# Patient Record
Sex: FEMALE | Race: WHITE | NOT HISPANIC OR LATINO | Employment: OTHER | URBAN - METROPOLITAN AREA
[De-identification: names, ages, dates, MRNs, and addresses within clinical notes are randomized per-mention and may not be internally consistent; named-entity substitution may affect disease eponyms.]

---

## 2017-01-04 ENCOUNTER — OFFICE VISIT (OUTPATIENT)
Dept: OPTOMETRY | Facility: CLINIC | Age: 72
End: 2017-01-04
Payer: MEDICARE

## 2017-01-04 ENCOUNTER — CLINICAL SUPPORT (OUTPATIENT)
Dept: OPHTHALMOLOGY | Facility: CLINIC | Age: 72
End: 2017-01-04
Payer: MEDICARE

## 2017-01-04 ENCOUNTER — HOSPITAL ENCOUNTER (OUTPATIENT)
Dept: RADIOLOGY | Facility: HOSPITAL | Age: 72
Discharge: HOME | End: 2017-01-04
Attending: INTERNAL MEDICINE
Payer: MEDICARE

## 2017-01-04 DIAGNOSIS — H53.462 LEFT HOMONYMOUS HEMIANOPSIA: ICD-10-CM

## 2017-01-04 DIAGNOSIS — H52.4 PRESBYOPIA OU: ICD-10-CM

## 2017-01-04 DIAGNOSIS — E11.9 TYPE 2 DIABETES MELLITUS WITHOUT RETINOPATHY: Primary | ICD-10-CM

## 2017-01-04 DIAGNOSIS — Z12.31 ENCOUNTER FOR SCREENING MAMMOGRAM FOR MALIGNANT NEOPLASM OF BREAST: ICD-10-CM

## 2017-01-04 DIAGNOSIS — Z13.5 SCREENING FOR GLAUCOMA: ICD-10-CM

## 2017-01-04 PROCEDURE — 77067 SCR MAMMO BI INCL CAD: CPT | Mod: 26,,, | Performed by: RADIOLOGY

## 2017-01-04 PROCEDURE — 92015 DETERMINE REFRACTIVE STATE: CPT | Mod: S$GLB,,, | Performed by: OPTOMETRIST

## 2017-01-04 PROCEDURE — 92014 COMPRE OPH EXAM EST PT 1/>: CPT | Mod: S$GLB,,, | Performed by: OPTOMETRIST

## 2017-01-04 PROCEDURE — 92083 EXTENDED VISUAL FIELD XM: CPT | Mod: S$GLB,,, | Performed by: OPTOMETRIST

## 2017-01-04 PROCEDURE — 99999 PR PBB SHADOW E&M-EST. PATIENT-LVL II: CPT | Mod: PBBFAC,,, | Performed by: OPTOMETRIST

## 2017-01-04 NOTE — PROGRESS NOTES
HPI     DLS: 12/29/2015  Pt states no noticeable va changes   Denies f/f    Refresh ou PRN     LBS= 125 this morning   Hemoglobin A1C       Date                     Value               Ref Range             Status                11/28/2016               7.7 (H)             4.5 - 6.2 %           Final                  11/16/2016               7.8 (H)             4.5 - 6.2 %           Final                  07/21/2016               7.3 (H)             4.5 - 6.2 %           Final              ----------         Last edited by Brooklyn Benton on 1/4/2017  1:40 PM.     ROS     Positive for: Endocrine, Cardiovascular, Eyes    Negative for: Constitutional, Gastrointestinal, Neurological, Skin,   Genitourinary, Musculoskeletal, HENT, Respiratory, Psychiatric,   Allergic/Imm, Heme/Lymph    Last edited by Mayco Correa, OD on 1/4/2017  1:54 PM. (History)        Assessment /Plan     For exam results, see Encounter Report.    Type 2 diabetes mellitus without retinopathy    Left homonymous hemianopsia - Both Eyes    Screening for glaucoma    Presbyopia OU      1. Sp pciol/YAG OU--pt wishes new Rx  2. Incomplete left homom hemianopsia sp stroke/pit hx.  VF stable  3. DM--without retinopathy.  Advised yearly exam       Plan:    rtc 1 yr--repeat HVF 24-2 sf in 2 years

## 2017-01-04 NOTE — PROGRESS NOTES
f done-[facility]    Assessment /Plan     For exam results, see Encounter Report.    Left homonymous hemianopsia - Both Eyes  -     [place]man Visual Field - OU - Extended - Both Eyes

## 2017-01-24 DIAGNOSIS — E11.69 DYSLIPIDEMIA ASSOCIATED WITH TYPE 2 DIABETES MELLITUS: ICD-10-CM

## 2017-01-24 DIAGNOSIS — E78.5 DYSLIPIDEMIA ASSOCIATED WITH TYPE 2 DIABETES MELLITUS: ICD-10-CM

## 2017-01-24 DIAGNOSIS — E11.40 TYPE 2 DIABETES MELLITUS WITH DIABETIC NEUROPATHY: ICD-10-CM

## 2017-01-24 DIAGNOSIS — I10 ESSENTIAL HYPERTENSION: ICD-10-CM

## 2017-01-24 RX ORDER — HYDROCHLOROTHIAZIDE 12.5 MG/1
TABLET ORAL
Qty: 30 TABLET | Refills: 0 | Status: SHIPPED | OUTPATIENT
Start: 2017-01-24 | End: 2017-04-12 | Stop reason: SDUPTHER

## 2017-01-27 DIAGNOSIS — E11.40 TYPE 2 DIABETES MELLITUS WITH DIABETIC NEUROPATHY: ICD-10-CM

## 2017-01-27 DIAGNOSIS — I15.2 HYPERTENSION ASSOCIATED WITH DIABETES: Chronic | ICD-10-CM

## 2017-01-27 DIAGNOSIS — E78.5 DYSLIPIDEMIA ASSOCIATED WITH TYPE 2 DIABETES MELLITUS: ICD-10-CM

## 2017-01-27 DIAGNOSIS — E11.69 DYSLIPIDEMIA ASSOCIATED WITH TYPE 2 DIABETES MELLITUS: ICD-10-CM

## 2017-01-27 DIAGNOSIS — I10 ESSENTIAL HYPERTENSION: ICD-10-CM

## 2017-01-27 DIAGNOSIS — E11.59 HYPERTENSION ASSOCIATED WITH DIABETES: Chronic | ICD-10-CM

## 2017-01-27 RX ORDER — HYDROCHLOROTHIAZIDE 12.5 MG/1
TABLET ORAL
Qty: 30 TABLET | Refills: 0 | Status: SHIPPED | OUTPATIENT
Start: 2017-01-27 | End: 2017-03-06 | Stop reason: SDUPTHER

## 2017-01-29 RX ORDER — LISINOPRIL 40 MG/1
TABLET ORAL
Qty: 90 TABLET | Refills: 3 | Status: SHIPPED | OUTPATIENT
Start: 2017-01-29 | End: 2018-02-10 | Stop reason: SDUPTHER

## 2017-01-30 DIAGNOSIS — E78.5 DYSLIPIDEMIA ASSOCIATED WITH TYPE 2 DIABETES MELLITUS: Primary | ICD-10-CM

## 2017-01-30 DIAGNOSIS — E11.69 DYSLIPIDEMIA ASSOCIATED WITH TYPE 2 DIABETES MELLITUS: Primary | ICD-10-CM

## 2017-01-30 RX ORDER — ROSUVASTATIN CALCIUM 10 MG/1
10 TABLET, COATED ORAL DAILY
Qty: 90 TABLET | Refills: 3 | Status: SHIPPED | OUTPATIENT
Start: 2017-01-30 | End: 2018-04-09 | Stop reason: SDUPTHER

## 2017-01-30 RX ORDER — ROSUVASTATIN CALCIUM 10 MG/1
10 TABLET, COATED ORAL DAILY
COMMUNITY
End: 2017-01-30 | Stop reason: SDUPTHER

## 2017-02-06 RX ORDER — FENOFIBRATE 48 MG/1
TABLET, FILM COATED ORAL
Qty: 90 TABLET | Refills: 3 | Status: SHIPPED | OUTPATIENT
Start: 2017-02-06 | End: 2018-03-11 | Stop reason: SDUPTHER

## 2017-02-06 NOTE — TELEPHONE ENCOUNTER
Received incoming fax from [employer] stating that the relion test strips are not covered under the pt's insurance can it be changed to Accu check luisito test strips. Order pending pls advise.

## 2017-03-01 ENCOUNTER — LAB VISIT (OUTPATIENT)
Dept: LAB | Facility: HOSPITAL | Age: 72
End: 2017-03-01
Attending: INTERNAL MEDICINE
Payer: MEDICARE

## 2017-03-01 DIAGNOSIS — E11.51 DIABETES MELLITUS WITH PERIPHERAL VASCULAR DISEASE: ICD-10-CM

## 2017-03-01 PROCEDURE — 83036 HEMOGLOBIN GLYCOSYLATED A1C: CPT

## 2017-03-01 PROCEDURE — 36415 COLL VENOUS BLD VENIPUNCTURE: CPT | Mod: PO

## 2017-03-02 ENCOUNTER — TELEPHONE (OUTPATIENT)
Dept: INTERNAL MEDICINE | Facility: CLINIC | Age: 72
End: 2017-03-02

## 2017-03-02 LAB
ESTIMATED AVG GLUCOSE: 186 MG/DL
HBA1C MFR BLD HPLC: 8.1 %

## 2017-03-03 NOTE — TELEPHONE ENCOUNTER
Spoke with pt, provided her with the number to call and schedule appt b/c her daughter schedules the appt.

## 2017-03-06 DIAGNOSIS — E78.5 DYSLIPIDEMIA ASSOCIATED WITH TYPE 2 DIABETES MELLITUS: ICD-10-CM

## 2017-03-06 DIAGNOSIS — E11.69 DYSLIPIDEMIA ASSOCIATED WITH TYPE 2 DIABETES MELLITUS: ICD-10-CM

## 2017-03-06 DIAGNOSIS — E11.40 TYPE 2 DIABETES MELLITUS WITH DIABETIC NEUROPATHY: ICD-10-CM

## 2017-03-06 DIAGNOSIS — I10 ESSENTIAL HYPERTENSION: ICD-10-CM

## 2017-03-07 RX ORDER — HYDROCHLOROTHIAZIDE 12.5 MG/1
TABLET ORAL
Qty: 30 TABLET | Refills: 0 | Status: SHIPPED | OUTPATIENT
Start: 2017-03-07 | End: 2017-04-12 | Stop reason: SDUPTHER

## 2017-03-08 ENCOUNTER — OFFICE VISIT (OUTPATIENT)
Dept: PODIATRY | Facility: CLINIC | Age: 72
End: 2017-03-08
Payer: MEDICARE

## 2017-03-08 VITALS
SYSTOLIC BLOOD PRESSURE: 163 MMHG | HEART RATE: 62 BPM | DIASTOLIC BLOOD PRESSURE: 65 MMHG | HEIGHT: 59 IN | BODY MASS INDEX: 27.01 KG/M2 | WEIGHT: 134 LBS

## 2017-03-08 DIAGNOSIS — L84 CORN OR CALLUS: ICD-10-CM

## 2017-03-08 DIAGNOSIS — E11.49 TYPE II DIABETES MELLITUS WITH NEUROLOGICAL MANIFESTATIONS: Primary | ICD-10-CM

## 2017-03-08 DIAGNOSIS — B35.1 ONYCHOMYCOSIS DUE TO DERMATOPHYTE: ICD-10-CM

## 2017-03-08 PROCEDURE — 99999 PR PBB SHADOW E&M-EST. PATIENT-LVL II: CPT | Mod: PBBFAC,,, | Performed by: PODIATRIST

## 2017-03-08 PROCEDURE — 99499 UNLISTED E&M SERVICE: CPT | Mod: S$GLB,,, | Performed by: PODIATRIST

## 2017-03-08 PROCEDURE — 11721 DEBRIDE NAIL 6 OR MORE: CPT | Mod: Q9,59,S$GLB, | Performed by: PODIATRIST

## 2017-03-08 PROCEDURE — 11056 PARNG/CUTG B9 HYPRKR LES 2-4: CPT | Mod: Q9,S$GLB,, | Performed by: PODIATRIST

## 2017-03-08 NOTE — PROGRESS NOTES
Subjective:      Patient ID: Marlen Arndt is a 71 y.o. female.    Chief Complaint: No chief complaint on file.    Marlen is a 71 y.o. female who presents to the clinic for evaluation and treatment of high risk feet. 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 The patient's chief complaint is long, thick toenails .   This patient has documented high risk feet requiring routine maintenance secondary to diabetes mellitis and those secondary complications of diabetes, as mentioned..    PCP: Dominique Rendon MD  11/30/16  Date Last Seen by PCP:  No chief complaint on file.      Current shoe gear:  sandals    Hemoglobin A1C   Date Value Ref Range Status   03/01/2017 8.1 (H) 4.5 - 6.2 % Final     Comment:     According to ADA guidelines, hemoglobin A1C <7.0% represents  optimal control in non-pregnant diabetic patients.  Different  metrics may apply to specific populations.   Standards of Medical Care in Diabetes - 2016.  For the purpose of screening for the presence of diabetes:  <5.7%     Consistent with the absence of diabetes  5.7-6.4%  Consistent with increasing risk for diabetes   (prediabetes)  >or=6.5%  Consistent with diabetes  Currently no consensus exists for use of hemoglobin A1C  for diagnosis of diabetes for  children.     11/28/2016 7.7 (H) 4.5 - 6.2 % Final     Comment:     According to ADA guidelines, hemoglobin A1C <7.0% represents  optimal control in non-pregnant diabetic patients.  Different  metrics may apply to specific populations.   Standards of Medical Care in Diabetes - 2016.  For the purpose of screening for the presence of diabetes:  <5.7%     Consistent with the absence of diabetes  5.7-6.4%  Consistent with increasing risk for diabetes   (prediabetes)  >or=6.5%  Consistent with diabetes  Currently no consensus exists for use of hemoglobin A1C  for diagnosis of diabetes for children.     11/16/2016 7.8 (H) 4.5 - 6.2 % Final     Comment:     According to ADA guidelines, hemoglobin A1C <7.0% represents  optimal control in non-pregnant diabetic patients.  Different  metrics may apply to specific populations.   Standards of Medical Care in Diabetes - 2016.  For the purpose of screening for the presence of diabetes:  <5.7%     Consistent with the absence of diabetes  5.7-6.4%  Consistent with increasing risk for diabetes   (prediabetes)  >or=6.5%  Consistent with diabetes  Currently no consensus exists for use of hemoglobin A1C  for diagnosis of diabetes for children.         Review of Systems   Constitution: Negative for chills, decreased appetite and fever.   Cardiovascular: Negative for chest pain, claudication and leg swelling.   Respiratory: Negative for cough.    Skin: Positive for dry skin and nail changes. Negative for flushing, itching and poor wound healing.   Musculoskeletal: Negative for arthritis, back pain, falls, gout, joint swelling and myalgias.   Gastrointestinal: Negative for nausea and vomiting.   Neurological: Positive for numbness. Negative for loss of balance and paresthesias.           Objective:      Physical Exam   Constitutional: She is oriented to person, place, and time. She appears well-developed and well-nourished. No distress.   Cardiovascular:   Pulses:       Dorsalis pedis  pulses are 1+ on the right side, and 1+ on the left side.        Posterior tibial pulses are 1+ on the right side, and 1+ on the left side.   Toes are cool to touch. Feet are warm proximally.There is decreased digital hair. Skin is atrophic, slightly hyperpigmented, and mildly edematous       Musculoskeletal: Normal range of motion. She exhibits no edema or tenderness.   Adequate joint range of motion without pain, limitation, nor crepitation Bilateral feet and ankle joints. Muscle strength is 5/5 in all groups bilaterally.         Neurological: She is alert and oriented to person, place, and time.   [place] 5.07 monofilamant testing is diminished Carlos feet. Sharp/dull sensation diminished Bilaterally. Light touch absent Bilaterally.       Skin: Skin is warm, dry and intact. No abrasion, no bruising, no burn, no ecchymosis and no lesion noted. No erythema.   Nails x 10  are elongated by  2-7mm's, thickened by 2-4 mm's, dystrophic, and are darkened in  coloration . Xerosis Bilaterally. No open lesions noted.    Hyperkeratotic tissue noted to sub 1st mpj left and plantar medial 1st hallux IPJ.    Psychiatric: She has a normal mood and affect. Her behavior is normal.   Nursing note and vitals reviewed.            Assessment:       Encounter Diagnoses   Name Primary?    Type II diabetes mellitus with neurological manifestations Yes    Corn or callus     Onychomycosis due to dermatophyte          Plan:       Diagnoses and all orders for this visit:    Type II diabetes mellitus with neurological manifestations    Corn or callus    Onychomycosis due to dermatophyte      I counseled the patient on her conditions, their implications and medical management.        - Shoe inspection. Diabetic Foot Education. Patient reminded of the importance of good nutrition and blood sugar control to help prevent podiatric complications of diabetes. Patient instructed on proper foot hygeine. We discussed wearing proper shoe gear,  daily foot inspections, never walking without protective shoe gear, never putting sharp instruments to feet, routine podiatric nail visits every 2-3 months.      - With patient's permission, nails were aggressively reduced and debrided x 10 to their soft tissue attachment mechanically and with electric , removing all offending nail and debris. Patient relates relief following the procedure. She will continue to monitor the areas daily, inspect her feet, wear protective shoe gear when ambulatory, moisturizer to maintain skin integrity and follow in this office in approximately 2-3 months, sooner p.r.n.    - After cleansing the  area w/ alcohol prep pad the above mentioned hyperkeratosis was trimmed utilizing No 15 scapel, to a smooth base with out incident. Patient tolerated this  well and reported comfort to the area of sub 1st MPJ left and left hallux plantar medial IPJ.

## 2017-03-27 ENCOUNTER — TELEPHONE (OUTPATIENT)
Dept: CARDIOLOGY | Facility: CLINIC | Age: 72
End: 2017-03-27

## 2017-03-27 ENCOUNTER — TELEPHONE (OUTPATIENT)
Dept: INTERNAL MEDICINE | Facility: CLINIC | Age: 72
End: 2017-03-27

## 2017-03-27 NOTE — TELEPHONE ENCOUNTER
----- Message from Hayden King MA sent at 3/27/2017  2:22 PM CDT -----  Contact: self - [phone]   Patient is requesting to speak with Dr Rendon regarding a Jury Duty letter. Please call. Thanks!

## 2017-03-27 NOTE — TELEPHONE ENCOUNTER
Pt would like to know if you can write her a letter stating that she should not be on jury duty 4/26/17. Pt stated that she has had several strokes and does not think she would be able to be on jury duty. Pt stated that you wrote a letter for her years ago so that she wouldn't have to be on jury duty. Please advise.

## 2017-03-27 NOTE — TELEPHONE ENCOUNTER
----- Message from Mona Long sent at 3/27/2017 11:04 AM CDT -----  Contact: self   Pt has requested a call to get information NOT to attend jury duty. Pt call back number is [phone].        Thanks

## 2017-03-27 NOTE — LETTER
[facility]  [address]  [place] [address]  Phone: [phone]  Fax: [phone] March 29, 2017    Marlen Arndt  [address] [place] [address]      To Whom It May Concern:    Marlen Arndt is unable to participate in jury duty due to chronic medical illness.  She will never be able to serve as a juror.    If you have any questions or concerns, please feel free to call my office.    Sincerely,        Dominique Rendon MD

## 2017-03-28 NOTE — TELEPHONE ENCOUNTER
Spoke with pt, she stated she has memory problems and forgets a lot the provider is aware of that.

## 2017-03-29 ENCOUNTER — TELEPHONE (OUTPATIENT)
Dept: INTERNAL MEDICINE | Facility: CLINIC | Age: 72
End: 2017-03-29

## 2017-03-29 NOTE — TELEPHONE ENCOUNTER
----- Message from Jade Manuel sent at 3/29/2017  1:33 PM CDT -----  Contact: Son/Alexi/[phone] home/[phone] cell  Pt's son said that he is calling in regards to needing to get a letter excusing pt from jury duty he stated that pt has had several strokes and has memory problems also pt does not drive so he is asking if the doctor could write a letter for the pt. Please call and advise            Thank you

## 2017-04-10 NOTE — TELEPHONE ENCOUNTER
----- Message from Daniel London sent at 4/10/2017 12:20 PM CDT -----  Contact: [facility] at [phone]   Pharmacy said pt's script blood sugar diagnostic (ACCU-CHEK TYLER) Strp is not covered by insurance.    Pharmacy is requesting New Scripts for pt's meter, test strips and lancets:  [employer] brands are covered by pt's insurance.

## 2017-04-11 RX ORDER — BLOOD-GLUCOSE CONTROL, NORMAL
2 EACH MISCELLANEOUS 2 TIMES DAILY
Qty: 100 EACH | Refills: 3 | Status: SHIPPED | OUTPATIENT
Start: 2017-04-11 | End: 2017-05-26

## 2017-04-11 RX ORDER — DEXTROSE 4 G
2 TABLET,CHEWABLE ORAL 2 TIMES DAILY
Qty: 1 EACH | Refills: 0 | Status: SHIPPED | OUTPATIENT
Start: 2017-04-11 | End: 2017-06-27 | Stop reason: SDUPTHER

## 2017-04-11 NOTE — TELEPHONE ENCOUNTER
----- Message from Omid Humphries sent at 4/11/2017  9:32 AM CDT -----  Contact: Lisa [phone]  Pharmacy is requesting New Scripts for pt's meter, test strips and lancets:  [employer] brands are covered by pt's insurance. Please advise, Thanks !

## 2017-04-12 ENCOUNTER — TELEPHONE (OUTPATIENT)
Dept: INTERNAL MEDICINE | Facility: CLINIC | Age: 72
End: 2017-04-12

## 2017-04-12 ENCOUNTER — OFFICE VISIT (OUTPATIENT)
Dept: INTERNAL MEDICINE | Facility: CLINIC | Age: 72
End: 2017-04-12
Payer: MEDICARE

## 2017-04-12 VITALS
HEIGHT: 59 IN | OXYGEN SATURATION: 96 % | HEART RATE: 61 BPM | SYSTOLIC BLOOD PRESSURE: 130 MMHG | BODY MASS INDEX: 27.29 KG/M2 | WEIGHT: 135.38 LBS | DIASTOLIC BLOOD PRESSURE: 51 MMHG

## 2017-04-12 DIAGNOSIS — E11.69 TYPE 2 DIABETES MELLITUS WITH HYPERLIPIDEMIA: ICD-10-CM

## 2017-04-12 DIAGNOSIS — J32.9 BACTERIAL SINUSITIS: ICD-10-CM

## 2017-04-12 DIAGNOSIS — E11.51 DIABETES MELLITUS WITH PERIPHERAL VASCULAR DISEASE: Chronic | ICD-10-CM

## 2017-04-12 DIAGNOSIS — E78.5 DYSLIPIDEMIA ASSOCIATED WITH TYPE 2 DIABETES MELLITUS: Primary | ICD-10-CM

## 2017-04-12 DIAGNOSIS — B96.89 BACTERIAL SINUSITIS: ICD-10-CM

## 2017-04-12 DIAGNOSIS — E11.40 TYPE 2 DIABETES MELLITUS WITH DIABETIC NEUROPATHY, WITH LONG-TERM CURRENT USE OF INSULIN: ICD-10-CM

## 2017-04-12 DIAGNOSIS — I10 ESSENTIAL HYPERTENSION: ICD-10-CM

## 2017-04-12 DIAGNOSIS — E11.69 DYSLIPIDEMIA ASSOCIATED WITH TYPE 2 DIABETES MELLITUS: Primary | ICD-10-CM

## 2017-04-12 DIAGNOSIS — E78.5 TYPE 2 DIABETES MELLITUS WITH HYPERLIPIDEMIA: ICD-10-CM

## 2017-04-12 DIAGNOSIS — Z79.4 TYPE 2 DIABETES MELLITUS WITH DIABETIC NEUROPATHY, WITH LONG-TERM CURRENT USE OF INSULIN: ICD-10-CM

## 2017-04-12 PROCEDURE — 99999 PR PBB SHADOW E&M-EST. PATIENT-LVL V: CPT | Mod: PBBFAC,,, | Performed by: INTERNAL MEDICINE

## 2017-04-12 PROCEDURE — 3045F PR MOST RECENT HEMOGLOBIN A1C LEVEL 7.0-9.0%: CPT | Mod: S$GLB,,, | Performed by: INTERNAL MEDICINE

## 2017-04-12 PROCEDURE — 4010F ACE/ARB THERAPY RXD/TAKEN: CPT | Mod: S$GLB,,, | Performed by: INTERNAL MEDICINE

## 2017-04-12 PROCEDURE — 99214 OFFICE O/P EST MOD 30 MIN: CPT | Mod: S$GLB,,, | Performed by: INTERNAL MEDICINE

## 2017-04-12 PROCEDURE — 1159F MED LIST DOCD IN RCRD: CPT | Mod: S$GLB,,, | Performed by: INTERNAL MEDICINE

## 2017-04-12 PROCEDURE — 3078F DIAST BP <80 MM HG: CPT | Mod: S$GLB,,, | Performed by: INTERNAL MEDICINE

## 2017-04-12 PROCEDURE — 1160F RVW MEDS BY RX/DR IN RCRD: CPT | Mod: S$GLB,,, | Performed by: INTERNAL MEDICINE

## 2017-04-12 PROCEDURE — 1157F ADVNC CARE PLAN IN RCRD: CPT | Mod: S$GLB,,, | Performed by: INTERNAL MEDICINE

## 2017-04-12 PROCEDURE — 3075F SYST BP GE 130 - 139MM HG: CPT | Mod: S$GLB,,, | Performed by: INTERNAL MEDICINE

## 2017-04-12 PROCEDURE — 99499 UNLISTED E&M SERVICE: CPT | Mod: S$GLB,,, | Performed by: INTERNAL MEDICINE

## 2017-04-12 PROCEDURE — 1126F AMNT PAIN NOTED NONE PRSNT: CPT | Mod: S$GLB,,, | Performed by: INTERNAL MEDICINE

## 2017-04-12 RX ORDER — FLUTICASONE PROPIONATE 50 MCG
2 SPRAY, SUSPENSION (ML) NASAL DAILY
Qty: 16 G | Refills: 12 | Status: SHIPPED | OUTPATIENT
Start: 2017-04-12 | End: 2017-05-12

## 2017-04-12 RX ORDER — HYDROCHLOROTHIAZIDE 12.5 MG/1
12.5 TABLET ORAL DAILY
Qty: 90 TABLET | Refills: 3 | Status: SHIPPED | OUTPATIENT
Start: 2017-04-12 | End: 2018-01-29

## 2017-04-12 RX ORDER — DOXYCYCLINE 100 MG/1
100 CAPSULE ORAL 2 TIMES DAILY
Qty: 20 CAPSULE | Refills: 0 | Status: SHIPPED | OUTPATIENT
Start: 2017-04-12 | End: 2017-05-15 | Stop reason: SDUPTHER

## 2017-04-12 NOTE — TELEPHONE ENCOUNTER
----- Message from Gladys Rodriguez sent at 4/12/2017 10:04 AM CDT -----  Contact: [facility]  [facility] pharmacy is calling to clarify the prescription for the lancets and the strips.  It was written as use two twice a day, which is unusual.      [address] [place] [place] [address] [phone] (Phone) [phone] (Fax)    Thanks!

## 2017-04-13 ENCOUNTER — TELEPHONE (OUTPATIENT)
Dept: INTERNAL MEDICINE | Facility: CLINIC | Age: 72
End: 2017-04-13

## 2017-05-09 ENCOUNTER — TELEPHONE (OUTPATIENT)
Dept: INTERNAL MEDICINE | Facility: CLINIC | Age: 72
End: 2017-05-09

## 2017-05-15 RX ORDER — DOXYCYCLINE 100 MG/1
CAPSULE ORAL
Qty: 20 CAPSULE | Refills: 0 | Status: SHIPPED | OUTPATIENT
Start: 2017-05-15 | End: 2017-07-03 | Stop reason: ALTCHOICE

## 2017-05-15 NOTE — TELEPHONE ENCOUNTER
Pt stated that she is still coughing without relief for nearly 1 month. Pt stated that this is just a continuation of her previous illness (bacterial sinusitis), and she felt as though she needed to extend the amount of time on the Doxy. She has used cough syrup (Tussin DM) without relief. Pt denies fever. Please advise.

## 2017-05-17 ENCOUNTER — CLINICAL SUPPORT (OUTPATIENT)
Dept: DIABETES | Facility: CLINIC | Age: 72
End: 2017-05-17
Payer: MEDICARE

## 2017-05-17 DIAGNOSIS — Z79.4 UNCONTROLLED TYPE 2 DIABETES MELLITUS WITH HYPERGLYCEMIA, WITH LONG-TERM CURRENT USE OF INSULIN: Primary | ICD-10-CM

## 2017-05-17 DIAGNOSIS — Z79.4 TYPE 2 DIABETES MELLITUS WITH DIABETIC NEUROPATHY, WITH LONG-TERM CURRENT USE OF INSULIN: ICD-10-CM

## 2017-05-17 DIAGNOSIS — E11.65 UNCONTROLLED TYPE 2 DIABETES MELLITUS WITH HYPERGLYCEMIA, WITH LONG-TERM CURRENT USE OF INSULIN: Primary | ICD-10-CM

## 2017-05-17 DIAGNOSIS — E11.40 TYPE 2 DIABETES MELLITUS WITH DIABETIC NEUROPATHY, WITH LONG-TERM CURRENT USE OF INSULIN: ICD-10-CM

## 2017-05-17 PROCEDURE — G0108 DIAB MANAGE TRN  PER INDIV: HCPCS | Mod: S$GLB,,, | Performed by: DIETITIAN, REGISTERED

## 2017-05-17 NOTE — PROGRESS NOTES
"Diabetes Education  Author: Nona Rabago RD  Date: 5/17/2017    Diabetes Education Visit  Diabetes Education Record Assessment/Progress: Initial    Diabetes Type  Diabetes Type : Type II    Diabetes History  Diabetes Diagnosis: >10 years    Nutrition  Meal Planning: skipping meals, water, diet drinks, artificial sweeteners    Monitoring   Self Monitoring : reports SMBG 4 times daily - brought log but only 2x/day written down - am fasting ranging 110s-200s and HS ranging 90s-160s  Blood Glucose Logs: Yes    Exercise   Exercise Type:  (not currently exercising)    Current Diabetes Treatment   Current Treatment: Insulin (Novolog 3-5 units before each meal TID, Levemir 12 units every evening)    Social History  Preferred Learning Method: Face to Face, Reading Materials  Primary Support: Self, Daughter, Spouse (daughter attended visit with her today)  Smoking Status: Ex Smoker                             Barriers to Change  Barriers to Change: None  Learning Challenges : None    Readiness to Learn   Readiness to Learn : Acceptance    Cultural Influences  Cultural Influences: No    Diabetes Education Assessment/Progress    Acute Complications (preventing, detecting, and treating acute complications): Discussion, Instructed, Competent (verbalizes/demonstrates), Written Materials Provided, Individual Session, Competent Family/SO (Had severe hypo after heart surgery ~ 1 year ago. Reports glucagon Rx was too expensive but keeps glucose gel at bedside for treatment. Has not had any recent hypos. Reviewed appropriate treatment with "rule of 15.")    Chronic Complications (preventing, detecting, and treating chronic complications): Discussion, Instructed, Competent (verbalizes/demonstrates), Written Materials Provided, Individual Session (Reports up to date on eye exam. Reviewed care schedule and home foot care guidelines. )    Diabetes Disease Process (diabetes disease process and treatment options): Discussion, Instructed, " Competent (verbalizes/demonstrates), Written Materials Provided, Individual Session    Nutrition (Incorporating nutritional management into one's lifestyle): Discussion, Instructed, Competent (verbalizes/demonstrates), Written Materials Provided, Individual Session (Shows good understanding of sources of CHO and label reading. Reviewed avoid skipping meals, timing/spacing of meals, serving sizes, and rec'd eating pattern with 30-45g CHO/meal, 3 meals daily and limiting most snacks to 0-5g CHO. Advised if BG <100 HS, have 15g CHO snack + protein.)    Physical Activity (incorporating physical activity into one's lifestyle): Discussion, Instructed, Competent (verbalizes/demonstrates), Written Materials Provided, Individual Session (Reviewed benefits and goals of physical activity. )    Medications (states correct name, dose, onset, peak, duration, side effects & timing of meds): Discussion, Instructed, Competent (verbalizes/demonstrates), Written Materials Provided, Individual Session (Reviewed timing, dosage, and MOA of Novolog and Levemir. Denies missed doses. Has alarm on watch set for Levemir dose. Reports appropriate self-admin. But has been self-adjusting insulin since regaining appetite after heart surgery and BG trending up. )    Monitoring (monitoring blood glucose/other parameters & using results): Discussion, Instructed, Competent (verbalizes/demonstrates), Written Materials Provided, Individual Session (Reviewed SMBG 4 times daily, AC/HS and advised to bring log to empowerment appt. Provided log sheet. )    Goal Setting and Problem Solving (verbalizes behavior change strategies & sets realistic goals): Discussion, Individual Session    Behavior Change (developing personal strategies to health & behavior change): Discussion, Individual Session    Goals  Physical Activity: Set (Will start with exercising at least 2 days per week.)  Start Date: 05/17/17  Target Date: 08/17/17         Diabetes Care  Plan/Intervention  Education Plan/Intervention: Diabetes Empowerment Program (Empowerment appt scheduled for June 15th (1 month out d/t pt's schedule - granddaughter's wedding is June 9th). )    Diabetes Meal Plan  Restrictions: Restricted Carbohydrate, Low Sodium, Low Fat  Carbohydrate Per Meal: 30-45g    Education Units of Time   Time Spent: 45 min      Health Maintenance Topics with due status: Not Due       Topic Last Completion Date    Colonoscopy 04/24/2009    TETANUS VACCINE 10/10/2013    DEXA SCAN 06/29/2015    Lipid Panel 11/16/2016    Influenza Vaccine 11/30/2016    Mammogram 01/04/2017    Eye Exam 01/04/2017    Hemoglobin A1c 03/01/2017    Foot Exam 03/08/2017     There are no preventive care reminders to display for this patient.

## 2017-05-26 ENCOUNTER — PATIENT OUTREACH (OUTPATIENT)
Dept: DIABETES | Facility: CLINIC | Age: 72
End: 2017-05-26

## 2017-05-26 DIAGNOSIS — E11.65 UNCONTROLLED TYPE 2 DIABETES MELLITUS WITH HYPERGLYCEMIA, WITH LONG-TERM CURRENT USE OF INSULIN: Primary | ICD-10-CM

## 2017-05-26 DIAGNOSIS — Z79.4 UNCONTROLLED TYPE 2 DIABETES MELLITUS WITH HYPERGLYCEMIA, WITH LONG-TERM CURRENT USE OF INSULIN: Primary | ICD-10-CM

## 2017-05-26 RX ORDER — CALCIUM CITRATE/VITAMIN D3 200MG-6.25
1 TABLET ORAL 4 TIMES DAILY
Qty: 120 STRIP | Refills: 11 | Status: SHIPPED | OUTPATIENT
Start: 2017-05-26 | End: 2017-06-28 | Stop reason: SDUPTHER

## 2017-05-26 RX ORDER — BLOOD-GLUCOSE CONTROL, NORMAL
1 EACH MISCELLANEOUS 4 TIMES DAILY
Qty: 120 EACH | Refills: 11 | Status: SHIPPED | OUTPATIENT
Start: 2017-05-26 | End: 2019-12-16

## 2017-05-26 NOTE — PROGRESS NOTES
Received voicemail from pt regarding needs new Rx for [employer] testing supplies. Attempted to call pt back x2 with no answer and no voice mailbox available. Sent Rx for testing supplies to Dr. Rendon, pt's PCP.

## 2017-05-30 RX ORDER — PHENOBARBITAL 64.8 MG/1
TABLET ORAL
Qty: 90 TABLET | Refills: 3 | Status: SHIPPED | OUTPATIENT
Start: 2017-05-30 | End: 2019-12-16

## 2017-06-05 ENCOUNTER — TELEPHONE (OUTPATIENT)
Dept: INTERNAL MEDICINE | Facility: CLINIC | Age: 72
End: 2017-06-05

## 2017-06-05 NOTE — TELEPHONE ENCOUNTER
----- Message from Isa Orourke sent at 6/5/2017  1:39 PM CDT -----  Contact: call [phone] cell [phone]  Pt is calling to check on status of paper work she dropped off last week for her  for the [facility]

## 2017-06-15 ENCOUNTER — CLINICAL SUPPORT (OUTPATIENT)
Dept: DIABETES | Facility: CLINIC | Age: 72
End: 2017-06-15
Payer: MEDICARE

## 2017-06-15 VITALS
SYSTOLIC BLOOD PRESSURE: 132 MMHG | WEIGHT: 128.75 LBS | BODY MASS INDEX: 25.96 KG/M2 | DIASTOLIC BLOOD PRESSURE: 74 MMHG | HEART RATE: 67 BPM | HEIGHT: 59 IN

## 2017-06-15 DIAGNOSIS — E11.51 TYPE 2 DIABETES MELLITUS WITH DIABETIC PERIPHERAL ANGIOPATHY WITHOUT GANGRENE, WITH LONG-TERM CURRENT USE OF INSULIN: Chronic | ICD-10-CM

## 2017-06-15 DIAGNOSIS — Z95.2 S/P AVR (AORTIC VALVE REPLACEMENT): ICD-10-CM

## 2017-06-15 DIAGNOSIS — E11.69 DYSLIPIDEMIA ASSOCIATED WITH TYPE 2 DIABETES MELLITUS: ICD-10-CM

## 2017-06-15 DIAGNOSIS — I73.9 PVD (PERIPHERAL VASCULAR DISEASE): Chronic | ICD-10-CM

## 2017-06-15 DIAGNOSIS — R41.3 POOR SHORT TERM MEMORY: ICD-10-CM

## 2017-06-15 DIAGNOSIS — E11.40 TYPE 2 DIABETES MELLITUS WITH DIABETIC NEUROPATHY, WITH LONG-TERM CURRENT USE OF INSULIN: Primary | ICD-10-CM

## 2017-06-15 DIAGNOSIS — I35.9 AORTIC VALVE DISEASE: ICD-10-CM

## 2017-06-15 DIAGNOSIS — E11.42 TYPE 2 DIABETES MELLITUS WITH POLYNEUROPATHY: Chronic | ICD-10-CM

## 2017-06-15 DIAGNOSIS — Z79.4 TYPE 2 DIABETES MELLITUS WITH DIABETIC PERIPHERAL ANGIOPATHY WITHOUT GANGRENE, WITH LONG-TERM CURRENT USE OF INSULIN: Chronic | ICD-10-CM

## 2017-06-15 DIAGNOSIS — E11.59 HYPERTENSION ASSOCIATED WITH DIABETES: Chronic | ICD-10-CM

## 2017-06-15 DIAGNOSIS — I25.10 CORONARY ARTERY DISEASE INVOLVING NATIVE CORONARY ARTERY OF NATIVE HEART WITHOUT ANGINA PECTORIS: Chronic | ICD-10-CM

## 2017-06-15 DIAGNOSIS — Z79.4 TYPE 2 DIABETES MELLITUS WITH DIABETIC NEUROPATHY, WITH LONG-TERM CURRENT USE OF INSULIN: Primary | ICD-10-CM

## 2017-06-15 DIAGNOSIS — I15.2 HYPERTENSION ASSOCIATED WITH DIABETES: Chronic | ICD-10-CM

## 2017-06-15 DIAGNOSIS — E78.5 DYSLIPIDEMIA ASSOCIATED WITH TYPE 2 DIABETES MELLITUS: ICD-10-CM

## 2017-06-15 PROCEDURE — 99999 PR PBB SHADOW E&M-EST. PATIENT-LVL V: CPT | Mod: PBBFAC,,,

## 2017-06-15 PROCEDURE — 99214 OFFICE O/P EST MOD 30 MIN: CPT | Mod: S$GLB,,, | Performed by: NURSE PRACTITIONER

## 2017-06-15 PROCEDURE — 99499 UNLISTED E&M SERVICE: CPT | Mod: S$GLB,,, | Performed by: NURSE PRACTITIONER

## 2017-06-15 RX ORDER — INSULIN ASPART 100 [IU]/ML
3 INJECTION, SOLUTION INTRAVENOUS; SUBCUTANEOUS
Qty: 1 BOX | Refills: 3
Start: 2017-06-15 | End: 2017-07-16

## 2017-06-15 NOTE — PROGRESS NOTES
CC:  Diabetes.     HPI: June B Yris is a 71 y.o. female presents for visit in [facility]. The patient has had diabetes along with associated comorbidities indicated in the Visit Diagnosis section of this encounter. The patient was diagnosed with Type 2 diabetes in >30 years ago. Has been on insulin since diabetes diagnosis.     VISIT #: 1    Of note: Reports diabetes well controlled until 2015, sciatica uncontrolled with significant weight loss and loss of muscle tone. Once sciatica improved, insulin doses decreased due to improved BG and hypoglycemia, multiple episodes of hypoglycemia. Now BG increasing once diet and appetite improved but insulin doses have not been increased    Also has history of carotid artery disease and CVA, followed by Dr. Isatu Ziegler    1st visit - Today, presents with daughter, forgot BG logs at home. Per pt and daughter report, patient is very conscious of what she eats, measures everything, mindful of carbs and meal sizes but does not carb count.       DIABETES COMPLICATIONS: peripheral neuropathy and cerebrovascular disease     STANDARDS of CARE:  Statin:  Yes - lipitor   ACEI or ARB:  Yes - lisinopril   ASA:  Yes - 81 mg   Last eye exam 1/2017 no retinopathy   Microalbumin/Creatinine ratio:  Lab Results   Component Value Date    MICALBCREAT 20.0 02/11/2015       CURRENT A1C:    Hemoglobin A1C   Date Value Ref Range Status   03/01/2017 8.1 (H) 4.5 - 6.2 % Final     Comment:     According to ADA guidelines, hemoglobin A1C <7.0% represents  optimal control in non-pregnant diabetic patients.  Different  metrics may apply to specific populations.   Standards of Medical Care in Diabetes - 2016.  For the purpose of screening for the presence of diabetes:  <5.7%     Consistent with the absence of diabetes  5.7-6.4%  Consistent with increasing risk for diabetes   (prediabetes)  >or=6.5%  Consistent with diabetes  Currently no consensus exists for use of hemoglobin  "A1C  for diagnosis of diabetes for children.     11/28/2016 7.7 (H) 4.5 - 6.2 % Final     Comment:     According to ADA guidelines, hemoglobin A1C <7.0% represents  optimal control in non-pregnant diabetic patients.  Different  metrics may apply to specific populations.   Standards of Medical Care in Diabetes - 2016.  For the purpose of screening for the presence of diabetes:  <5.7%     Consistent with the absence of diabetes  5.7-6.4%  Consistent with increasing risk for diabetes   (prediabetes)  >or=6.5%  Consistent with diabetes  Currently no consensus exists for use of hemoglobin A1C  for diagnosis of diabetes for children.     11/16/2016 7.8 (H) 4.5 - 6.2 % Final     Comment:     According to ADA guidelines, hemoglobin A1C <7.0% represents  optimal control in non-pregnant diabetic patients.  Different  metrics may apply to specific populations.   Standards of Medical Care in Diabetes - 2016.  For the purpose of screening for the presence of diabetes:  <5.7%     Consistent with the absence of diabetes  5.7-6.4%  Consistent with increasing risk for diabetes   (prediabetes)  >or=6.5%  Consistent with diabetes  Currently no consensus exists for use of hemoglobin A1C  for diagnosis of diabetes for children.         GOAL A1C: <7%    DM MEDICATIONS USED IN THE PAST: Metformin, Novolog, Levemir     MEDICATIONS UPON START OF PROGRAM:  Metformin 1000 mg BID, Levemir 12 units nightly, Novolog 3-5 units AC  Denies missing any doses of medication     CURRENT DIABETES MEDICATIONS: Metformin 1000 mg BID, Levemir 12 units nightly, Novolog 3-5 units AC  Denies missing any doses of medication   Does hold Novolog if she does not eat any carbs  Estimating/adjusting Novolog dose "based on what she eats" - although vague about what determines different dosing     DO YOU USE THE [facility] EMAIL SYSTEM? Yes    BLOOD GLUCOSE MONITORING:  No logs, only checking twice daily so unsure of prandial excursions after meals     HYPOGLYCEMIA:  " "No    MEALS:   Eating 3 meals     Snacking in between meals intermittently on trail mix or goldfish, detour bars    Drinking water and diet drinks     CURRENT EXERCISE:  No    OCCUPATION: retired     MEDICATIONS, ALLERGIES, LABS, VS's, MEDICAL, SURGICAL, SOCIAL, AND FAMILY HISTORY reviewed and updated in the appropriate sections during this encounter    ROS:     Constitutional: Negative for weight change  Endocrine:  Denies polyuria, polydipsia, nocturia.  HENT: Denies neck swelling, lumps, horseness or trouble swallowing. Denies any personal or family history of thyroid cancer.    Eyes: + intermittent blurry vision   Respiratory: Negative for cough or shortness or breath.  Cardiovascular: Negative for chest pain or claudication.   Gastrointestinal: Negative for nausea, diarrhea, vomiting, bloating.  No history of pancreatitis or gastroparesis.  Genitourinary: Negative for urgency, frequency, frequent urinary tract infections.  Skin: Denies prolonged wound healing.  Neurological: Negative for syncope, + numbness/burning of extremities.  Psychiatric/Behavioral: Negative for depression or anxiety      All other systems reviewed and are negative.    PE:  Constitutional: /74 (BP Location: Right arm, Patient Position: Sitting, BP Method: Manual)   Pulse 67   Ht 4' 11" (1.499 m)   Wt 58.4 kg (128 lb 12 oz)   BMI 26.00 kg/m²    Well developed, well nourished, NAD.    HENT:   External ears, nose: no masses. No significant findings.   Hearing: normal     Neck:  No trachial deviation present, No neck masses noticed   Thyroid:  No thyromegaly present.  No thyroid tenderness.    + right and left carotid bruit (chronic per last cardiology note)     Cardiovascular:    Auscultation:  Harsh systolic murmur +2/6, radiates to right neck (chronic aortic valve disease, followed by cardiology Dr. Smith)    Lower extremities:  No edema or cyanosis.     Respiratory:    Effort:  Normal, no use of accessory " muscles.   Auscultation: breath sounds normal, no adventitious sounds.  Abdomen:     Exam:  Soft, non-tender, no masses.      No hernia noted.  Skin:    Inspection: no rashes, lesion or ulcers, no acanthosis nigracans.   Palpation: no induration or nodules.    Insulin injection sites: no lipoatrophy or lipohypertrophy.  Psychiatric:  Judgement and insight good with normal mood and affect.  Musculoskeletal:  Gait steady. No gross abnormalities.        FOOT EXAMINATION:   Protective Sensation (w/ 10 gram monofilament):  Right: Intact  Left: Intact    Visual Inspection:  Callus -  Bilateral heels    Pedal Pulses:   Right: Present  Left: Present    Posterior tibialis:   Right:Present  Left: Present      Decreased vibratory response to 128 Hz tuning fork bilaterally.     ______________________________________________________________________     ASSESSMENT and PLAN:    1- Type 2 diabetes with neuropathy, PAD, cerebrovascular disease and hyperglycemia - A1c elevated, to be repeated by PCP in 2 weeks. No BG logs in visit today, unable to make any changes. Reviewed A1c and BG goals.  Discussed diagnosis of DM, progression of disease, long term complications and tx options, including adjustment of MDI versus other treatment modalities such as DPP-4.   Until CGMS, continue Metformin 1000 mg BID, Levemir 12 units nightly, Novolog 3 units AC.     ADDENDUM 6/21/17 Cpeptide WNL 1.77.      Instructed to monitor BG ac/hs, document on BG logs, and bring complete BG logs to all visits.     CGMS in 2-3 weeks  Empowerment f/u 1 week after CGMS off   Cpeptide and random glucose labs to assess intrinsic insulin production   MAC needed at next visit     2- Peripheral neuropathy - Educated patient to check feet daily for any foreign objects and/or wounds. Discussed with patient the importance of wearing appropriate footwear at all times, not to walk barefoot ever, and to check shoes before putting them on feet. Instructed patient to keep  feet dry by regularly changing shoes and socks and drying feet after baths and exercises. Also, instructed patient to report any new lesions, discolorations, or swelling to a healthcare professional.    3 - CAD, aortic valve disease, s/p AVR - followed by cardiology Dr. Ziegler    4- Hypertension - goal < 140/90 mmHg -  At goal, on lisinopril, continue current medications   BP Readings from Last 3 Encounters:   06/15/17 132/74   04/12/17 (!) 130/51   03/08/17 (!) 163/65     5- Hyperlipidemia -  LFT's WNL. On lipitor, if LDL remains >70 at next visit, consider increase in Lipitor dose    Lab Results   Component Value Date    LDLCALC 77.2 11/16/2016     6- Body mass index is 26 kg/m². = Overweight. Modest weight loss (5-10%) may greatly improve BG control     7 - Poor short term memory - family helping patient arrange medications, not affecting DM care

## 2017-06-15 NOTE — Clinical Note
Marlen Arndt attended [facility] today and was continued on her current medication regimen until a continuous glucose monitor can be completed.   Marlen Arndt will be seen back in 3 weeks for a f/u with labs (to assess intrinsic insulin production). Please let me know if I can be of any assistance. Thank you for allowing me to participate in Marlen Arndt 's care.   Thanks KINGA Canseco Endocrinology Nurse Practitioner

## 2017-06-15 NOTE — PATIENT INSTRUCTIONS
Continuous Glucose Monitoring (CGM)  Your healthcare provider has put you on the [employer] Pro Sensor system. At your next appointment, your CGM will be downloaded and reviewed so your healthcare provider can see your blood sugar trends and patterns. Your medication and diet may be adjusted based on this downloaded information.    What You Need To Do:   Wear the sensor on the back of your upper arm for the amount of time designated by the CGM clinic    You have no restrictions on your diet or activity.    Maintain a daily log of your blood glucose readings, diet, exercise, and dosing/timing of your diabetes medications.     Continue regular blood glucose self-testing per your providers recommendation using your own glucometer.    Important Things About Your CGM Test   If you have an MRI, a CT scan, or a diathermy treatment, you must remove your sensor prior to the procedure. If this occurs, notify your healthcare provider.      A Little Extra Care:   Showering, Bathing, and Swimming: The sensor is water-resistant and can be worn while bathing, showering, or swimming as long as you do not take it deeper than 3 feet or keep it underwater for more than 30 minutes at a time.   Getting dressed: Use care to avoid catching the sensor on clothing while getting dressed.   Exercising: Intensive exercise may cause the sensor to loosen due to sweat or movement of the sensor.     Day of Test (Inserting the CGM Sensor device)    The CGM Sensor device is placed on the top of your skin on the back of your upper arm     Throughout the Test    Continue regular blood glucose self-testing per your providers recommendation using your own glucometer.   Maintain a daily log of your blood glucose readings, diet, exercise, and dosing/timing of your diabetes medications.      Last Day of the Test (Removing the CGM Sensor device)    Loosen the adhesive attached to the CGM Sensor device.    Pull the CGM Sensor device away  from your skin    Place the CGM Sensor device in the biohazard plastic bag   Return all items you were given on the first day of the test including log sheets and items you placed in the biohazard bag.    Bring items between 7:30 am and 9:00 am to the [facility] located at [address] [place] [address], [address] [place] [address].     When to Contact the Clinic   Call [phone] (Monday-Friday) or [phone] after hours if:    Your CGM Sensor falls off    You have pain, severe itching, or bleeding at the site     Revised: 01/2017      © 2015 [facility] ([email or web address]) is a non-profit, academic, multi-specialty, healthcare delivery system dedicated to patient care, research and education.     UNDERSTANDING CONTINUOUS GLUCOSE MONITORING     What is continuous glucose monitoring?   Continuous glucose monitoring system (CGMS) is a method used to record your blood sugar levels over a period of time (usually 5 days). Your home blood glucose monitoring is very helpful, but only measures blood glucose levels at various points in time and can miss dangerous high and low patterns, especially during the night while you sleep. Continuous glucose monitoring provides a continuous 24-hour measurement of your blood glucose levels.     Who benefits from continuous glucose monitoring?    People who experience dangerous high and low blood glucose readings.    People who suffer from hypoglycemia unawareness and cannot feel when their blood glucose is dropping.    People who desire better blood glucose control.    People with elevated A1C levels.     How does continuous glucose monitoring work?   A glucose sensor is inserted on the top of your skin on the back of your arm. We do not leave a needle under your skin. The blood glucose sensor measures your blood sugar level every 15 minutes, 24 hours a day. At the end of the 5 days, the CGM Sensor is then downloaded and reviewed so  your healthcare provider can see your blood sugar trends and patterns.     What are your responsibilities to ensure successful testing?   It is recommended that you monitor your blood readings as directed by your healthcare provider.  It is vital that you document the correct times of your medications, meals, snacks, blood sugar readings, and any exercise.       How do you prepare for the test?   There is nothing you need to do to prepare for the test. You do not need to fast (restrict food intake) the night before the test.     Revised: 01/2017    © 2015 [facility] ([email or web address]) is a non-profit, academic, multi-specialty, healthcare delivery system dedicated to patient care, research and education.

## 2017-06-16 ENCOUNTER — TELEPHONE (OUTPATIENT)
Dept: ENDOCRINOLOGY | Facility: CLINIC | Age: 72
End: 2017-06-16

## 2017-06-16 ENCOUNTER — TELEPHONE (OUTPATIENT)
Dept: DIABETES | Facility: CLINIC | Age: 72
End: 2017-06-16

## 2017-06-16 NOTE — TELEPHONE ENCOUNTER
----- Message from Windy Olivera NP sent at 6/15/2017  4:47 PM CDT -----  Regarding: add urine MAC to lab already scheduled in June   Please add a urine MAC to labs already scheduled for pt in June    Thanks

## 2017-06-16 NOTE — TELEPHONE ENCOUNTER
Please contact patient to reschedule diabetse empowerment f/u appt to occur AFTER CGMS complete. Schedule f/u for 1 week after CGMS off. Ok to use 1130 override slot if no other slots available    Thanks

## 2017-06-20 ENCOUNTER — TELEPHONE (OUTPATIENT)
Dept: DIABETES | Facility: CLINIC | Age: 72
End: 2017-06-20

## 2017-06-20 DIAGNOSIS — E11.42 TYPE 2 DIABETES MELLITUS WITH POLYNEUROPATHY: Primary | Chronic | ICD-10-CM

## 2017-06-20 NOTE — TELEPHONE ENCOUNTER
Called and spoke with patient.  Instructions given as per Windy Scroggs.  Patient verbalizes understanding.  Random glucose ordered.

## 2017-06-20 NOTE — TELEPHONE ENCOUNTER
Cpeptide scheduled at lab for tomorrow    Please contact patient and remind her that her BG need to be >180 before her labs are drawn for her Cpeptide    Also, please add a random glucose to the lab encounter with the Augustinaitde. She needs both a random  glucose and Cpeptide done together, with BG >180 before lab    Thanks

## 2017-06-21 ENCOUNTER — LAB VISIT (OUTPATIENT)
Dept: LAB | Facility: HOSPITAL | Age: 72
End: 2017-06-21
Attending: NURSE PRACTITIONER
Payer: MEDICARE

## 2017-06-21 DIAGNOSIS — E11.40 TYPE 2 DIABETES MELLITUS WITH DIABETIC NEUROPATHY, WITH LONG-TERM CURRENT USE OF INSULIN: ICD-10-CM

## 2017-06-21 DIAGNOSIS — Z79.4 TYPE 2 DIABETES MELLITUS WITH DIABETIC NEUROPATHY, WITH LONG-TERM CURRENT USE OF INSULIN: ICD-10-CM

## 2017-06-21 DIAGNOSIS — E11.42 TYPE 2 DIABETES MELLITUS WITH POLYNEUROPATHY: Chronic | ICD-10-CM

## 2017-06-21 LAB
C PEPTIDE SERPL-MCNC: 1.77 NG/ML
GLUCOSE SERPL-MCNC: 240 MG/DL

## 2017-06-21 PROCEDURE — 84681 ASSAY OF C-PEPTIDE: CPT

## 2017-06-21 PROCEDURE — 82947 ASSAY GLUCOSE BLOOD QUANT: CPT

## 2017-06-21 PROCEDURE — 36415 COLL VENOUS BLD VENIPUNCTURE: CPT | Mod: PO

## 2017-06-22 ENCOUNTER — PATIENT MESSAGE (OUTPATIENT)
Dept: DIABETES | Facility: CLINIC | Age: 72
End: 2017-06-22

## 2017-06-23 ENCOUNTER — LAB VISIT (OUTPATIENT)
Dept: LAB | Facility: HOSPITAL | Age: 72
End: 2017-06-23
Attending: INTERNAL MEDICINE
Payer: MEDICARE

## 2017-06-23 DIAGNOSIS — E11.59 HYPERTENSION ASSOCIATED WITH DIABETES: Chronic | ICD-10-CM

## 2017-06-23 DIAGNOSIS — I25.10 CORONARY ARTERY DISEASE INVOLVING NATIVE CORONARY ARTERY OF NATIVE HEART WITHOUT ANGINA PECTORIS: Chronic | ICD-10-CM

## 2017-06-23 DIAGNOSIS — E11.51 DIABETES MELLITUS WITH PERIPHERAL VASCULAR DISEASE: Chronic | ICD-10-CM

## 2017-06-23 DIAGNOSIS — I77.9 BILATERAL CAROTID ARTERY DISEASE: ICD-10-CM

## 2017-06-23 DIAGNOSIS — I15.2 HYPERTENSION ASSOCIATED WITH DIABETES: Chronic | ICD-10-CM

## 2017-06-23 LAB
ALBUMIN SERPL BCP-MCNC: 3.6 G/DL
ALP SERPL-CCNC: 54 U/L
ALT SERPL W/O P-5'-P-CCNC: 13 U/L
ANION GAP SERPL CALC-SCNC: 11 MMOL/L
AST SERPL-CCNC: 19 U/L
BILIRUB SERPL-MCNC: 0.4 MG/DL
BUN SERPL-MCNC: 13 MG/DL
CALCIUM SERPL-MCNC: 9.7 MG/DL
CHLORIDE SERPL-SCNC: 101 MMOL/L
CHOLEST/HDLC SERPL: 2.2 {RATIO}
CO2 SERPL-SCNC: 24 MMOL/L
CREAT SERPL-MCNC: 0.8 MG/DL
EST. GFR  (AFRICAN AMERICAN): >60 ML/MIN/1.73 M^2
EST. GFR  (NON AFRICAN AMERICAN): >60 ML/MIN/1.73 M^2
GLUCOSE SERPL-MCNC: 136 MG/DL
HDL/CHOLESTEROL RATIO: 46.1 %
HDLC SERPL-MCNC: 154 MG/DL
HDLC SERPL-MCNC: 71 MG/DL
LDLC SERPL CALC-MCNC: 64 MG/DL
NONHDLC SERPL-MCNC: 83 MG/DL
POTASSIUM SERPL-SCNC: 4.7 MMOL/L
PROT SERPL-MCNC: 6.4 G/DL
SODIUM SERPL-SCNC: 136 MMOL/L
TRIGL SERPL-MCNC: 95 MG/DL
TSH SERPL DL<=0.005 MIU/L-ACNC: 2.1 UIU/ML

## 2017-06-23 PROCEDURE — 36415 COLL VENOUS BLD VENIPUNCTURE: CPT | Mod: PO

## 2017-06-23 PROCEDURE — 80061 LIPID PANEL: CPT

## 2017-06-23 PROCEDURE — 84443 ASSAY THYROID STIM HORMONE: CPT

## 2017-06-23 PROCEDURE — 83036 HEMOGLOBIN GLYCOSYLATED A1C: CPT

## 2017-06-23 PROCEDURE — 80053 COMPREHEN METABOLIC PANEL: CPT

## 2017-06-24 LAB
ESTIMATED AVG GLUCOSE: 171 MG/DL
HBA1C MFR BLD HPLC: 7.6 %

## 2017-06-26 ENCOUNTER — TELEPHONE (OUTPATIENT)
Dept: CARDIOLOGY | Facility: CLINIC | Age: 72
End: 2017-06-26

## 2017-06-26 NOTE — TELEPHONE ENCOUNTER
----- Message from Isatu Ziegler MD sent at 6/26/2017  9:20 AM CDT -----  Please review.  We will discuss the results during your upcoming visit with me. It looks good and diabetes is better controlled.

## 2017-06-27 RX ORDER — DEXTROSE 4 G
2 TABLET,CHEWABLE ORAL 2 TIMES DAILY
Qty: 1 EACH | Refills: 0 | Status: SHIPPED | OUTPATIENT
Start: 2017-06-27 | End: 2018-05-08 | Stop reason: SDUPTHER

## 2017-06-28 DIAGNOSIS — Z79.4 UNCONTROLLED TYPE 2 DIABETES MELLITUS WITH HYPERGLYCEMIA, WITH LONG-TERM CURRENT USE OF INSULIN: ICD-10-CM

## 2017-06-28 DIAGNOSIS — E11.65 UNCONTROLLED TYPE 2 DIABETES MELLITUS WITH HYPERGLYCEMIA, WITH LONG-TERM CURRENT USE OF INSULIN: ICD-10-CM

## 2017-06-28 RX ORDER — CALCIUM CITRATE/VITAMIN D3 200MG-6.25
1 TABLET ORAL 4 TIMES DAILY
Qty: 120 STRIP | Refills: 11 | Status: SHIPPED | OUTPATIENT
Start: 2017-06-28

## 2017-06-30 ENCOUNTER — OFFICE VISIT (OUTPATIENT)
Dept: CARDIOLOGY | Facility: CLINIC | Age: 72
End: 2017-06-30
Payer: MEDICARE

## 2017-06-30 VITALS
WEIGHT: 129.88 LBS | BODY MASS INDEX: 26.18 KG/M2 | HEIGHT: 59 IN | HEART RATE: 76 BPM | SYSTOLIC BLOOD PRESSURE: 134 MMHG | DIASTOLIC BLOOD PRESSURE: 56 MMHG

## 2017-06-30 DIAGNOSIS — Z86.73 HISTORY OF CVA (CEREBROVASCULAR ACCIDENT): ICD-10-CM

## 2017-06-30 DIAGNOSIS — Z79.4 TYPE 2 DIABETES MELLITUS WITH DIABETIC PERIPHERAL ANGIOPATHY WITHOUT GANGRENE, WITH LONG-TERM CURRENT USE OF INSULIN: Chronic | ICD-10-CM

## 2017-06-30 DIAGNOSIS — H53.462 LEFT HOMONYMOUS HEMIANOPSIA: ICD-10-CM

## 2017-06-30 DIAGNOSIS — I67.9 CEREBROVASCULAR DISEASE: ICD-10-CM

## 2017-06-30 DIAGNOSIS — I73.9 PVD (PERIPHERAL VASCULAR DISEASE): Chronic | ICD-10-CM

## 2017-06-30 DIAGNOSIS — I67.2 CEREBRAL ATHEROSCLEROSIS: ICD-10-CM

## 2017-06-30 DIAGNOSIS — I35.9 AORTIC VALVE DISEASE: ICD-10-CM

## 2017-06-30 DIAGNOSIS — Z95.2 S/P AVR (AORTIC VALVE REPLACEMENT): ICD-10-CM

## 2017-06-30 DIAGNOSIS — D35.2 PITUITARY MICROADENOMA: ICD-10-CM

## 2017-06-30 DIAGNOSIS — E11.59 HYPERTENSION ASSOCIATED WITH DIABETES: Chronic | ICD-10-CM

## 2017-06-30 DIAGNOSIS — E11.51 TYPE 2 DIABETES MELLITUS WITH DIABETIC PERIPHERAL ANGIOPATHY WITHOUT GANGRENE, WITH LONG-TERM CURRENT USE OF INSULIN: Chronic | ICD-10-CM

## 2017-06-30 DIAGNOSIS — R41.3 POOR SHORT TERM MEMORY: ICD-10-CM

## 2017-06-30 DIAGNOSIS — I25.10 CORONARY ARTERY DISEASE INVOLVING NATIVE CORONARY ARTERY OF NATIVE HEART WITHOUT ANGINA PECTORIS: Primary | Chronic | ICD-10-CM

## 2017-06-30 DIAGNOSIS — I70.0 STENOSIS OF INFRARENAL ABDOMINAL AORTA DUE TO ATHEROSCLEROSIS: ICD-10-CM

## 2017-06-30 DIAGNOSIS — I15.2 HYPERTENSION ASSOCIATED WITH DIABETES: Chronic | ICD-10-CM

## 2017-06-30 DIAGNOSIS — I77.9 BILATERAL CAROTID ARTERY DISEASE: ICD-10-CM

## 2017-06-30 PROCEDURE — 99999 PR PBB SHADOW E&M-EST. PATIENT-LVL III: CPT | Mod: PBBFAC,,, | Performed by: INTERNAL MEDICINE

## 2017-06-30 PROCEDURE — 1125F AMNT PAIN NOTED PAIN PRSNT: CPT | Mod: S$GLB,,, | Performed by: INTERNAL MEDICINE

## 2017-06-30 PROCEDURE — 4010F ACE/ARB THERAPY RXD/TAKEN: CPT | Mod: S$GLB,,, | Performed by: INTERNAL MEDICINE

## 2017-06-30 PROCEDURE — 1157F ADVNC CARE PLAN IN RCRD: CPT | Mod: S$GLB,,, | Performed by: INTERNAL MEDICINE

## 2017-06-30 PROCEDURE — 3045F PR MOST RECENT HEMOGLOBIN A1C LEVEL 7.0-9.0%: CPT | Mod: S$GLB,,, | Performed by: INTERNAL MEDICINE

## 2017-06-30 PROCEDURE — 99499 UNLISTED E&M SERVICE: CPT | Mod: S$GLB,,, | Performed by: INTERNAL MEDICINE

## 2017-06-30 PROCEDURE — 99214 OFFICE O/P EST MOD 30 MIN: CPT | Mod: S$GLB,,, | Performed by: INTERNAL MEDICINE

## 2017-06-30 PROCEDURE — 1159F MED LIST DOCD IN RCRD: CPT | Mod: S$GLB,,, | Performed by: INTERNAL MEDICINE

## 2017-06-30 NOTE — PROGRESS NOTES
"Subjective:   Patient ID:  June B Yris is a 72 y.o. female who presents for follow-up of Coronary Artery Disease and Hypertension      HPI: Doing well . NO new symptoms.  Tolerates medications well.  Diabetes better, but not at goal    Lab Results   Component Value Date     06/23/2017    K 4.7 06/23/2017     06/23/2017    CO2 24 06/23/2017    BUN 13 06/23/2017    CREATININE 0.8 06/23/2017     (H) 06/23/2017    HGBA1C 7.6 (H) 06/23/2017    MG 1.6 08/14/2013    AST 19 06/23/2017    ALT 13 06/23/2017    ALBUMIN 3.6 06/23/2017    PROT 6.4 06/23/2017    BILITOT 0.4 06/23/2017    WBC 7.05 11/30/2016    HGB 11.4 (L) 11/30/2016    HCT 33.6 (L) 11/30/2016    HCT 24 (L) 08/08/2013    MCV 86 11/30/2016     11/30/2016    INR 1.0 08/14/2013    TSH 2.100 06/23/2017    CHOL 154 06/23/2017    HDL 71 06/23/2017    LDLCALC 64.0 06/23/2017    TRIG 95 06/23/2017       Review of Systems   Constitution: Negative.   HENT: Negative.    Eyes: Negative.    Cardiovascular: Negative.  Negative for chest pain, claudication, dyspnea on exertion, irregular heartbeat, leg swelling, near-syncope, palpitations and syncope.   Respiratory: Negative.  Negative for cough, shortness of breath, snoring and wheezing.    Endocrine: Negative.  Negative for cold intolerance, heat intolerance, polydipsia, polyphagia and polyuria.   Skin: Negative.    Musculoskeletal: Positive for arthritis and back pain.   Gastrointestinal: Negative.    Genitourinary: Negative.    Neurological: Negative.    Psychiatric/Behavioral: Negative.        Objective:   Physical Exam   Constitutional: She is oriented to person, place, and time. She appears well-developed and well-nourished.   BP (!) 134/56   Pulse 76   Ht 4' 11" (1.499 m)   Wt 58.9 kg (129 lb 13.6 oz)   BMI 26.23 kg/m²      HENT:   Head: Normocephalic.   Eyes: Pupils are equal, round, and reactive to light.   Neck: Normal range of motion. Neck supple. No thyromegaly present. "   Cardiovascular: Normal rate, regular rhythm and intact distal pulses.  Exam reveals no gallop and no friction rub.    Murmur heard.   Midsystolic murmur is present  at the upper right sternal border radiating to the neck  Pulses:       Carotid pulses are 2+ on the right side with bruit, and 2+ on the left side with bruit.       Radial pulses are 2+ on the right side, and 2+ on the left side.        Femoral pulses are 2+ on the right side, and 2+ on the left side.       Popliteal pulses are 2+ on the right side, and 2+ on the left side.        Dorsalis pedis pulses are 1+ on the right side, and 1+ on the left side.        Posterior tibial pulses are 1+ on the right side, and 1+ on the left side.   Pulmonary/Chest: Effort normal and breath sounds normal. No respiratory distress. She has no wheezes. She has no rales. She exhibits no tenderness.   Abdominal: Soft. Bowel sounds are normal.   Musculoskeletal: Normal range of motion. She exhibits no edema.   Neurological: She is alert and oriented to person, place, and time.   Skin: Skin is warm and dry.   Psychiatric: She has a normal mood and affect.   Nursing note and vitals reviewed.        Assessment and Plan:     Problem List Items Addressed This Visit        Cardiology Problems    Hypertension associated with diabetes (Chronic)    Relevant Orders    Hemoglobin A1c    TSH    Comprehensive metabolic panel    Lipid panel    PVD (peripheral vascular disease) (Chronic)    Relevant Orders    Hemoglobin A1c    TSH    Comprehensive metabolic panel    Lipid panel    Coronary artery disease involving native coronary artery of native heart without angina pectoris - Primary (Chronic)    Relevant Orders    Hemoglobin A1c    TSH    Comprehensive metabolic panel    Lipid panel    Stenosis of infrarenal abdominal aorta due to atherosclerosis    Relevant Orders    Hemoglobin A1c    TSH    Comprehensive metabolic panel    Lipid panel    Bilateral carotid artery disease    Relevant  Orders    CAR Ultrasound doppler carotid bliateral    Hemoglobin A1c    TSH    Comprehensive metabolic panel    Lipid panel    Cerebrovascular disease    Aortic valve disease    Relevant Orders    Hemoglobin A1c    TSH    Comprehensive metabolic panel    Lipid panel       Other    Type 2 diabetes mellitus with diabetic peripheral angiopathy without gangrene, with long-term current use of insulin (Chronic)    Relevant Orders    Hemoglobin A1c    TSH    Comprehensive metabolic panel    Lipid panel    Left homonymous hemianopsia - Both Eyes    Pituitary microadenoma    Poor short term memory    S/P AVR (aortic valve replacement)    History of CVA (cerebrovascular accident)      Other Visit Diagnoses     Cerebral atherosclerosis         Relevant Orders    CAR Ultrasound doppler carotid bliateral          Patient's Medications   New Prescriptions    No medications on file   Previous Medications    ACETAMINOPHEN (TYLENOL) 325 MG TABLET    Take 325 mg by mouth daily as needed. Half Tablet Oral Every day as needed    ASCORBIC ACID (VITAMIN C) 1000 MG TABLET        ASPIRIN 81 MG CHEW    Take 81 mg by mouth once daily.    BLOOD-GLUCOSE METER MISC    2 each by Misc.(Non-Drug; Combo Route) route 2 (two) times daily.    COENZYME Q10 (COQ-10) 100 MG CAPSULE    Take 100 mg by mouth Daily. 1 Capsule Oral Every day    DAILY MULTIVITAMIN TABLET    Take 1 tablet by mouth Daily. 1 Tablet Oral Every day    DOXYCYCLINE (MONODOX) 100 MG CAPSULE    TAKE ONE CAPSULE BY MOUTH TWICE DAILY    FENOFIBRATE (TRICOR) 48 MG TABLET    TAKE ONE TABLET BY MOUTH ONCE DAILY    GABAPENTIN (NEURONTIN) 300 MG CAPSULE    TAKE THREE CAPSULES BY MOUTH THREE TIMES DAILY    HYDROCHLOROTHIAZIDE (HYDRODIURIL) 12.5 MG TAB    Take 1 tablet (12.5 mg total) by mouth once daily.    INSULIN ASPART (NOVOLOG FLEXPEN) 100 UNIT/ML INPN PEN    Inject 3 Units into the skin 3 (three) times daily with meals.    INSULIN DETEMIR (LEVEMIR FLEXTOUCH) 100 UNIT/ML (3 ML) SUBQ INPN  "PEN    Inject 12 Units into the skin every evening.    INSULIN SYRINGE-NEEDLE U-100 1/2 ML 30 X 1/2" SYRG    Inject 1 each into the skin 2 (two) times daily.    LABETALOL (NORMODYNE) 200 MG TABLET    Pt to take 2 pill twice a day.    LANCETS 30 GAUGE MISC    1 lancet by Misc.(Non-Drug; Combo Route) route 4 (four) times daily.    LISINOPRIL (PRINIVIL,ZESTRIL) 40 MG TABLET    TAKE ONE TABLET BY MOUTH ONCE DAILY    MAGNESIUM 200 MG TAB    Take 200 mg by mouth once daily.    METFORMIN (GLUCOPHAGE) 1000 MG TABLET    TAKE ONE TABLET BY MOUTH TWICE DAILY FOR DIABETES    NITROGLYCERIN (NITROSTAT) 0.4 MG SL TABLET    1-3 Tablet, Sublingual   As directed    NOVOFINE 30 30 GAUGE X 1/3" NDLE    USE  4 TIMES DAILY    OMEGA-3 FATTY ACIDS-VITAMIN E (FISH OIL) 1,000 MG CAP        PHENOBARBITAL (LUMINAL) 64.8 MG TABLET    TAKE ONE TABLET BY MOUTH AT BEDTIME    ROSUVASTATIN (CRESTOR) 10 MG TABLET    Take 1 tablet (10 mg total) by mouth once daily.    TRUE METRIX GLUCOSE TEST STRIP STRP    1 strip by Misc.(Non-Drug; Combo Route) route 4 (four) times daily.   Modified Medications    No medications on file   Discontinued Medications    GLUCAGON (HUMAN RECOMBINANT) INJ 1MG/ML KIT    Inject 1 mg into the muscle as needed.       Return in about 6 months (around 12/30/2017).          "

## 2017-07-03 ENCOUNTER — OFFICE VISIT (OUTPATIENT)
Dept: PODIATRY | Facility: CLINIC | Age: 72
End: 2017-07-03
Payer: MEDICARE

## 2017-07-03 ENCOUNTER — CLINICAL SUPPORT (OUTPATIENT)
Dept: CARDIOLOGY | Facility: CLINIC | Age: 72
End: 2017-07-03
Payer: MEDICARE

## 2017-07-03 VITALS
HEART RATE: 58 BPM | SYSTOLIC BLOOD PRESSURE: 133 MMHG | RESPIRATION RATE: 18 BRPM | DIASTOLIC BLOOD PRESSURE: 59 MMHG | HEIGHT: 59 IN | WEIGHT: 129 LBS | BODY MASS INDEX: 26 KG/M2

## 2017-07-03 DIAGNOSIS — I77.9 BILATERAL CAROTID ARTERY DISEASE: ICD-10-CM

## 2017-07-03 DIAGNOSIS — L84 CORN OR CALLUS: ICD-10-CM

## 2017-07-03 DIAGNOSIS — B35.1 ONYCHOMYCOSIS DUE TO DERMATOPHYTE: ICD-10-CM

## 2017-07-03 DIAGNOSIS — E11.49 TYPE II DIABETES MELLITUS WITH NEUROLOGICAL MANIFESTATIONS: Primary | ICD-10-CM

## 2017-07-03 DIAGNOSIS — I67.2 CEREBRAL ATHEROSCLEROSIS: ICD-10-CM

## 2017-07-03 LAB — INTERNAL CAROTID STENOSIS: ABNORMAL

## 2017-07-03 PROCEDURE — 99999 PR PBB SHADOW E&M-EST. PATIENT-LVL III: CPT | Mod: PBBFAC,,, | Performed by: PODIATRIST

## 2017-07-03 PROCEDURE — 11721 DEBRIDE NAIL 6 OR MORE: CPT | Mod: 59,Q9,S$GLB, | Performed by: PODIATRIST

## 2017-07-03 PROCEDURE — 99499 UNLISTED E&M SERVICE: CPT | Mod: S$GLB,,, | Performed by: PODIATRIST

## 2017-07-03 PROCEDURE — 11056 PARNG/CUTG B9 HYPRKR LES 2-4: CPT | Mod: Q9,S$GLB,, | Performed by: PODIATRIST

## 2017-07-03 PROCEDURE — 93880 EXTRACRANIAL BILAT STUDY: CPT | Mod: S$GLB,,, | Performed by: INTERNAL MEDICINE

## 2017-07-03 NOTE — PROGRESS NOTES
Subjective:      Patient ID: Marlen Arndt is a 72 y.o. female.    Chief Complaint: PCP (Dominique Rendon MD 4/12/17); Diabetic Foot Exam; and Nail Care    Marlen is a 72 y.o. female who presents to the clinic for evaluation and treatment of high risk feet. Marlen has a past medical history of Aortic stenosis; Arthritis; Back pain; Carotid artery occlusion; Cataract; Coronary artery disease; Diabetes mellitus type II; Heart murmur; Hyperlipidemia; Hypertension associated with diabetes (7/24/2012); Non-functioning pituitary adenoma (7/24/2012); Pituitary microadenoma (11/17/2015); Polyneuropathy; PVD (peripheral vascular disease); PVD (peripheral vascular disease); S/P AVR (aortic valve replacement) (8/14/2013); Stenosis; Stented coronary artery (4/10/2013); Stroke; Type II or unspecified type diabetes mellitus with neurological manifestations, not stated as uncontrolled; and Type II or unspecified type diabetes mellitus with peripheral circulatory disorders, not stated as uncontrolled. The patient's chief complaint is long, thick toenails .   This patient has documented high risk feet requiring routine maintenance secondary to diabetes mellitis and those secondary complications of diabetes, as mentioned..    PCP: Dominique Rendon MD  Date Last Seen by PCP:  Chief Complaint   Patient presents with    PCP     Dominique Rendon MD 4/12/17    Diabetic Foot Exam    Nail Care       Current shoe gear:  sandals    Hemoglobin A1C   Date Value Ref Range Status   06/23/2017 7.6 (H) 4.0 - 5.6 % Final     Comment:     According to ADA guidelines, hemoglobin A1c <7.0% represents  optimal control in non-pregnant diabetic patients. Different  metrics may apply to specific patient populations.   Standards of Medical Care in Diabetes-2016.  For the purpose of screening for the presence of diabetes:  <5.7%     Consistent with the absence of diabetes  5.7-6.4%  Consistent with increasing risk for diabetes   (prediabetes)  >or=6.5%   Consistent with diabetes  Currently, no consensus exists for use of hemoglobin A1c  for diagnosis of diabetes for children.  This Hemoglobin A1c assay has significant interference with fetal   hemoglobin   (HbF). The results are invalid for patients with abnormal amounts of   HbF,   including those with known Hereditary Persistence   of Fetal Hemoglobin. Heterozygous hemoglobin variants (HbAS, HbAC,   HbAD, HbAE, HbA2) do not significantly interfere with this assay;   however, presence of multiple variants in a sample may impact the %   interference.     03/01/2017 8.1 (H) 4.5 - 6.2 % Final     Comment:     According to ADA guidelines, hemoglobin A1C <7.0% represents  optimal control in non-pregnant diabetic patients.  Different  metrics may apply to specific populations.   Standards of Medical Care in Diabetes - 2016.  For the purpose of screening for the presence of diabetes:  <5.7%     Consistent with the absence of diabetes  5.7-6.4%  Consistent with increasing risk for diabetes   (prediabetes)  >or=6.5%  Consistent with diabetes  Currently no consensus exists for use of hemoglobin A1C  for diagnosis of diabetes for children.     11/28/2016 7.7 (H) 4.5 - 6.2 % Final     Comment:     According to ADA guidelines, hemoglobin A1C <7.0% represents  optimal control in non-pregnant diabetic patients.  Different  metrics may apply to specific populations.   Standards of Medical Care in Diabetes - 2016.  For the purpose of screening for the presence of diabetes:  <5.7%     Consistent with the absence of diabetes  5.7-6.4%  Consistent with increasing risk for diabetes   (prediabetes)  >or=6.5%  Consistent with diabetes  Currently no consensus exists for use of hemoglobin A1C  for diagnosis of diabetes for children.         Review of Systems   Constitution: Negative for chills, decreased appetite and fever.   Cardiovascular: Negative for chest pain, claudication and leg swelling.   Respiratory: Negative for cough.    Skin:  Positive for dry skin and nail changes. Negative for flushing, itching and poor wound healing.   Musculoskeletal: Negative for arthritis, back pain, falls, gout, joint swelling and myalgias.   Gastrointestinal: Negative for nausea and vomiting.   Neurological: Positive for numbness. Negative for loss of balance and paresthesias.           Objective:      Physical Exam   Constitutional: She is oriented to person, place, and time. She appears well-developed and well-nourished. No distress.   Cardiovascular:   Pulses:       Dorsalis pedis pulses are 1+ on the right side, and 1+ on the left side.        Posterior tibial pulses are 1+ on the right side, and 1+ on the left side.   Toes are cool to touch. Feet are warm proximally.There is decreased digital hair. Skin is atrophic, slightly hyperpigmented, and mildly edematous       Musculoskeletal: Normal range of motion. She exhibits no edema or tenderness.   Adequate joint range of motion without pain, limitation, nor crepitation Bilateral feet and ankle joints. Muscle strength is 5/5 in all groups bilaterally.         Neurological: She is alert and oriented to person, place, and time.   [place] 5.07 monofilamant testing is diminished Carlos feet. Sharp/dull sensation diminished Bilaterally. Light touch absent Bilaterally.       Skin: Skin is warm, dry and intact. No abrasion, no bruising, no burn, no ecchymosis and no lesion noted. No erythema.   Nails x 10  are elongated by  2-9mm's, thickened by 2-4 mm's, dystrophic, and are darkened in  coloration . Xerosis Bilaterally. No open lesions noted.    Hyperkeratotic tissue noted to sub 1st mpj left and plantar medial 1st hallux IPJ.    Psychiatric: She has a normal mood and affect. Her behavior is normal.   Nursing note and vitals reviewed.            Assessment:       Encounter Diagnoses   Name Primary?    Type II diabetes mellitus with neurological manifestations Yes    Corn or callus     Onychomycosis due to  dermatophyte          Plan:       Marlen was seen today for pcp, diabetic foot exam and nail care.    Diagnoses and all orders for this visit:    Type II diabetes mellitus with neurological manifestations    Corn or callus    Onychomycosis due to dermatophyte      I counseled the patient on her conditions, their implications and medical management.        - Shoe inspection. Diabetic Foot Education. Patient reminded of the importance of good nutrition and blood sugar control to help prevent podiatric complications of diabetes. Patient instructed on proper foot hygeine. We discussed wearing proper shoe gear, daily foot inspections, never walking without protective shoe gear, never putting sharp instruments to feet, routine podiatric nail visits every 2-3 months.      - With patient's permission, nails were aggressively reduced and debrided x 10 to their soft tissue attachment mechanically and with electric , removing all offending nail and debris. Patient relates relief following the procedure. She will continue to monitor the areas daily, inspect her feet, wear protective shoe gear when ambulatory, moisturizer to maintain skin integrity and follow in this office in approximately 2-3 months, sooner p.r.n.    - After cleansing the  area w/ alcohol prep pad the above mentioned hyperkeratosis was trimmed utilizing No 15 scapel, to a smooth base with out incident. Patient tolerated this  well and reported comfort to the area of sub 1st MPJ left and left hallux plantar medial IPJ.

## 2017-07-05 ENCOUNTER — TELEPHONE (OUTPATIENT)
Dept: CARDIOLOGY | Facility: CLINIC | Age: 72
End: 2017-07-05

## 2017-07-05 NOTE — TELEPHONE ENCOUNTER
----- Message from Isatu Ziegler MD sent at 7/5/2017  9:27 AM CDT -----  Mrs. Arndt, please review results of your carotid duplex. It shows moderate bilateral disease.  We will repeat it in 6 months.

## 2017-07-06 RX ORDER — GABAPENTIN 300 MG/1
CAPSULE ORAL
Qty: 270 CAPSULE | Refills: 3 | Status: SHIPPED | OUTPATIENT
Start: 2017-07-06 | End: 2017-08-03

## 2017-07-12 ENCOUNTER — CLINICAL SUPPORT (OUTPATIENT)
Dept: ENDOCRINOLOGY | Facility: CLINIC | Age: 72
End: 2017-07-12
Payer: MEDICARE

## 2017-07-12 DIAGNOSIS — E11.40 TYPE 2 DIABETES MELLITUS WITH DIABETIC NEUROPATHY, WITH LONG-TERM CURRENT USE OF INSULIN: ICD-10-CM

## 2017-07-12 DIAGNOSIS — Z79.4 TYPE 2 DIABETES MELLITUS WITH DIABETIC NEUROPATHY, WITH LONG-TERM CURRENT USE OF INSULIN: ICD-10-CM

## 2017-07-12 NOTE — PROGRESS NOTES
"DIABETES EDUCATOR NOTE   PLACEMENT OF FREESTYLE ALBERTO PRO SENSOR  CONTINOUS GLUCOSE MONITORING SYSTEM (CGMS)    Patient is here in clinic today for placement of continuous glucose monitoring sensor.      Each patient verified that they were here for CGMS procedure ordered by their provider and that they have a working glucose meter and supplies at home.   Patient will be provided with a Freestyle Alberto Sensor and a copy of the Continuous Glucose Monitoring Patient Log to fill out during the study.   A detailed  explanation of Continuous Glucose Monitoring was  provided. Patient informed that this is a blind procedure and that they will not actually see the blood sugar tracing in real time.  Reviewed with patient the [employer] patient education handout called "Your Freestyle Alberto Pro sensor: What you need to know" to review self-care during the study to avoid sensor loosening or removal ie... Bathing, Swimming, dressing, and exercising.   Instructed patient to check blood sugar using home glucometer and to record the following on provided patient log sheets:Blood sugar taken at home, Meals and snacks, Activity, and Diabetes medications taken and dosage    Patient was brought to a private location.  Arm for insertion was selected and prepared and allowed to dry. Glucose Sensor Serial Number [id number]  was inserted to back of patient's right upper arm.    The following forms  were given and reviewed in detail with patient and all questions answered.   · Continuous Glucose Monitoring Patient Log #[id number]  · [employer] Patient Handout "Your Freestyle Alberto Pro Sensor: What you need to know"     Instructions held in a group: Time: 15 min   Insertion of sensor done individually in private:  Time: 5 minutes       "

## 2017-07-16 RX ORDER — INSULIN ASPART 100 [IU]/ML
INJECTION, SOLUTION INTRAVENOUS; SUBCUTANEOUS
Qty: 15 ML | Refills: 11 | Status: SHIPPED | OUTPATIENT
Start: 2017-07-16 | End: 2017-11-03

## 2017-07-18 ENCOUNTER — CLINICAL SUPPORT (OUTPATIENT)
Dept: ENDOCRINOLOGY | Facility: CLINIC | Age: 72
End: 2017-07-18
Payer: MEDICARE

## 2017-07-18 DIAGNOSIS — E11.40 TYPE 2 DIABETES MELLITUS WITH DIABETIC NEUROPATHY, WITH LONG-TERM CURRENT USE OF INSULIN: ICD-10-CM

## 2017-07-18 DIAGNOSIS — Z79.4 TYPE 2 DIABETES MELLITUS WITH DIABETIC NEUROPATHY, WITH LONG-TERM CURRENT USE OF INSULIN: ICD-10-CM

## 2017-07-18 PROCEDURE — 95250 CONT GLUC MNTR PHYS/QHP EQP: CPT | Mod: S$GLB,,, | Performed by: DIETITIAN, REGISTERED

## 2017-07-18 PROCEDURE — 95251 CONT GLUC MNTR ANALYSIS I&R: CPT | Mod: S$GLB,,, | Performed by: NURSE PRACTITIONER

## 2017-07-19 NOTE — PROGRESS NOTES
DIABETES EDUCATOR NOTE   Return of the Freestyle Layla Pro Sensor and Patient Log.    Patient returned to clinic today to return Glucose Sensor and signed patient log form used in [facility] procedure.    The CGMS Sensor will be scanned and downloaded. All reports will be imported into the patient's electronic medical record.    Endocrine Nurse Practitioner will complete data interpretation and make recommendations; will forward recommendations to the ordering provider for follow up with patient.

## 2017-07-26 ENCOUNTER — PATIENT MESSAGE (OUTPATIENT)
Dept: ENDOCRINOLOGY | Facility: CLINIC | Age: 72
End: 2017-07-26

## 2017-07-26 ENCOUNTER — TELEPHONE (OUTPATIENT)
Dept: DIABETES | Facility: CLINIC | Age: 72
End: 2017-07-26

## 2017-07-26 NOTE — TELEPHONE ENCOUNTER
I attempted to call both son and daughter to coordinate next Diabetes Empowerment appointment, but there was no answer, left message for a call back and gave specific instructions to make sure the Levemir is decreased to 8 units nightly as per Windy Olivera NP.  I also attempted to call patient at her number listed, but there was no answer and no option to leave voicemail.

## 2017-07-26 NOTE — TELEPHONE ENCOUNTER
Please call patient and notify her that her blood sugar was too low on her CGMS, so instruct her to decrease her Levemir to 8 units nightly.     Also, note received with CGMS from son and daughter about concerns over possible nonadherence to medications due to memory. Please call son and/or daughter (see numbers below) and encourage one or both of them to attend upcoming Diabetes Empowerment visit to discuss concerns    Tylor Arndt [phone]  lorie Cloud [phone]    Thanks

## 2017-07-26 NOTE — TELEPHONE ENCOUNTER
Son calls back and explains that, he got my message and him and his sister will definitely be at their mom's appointment with Windy Olivera NP next week.  Verbalizes understanding to decrease levemir to 8 units nightly.

## 2017-07-30 DIAGNOSIS — E11.51 DIABETES MELLITUS WITH PERIPHERAL VASCULAR DISEASE: Chronic | ICD-10-CM

## 2017-07-30 DIAGNOSIS — I25.10 CORONARY ARTERY DISEASE INVOLVING NATIVE CORONARY ARTERY OF NATIVE HEART WITHOUT ANGINA PECTORIS: Chronic | ICD-10-CM

## 2017-07-30 DIAGNOSIS — I77.9 BILATERAL CAROTID ARTERY DISEASE: ICD-10-CM

## 2017-07-30 DIAGNOSIS — I15.2 HYPERTENSION ASSOCIATED WITH DIABETES: Chronic | ICD-10-CM

## 2017-07-30 DIAGNOSIS — E11.59 HYPERTENSION ASSOCIATED WITH DIABETES: Chronic | ICD-10-CM

## 2017-07-31 RX ORDER — LABETALOL 200 MG/1
TABLET, FILM COATED ORAL
Qty: 120 TABLET | Refills: 0 | Status: SHIPPED | OUTPATIENT
Start: 2017-07-31 | End: 2017-12-06 | Stop reason: SDUPTHER

## 2017-08-03 ENCOUNTER — CLINICAL SUPPORT (OUTPATIENT)
Dept: DIABETES | Facility: CLINIC | Age: 72
End: 2017-08-03
Payer: MEDICARE

## 2017-08-03 ENCOUNTER — PATIENT MESSAGE (OUTPATIENT)
Dept: DIABETES | Facility: CLINIC | Age: 72
End: 2017-08-03

## 2017-08-03 VITALS
HEART RATE: 63 BPM | BODY MASS INDEX: 26.45 KG/M2 | SYSTOLIC BLOOD PRESSURE: 122 MMHG | WEIGHT: 131.19 LBS | HEIGHT: 59 IN | DIASTOLIC BLOOD PRESSURE: 68 MMHG

## 2017-08-03 DIAGNOSIS — E11.69 DYSLIPIDEMIA ASSOCIATED WITH TYPE 2 DIABETES MELLITUS: ICD-10-CM

## 2017-08-03 DIAGNOSIS — E11.59 HYPERTENSION ASSOCIATED WITH DIABETES: Chronic | ICD-10-CM

## 2017-08-03 DIAGNOSIS — Z79.4 TYPE 2 DIABETES MELLITUS WITH DIABETIC PERIPHERAL ANGIOPATHY WITHOUT GANGRENE, WITH LONG-TERM CURRENT USE OF INSULIN: Chronic | ICD-10-CM

## 2017-08-03 DIAGNOSIS — Z79.4 TYPE 2 DIABETES MELLITUS WITH DIABETIC NEUROPATHY, WITH LONG-TERM CURRENT USE OF INSULIN: Primary | ICD-10-CM

## 2017-08-03 DIAGNOSIS — I70.0 ATHEROSCLEROSIS OF AORTA: ICD-10-CM

## 2017-08-03 DIAGNOSIS — E78.5 DYSLIPIDEMIA ASSOCIATED WITH TYPE 2 DIABETES MELLITUS: ICD-10-CM

## 2017-08-03 DIAGNOSIS — I15.2 HYPERTENSION ASSOCIATED WITH DIABETES: Chronic | ICD-10-CM

## 2017-08-03 DIAGNOSIS — R41.3 MEMORY LOSS: ICD-10-CM

## 2017-08-03 DIAGNOSIS — E11.40 TYPE 2 DIABETES MELLITUS WITH DIABETIC NEUROPATHY, WITH LONG-TERM CURRENT USE OF INSULIN: Primary | ICD-10-CM

## 2017-08-03 DIAGNOSIS — E11.42 TYPE 2 DIABETES MELLITUS WITH POLYNEUROPATHY: Chronic | ICD-10-CM

## 2017-08-03 DIAGNOSIS — I77.9 BILATERAL CAROTID ARTERY DISEASE: ICD-10-CM

## 2017-08-03 DIAGNOSIS — E11.51 TYPE 2 DIABETES MELLITUS WITH DIABETIC PERIPHERAL ANGIOPATHY WITHOUT GANGRENE, WITH LONG-TERM CURRENT USE OF INSULIN: Chronic | ICD-10-CM

## 2017-08-03 DIAGNOSIS — R41.3 POOR SHORT TERM MEMORY: ICD-10-CM

## 2017-08-03 DIAGNOSIS — I73.9 PVD (PERIPHERAL VASCULAR DISEASE): Chronic | ICD-10-CM

## 2017-08-03 DIAGNOSIS — I25.10 CORONARY ARTERY DISEASE INVOLVING NATIVE CORONARY ARTERY OF NATIVE HEART WITHOUT ANGINA PECTORIS: Chronic | ICD-10-CM

## 2017-08-03 PROCEDURE — 99999 PR PBB SHADOW E&M-EST. PATIENT-LVL V: CPT | Mod: PBBFAC,,, | Performed by: NURSE PRACTITIONER

## 2017-08-03 PROCEDURE — 99214 OFFICE O/P EST MOD 30 MIN: CPT | Mod: S$GLB,,, | Performed by: NURSE PRACTITIONER

## 2017-08-03 PROCEDURE — 99499 UNLISTED E&M SERVICE: CPT | Mod: S$GLB,,, | Performed by: NURSE PRACTITIONER

## 2017-08-03 RX ORDER — METFORMIN HYDROCHLORIDE 500 MG/1
2000 TABLET, EXTENDED RELEASE ORAL NIGHTLY
Qty: 360 TABLET | Refills: 3 | Status: SHIPPED | OUTPATIENT
Start: 2017-08-03 | End: 2017-08-07 | Stop reason: SDUPTHER

## 2017-08-03 RX ORDER — GABAPENTIN 300 MG/1
300 CAPSULE ORAL 2 TIMES DAILY
Qty: 270 CAPSULE | Refills: 3
Start: 2017-08-03 | End: 2018-01-25 | Stop reason: SDUPTHER

## 2017-08-03 NOTE — PATIENT INSTRUCTIONS
Ask [employer]:  1/ Do you have a donut hole or coverage gap?  How close to the coverage gap are you?  If you were in the gap, how much would either Januvia or Trulicity be?  What would the copays for Januvia and trulicity be currently?    Let me know when you decide which treatment routine you want to change to

## 2017-08-03 NOTE — PROGRESS NOTES
"CC:  Diabetes.     HPI: June B Yris is a 72 y.o. female presents for visit in [facility]. The patient has had diabetes along with associated comorbidities indicated in the Visit Diagnosis section of this encounter. The patient was diagnosed with Type 2 diabetes in >30 years ago. Has been on insulin since diabetes diagnosis.     Of note: Reports diabetes well controlled until 2015, sciatica uncontrolled with significant weight loss and loss of muscle tone. Once sciatica improved, insulin doses decreased due to improved BG and hypoglycemia, multiple episodes of hypoglycemia. Now BG increasing once diet and appetite improved but insulin doses have not been increased    Also has history of carotid artery disease and CVA, followed by Dr. Isatu Ziegler    Today, presents with daughter and son. Family and patient both have concerns about memory loss (pt forgets why she walked into a room, unsure if she took her medications when asked, often forgets her AM medications unless prompted multiple times, etc). On CGMS, BG uncontrolled after meals. Pt documented Novolog AC and meal intake, but still varying dose of Novolog "based on what she eats", but has no significant knowledge of carb intake, does not count carbs. Cpeptide WNL on June labs. Family with concerns that patient is missing medications frequently. On CGMS, frequent hypoglycemia and basal insulin dose was decreased from 10 units to 8 units nightly.       DIABETES COMPLICATIONS: peripheral neuropathy and cerebrovascular disease     STANDARDS of CARE:  Statin:  Yes - lipitor   ACEI or ARB:  Yes - lisinopril   ASA:  Yes - 81 mg   Last eye exam 1/2017 no retinopathy   Microalbumin/Creatinine ratio:  Lab Results   Component Value Date    MICALBCREAT 25.0 06/21/2017       CURRENT A1C:    Hemoglobin A1C   Date Value Ref Range Status   06/23/2017 7.6 (H) 4.0 - 5.6 % Final     Comment:     According to ADA guidelines, hemoglobin A1c <7.0% " represents  optimal control in non-pregnant diabetic patients. Different  metrics may apply to specific patient populations.   Standards of Medical Care in Diabetes-2016.  For the purpose of screening for the presence of diabetes:  <5.7%     Consistent with the absence of diabetes  5.7-6.4%  Consistent with increasing risk for diabetes   (prediabetes)  >or=6.5%  Consistent with diabetes  Currently, no consensus exists for use of hemoglobin A1c  for diagnosis of diabetes for children.  This Hemoglobin A1c assay has significant interference with fetal   hemoglobin   (HbF). The results are invalid for patients with abnormal amounts of   HbF,   including those with known Hereditary Persistence   of Fetal Hemoglobin. Heterozygous hemoglobin variants (HbAS, HbAC,   HbAD, HbAE, HbA2) do not significantly interfere with this assay;   however, presence of multiple variants in a sample may impact the %   interference.     03/01/2017 8.1 (H) 4.5 - 6.2 % Final     Comment:     According to ADA guidelines, hemoglobin A1C <7.0% represents  optimal control in non-pregnant diabetic patients.  Different  metrics may apply to specific populations.   Standards of Medical Care in Diabetes - 2016.  For the purpose of screening for the presence of diabetes:  <5.7%     Consistent with the absence of diabetes  5.7-6.4%  Consistent with increasing risk for diabetes   (prediabetes)  >or=6.5%  Consistent with diabetes  Currently no consensus exists for use of hemoglobin A1C  for diagnosis of diabetes for children.     11/28/2016 7.7 (H) 4.5 - 6.2 % Final     Comment:     According to ADA guidelines, hemoglobin A1C <7.0% represents  optimal control in non-pregnant diabetic patients.  Different  metrics may apply to specific populations.   Standards of Medical Care in Diabetes - 2016.  For the purpose of screening for the presence of diabetes:  <5.7%     Consistent with the absence of diabetes  5.7-6.4%  Consistent with increasing risk for  "diabetes   (prediabetes)  >or=6.5%  Consistent with diabetes  Currently no consensus exists for use of hemoglobin A1C  for diagnosis of diabetes for children.         GOAL A1C: <7.5%    DM MEDICATIONS USED IN THE PAST: Metformin, Novolog, Levemir     MEDICATIONS UPON START OF PROGRAM:  Metformin 1000 mg BID, Levemir 8 units nightly (dose decreased after CGMS), Novolog 3-5 units AC  Denies missing any doses of medication     CURRENT DIABETES MEDICATIONS: Metformin 1000 mg BID, Levemir 12 units nightly, Novolog 3-4 units AC (varying Novolog doses often based on "meal size", although does not take carbs into consideration)  Missing doses of medication, trisha AM dose of Metformin, unable to quantify how often she is missing medication doses because she does not remember    DO YOU USE THE [employer] EMAIL SYSTEM? Yes    BLOOD GLUCOSE MONITORING:  See CGMS    HYPOGLYCEMIA:  Yes, overnight when taking higher basal insulin dose     MEALS:   Eating 3 meals                 Drinking water and diet drinks     CURRENT EXERCISE:  No    OCCUPATION: retired     MEDICATIONS, ALLERGIES, LABS, VS's, MEDICAL, SURGICAL, SOCIAL, AND FAMILY HISTORY reviewed and updated in the appropriate sections during this encounter    ROS:     Constitutional: Negative for weight change  Endocrine:  Denies polyuria, polydipsia, nocturia.  HENT: Denies neck swelling, lumps, horseness or trouble swallowing. Denies any personal or family history of thyroid cancer.    Eyes: denies blurry vision   Respiratory: Negative for cough or shortness or breath.  Cardiovascular: Negative for chest pain or claudication.   Gastrointestinal: Negative for nausea, diarrhea, vomiting, bloating.  No history of pancreatitis or gastroparesis.  Genitourinary: Negative for urgency, frequency, frequent urinary tract infections.  Skin: Denies prolonged wound healing.  Neurological: Negative for syncope, + numbness/burning of extremities.  Psychiatric/Behavioral: Negative for " "depression or anxiety      All other systems reviewed and are negative.    PE:  Constitutional: /68 (BP Location: Right arm, Patient Position: Sitting)   Pulse 63   Ht 4' 11" (1.499 m)   Wt 59.5 kg (131 lb 2.8 oz)   BMI 26.49 kg/m²    Well developed, well nourished, NAD.    HENT:   External ears, nose: no masses. No significant findings.   Hearing: normal     Neck:  No trachial deviation present, No neck masses noticed   Thyroid:  No thyromegaly present.  No thyroid tenderness.    + right and left carotid bruit (chronic per last cardiology note)     Cardiovascular:    Auscultation:   systolic murmur +2/6, radiates to right neck (chronic aortic valve disease, followed by cardiology Dr. Smith)    Lower extremities:  No edema or cyanosis.     Respiratory:    Effort:  Normal, no use of accessory muscles.   Auscultation: breath sounds normal, no adventitious sounds.  Abdomen:     Exam:  Soft, non-tender, no masses.      No hernia noted.  Skin:    Inspection: no rashes, lesion or ulcers, no acanthosis nigracans.   Palpation: no induration or nodules.    Insulin injection sites: no lipoatrophy or lipohypertrophy.  Psychiatric:  Judgement and insight good with normal mood and affect.  Musculoskeletal:  Gait steady. No gross abnormalities.        FOOT EXAMINATION:   Protective Sensation (w/ 10 gram monofilament):  Right: Intact  Left: Intact    Visual Inspection:  Callus -  Bilateral heels    Pedal Pulses:   Right: Present  Left: Present    Posterior tibialis:   Right:Present  Left: Present      Decreased vibratory response to 128 Hz tuning fork bilaterally.     ______________________________________________________________________     ASSESSMENT and PLAN:      1- Type 2 diabetes with neuropathy, PAD, cerebrovascular disease and hyperglycemia - A1c improved, slightly above goal of <7.5% given age.    Long discussion had with son, daugther, and pt about options: Cpeptide WNL so can use other non-insulin regimens, " given various options, all Metformin + followin. Januvia + basal insulin, d/c Novolog  (may not be strong enough to control prandial excursions but can try)  2. Trulicity +/- basal insulin, d/c Novolog   3. HOFFMAN + basal insulin, d/c Novolog (higher risk of hypo, not best choice if others possible)    Until decision made by family and patient, change Metformin to XR and change to 2000 mg nightly (for compliance/memory loss reasons), continue Levemir 8 units nightly, Novolog 3-4 units AC - pt/family to email me when decision made    17 Cpeptide WNL 1.77.      Instructed to monitor BG ac/hs, document on BG logs, and bring complete BG logs to all visits.     Empowerment in 3 months with BMP and A1c before    2- Peripheral neuropathy - Educated patient to check feet daily for any foreign objects and/or wounds. Discussed with patient the importance of wearing appropriate footwear at all times, not to walk barefoot ever, and to check shoes before putting them on feet. Instructed patient to keep feet dry by regularly changing shoes and socks and drying feet after baths and exercises. Also, instructed patient to report any new lesions, discolorations, or swelling to a healthcare professional.    3 - CAD, aortic valve disease, s/p AVR - followed by cardiology Dr. Ziegler    4- Hypertension - goal < 140/90 mmHg -  At goal, on lisinopril, continue current medications   BP Readings from Last 3 Encounters:   17 122/68   17 (!) 133/59   17 (!) 134/56     5- Hyperlipidemia -  LFT's WNL. On Crestor 10 mg daily,  at goal of LDL <70    Lab Results   Component Value Date    LDLCALC 64.0 2017     6 - Poor short term memory - family helping patient arrange medications but starting to affect DM care, referral to neurology for evaluation

## 2017-08-07 ENCOUNTER — PATIENT MESSAGE (OUTPATIENT)
Dept: ENDOCRINOLOGY | Facility: CLINIC | Age: 72
End: 2017-08-07

## 2017-08-07 RX ORDER — METFORMIN HYDROCHLORIDE 500 MG/1
2000 TABLET, EXTENDED RELEASE ORAL NIGHTLY
Qty: 360 TABLET | Refills: 3 | Status: SHIPPED | OUTPATIENT
Start: 2017-08-07 | End: 2017-11-03

## 2017-08-07 NOTE — TELEPHONE ENCOUNTER
----- Message from Faith Morales sent at 8/7/2017 10:17 AM CDT -----  Contact: josé miguel jauregui  [phone]  [facility]   -    Pharmacy  Calling to get clarification  On the pt medication  metformin    Thanks,

## 2017-08-07 NOTE — TELEPHONE ENCOUNTER
Contacted Pharmacy explains that name brand would cost $540.40.  It does come in generic but they had to order it and it is now available.  Will fill Rx. For generic.

## 2017-08-07 NOTE — TELEPHONE ENCOUNTER
Patient called and explains that [facility] will not do generic for the Metformin XR, but it was like that already.  I went ahead and resubmitted.

## 2017-08-09 ENCOUNTER — TELEPHONE (OUTPATIENT)
Dept: ENDOCRINOLOGY | Facility: CLINIC | Age: 72
End: 2017-08-09

## 2017-08-09 NOTE — TELEPHONE ENCOUNTER
Called [facility] and spoke with Laura Explains that, this matter has already been resolved.

## 2017-08-09 NOTE — TELEPHONE ENCOUNTER
----- Message from aRdha Evans sent at 8/3/2017  1:20 PM CDT -----  Contact: Laura with [facility]  Pharmacy called regarding metformin (GLUCOPHAGE-XR) 500 MG 24 hr tablet for the above patient.        Pharmacy can be reached at [phone].        Thanks!

## 2017-08-22 ENCOUNTER — OFFICE VISIT (OUTPATIENT)
Dept: NEUROLOGY | Facility: CLINIC | Age: 72
End: 2017-08-22
Payer: MEDICARE

## 2017-08-22 VITALS
HEART RATE: 58 BPM | HEIGHT: 59 IN | BODY MASS INDEX: 26.75 KG/M2 | DIASTOLIC BLOOD PRESSURE: 63 MMHG | WEIGHT: 132.69 LBS | SYSTOLIC BLOOD PRESSURE: 152 MMHG

## 2017-08-22 DIAGNOSIS — E55.9 VITAMIN D DEFICIENCY: ICD-10-CM

## 2017-08-22 DIAGNOSIS — F01.50 VASCULAR DEMENTIA WITHOUT BEHAVIORAL DISTURBANCE: Primary | ICD-10-CM

## 2017-08-22 DIAGNOSIS — G61.82 MULTIFOCAL MOTOR NEUROPATHY: ICD-10-CM

## 2017-08-22 PROCEDURE — 1157F ADVNC CARE PLAN IN RCRD: CPT | Mod: S$GLB,,, | Performed by: PSYCHIATRY & NEUROLOGY

## 2017-08-22 PROCEDURE — 1159F MED LIST DOCD IN RCRD: CPT | Mod: S$GLB,,, | Performed by: PSYCHIATRY & NEUROLOGY

## 2017-08-22 PROCEDURE — 3078F DIAST BP <80 MM HG: CPT | Mod: S$GLB,,, | Performed by: PSYCHIATRY & NEUROLOGY

## 2017-08-22 PROCEDURE — 3077F SYST BP >= 140 MM HG: CPT | Mod: S$GLB,,, | Performed by: PSYCHIATRY & NEUROLOGY

## 2017-08-22 PROCEDURE — 99213 OFFICE O/P EST LOW 20 MIN: CPT | Mod: S$GLB,,, | Performed by: PSYCHIATRY & NEUROLOGY

## 2017-08-22 PROCEDURE — 1126F AMNT PAIN NOTED NONE PRSNT: CPT | Mod: S$GLB,,, | Performed by: PSYCHIATRY & NEUROLOGY

## 2017-08-22 PROCEDURE — 3008F BODY MASS INDEX DOCD: CPT | Mod: S$GLB,,, | Performed by: PSYCHIATRY & NEUROLOGY

## 2017-08-22 PROCEDURE — 99999 PR PBB SHADOW E&M-EST. PATIENT-LVL II: CPT | Mod: PBBFAC,,, | Performed by: PSYCHIATRY & NEUROLOGY

## 2017-08-22 NOTE — PROGRESS NOTES
Subjective:       Patient ID: June B Yris is a 72 y.o. female.    Chief Complaint:  Memory Loss      History of Present Illness  HPI  Additional Complaints:  Dementia  She is here for evaluation and treatment of cognitive problems. She is accompanied by patient, son and daughter. Primary caregiver is patient and spouse. The family and the patient identify problems with changes in short and long term memory. Family and patient report problems with forgetting to take medications and frequently misplaces object.. Family and patient are concerned about  medication errors, however, they are not concerned about driving and cooking. Medication administration: patient self medicates    Functional Assessment:   Activities of Daily Living (ADLs):    She is independent in the following: ambulation, bathing and hygiene, feeding, continence, grooming, toileting and dressing  Requires assistance with the following: None  Instrumental Activities of Daily Living (IADLs):    She is independent in the following: all activities   Requires assistance with the following: None        Review of SystemsReview of Systems   Constitutional: Negative.    HENT: Negative.    Eyes: Negative.    Respiratory: Negative.    Cardiovascular: Negative.    Musculoskeletal: Negative.    Skin: Negative.    Neurological: Negative.    Psychiatric/Behavioral: Positive for confusion and decreased concentration.       Objective:      Neurologic Exam     Mental Status   Oriented to person, place, and time.   Oriented to person.   Oriented to place.   Disoriented to year and date. Oriented to month, day and season.   Registration: recalls 3 of 3 objects. Follows 3 step commands.   Attention: normal. Concentration: normal.   Speech: speech is normal   Level of consciousness: alert  Able to name object. Able to read. Able to repeat. Able to write. Normal comprehension.     Cranial Nerves     CN II   Left visual field deficit: upper temporal and lower temporal  quadrant(s)    CN III, IV, VI   Pupils are equal, round, and reactive to light.  Extraocular motions are normal.   Nystagmus: none   Diplopia: none  Ophthalmoparesis: none    CN V   Facial sensation intact.     CN VII   Left facial weakness: central    CN VIII   CN VIII normal.     CN IX, X   CN IX normal.   CN X normal.     CN XI   CN XI normal.     CN XII   CN XII normal.     Motor Exam   Muscle bulk: normal  Overall muscle tone: normal  Right arm pronator drift: absent  Left arm pronator drift: absent    Strength   Strength 5/5 except as noted.   Left deltoid: 4/5  Left biceps: 4/5  Left triceps: 4/5  Left wrist flexion: 4/5  Left wrist extension: 4/5  Left interossei: 4/5  Left quadriceps: 4/5  Left hamstrin/5  Left glutei: 4/5  Left anterior tibial: 4/5  Left posterior tibial: 4/5  Left peroneal: 4/5  Left gastroc: 4/5    Sensory Exam   Right leg vibration: decreased from ankle  Left leg vibration: decreased from ankle  Right leg pinprick: decreased from ankle  Left leg pinprick: decreased from ankle  Graphesthesia: normal  Stereognosis: normal    Gait, Coordination, and Reflexes     Gait  Gait: normal    Coordination   Romberg: negative  Finger to nose coordination: normal  Heel to shin coordination: normal  Tandem walking coordination: normal    Tremor   Resting tremor: absent  Intention tremor: absent  Action tremor: absent    Reflexes   Right brachioradialis: 2+  Left brachioradialis: 2+  Right biceps: 2+  Left biceps: 2+  Right triceps: 2+  Left triceps: 2+  Right patellar: 2+  Left patellar: 2+  Right achilles: 2+  Left achilles: 2+  Right : 2+  Left : 2+  Right plantar: normal  Left plantar: normal  Right Krishna: absent  Left Krishna: absent  Right ankle clonus: absent  Left ankle clonus: absent  Right pendular knee jerk: absent  Left pendular knee jerk: absent      Physical Exam   Constitutional: She is oriented to person, place, and time. She appears well-developed and well-nourished.    HENT:   Head: Normocephalic and atraumatic.   Eyes: EOM are normal. Pupils are equal, round, and reactive to light.   Neck: Normal range of motion. Neck supple.   Cardiovascular: Normal rate, regular rhythm and normal heart sounds.    Pulmonary/Chest: Effort normal.   Musculoskeletal: Normal range of motion.   Neurological: She is alert and oriented to person, place, and time. She has normal reflexes. She has a normal Finger-Nose-Finger Test, a normal Heel to Shin Test, a normal Romberg Test and a normal Tandem Gait Test. Gait normal.   Reflex Scores:       Tricep reflexes are 2+ on the right side and 2+ on the left side.       Bicep reflexes are 2+ on the right side and 2+ on the left side.       Brachioradialis reflexes are 2+ on the right side and 2+ on the left side.       Patellar reflexes are 2+ on the right side and 2+ on the left side.       Achilles reflexes are 2+ on the right side and 2+ on the left side.  Skin: Skin is warm and dry.   Psychiatric: Her speech is normal.   Vitals reviewed.        Assessment:        Problem List Items Addressed This Visit        Neuro    Vascular dementia without behavioral disturbance - Primary     Must exclude other causes of dementia - Alzheimer's, nutritional, etc. Will do full work-up         Relevant Orders    VITAMIN B1    Vitam B7, H (Biotin)    VITAMIN B12    VITAMIN B6    VITAMIN D    T4, FREE    TSH    T3, FREE    RPR    FTA ANTIBODIES, IGG AND IGM    EEG,w/awake & drowsy record    Neuropsychological testing    HEPATITIS C ANTIBODY      Other Visit Diagnoses     Alzheimer's disease of other onset without behavioral disturbance         Relevant Orders    VITAMIN B12    Vitamin D deficiency         Relevant Orders    VITAMIN D    Alzheimer's disease         Relevant Orders    T4, FREE    VITAMIN B12    Multifocal motor neuropathy         Relevant Orders    TSH          Plan:     RTC 4-6 weeks

## 2017-09-18 ENCOUNTER — PATIENT MESSAGE (OUTPATIENT)
Dept: NEUROLOGY | Facility: CLINIC | Age: 72
End: 2017-09-18

## 2017-09-19 ENCOUNTER — PATIENT MESSAGE (OUTPATIENT)
Dept: INTERNAL MEDICINE | Facility: CLINIC | Age: 72
End: 2017-09-19

## 2017-09-19 RX ORDER — ERGOCALCIFEROL 1.25 MG/1
50000 CAPSULE ORAL
Qty: 12 CAPSULE | Refills: 3 | Status: SHIPPED | OUTPATIENT
Start: 2017-09-19 | End: 2018-08-16 | Stop reason: SDUPTHER

## 2017-09-28 RX ORDER — METFORMIN HYDROCHLORIDE 1000 MG/1
TABLET ORAL
Qty: 180 TABLET | Refills: 3 | OUTPATIENT
Start: 2017-09-28

## 2017-10-03 ENCOUNTER — PATIENT MESSAGE (OUTPATIENT)
Dept: NEUROLOGY | Facility: CLINIC | Age: 72
End: 2017-10-03

## 2017-10-17 ENCOUNTER — HOSPITAL ENCOUNTER (OUTPATIENT)
Dept: NEUROLOGY | Facility: CLINIC | Age: 72
Discharge: HOME | End: 2017-10-17
Payer: MEDICARE

## 2017-10-17 DIAGNOSIS — F01.50 VASCULAR DEMENTIA WITHOUT BEHAVIORAL DISTURBANCE: ICD-10-CM

## 2017-10-17 DIAGNOSIS — G45.4 TGA (TRANSIENT GLOBAL AMNESIA): ICD-10-CM

## 2017-10-17 PROCEDURE — 95816 PR EEG,W/AWAKE & DROWSY RECORD: ICD-10-PCS | Mod: S$GLB,,, | Performed by: PSYCHIATRY & NEUROLOGY

## 2017-10-17 PROCEDURE — 95816 EEG AWAKE AND DROWSY: CPT | Mod: S$GLB,,, | Performed by: PSYCHIATRY & NEUROLOGY

## 2017-10-18 NOTE — PROCEDURES
DATE OF PROCEDURE:  10/17/2017    EEG #:  [id number].    REQUESTING PHYSICIAN:  Archana Bello MD.    ELECTROENCEPHALOGRAM REPORT     METHODOLOGY:  Electroencephalographic (EEG) recording is recorded with   electrodes placed according to the International 10-20 placement system.  Thirty   two (32) channels of digital signal (sampling rate of 512/sec), including T1   and T2, were simultaneously recorded from the scalp and may include EKG, EMG,   and/or eye monitors.  Recording band pass was 0.1 to 512 Hz.  Digital video   recording of the patient is simultaneously recorded with the EEG.  The patient   is instructed to report clinical symptoms which may occur during the recording   session.  EEG and video recording are stored and archived in digital format.    Activation procedures, which include photic stimulation, hyperventilation and   instructing patients to perform simple tasks, are done in selected patients  The EEG is displayed on a monitor screen and can be reviewed using different   montages.  Computer assisted-analysis is employed to detect spike and   electrographic seizure activity.   The entire record is submitted for computer   analysis.  The entire recording is visually reviewed, and the times identified   by computer analysis as being spikes or seizures are reviewed again.    Compressed spectral analysis (CSA) is also performed on the activity recorded   from each individual channel.  This is displayed as a power display of   frequencies from 0 to 30 Hz over time.   The CSA is reviewed looking for   asymmetries in power between homologous areas of the scalp, then compared with   the original EEG recording.    FANCRU software was also utilized in the review of this study.  This software   suite analyzes the EEG recording in multiple domains.  Coherence and rhythmicity   are computed to identify EEG sections which may contain organized seizures.    Each channel undergoes analysis to detect the  presence of spike and sharp waves   which have special and morphological characteristics of epileptic activity.  The   routine EEG recording is converted from special into frequency domain.  This is   then displayed comparing homologous areas to identify areas of significant   asymmetry.  Algorithm to identify non-cortically generated artifact is used to   separate artifact from the EEG.      EEG FINDINGS:  The patient was awake throughout the recording.  There was a   fairly rhythmic posterior dominant rhythm of 8 Hz seen in the occipital,   parietal and posterior temporal regions bilaterally.  A midrange, fairly spindly   beta was seen diffusely.  Intermittently, theta was seen in the frontotemporal   regions, which at times in a synchronous fashion and other times it was   asynchronous.  There was also some delta components noted in the right   frontotemporal region.  No spike or sharp wave activity was seen.  Intermittent   photic stimulation was carried out, which did not significantly alter the   recording.  Hyperventilation for 3 minutes was performed without significant   impact on the recording.    IMPRESSION:  Abnormal EEG.  There is asynchronous slowing noted in the   frontotemporal regions bilaterally indicating a focal and probably some   structural process.  There is no evidence for an epileptic discharge.    CLINICAL CORRELATION:  The patient is a 72-year-old female with transient global   amnesia and possible early dementia.  This tracing does show a normal posterior   dominant rhythm, but asynchronous slowing in the frontotemporal areas   throughout the recording indicating a structural process.  This pattern,   however, is seen more commonly after the fourth decade of life and is thought to   often be associated with subcortical ischemic vascular changes.  Other   structural processes, however, could produce the same finding.  There is no   epileptic activity present.      MARLON/ADOLPH  dd: 10/18/2017  14:46:57 (CDT)  td: 10/18/2017 15:45:20 (ADI)  Doc ID   #[id number]  Job ID #[id number]    CC:

## 2017-10-19 PROBLEM — G45.4 TGA (TRANSIENT GLOBAL AMNESIA): Status: ACTIVE | Noted: 2017-08-03

## 2017-10-26 ENCOUNTER — IMMUNIZATION (OUTPATIENT)
Dept: FAMILY MEDICINE | Facility: CLINIC | Age: 72
End: 2017-10-26
Payer: MEDICARE

## 2017-10-26 DIAGNOSIS — Z23 NEED FOR PROPHYLACTIC VACCINATION AND INOCULATION AGAINST INFLUENZA: Primary | ICD-10-CM

## 2017-10-26 PROCEDURE — G0008 ADMIN INFLUENZA VIRUS VAC: HCPCS | Mod: S$GLB,,, | Performed by: INTERNAL MEDICINE

## 2017-10-26 PROCEDURE — 90662 IIV NO PRSV INCREASED AG IM: CPT | Mod: S$GLB,,, | Performed by: INTERNAL MEDICINE

## 2017-10-27 ENCOUNTER — LAB VISIT (OUTPATIENT)
Dept: LAB | Facility: HOSPITAL | Age: 72
End: 2017-10-27
Attending: INTERNAL MEDICINE
Payer: MEDICARE

## 2017-10-27 ENCOUNTER — OFFICE VISIT (OUTPATIENT)
Dept: FAMILY MEDICINE | Facility: CLINIC | Age: 72
End: 2017-10-27
Payer: MEDICARE

## 2017-10-27 VITALS
DIASTOLIC BLOOD PRESSURE: 74 MMHG | SYSTOLIC BLOOD PRESSURE: 130 MMHG | WEIGHT: 128.31 LBS | HEIGHT: 59 IN | BODY MASS INDEX: 25.87 KG/M2 | HEART RATE: 64 BPM

## 2017-10-27 DIAGNOSIS — I70.0 ATHEROSCLEROSIS OF AORTA: ICD-10-CM

## 2017-10-27 DIAGNOSIS — I15.2 HYPERTENSION ASSOCIATED WITH DIABETES: Chronic | ICD-10-CM

## 2017-10-27 DIAGNOSIS — E11.69 DYSLIPIDEMIA ASSOCIATED WITH TYPE 2 DIABETES MELLITUS: ICD-10-CM

## 2017-10-27 DIAGNOSIS — E11.59 HYPERTENSION ASSOCIATED WITH DIABETES: Chronic | ICD-10-CM

## 2017-10-27 DIAGNOSIS — I77.9 BILATERAL CAROTID ARTERY DISEASE: ICD-10-CM

## 2017-10-27 DIAGNOSIS — E11.65 UNCONTROLLED TYPE 2 DIABETES MELLITUS WITH HYPERGLYCEMIA, WITH LONG-TERM CURRENT USE OF INSULIN: ICD-10-CM

## 2017-10-27 DIAGNOSIS — E66.3 OVERWEIGHT (BMI 25.0-29.9): ICD-10-CM

## 2017-10-27 DIAGNOSIS — Z79.4 UNCONTROLLED TYPE 2 DIABETES MELLITUS WITH HYPERGLYCEMIA, WITH LONG-TERM CURRENT USE OF INSULIN: ICD-10-CM

## 2017-10-27 DIAGNOSIS — D35.2 NON-FUNCTIONING PITUITARY ADENOMA: ICD-10-CM

## 2017-10-27 DIAGNOSIS — Z79.4 TYPE 2 DIABETES MELLITUS WITH DIABETIC NEUROPATHY, WITH LONG-TERM CURRENT USE OF INSULIN: ICD-10-CM

## 2017-10-27 DIAGNOSIS — E11.40 TYPE 2 DIABETES MELLITUS WITH DIABETIC NEUROPATHY, WITH LONG-TERM CURRENT USE OF INSULIN: ICD-10-CM

## 2017-10-27 DIAGNOSIS — M47.816 FACET ARTHRITIS OF LUMBAR REGION: ICD-10-CM

## 2017-10-27 DIAGNOSIS — I73.9 PVD (PERIPHERAL VASCULAR DISEASE): Chronic | ICD-10-CM

## 2017-10-27 DIAGNOSIS — E11.42 DIABETIC POLYNEUROPATHY ASSOCIATED WITH TYPE 2 DIABETES MELLITUS: ICD-10-CM

## 2017-10-27 DIAGNOSIS — E78.5 DYSLIPIDEMIA ASSOCIATED WITH TYPE 2 DIABETES MELLITUS: ICD-10-CM

## 2017-10-27 DIAGNOSIS — Z00.00 ENCOUNTER FOR PREVENTIVE HEALTH EXAMINATION: Primary | ICD-10-CM

## 2017-10-27 LAB
ANION GAP SERPL CALC-SCNC: 9 MMOL/L
BUN SERPL-MCNC: 13 MG/DL
CALCIUM SERPL-MCNC: 9.4 MG/DL
CHLORIDE SERPL-SCNC: 101 MMOL/L
CO2 SERPL-SCNC: 28 MMOL/L
CREAT SERPL-MCNC: 0.9 MG/DL
EST. GFR  (AFRICAN AMERICAN): >60 ML/MIN/1.73 M^2
EST. GFR  (NON AFRICAN AMERICAN): >60 ML/MIN/1.73 M^2
ESTIMATED AVG GLUCOSE: 183 MG/DL
GLUCOSE SERPL-MCNC: 186 MG/DL
HBA1C MFR BLD HPLC: 8 %
POTASSIUM SERPL-SCNC: 4.2 MMOL/L
SODIUM SERPL-SCNC: 138 MMOL/L

## 2017-10-27 PROCEDURE — 83036 HEMOGLOBIN GLYCOSYLATED A1C: CPT

## 2017-10-27 PROCEDURE — 80048 BASIC METABOLIC PNL TOTAL CA: CPT

## 2017-10-27 PROCEDURE — G0439 PPPS, SUBSEQ VISIT: HCPCS | Mod: S$GLB,,, | Performed by: NURSE PRACTITIONER

## 2017-10-27 PROCEDURE — 99999 PR PBB SHADOW E&M-EST. PATIENT-LVL V: CPT | Mod: PBBFAC,,, | Performed by: NURSE PRACTITIONER

## 2017-10-27 PROCEDURE — 99499 UNLISTED E&M SERVICE: CPT | Mod: S$GLB,,, | Performed by: NURSE PRACTITIONER

## 2017-10-27 PROCEDURE — 36415 COLL VENOUS BLD VENIPUNCTURE: CPT | Mod: PO

## 2017-10-27 NOTE — PROGRESS NOTES
"Marlen Arndt presented for a  Medicare AWV and comprehensive Health Risk Assessment today. The following components were reviewed and updated:    · Medical history  · Family History  · Social history  · Allergies and Current Medications  · Health Risk Assessment  · Health Maintenance  · Care Team     ** See Completed Assessments for Annual Wellness Visit within the encounter summary.**       The following assessments were completed:  · Living Situation  · CAGE  · Depression Screening  · Timed Get Up and Go  · Whisper Test  · Cognitive Function Screening  · Nutrition Screening  · ADL Screening  · PAQ Screening    Vitals:    10/27/17 0959   BP: 130/74   Pulse: 64   Weight: 58.2 kg (128 lb 4.9 oz)   Height: 4' 11" (1.499 m)     Body mass index is 25.91 kg/m².     Physical Exam   Constitutional: She is oriented to person, place, and time. She appears well-developed and well-nourished. No distress.   HENT:   Head: Normocephalic and atraumatic.   Eyes: EOM are normal. Pupils are equal, round, and reactive to light.   Neck: Neck supple. No JVD present. No tracheal deviation present.   Cardiovascular: Normal rate, regular rhythm, normal heart sounds and intact distal pulses.    No murmur heard.  Pulmonary/Chest: Effort normal and breath sounds normal. No respiratory distress. She has no wheezes. She has no rales.   Abdominal: Soft. Bowel sounds are normal. She exhibits no distension and no mass. There is no tenderness.   Musculoskeletal: Normal range of motion. She exhibits no edema or tenderness.   Neurological: She is alert and oriented to person, place, and time. Coordination normal.   Skin: Skin is warm and dry. No erythema. No pallor.   Psychiatric: She has a normal mood and affect. Her behavior is normal. Judgment and thought content normal. Cognition and memory are impaired. She expresses no homicidal and no suicidal ideation.   Nursing note and vitals reviewed.        Diagnoses and health risks identified today and " associated recommendations/orders:    1. Encounter for preventive health examination    2. Uncontrolled type 2 diabetes mellitus with hyperglycemia, with long-term current use of insulin  Chronic; unstable on medication.  Followed by PCP, Endocrinology, and Diabetes Education.    3. Diabetic polyneuropathy associated with type 2 diabetes mellitus  Chronic; stable on medication.  Followed by Podiatry.    4. Hypertension associated with diabetes  Chronic; stable on medication.  Followed by PCP.    5. Dyslipidemia associated with type 2 diabetes mellitus  Chronic; stable on medication.  Followed by PCP.    6. Atherosclerosis of aorta  Chronic; stable.  Followed by Cardiology.    7. Bilateral carotid artery disease  Chronic; stable.  Followed by Cardiology.    8. PVD (peripheral vascular disease)  Chronic; stable.  Followed by PCP.    9. Non-functioning pituitary adenoma  Chronic; stable.  Followed by PCP.    10. Facet arthritis of lumbar region  Chronic; stable on medication.  As seen on imaging dated 12/07/10.  Followed by PCP.    11. Overweight (BMI 25.0-29.9)      Provided Marlen with a 5-10 year written screening schedule and personal prevention plan. Recommendations were developed using the USPSTF age appropriate recommendations. Education, counseling, and referrals were provided as needed. After Visit Summary printed and given to patient which includes a list of additional screenings\tests needed.    Return in about 4 weeks (around 11/24/2017) for follow-up with PCP, HRA visit in 1 year.    Tere Friedman NP

## 2017-10-27 NOTE — PATIENT INSTRUCTIONS
Counseling and Referral of Other Preventative  (Italic type indicates deductible and co-insurance are waived)    Patient Name: Marlen Arndt  Today's Date: 10/27/2017      SERVICE LIMITATIONS RECOMMENDATION    Vaccines    · Pneumococcal (once after 65)    · Influenza (annually)    · Hepatitis B (if medium/high risk)    · Prevnar 13      Hepatitis B medium/high risk factors:       - End-stage renal disease       - Hemophiliacs who received Factor VII or         IX concentrates       - Clients of institutions for the mentally             retarded       - Persons who live in the same house as          a HepB carrier       - Homosexual men       - Illicit injectable drug abusers     Pneumococcal: Done, no repeat necessary     Influenza: Done, repeat in one year     Hepatitis B: N/A     Prevnar 13: Done, no repeat necessary    Mammogram (biennial age 50-74)  Annually (age 40 or over)  Done this year, repeat every year    Pap (up to age 70 and after 70 if unknown history or abnormal study last 10 years)    N/A     The USPSTF recommends against screening for cervical cancer in women older than age 65 years who have had adequate prior screening and are not otherwise at high risk for cervical cancer.      Colorectal cancer screening (to age 75)    · Fecal occult blood test (annual)  · Flexible sigmoidoscopy (5y)  · Screening colonoscopy (10y)  · Barium enema   Last done 04/24/2009, recommend to repeat every 10  years    Diabetes self-management training (no USPSTF recommendations)  Requires referral by treating physician for patient with diabetes or renal disease. 10 hours of initial DSMT sessions of no less than 30 minutes each in a continuous 12-month period. 2 hours of follow-up DSMT in subsequent years.  Done this year, repeat every year    Bone mass measurements (age 65 & older, biennial)  Requires diagnosis related to osteoporosis or estrogen deficiency. Biennial benefit unless patient has history of long-term  glucocorticoid  Last done 06/29/2015, recommend to repeat every 3  years    Glaucoma screening (no USPSTF recommendation)  Diabetes mellitus, family history   , age 50 or over    American, age 65 or over  Done this year, repeat every year    Medical nutrition therapy for diabetes or renal disease (no recommended schedule)  Requires referral by treating physician for patient with diabetes or renal disease or kidney transplant within the past 3 years.  Can be provided in same year as diabetes self-management training (DSMT), and CMS recommends medical nutrition therapy take place after DSMT. Up to 3 hours for initial year and 2 hours in subsequent years.  N/A    Cardiovascular screening blood tests (every 5 years)  · Fasting lipid panel  Order as a panel if possible  Done this year, repeat every year    Diabetes screening tests (at least every 3 years, Medicare covers annually or at 6-month intervals for prediabetic patients)  · Fasting blood sugar (FBS) or glucose tolerance test (GTT)  Patient must be diagnosed with one of the following:       - Hypertension       - Dyslipidemia       - Obesity (BMI 30kg/m2)       - Previous elevated impaired FBS or GTT       ... or any two of the following:       - Overweight (BMI 25 but <30)       - Family history of diabetes       - Age 65 or older       - History of gestational diabetes or birth of baby weighing more than 9 pounds  Done this year, repeat every year    HIV screening (annually for increased risk patients)  · HIV-1 and HIV-2 by EIA, or MARCIE, rapid antibody test or oral mucosa transudate  Patients must be at increased risk for HIV infection per USPSTF guidelines or pregnant. Tests covered annually for patient at increased risk or as requested by the patient. Pregnant patients may receive up to 3 tests during pregnancy.  Risks discussed, screening is not recommended    Smoking cessation counseling (up to 8 sessions per year)  Patients must be  asymptomatic of tobacco-related conditions to receive as a preventative service.  Non-smoker    Subsequent annual wellness visit  At least 12 months since last AWV  Return in one year     The following information is provided to all patients.  This information is to help you find resources for any of the problems found today that may be affecting your health:                Living healthy guide: www.[facility].[place].[facility]      Understanding Diabetes: www.diabetes.org      Eating healthy: www.cdc.gov/healthyweight      CDC home safety checklist: www.cdc.gov/steadi/patient.html      Agency on Aging: www.goea.[place].[facility]      Alcoholics anonymous (AA): www.aa.org      Physical Activity: www.mariama.nih.gov/eu2stxc      Tobacco use: www.quitwithusla.org

## 2017-11-01 ENCOUNTER — PATIENT MESSAGE (OUTPATIENT)
Dept: NEUROLOGY | Facility: CLINIC | Age: 72
End: 2017-11-01

## 2017-11-01 ENCOUNTER — PATIENT MESSAGE (OUTPATIENT)
Dept: ENDOCRINOLOGY | Facility: CLINIC | Age: 72
End: 2017-11-01

## 2017-11-03 ENCOUNTER — OFFICE VISIT (OUTPATIENT)
Dept: URGENT CARE | Facility: CLINIC | Age: 72
End: 2017-11-03
Payer: MEDICARE

## 2017-11-03 ENCOUNTER — CLINICAL SUPPORT (OUTPATIENT)
Dept: DIABETES | Facility: CLINIC | Age: 72
End: 2017-11-03
Payer: MEDICARE

## 2017-11-03 VITALS
HEIGHT: 59 IN | SYSTOLIC BLOOD PRESSURE: 96 MMHG | BODY MASS INDEX: 25.69 KG/M2 | WEIGHT: 127.44 LBS | DIASTOLIC BLOOD PRESSURE: 60 MMHG | HEART RATE: 80 BPM

## 2017-11-03 VITALS
HEART RATE: 78 BPM | SYSTOLIC BLOOD PRESSURE: 104 MMHG | RESPIRATION RATE: 18 BRPM | WEIGHT: 128 LBS | TEMPERATURE: 99 F | OXYGEN SATURATION: 96 % | DIASTOLIC BLOOD PRESSURE: 60 MMHG | HEIGHT: 59 IN | BODY MASS INDEX: 25.8 KG/M2

## 2017-11-03 DIAGNOSIS — I15.2 HYPERTENSION ASSOCIATED WITH DIABETES: Chronic | ICD-10-CM

## 2017-11-03 DIAGNOSIS — I73.9 PVD (PERIPHERAL VASCULAR DISEASE): Chronic | ICD-10-CM

## 2017-11-03 DIAGNOSIS — E11.59 HYPERTENSION ASSOCIATED WITH DIABETES: Chronic | ICD-10-CM

## 2017-11-03 DIAGNOSIS — J32.9 SINUSITIS, UNSPECIFIED CHRONICITY, UNSPECIFIED LOCATION: Primary | ICD-10-CM

## 2017-11-03 DIAGNOSIS — E11.42 DIABETIC POLYNEUROPATHY ASSOCIATED WITH TYPE 2 DIABETES MELLITUS: ICD-10-CM

## 2017-11-03 DIAGNOSIS — F01.50 VASCULAR DEMENTIA WITHOUT BEHAVIORAL DISTURBANCE: ICD-10-CM

## 2017-11-03 DIAGNOSIS — J04.0 LARYNGITIS: ICD-10-CM

## 2017-11-03 DIAGNOSIS — I77.9 BILATERAL CAROTID ARTERY DISEASE: ICD-10-CM

## 2017-11-03 DIAGNOSIS — I25.10 CORONARY ARTERY DISEASE INVOLVING NATIVE CORONARY ARTERY OF NATIVE HEART WITHOUT ANGINA PECTORIS: Chronic | ICD-10-CM

## 2017-11-03 DIAGNOSIS — Z79.4 UNCONTROLLED TYPE 2 DIABETES MELLITUS WITH HYPERGLYCEMIA, WITH LONG-TERM CURRENT USE OF INSULIN: ICD-10-CM

## 2017-11-03 DIAGNOSIS — E11.42 DIABETIC POLYNEUROPATHY ASSOCIATED WITH TYPE 2 DIABETES MELLITUS: Primary | ICD-10-CM

## 2017-11-03 DIAGNOSIS — E78.5 DYSLIPIDEMIA ASSOCIATED WITH TYPE 2 DIABETES MELLITUS: ICD-10-CM

## 2017-11-03 DIAGNOSIS — E11.65 UNCONTROLLED TYPE 2 DIABETES MELLITUS WITH HYPERGLYCEMIA, WITH LONG-TERM CURRENT USE OF INSULIN: ICD-10-CM

## 2017-11-03 DIAGNOSIS — E11.69 DYSLIPIDEMIA ASSOCIATED WITH TYPE 2 DIABETES MELLITUS: ICD-10-CM

## 2017-11-03 DIAGNOSIS — Z86.73 HISTORY OF CVA (CEREBROVASCULAR ACCIDENT): ICD-10-CM

## 2017-11-03 DIAGNOSIS — R41.3 POOR SHORT TERM MEMORY: ICD-10-CM

## 2017-11-03 PROCEDURE — 99499 UNLISTED E&M SERVICE: CPT | Mod: S$GLB,,, | Performed by: NURSE PRACTITIONER

## 2017-11-03 PROCEDURE — 99203 OFFICE O/P NEW LOW 30 MIN: CPT | Mod: S$GLB,,, | Performed by: PHYSICIAN ASSISTANT

## 2017-11-03 PROCEDURE — 99214 OFFICE O/P EST MOD 30 MIN: CPT | Mod: S$GLB,,, | Performed by: NURSE PRACTITIONER

## 2017-11-03 PROCEDURE — G0108 DIAB MANAGE TRN  PER INDIV: HCPCS | Mod: S$GLB,,, | Performed by: DIETITIAN, REGISTERED

## 2017-11-03 PROCEDURE — 99999 PR PBB SHADOW E&M-EST. PATIENT-LVL V: CPT | Mod: PBBFAC,,, | Performed by: NURSE PRACTITIONER

## 2017-11-03 RX ORDER — BENZONATATE 100 MG/1
200 CAPSULE ORAL 3 TIMES DAILY PRN
Qty: 20 CAPSULE | Refills: 0 | Status: SHIPPED | OUTPATIENT
Start: 2017-11-03 | End: 2018-04-20

## 2017-11-03 RX ORDER — AMOXICILLIN AND CLAVULANATE POTASSIUM 875; 125 MG/1; MG/1
1 TABLET, FILM COATED ORAL 2 TIMES DAILY
Qty: 20 TABLET | Refills: 0 | Status: SHIPPED | OUTPATIENT
Start: 2017-11-03 | End: 2017-11-13

## 2017-11-03 RX ORDER — METFORMIN HYDROCHLORIDE 1000 MG/1
1000 TABLET ORAL 2 TIMES DAILY WITH MEALS
Qty: 180 TABLET | Refills: 3 | Status: SHIPPED | OUTPATIENT
Start: 2017-11-03 | End: 2018-12-18 | Stop reason: SDUPTHER

## 2017-11-03 NOTE — PROGRESS NOTES
"CC:  Diabetes.     HPI: June B Yirs is a 72 y.o. female presents for visit in [facility]. The patient has had diabetes along with associated comorbidities indicated in the Visit Diagnosis section of this encounter. The patient was diagnosed with Type 2 diabetes in >30 years ago. Has been on insulin since diabetes diagnosis.     Of note: Reports diabetes well controlled until 2015, sciatica uncontrolled with significant weight loss and loss of muscle tone.     Also has history of carotid artery disease and CVA, followed by Dr. Isatu Ziegler    Last visit, presented with son and daughter, concerns with memory loss. Frequent hypo on CGMS, so Levemir dose decreased, Novolog dose increased.     Today, presents with daughter. Interested in Trulicity. Diagnosed with dementia, followed by neurology. Doing better with medication compliance with Metformin due to talking pill box. Giving Novolog based on "what she eats" but not giving with snacks. Did not change to Metformin XR after last visit because issues getting XR metformin from pharmacy      DIABETES COMPLICATIONS: peripheral neuropathy, cerebrovascular disease and peripheral vascular disease     STANDARDS of CARE:  Statin:  Yes - crestor 10 mg daily   ACEI or ARB:  Yes - lisinopril   ASA:  Yes - 81 mg   Last eye exam 1/2017 no retinopathy   Microalbumin/Creatinine ratio:  Lab Results   Component Value Date    MICALBCREAT 25.0 06/21/2017       CURRENT A1C:    Hemoglobin A1C   Date Value Ref Range Status   10/27/2017 8.0 (H) 4.0 - 5.6 % Final     Comment:     According to ADA guidelines, hemoglobin A1c <7.0% represents  optimal control in non-pregnant diabetic patients. Different  metrics may apply to specific patient populations.   Standards of Medical Care in Diabetes-2016.  For the purpose of screening for the presence of diabetes:  <5.7%     Consistent with the absence of diabetes  5.7-6.4%  Consistent with increasing risk for diabetes "   (prediabetes)  >or=6.5%  Consistent with diabetes  Currently, no consensus exists for use of hemoglobin A1c  for diagnosis of diabetes for children.  This Hemoglobin A1c assay has significant interference with fetal   hemoglobin   (HbF). The results are invalid for patients with abnormal amounts of   HbF,   including those with known Hereditary Persistence   of Fetal Hemoglobin. Heterozygous hemoglobin variants (HbAS, HbAC,   HbAD, HbAE, HbA2) do not significantly interfere with this assay;   however, presence of multiple variants in a sample may impact the %   interference.     06/23/2017 7.6 (H) 4.0 - 5.6 % Final     Comment:     According to ADA guidelines, hemoglobin A1c <7.0% represents  optimal control in non-pregnant diabetic patients. Different  metrics may apply to specific patient populations.   Standards of Medical Care in Diabetes-2016.  For the purpose of screening for the presence of diabetes:  <5.7%     Consistent with the absence of diabetes  5.7-6.4%  Consistent with increasing risk for diabetes   (prediabetes)  >or=6.5%  Consistent with diabetes  Currently, no consensus exists for use of hemoglobin A1c  for diagnosis of diabetes for children.  This Hemoglobin A1c assay has significant interference with fetal   hemoglobin   (HbF). The results are invalid for patients with abnormal amounts of   HbF,   including those with known Hereditary Persistence   of Fetal Hemoglobin. Heterozygous hemoglobin variants (HbAS, HbAC,   HbAD, HbAE, HbA2) do not significantly interfere with this assay;   however, presence of multiple variants in a sample may impact the %   interference.     03/01/2017 8.1 (H) 4.5 - 6.2 % Final     Comment:     According to ADA guidelines, hemoglobin A1C <7.0% represents  optimal control in non-pregnant diabetic patients.  Different  metrics may apply to specific populations.   Standards of Medical Care in Diabetes - 2016.  For the purpose of screening for the presence of  "diabetes:  <5.7%     Consistent with the absence of diabetes  5.7-6.4%  Consistent with increasing risk for diabetes   (prediabetes)  >or=6.5%  Consistent with diabetes  Currently no consensus exists for use of hemoglobin A1C  for diagnosis of diabetes for children.         GOAL A1C: <7.5%    DM MEDICATIONS USED IN THE PAST: Metformin, Novolog, Levemir     MEDICATIONS UPON START OF PROGRAM:  Metformin 1000 mg BID, Levemir 8 units nightly (dose decreased after CGMS), Novolog 3-5 units AC  Denies missing any doses of medication     CURRENT DIABETES MEDICATIONS: Metformin 1000 mg BID, Levemir 8 units nightly, Novolog 4-5 AC (varying Novolog doses often based on "meal size" or carb amount)  Denies missing doses of medications now with talking pill box     DO YOU USE THE [facility] EMAIL SYSTEM? Yes    BLOOD GLUCOSE MONITORING:  See CGMS    HYPOGLYCEMIA:  Yes, overnight when taking higher basal insulin dose     MEALS:   Not eating meals consistently    Snacks throughout day     Drinking water and diet drinks     CURRENT EXERCISE:  No    OCCUPATION: retired     MEDICATIONS, ALLERGIES, LABS, VS's, MEDICAL, SURGICAL, SOCIAL, AND FAMILY HISTORY reviewed and updated in the appropriate sections during this encounter    ROS:     Constitutional: Negative for weight change  Endocrine:  Denies polyuria, polydipsia, nocturia.  HENT: Denies neck swelling, lumps, horseness or trouble swallowing. Denies any personal or family history of thyroid cancer.    Eyes: Negative for visual disturbance.   Gastrointestinal: Negative for nausea, diarrhea, vomiting, bloating.  No history of pancreatitis or gastroparesis.  Genitourinary: Negative for urgency, frequency, frequent urinary tract infections.  Neurological: Negative for syncope,  + numbness/burning of extremities.      All other systems reviewed and are negative.    PE:  Constitutional: BP 96/60 (BP Location: Left arm, Patient Position: Sitting, BP Method: Medium (Manual))   Pulse 80   " "Ht 4' 11" (1.499 m)   Wt 57.8 kg (127 lb 6.8 oz)   BMI 25.74 kg/m²    Well developed, well nourished, NAD.    Neck:  No trachial deviation present, No neck masses noticed   Thyroid:  No thyromegaly present.  No thyroid tenderness.    + right and left carotid bruit (chronic per last cardiology note)     Cardiovascular:    Auscultation:  systolic murmur +2/6, radiates to right neck (chronic aortic valve disease, followed by cardiology Dr. Smith)    Lower extremities:  No edema or cyanosis.      Respiratory:    Effort:  Normal, no use of accessory muscles.   Auscultation: breath sounds normal, no adventitious sounds.  Skin:    Inspection: no rashes, lesion or ulcers, no acanthosis nigracans.   Palpation: no induration or nodules.   Insulin injection sites: no lipoatrophy or lipohypertrophy.  Psychiatric:  Judgement and insight good with normal mood and affect.      FOOT EXAMINATION:   Protective Sensation (w/ 10 gram monofilament):  Right: Intact  Left: Intact    Visual Inspection:  Callus -  Bilateral heels    Pedal Pulses:   Right: Present  Left: Present    Posterior tibialis:   Right:Present  Left: Present      Decreased vibratory response to 128 Hz tuning fork bilaterally.     ______________________________________________________________________     ASSESSMENT and PLAN:      1- Type 2 diabetes with neuropathy, PAD, cerebrovascular disease and hyperglycemia - A1c increasing, pt wishes to try Trulicity. Start trulicity 0.75 mg weekly for one month. Then increase the dose to Trulicity 1.5 mg weekly indefinitely. Discussed MOA, side effects, including n/v/pancreatitis/medullary thyroid cancer. Instructed patient to notify me of any n/v/abdominal pain. Discussed proper dosing and administration. Continue Levemir 8 units nightly. D/c Novolog with start of Trulicity. Continue Metformin 1000 mg BID.     Discussed likelihood of falling into donut hole and high cost of Trulicity through donut hole, pt and family aware. " Discussed Trulicity program to possibly obtain medication for free while in donut hole.     6/17 Cpeptide WNL 1.77.      Instructed to monitor BG 2 times daily, document on BG logs, and bring complete BG logs to all visits - instructed to email logs in 1 month or sooner for any BG <70    [facility] in Return in about 3 months (around 2/3/2018). with below labs before    Orders Placed This Encounter   Procedures    Basic metabolic panel    Hemoglobin A1c    Ambulatory Referral to Diabetes Education     Diabetes education today for trulicity instruction     2- Peripheral neuropathy - Educated patient to check feet daily for any foreign objects and/or wounds. Discussed with patient the importance of wearing appropriate footwear at all times, not to walk barefoot ever, and to check shoes before putting them on feet. Instructed patient to keep feet dry by regularly changing shoes and socks and drying feet after baths and exercises. Also, instructed patient to report any new lesions, discolorations, or swelling to a healthcare professional.    3 - PVD, CAD, aortic valve disease, s/p AVR - followed by cardiology Dr. Ziegler    4- Hypertension - goal < 140/90 mmHg -  At goal, on lisinopril, continue current medications   BP Readings from Last 3 Encounters:   11/03/17 96/60   10/27/17 130/74   08/22/17 (!) 152/63     5- Hyperlipidemia -  LFT's WNL. On Crestor 10 mg daily,  at goal of LDL <70    Lab Results   Component Value Date    LDLCALC 64.0 06/23/2017     6 - Poor short term memory/ vascular dementia -neurology following

## 2017-11-03 NOTE — PROGRESS NOTES
"Subjective:       Patient ID: June B rYis is a 72 y.o. female.    Vitals:  height is 4' 11" (1.499 m) and weight is 58.1 kg (128 lb). Her temperature is 98.8 °F (37.1 °C). Her blood pressure is 104/60 and her pulse is 78. Her respiration is 18 and oxygen saturation is 96%.     Chief Complaint: Sinus Problem    Sinus Problem   This is a new problem. Episode onset: 3 days. The problem has been gradually worsening since onset. There has been no fever. Her pain is at a severity of 0/10. She is experiencing no pain. Associated symptoms include congestion, coughing, a hoarse voice and a sore throat. Pertinent negatives include no chills, ear pain, headaches or shortness of breath. Past treatments include nothing.     Review of Systems   Constitution: Positive for malaise/fatigue. Negative for chills and fever.   HENT: Positive for congestion, hoarse voice and sore throat. Negative for ear pain.    Eyes: Negative for discharge and redness.   Cardiovascular: Negative for chest pain, dyspnea on exertion and leg swelling.   Respiratory: Positive for cough. Negative for shortness of breath, sputum production and wheezing.    Musculoskeletal: Negative for myalgias.   Gastrointestinal: Negative for abdominal pain and nausea.   Neurological: Negative for headaches.       Objective:      Physical Exam   Constitutional: She is oriented to person, place, and time. She appears well-developed and well-nourished. She is cooperative.  Non-toxic appearance. She does not appear ill. No distress.   HENT:   Head: Normocephalic and atraumatic.   Right Ear: Hearing, tympanic membrane, external ear and ear canal normal.   Left Ear: Hearing, tympanic membrane, external ear and ear canal normal.   Nose: Mucosal edema and rhinorrhea present. No nasal deformity. No epistaxis. Right sinus exhibits maxillary sinus tenderness. Right sinus exhibits no frontal sinus tenderness. Left sinus exhibits maxillary sinus tenderness. Left sinus exhibits no " frontal sinus tenderness.   Mouth/Throat: Uvula is midline, oropharynx is clear and moist and mucous membranes are normal. No trismus in the jaw. Normal dentition. No uvula swelling. No posterior oropharyngeal erythema. No tonsillar exudate.   Eyes: Conjunctivae and lids are normal. No scleral icterus.   Sclera clear bilat   Neck: Trachea normal, full passive range of motion without pain and phonation normal. Neck supple.   Cardiovascular: Normal rate, regular rhythm, normal heart sounds, intact distal pulses and normal pulses.    Pulmonary/Chest: Effort normal and breath sounds normal. No respiratory distress. She has no decreased breath sounds. She has no wheezes. She has no rhonchi. She has no rales.   Abdominal: Soft. Normal appearance and bowel sounds are normal. She exhibits no distension. There is no tenderness.   Musculoskeletal: Normal range of motion. She exhibits no edema or deformity.   Neurological: She is alert and oriented to person, place, and time. She exhibits normal muscle tone. Coordination normal.   Skin: Skin is warm, dry and intact. She is not diaphoretic. No pallor.   Psychiatric: She has a normal mood and affect. Her speech is normal and behavior is normal. Judgment and thought content normal. Cognition and memory are normal.   Nursing note and vitals reviewed.      Assessment:       1. Sinusitis, unspecified chronicity, unspecified location    2. Laryngitis        Plan:         Sinusitis, unspecified chronicity, unspecified location  -     amoxicillin-clavulanate 875-125mg (AUGMENTIN) 875-125 mg per tablet; Take 1 tablet by mouth 2 (two) times daily.  Dispense: 20 tablet; Refill: 0  -     benzonatate (TESSALON PERLES) 100 MG capsule; Take 2 capsules (200 mg total) by mouth 3 (three) times daily as needed for Cough.  Dispense: 20 capsule; Refill: 0    Laryngitis        Sinusitis (Antibiotic Treatment)    The sinuses are air-filled spaces within the bones of the face. They connect to the  inside of the nose. Sinusitis is an inflammation of the tissue lining the sinus cavity. Sinus inflammation can occur during a cold. It can also be due to allergies to pollens and other particles in the air. Sinusitis can cause symptoms of sinus congestion and fullness. A sinus infection causes fever, headache and facial pain. There is often green or yellow drainage from the nose or into the back of the throat (post-nasal drip). You have been given antibiotics to treat this condition.  Home care:  · Take the full course of antibiotics as instructed. Do not stop taking them, even if you feel better.  · Drink plenty of water, hot tea, and other liquids. This may help thin mucus. It also may promote sinus drainage.  · Heat may help soothe painful areas of the face. Use a towel soaked in hot water. Or,  the shower and direct the hot spray onto your face. Using a vaporizer along with a menthol rub at night may also help.   · An expectorant containing guaifenesin may help thin the mucus and promote drainage from the sinuses.  · Over-the-counter decongestants may be used unless a similar medicine was prescribed. Nasal sprays work the fastest. Use one that contains phenylephrine or oxymetazoline. First blow the nose gently. Then use the spray. Do not use these medicines more often than directed on the label or symptoms may get worse. You may also use tablets containing pseudoephedrine. Avoid products that combine ingredients, because side effects may be increased. Read labels. You can also ask the pharmacist for help. (NOTE: Persons with high blood pressure should not use decongestants. They can raise blood pressure.)  · Over-the-counter antihistamines may help if allergies contributed to your sinusitis.    · Do not use nasal rinses or irrigation during an acute sinus infection, unless told to by your health care provider. Rinsing may spread the infection to other sinuses.  · Use acetaminophen or ibuprofen to control  pain, unless another pain medicine was prescribed. (If you have chronic liver or kidney disease or ever had a stomach ulcer, talk with your doctor before using these medicines. Aspirin should never be used in anyone under 18 years of age who is ill with a fever. It may cause severe liver damage.)  · Don't smoke. This can worsen symptoms.  Follow-up care  Follow up with your healthcare provider or our staff if you are not improving within the next week.  When to seek medical advice  Call your healthcare provider if any of these occur:  · Facial pain or headache becoming more severe  · Stiff neck  · Unusual drowsiness or confusion  · Swelling of the forehead or eyelids  · Vision problems, including blurred or double vision  · Fever of 100.4ºF (38ºC) or higher, or as directed by your healthcare provider  · Seizure  · Breathing problems  · Symptoms not resolving within 10 days  Date Last Reviewed: 4/13/2015  © 8818-7172 [employer]. [address] [place] [address]. All rights reserved. This information is not intended as a substitute for professional medical care. Always follow your healthcare professional's instructions.        Laryngitis    Laryngitis is a swelling of the tissues around the vocal cords. Symptoms include a hoarse (scratchy) voice. The voice may be lost completely. It may be caused by a viral illness, such as a head or chest cold. It may also be due to overuse and strain of the voice. Smoking, drinking alcohol, acid reflux, allergies, or inhaling harsh chemicals may also lead to symptoms. This condition will usually resolve in 1-2 weeks.  Home care  · Rest your voice until it recovers. Talk as little as possible. If your symptoms are severe, rest at home for a day or so.  · Breathing cool steam from a humidifier/vaporizer or in a steamy shower may be helpful.  · Drink plenty of fluids to stay well hydrated.  · Do not smoke  Follow-up care  Follow up with your healthcare provider  or this facility if you have not improved after one week.  When to seek medical advice  Contact your healthcare provider for any of the following:  · Severe pain with swallowing  · Trouble opening mouth  · Neck swelling, neck pain, or trouble moving neck  · Noisy breathing or trouble breathing  · Fever of 100.4°F (38.ºC) or higher, or as directed by your healthcare provider  · Drooling  · Symptoms do not resolve in 2 weeks  Date Last Reviewed: 4/26/2015  © 3885-3556 [employer]. [address] [place] [address]. All rights reserved. This information is not intended as a substitute for professional medical care. Always follow your healthcare professional's instructions.      Please follow up with your Primary care provider within 2-5 days if your signs and symptoms have not resolved or worsen.     If your condition worsens or fails to improve we recommend that you receive another evaluation at the emergency room immediately or contact your primary medical clinic to discuss your concerns.   You must understand that you have received an Urgent Care treatment only and that you may be released before all of your medical problems are known or treated. You, the patient, will arrange for follow up care as instructed.

## 2017-11-03 NOTE — PATIENT INSTRUCTIONS
Continue Metformin 1000 mg twice daily    Start trulicity 0.75 mg weekly for one month. Then increase the dose to Trulicity 1.5 mg weekly indefinitely. Discussed MOA, side effects, including n/v/pancreatitis/medullary thyroid cancer. Instructed patient to notify me of any n/v/abdominal pain. Discussed proper dosing and administration.     Continue Levemir 8 units daily    STOP Novolog when you start trulicity    Email me if you cannot start Trulicity    Email me logs in 1 month

## 2017-11-03 NOTE — PATIENT INSTRUCTIONS
Sinusitis (Antibiotic Treatment)    The sinuses are air-filled spaces within the bones of the face. They connect to the inside of the nose. Sinusitis is an inflammation of the tissue lining the sinus cavity. Sinus inflammation can occur during a cold. It can also be due to allergies to pollens and other particles in the air. Sinusitis can cause symptoms of sinus congestion and fullness. A sinus infection causes fever, headache and facial pain. There is often green or yellow drainage from the nose or into the back of the throat (post-nasal drip). You have been given antibiotics to treat this condition.  Home care:  · Take the full course of antibiotics as instructed. Do not stop taking them, even if you feel better.  · Drink plenty of water, hot tea, and other liquids. This may help thin mucus. It also may promote sinus drainage.  · Heat may help soothe painful areas of the face. Use a towel soaked in hot water. Or,  the shower and direct the hot spray onto your face. Using a vaporizer along with a menthol rub at night may also help.   · An expectorant containing guaifenesin may help thin the mucus and promote drainage from the sinuses.  · Over-the-counter decongestants may be used unless a similar medicine was prescribed. Nasal sprays work the fastest. Use one that contains phenylephrine or oxymetazoline. First blow the nose gently. Then use the spray. Do not use these medicines more often than directed on the label or symptoms may get worse. You may also use tablets containing pseudoephedrine. Avoid products that combine ingredients, because side effects may be increased. Read labels. You can also ask the pharmacist for help. (NOTE: Persons with high blood pressure should not use decongestants. They can raise blood pressure.)  · Over-the-counter antihistamines may help if allergies contributed to your sinusitis.    · Do not use nasal rinses or irrigation during an acute sinus infection, unless told to by  your health care provider. Rinsing may spread the infection to other sinuses.  · Use acetaminophen or ibuprofen to control pain, unless another pain medicine was prescribed. (If you have chronic liver or kidney disease or ever had a stomach ulcer, talk with your doctor before using these medicines. Aspirin should never be used in anyone under 18 years of age who is ill with a fever. It may cause severe liver damage.)  · Don't smoke. This can worsen symptoms.  Follow-up care  Follow up with your healthcare provider or our staff if you are not improving within the next week.  When to seek medical advice  Call your healthcare provider if any of these occur:  · Facial pain or headache becoming more severe  · Stiff neck  · Unusual drowsiness or confusion  · Swelling of the forehead or eyelids  · Vision problems, including blurred or double vision  · Fever of 100.4ºF (38ºC) or higher, or as directed by your healthcare provider  · Seizure  · Breathing problems  · Symptoms not resolving within 10 days  Date Last Reviewed: 4/13/2015  © 7185-9746 [employer]. [address] [place] [address]. All rights reserved. This information is not intended as a substitute for professional medical care. Always follow your healthcare professional's instructions.        Laryngitis    Laryngitis is a swelling of the tissues around the vocal cords. Symptoms include a hoarse (scratchy) voice. The voice may be lost completely. It may be caused by a viral illness, such as a head or chest cold. It may also be due to overuse and strain of the voice. Smoking, drinking alcohol, acid reflux, allergies, or inhaling harsh chemicals may also lead to symptoms. This condition will usually resolve in 1-2 weeks.  Home care  · Rest your voice until it recovers. Talk as little as possible. If your symptoms are severe, rest at home for a day or so.  · Breathing cool steam from a humidifier/vaporizer or in a steamy shower may be  helpful.  · Drink plenty of fluids to stay well hydrated.  · Do not smoke  Follow-up care  Follow up with your healthcare provider or this facility if you have not improved after one week.  When to seek medical advice  Contact your healthcare provider for any of the following:  · Severe pain with swallowing  · Trouble opening mouth  · Neck swelling, neck pain, or trouble moving neck  · Noisy breathing or trouble breathing  · Fever of 100.4°F (38.ºC) or higher, or as directed by your healthcare provider  · Drooling  · Symptoms do not resolve in 2 weeks  Date Last Reviewed: 4/26/2015  © 0071-5868 [employer]. [address] [place] [address], [place] [address]. All rights reserved. This information is not intended as a substitute for professional medical care. Always follow your healthcare professional's instructions.      Please follow up with your Primary care provider within 2-5 days if your signs and symptoms have not resolved or worsen.     If your condition worsens or fails to improve we recommend that you receive another evaluation at the emergency room immediately or contact your primary medical clinic to discuss your concerns.   You must understand that you have received an Urgent Care treatment only and that you may be released before all of your medical problems are known or treated. You, the patient, will arrange for follow up care as instructed.

## 2017-11-07 NOTE — PROGRESS NOTES
"Diabetes Education  Author: Fariba Hernández RD  Date: 11/7/2017    Diabetes Education Visit  Diabetes Education Record Assessment/Progress: Comprehensive/Group (empowerment; Trulicity training)    Diabetes Type  Diabetes Type : Type II    Diabetes History  Diabetes Diagnosis: >10 years    Nutrition  Meal Planning:  (snacks throughout the day)    Monitoring   Blood Glucose Logs: No    Current Diabetes Treatment   Current Treatment: Oral Medication, Insulin (Metformin 1000 mg BID, Levemir 8 units nightly, Novolog 4-5 AC (varying Novolog doses often based on "meal size" or carb amount))    Social History  Preferred Learning Method: Face to Face  Primary Support: Family, Daughter (daughter present at visit)  Smoking Status: Ex Smoker    Barriers to Change  Barriers to Change: Cognitive (dx with dementia)  Learning Challenges : None    Readiness to Learn   Readiness to Learn : Acceptance    Cultural Influences  Cultural Influences: No      Diabetes Education Assessment/Progress  Medications (states correct name, dose, onset, peak, duration, side effects & timing of meds): Discussion, Demonstration, Return Demonstration, Instructed, Individual Session, Requires Assistance Family/SO, Comprehends Key Points, Written Materials Provided (Stopping Novolog with start of Trulicity. Continue Levemir and metformin. Start Trulicity 0.75 mg then increase to 1.5 mg)     The patient is here in clinic today with daughter to attend an individual appointment for training on once a week Trulicity Pen injections. Patient was given background information on Trulicity and how it works. Covered in detail how to use the Trulicity pen (timing - when to take it, meal planning, storage, and pen replacement). Discussed what to expect: side effects, possible hypoglycemia, and blood sugar control. Reviewed causes of hypoglycemia including signs, symptoms and treatment options.    Instructed to remove gray base and then place the clear base flat and " firmly against the skin, unlock the pen by turning the lock ring, press the green button and hold for 5-10 seconds. Patient advised that a click sound will be heard when the green button is pressed and will hear a second click sound when the dose is complete and the gray part of the pen inside of the pen will be visible.     Patient and daughter performed successful return demonstration.      Monitoring (monitoring blood glucose/other parameters & using results): Discussion, Individual Session, Requires Assistance Family/SO, Comprehends Key Points (SMBG twice daily per NP.)  Acute Complications (preventing, detecting, and treating acute complications): Discussion, Individual Session, Requires Assistance Family/SO, Comprehends Key Points (Reviewed hypo symptoms and appropriate treatment. )      Goals  Patient has selected/evaluated goals during today's session: No         Diabetes Care Plan/Intervention  Education Plan/Intervention: Diabetes Empowerment Program ( )      Diabetes Meal Plan  Restrictions: Restricted Carbohydrate, Low Sodium, Low Fat  Carbohydrate Per Meal: 30-45g      Education Units of Time   Time Spent: 30 min      Health Maintenance Topics with due status: Not Due       Topic Last Completion Date    Colonoscopy 04/24/2009    TETANUS VACCINE 10/10/2013    DEXA SCAN 06/29/2015    Mammogram 01/05/2017    Foot Exam 06/15/2017    Lipid Panel 06/23/2017    Eye Exam 06/30/2017    Hemoglobin A1c 10/27/2017     There are no preventive care reminders to display for this patient.

## 2017-11-14 ENCOUNTER — TELEPHONE (OUTPATIENT)
Dept: INTERNAL MEDICINE | Facility: CLINIC | Age: 72
End: 2017-11-14

## 2017-11-14 NOTE — TELEPHONE ENCOUNTER
----- Message from Katiuska Helton sent at 11/14/2017  9:36 AM CST -----  Contact: [facility] [phone]  Diabetic or Medical Supplies.  What supplies are needed: lancets  What is the brand name of the supplies: Reli on ultra lancet 30 gauge  Is this a refill or new prescription:  refill  Who prescribed the supplies:    Pharmacy or company name, phone # and location:  [facility] [phone]  Comments:

## 2017-11-24 ENCOUNTER — PATIENT MESSAGE (OUTPATIENT)
Dept: ENDOCRINOLOGY | Facility: CLINIC | Age: 72
End: 2017-11-24

## 2017-11-28 NOTE — TELEPHONE ENCOUNTER
Noted patient has not read message from Windy Olivera NP in patient portal- mailed to patient to address on file.

## 2017-11-30 ENCOUNTER — TELEPHONE (OUTPATIENT)
Dept: INTERNAL MEDICINE | Facility: CLINIC | Age: 72
End: 2017-11-30

## 2017-11-30 DIAGNOSIS — Z12.31 SCREENING MAMMOGRAM, ENCOUNTER FOR: Primary | ICD-10-CM

## 2017-11-30 NOTE — TELEPHONE ENCOUNTER
----- Message from Jc Barrios sent at 11/29/2017 11:43 AM CST -----  Contact: Pt  Pt would like a call back from nurse in ref to order for an annual mammo screening    Pt would like courtesy call once orders are completed    Can be reached at [phone]

## 2017-12-06 DIAGNOSIS — I25.10 CORONARY ARTERY DISEASE INVOLVING NATIVE CORONARY ARTERY OF NATIVE HEART WITHOUT ANGINA PECTORIS: Chronic | ICD-10-CM

## 2017-12-06 DIAGNOSIS — I77.9 BILATERAL CAROTID ARTERY DISEASE: ICD-10-CM

## 2017-12-06 DIAGNOSIS — I15.2 HYPERTENSION ASSOCIATED WITH DIABETES: Chronic | ICD-10-CM

## 2017-12-06 DIAGNOSIS — E11.59 HYPERTENSION ASSOCIATED WITH DIABETES: Chronic | ICD-10-CM

## 2017-12-06 DIAGNOSIS — E11.51 DIABETES MELLITUS WITH PERIPHERAL VASCULAR DISEASE: Chronic | ICD-10-CM

## 2017-12-06 RX ORDER — LABETALOL 200 MG/1
TABLET, FILM COATED ORAL
Qty: 180 TABLET | Refills: 3 | Status: SHIPPED | OUTPATIENT
Start: 2017-12-06 | End: 2018-08-30 | Stop reason: SDUPTHER

## 2017-12-11 ENCOUNTER — OFFICE VISIT (OUTPATIENT)
Dept: INTERNAL MEDICINE | Facility: CLINIC | Age: 72
End: 2017-12-11
Payer: MEDICARE

## 2017-12-11 VITALS
HEIGHT: 59 IN | WEIGHT: 119.31 LBS | DIASTOLIC BLOOD PRESSURE: 66 MMHG | HEART RATE: 69 BPM | SYSTOLIC BLOOD PRESSURE: 128 MMHG | BODY MASS INDEX: 24.05 KG/M2 | OXYGEN SATURATION: 98 % | TEMPERATURE: 98 F

## 2017-12-11 DIAGNOSIS — E11.59 HYPERTENSION ASSOCIATED WITH DIABETES: Primary | Chronic | ICD-10-CM

## 2017-12-11 DIAGNOSIS — M54.2 NECK PAIN: ICD-10-CM

## 2017-12-11 DIAGNOSIS — F01.50 VASCULAR DEMENTIA WITHOUT BEHAVIORAL DISTURBANCE: ICD-10-CM

## 2017-12-11 DIAGNOSIS — R41.3 POOR SHORT TERM MEMORY: ICD-10-CM

## 2017-12-11 DIAGNOSIS — I15.2 HYPERTENSION ASSOCIATED WITH DIABETES: Primary | Chronic | ICD-10-CM

## 2017-12-11 DIAGNOSIS — M54.9 UPPER BACK PAIN: ICD-10-CM

## 2017-12-11 PROCEDURE — 99499 UNLISTED E&M SERVICE: CPT | Mod: S$GLB,,, | Performed by: INTERNAL MEDICINE

## 2017-12-11 PROCEDURE — 99214 OFFICE O/P EST MOD 30 MIN: CPT | Mod: S$GLB,,, | Performed by: INTERNAL MEDICINE

## 2017-12-11 PROCEDURE — 99999 PR PBB SHADOW E&M-EST. PATIENT-LVL III: CPT | Mod: PBBFAC,,, | Performed by: INTERNAL MEDICINE

## 2017-12-11 NOTE — Clinical Note
Yina - she never had the neuropsych test ordered in August - would y ou pls help with scheduling?  She needs f/u appt with Dr Bello after  thanks for your help.

## 2017-12-11 NOTE — PROGRESS NOTES
Subjective:       Patient ID: June B Yris is a 72 y.o. female.    Chief Complaint: Follow-up (6 month); Neck Pain; and Leg Pain (left side)    HPI   DM better controlled.  Now taking trulicity and Levemir - 8 units and glucophage.    Last aic late October.  Fasting sugars .  Before supper 101-143, 165, and before bedtime .  Goal is to stop levemir completely.      She has lost 10 lbs since starting Trulicity.  Taste has never returned following heart dz.      Also c/o 5 day  H/o neck pain.  Massage didn't help much.     L leg pain x 2 months.   10/10 at first, now better.  Reminiscent of R sciatica, but not as severe.  No leg weakness. Occupational Therapist son weaned off gabapentin as he read about potential brain damage (?).      Family is making sure she is taking meds as directed.    Progressive memory loss - lost purse, forgets what cards she is looking for.  Forgot sweater after going into room several times to get sweater.  She hasn't followed up with neurology.  Neurospych tests were never tested.        Review of Systems   Constitutional: Negative for fever and unexpected weight change.   HENT: Negative for congestion and postnasal drip.    Eyes: Negative for pain, discharge and visual disturbance.   Respiratory: Negative for cough, chest tightness, shortness of breath and wheezing.    Cardiovascular: Negative for chest pain and leg swelling.   Gastrointestinal: Negative for abdominal pain, constipation, diarrhea and nausea.   Genitourinary: Negative for difficulty urinating, dysuria and hematuria.   Skin: Negative for rash.   Neurological: Negative for headaches.        Worsening memory   Psychiatric/Behavioral: Negative for dysphoric mood and sleep disturbance. The patient is not nervous/anxious.        Objective:      Physical Exam   Constitutional: She is oriented to person, place, and time. She appears well-developed and well-nourished.   Eyes: No scleral icterus.   Neck: No JVD  present. No thyromegaly present.   Cardiovascular: Normal rate, regular rhythm and normal heart sounds.    Pulmonary/Chest: Effort normal and breath sounds normal. No respiratory distress. She has no wheezes. She has no rales.   Abdominal: Soft. She exhibits no mass. There is no tenderness.   Musculoskeletal: She exhibits no edema.        Left hip: Normal.   Neurological: She is alert and oriented to person, place, and time.   Reflex Scores:       Tricep reflexes are 2+ on the right side and 2+ on the left side.       Bicep reflexes are 2+ on the right side and 2+ on the left side.       Brachioradialis reflexes are 2+ on the right side and 2+ on the left side.       Patellar reflexes are 2+ on the right side and 2+ on the left side.  Psychiatric: She has a normal mood and affect. Her behavior is normal.       Assessment:       1. Hypertension associated with diabetes    2. Vascular dementia without behavioral disturbance    3. Poor short term memory    4. Neck pain    5. Upper back pain        Plan:       Marlen was seen today for follow-up, neck pain and leg pain.    Diagnoses and all orders for this visit:    Hypertension associated with diabetes    Vascular dementia without behavioral disturbance    Poor short term memory    Neck pain  -     Ambulatory Referral to Physical/Occupational Therapy    Upper back pain  -     Ambulatory Referral to Physical/Occupational Therapy       Will ask for neurology's help in scheduling neuropsych testing.

## 2017-12-11 NOTE — PATIENT INSTRUCTIONS
You may take short course of advil or aleve for leg pain.  (Aleve - 1 tab twice a day for up to 2 weeks only).     You may take gabapentin at night 100-300 mg.

## 2017-12-12 ENCOUNTER — PATIENT MESSAGE (OUTPATIENT)
Dept: ENDOCRINOLOGY | Facility: CLINIC | Age: 72
End: 2017-12-12

## 2017-12-14 ENCOUNTER — TELEPHONE (OUTPATIENT)
Dept: DIABETES | Facility: CLINIC | Age: 72
End: 2017-12-14

## 2017-12-14 NOTE — TELEPHONE ENCOUNTER
Please call pt and/or daughter to obtain BG logs from past week. Have not received fax that pts family said they faxed and they have not read email that I sent    Thanks

## 2017-12-21 ENCOUNTER — TELEPHONE (OUTPATIENT)
Dept: NEUROLOGY | Facility: CLINIC | Age: 72
End: 2017-12-21

## 2017-12-27 ENCOUNTER — PATIENT MESSAGE (OUTPATIENT)
Dept: ENDOCRINOLOGY | Facility: CLINIC | Age: 72
End: 2017-12-27

## 2018-01-03 ENCOUNTER — TELEPHONE (OUTPATIENT)
Dept: ENDOCRINOLOGY | Facility: CLINIC | Age: 73
End: 2018-01-03

## 2018-01-03 DIAGNOSIS — E11.42 DIABETIC POLYNEUROPATHY ASSOCIATED WITH TYPE 2 DIABETES MELLITUS: Primary | ICD-10-CM

## 2018-01-03 NOTE — TELEPHONE ENCOUNTER
Please call pts daughter Becky ([phone] ) and confirm that she received email to d/c Carlo. Continue Trulicity.     Also, please notify daughter Becky of my transition out of the endocrine department, need for f/u in 3 months with any endo provider. Can leave labs as they are    Thanks

## 2018-01-04 ENCOUNTER — PATIENT MESSAGE (OUTPATIENT)
Dept: OPTOMETRY | Facility: CLINIC | Age: 73
End: 2018-01-04

## 2018-01-04 ENCOUNTER — CLINICAL SUPPORT (OUTPATIENT)
Dept: REHABILITATION | Facility: HOSPITAL | Age: 73
End: 2018-01-04
Payer: MEDICARE

## 2018-01-04 DIAGNOSIS — R29.898 DECREASED ROM OF NECK: ICD-10-CM

## 2018-01-04 DIAGNOSIS — Z78.9 DECREASED ACTIVITIES OF DAILY LIVING (ADL): ICD-10-CM

## 2018-01-04 DIAGNOSIS — R53.1 DECREASED STRENGTH: ICD-10-CM

## 2018-01-04 DIAGNOSIS — M54.2 NECK PAIN: ICD-10-CM

## 2018-01-04 PROCEDURE — G8984 CARRY CURRENT STATUS: HCPCS | Mod: CK,PN

## 2018-01-04 PROCEDURE — 97161 PT EVAL LOW COMPLEX 20 MIN: CPT | Mod: PN

## 2018-01-04 PROCEDURE — G8985 CARRY GOAL STATUS: HCPCS | Mod: CK,PN

## 2018-01-04 PROCEDURE — 97110 THERAPEUTIC EXERCISES: CPT | Mod: PN

## 2018-01-04 NOTE — PLAN OF CARE
TIME RECORD    Date: 1/4/2018    Start Time:  11:10  Stop Time:  12:00    PROCEDURES:    TIMED  Procedure Min.   TE 10                     UNTIMED  Procedure Min.   IE 40         Total Timed Minutes:  10  Total Timed Units:  1 TE  Total Untimed Units:  1 LCE  Charges Billed/# of units:  2 (1 TE + 1 LCE)    OUTPATIENT PHYSICAL THERAPY   PATIENT EVALUATION  Onset Date: 9/2017  Primary Diagnosis: Neck and Upper back pain  1. Decreased ROM of neck     2. Neck pain     3. Decreased activities of daily living (ADL)     4. Decreased strength       Treatment Diagnosis: decreased lumbar and neck AROM, decreased ADLs, decreased B UE and LE strength, decreased functional mobility  Past Medical History:   Diagnosis Date    Aortic stenosis     Arthritis     Back pain     Carotid artery occlusion     Cataract     Coronary artery disease     Diabetes mellitus type II     Heart murmur     Hyperlipidemia     Hypertension associated with diabetes 7/24/2012    Non-functioning pituitary adenoma 7/24/2012    Pituitary microadenoma 11/17/2015    Polyneuropathy     PVD (peripheral vascular disease)     PVD (peripheral vascular disease)     S/P AVR (aortic valve replacement) 8/14/2013    21 mm Medtronic Mosaic ultra porcine valve     Stenosis     Stented coronary artery 4/10/2013    2013 RCA QASIM     Stroke     Multiple mini strokes; decreased left peripheal vision    Type II or unspecified type diabetes mellitus with neurological manifestations, not stated as uncontrolled(250.60)     Type II or unspecified type diabetes mellitus with peripheral circulatory disorders, not stated as uncontrolled(250.70)      Precautions: Stroke, Heart valve replacement, Left homonymous hemianopsia  Prior Therapy: None  Medications: June B Yris has a current medication list which includes the following prescription(s): acetaminophen, ascorbic acid (vitamin c), aspirin, benzonatate, blood-glucose meter, coenzyme q10, daily  multivitamin, dulaglutide, ergocalciferol, fenofibrate, gabapentin, hydrochlorothiazide, labetalol, lancets, lisinopril, magnesium, metformin, nitroglycerin, omega-3 fatty acids-vitamin e, phenobarbital, rosuvastatin, and true metrix glucose test strip.  Nutrition:  Normal  History of Present Illness: See subjective below  Prior Level of Function: Independent  Social History: Retired from [employer] (medical records)  Place of Residence (Steps/Adaptations): raised house with 4 steps to enter, lives with   Functional Deficits Leading to Referral/Nature of Injury: heavy lifting, overhead activities  Patient Therapy Goals: To stop everything from hurting    Subjective     June B Yris states she use to have L sided sciatica that would cause her falls (last fall was a year ago); sciatic pain would go down as far down as the L lateral calf. Last time pt felt sciatic pain was about 2-3 months ago, and pain would also be present in low back along belt line L>R. Pt reports neck pain started years ago and would be more in her L side of the neck and L Ut. Pain for neck and back comes and goes, and is more nagging pain than severe where she would use heating pad every night to help with neck and back pain.    Pain:  Location: back  and neck   Description: Aching and Dull  Activities Which Increase Pain: moving too fast doing activities with neck and back  Activities Which Decrease Pain: pain medication, heating pad and rest  Pain Scale: 2/10 at best 4/10 now  6/10 at worst    Objective     Posture: Flat lumbar spine, increased thoracic kyphosis  Palpation: +TTP to B Ut, L cervical paraspinals, B SI joints, L2-L5 and C3-6 spinous processes  Sensation: decreased light touch in B feet from neuropathy  DTRs: NT  Range of Motion/Strength:   AROM: CERVICAL LUMBAR   Flexion 75%, min neck pain 75%   Extension 75%, min neck pain 80%, decreased LBP   Right side bending 75%, min neck pain 80%   Left side bending 75%, min neck pain  75%   Right rotation 75%, min neck pain 90%, L sided LBP   Left rotation 75%, min neck pain 100%     Shoulder Right  Left  Pain/Dysfunction with Movement    AROM MMT AROM MMT    flexion 75% 4-/5 75% 3+/5    extension WFL 4/5 WFL 4-/5    abduction 75% 4-/5 75% 3+/5    adduction WFL 4/5 WFL 4-/5    Internal rotation WFL 4/5 WFL 4/5    ER at 0° abd WFL 4/5 WFL 4/5      Hip  Right   Left  Pain/Dysfunction with Movement    AROM PROM MMT AROM PROM MMT    Flexion WFL WFL 4-/5 WFL WFL 4/5    Extension WFL WFL 3+/5 WFL WFL 3+/5    Abduction WFL WFL 3+/5 WFL WFL 3+/5    Adduction WFL WFL 4/5 WFL WFL 4/5    Internal rotation WFL WFL 4/5 WFL WFL 4/5    External rotation WFL WFL 4/5 WFL WFL 4/5       Knee  Right   Left  Pain/Dysfunction with Movement    AROM PROM MMT AROM PROM MMT    Flexion WFL WFL 4/5 WFL WFL 4/5    Extension WFL WFL 4/5 WFL WFL 4/5      Ankle  Right   Left  Pain/Dysfunction with Movement    AROM PROM MMT AROM PROM MMT    Plantarflexion WFL WFL 5/5 WFL WFL 5/5    Dorsiflexion WFL WFL 5/5 WFL WFL 5/5    Inversion WFL WFL 5/5 WFL WFL 5/5    Eversion WFL WFL 5/5 WFL WFL 5/5      Flexibility: decreased B HS length  Gait: Without AD  Bed Mobility:Independent  Transfers: Independent  Special Tests:   SLR test = Negative B  Ely's test = Positive on L, negative on R  Max  Cervical compression test = Negative B  Cervical distraction test = Negative    Functional Limitation Reports: G codes Primary  Tool: FOTO Neck SURVEY  Category: Carrying ()  Limitation: 58%  Current: CK = at least 40% but < 60% impaired, limited or restricted  Goal: CK = at least 40% but < 60% impaired, limited or restricted    Functional Limitation Reports: G codes  Tool: FOTO Thoracic Spine SURVEY  Category: Mobility ()  Limitation: 44%  Current: CK 40%<60%  Goal: CJ 20%<40%    TREATMENT     Time In: 11:50  Time Out: 12:00    PT Evaluation Completed? Yes  Discussed Plan of Care with patient: Yes    June received 10 minutes of therapeutic  exercise & instruction including:  DATE 1/4/2018   VISIT 1   POC 3/4/18  copay $5    G CODE 1/10   FOTO 1/5   Cap Visit  Cap Total 107.58  107.58   Stretches:    HS     UT 1x30''   Levator scap 1x30''   Supine:    Chin tucks 5x5''   Flexion with dowel    SL abd    SL gator    Tas 5x5''   Bridges w/ hip add 10x   Clams    SL hip abd    Prone:    T's    UE ext        Standing:    Lat pulls downs    Rows        INITIALS KALIN     Written Home Exercises Provided: Yes  June demo good understanding of the education provided. Patient demo good return demo of skill of exercises.      Assessment       Initial Assessment (Pertinent finding, problem list and factors affecting outcome):   Pt is a 71 yo female dx with Neck pain and upper back pain presenting to PT at [facility]. Pt currently presents with neck and B upper back pain,decreased cervical/lumbar ROM, decreased BUE/LE strength, poor posture, and functional deficits with overhead activities and lifting/carrying activities. Pt would benefit from skilled PT consisting of muscular skeletal stretching/strengthening, manual therapy, neuro muscular re-education, and modalities prn to address limitations and increase functional mobility.      History  Co-morbidities and personal factors that may impact the plan of care Examination  Body Structures and Functions, activity limitations and participation restrictions that may impact the plan of care    Clinical Presentation   Co-morbidities:   OA, back/neck pain, DM II, HTN, Stroke/TIA        Personal Factors:   no deficits Body Regions:   head  neck  back  lower extremities  upper extremities  trunk    Body Systems:    gross symmetry  ROM  strength  gross coordinated movement  balance  gait  transfers  transitions  motor control  skin integrity            Participation Restrictions:   Overhead activities, lifting/carrying     Activity limitations:   Learning and applying knowledge  no  deficits    General Tasks and Commands  no deficits    Communication  no deficits    Mobility  lifting and carrying objects    Self care  no deficits    Domestic Life  shopping  cooking  doing house work (cleaning house, washing dishes, laundry)  assisting others    Interactions/Relationships  no deficits    Life Areas  no deficits    Community and Social Life  no deficits         stable and uncomplicated                      low   high  high Decision Making/ Complexity Score:  low       Rehab Potiential: excellent  Barriers to Rehab: None  GOALS: Short Term Goals: 4 weeks  1. Pt will demo good deep neck flexor and TA contraction strength to improve cervical lordosis and stabilization.  2. Increase cervical ROM by 5-10 degrees and lumbar ROM WFL in order to perform ADLs with decreased difficulty.  3. Pt will demo good sitting and standing posture for improved spine health and decreased neck pain.  4. Pt to tolerate HEP to improve ROM and independence with ADL's    Long Term Goals: 8 weeks  1. Report decreased neck and back pain  < /= 2/10  to increase tolerance for ADLs.  2. Increase cervical AROM to WFL with min neck pain for increased functional mobility.  3. Increased strength in B shoulders/scapular stabilizers and B LE to >/= 4+/5 MMT grade to increase tolerance for ADL and work activities.  4. Pt will report at CK level on FOTO neck score and CJ level on FOTO thoracic spine for pain disability to demonstrate decrease in disability and improvement in neck pain.      Plan     Certification Period: 1/4/18 to 3/4/18  Recommended Treatment Plan: 2 times per week for 8 weeks: Cervical/Lumbar Traction, Electrical Stimulation IFC/Russian, Gait Training, Manual Therapy, Moist Heat/ Ice, Neuromuscular Re-ed, Patient Education, Therapeutic Activites, Therapeutic Exercise and Ultrasound/Phonophoresis  Other Recommendations: modalities prn, ASTYM prn, kinesiotape prn, Functional Dry Needling prn       Therapist: Timmy LEWIS  Chano, PT    I CERTIFY THE NEED FOR THESE SERVICES FURNISHED UNDER THIS PLAN OF TREATMENT AND WHILE UNDER MY CARE    Physician's comments: ________________________________________________________________________________________________________________________________________________      Physician's Name: ___________________________________

## 2018-01-08 ENCOUNTER — OFFICE VISIT (OUTPATIENT)
Dept: OPTOMETRY | Facility: CLINIC | Age: 73
End: 2018-01-08
Payer: MEDICARE

## 2018-01-08 ENCOUNTER — HOSPITAL ENCOUNTER (OUTPATIENT)
Dept: RADIOLOGY | Facility: HOSPITAL | Age: 73
Discharge: HOME | End: 2018-01-08
Attending: INTERNAL MEDICINE
Payer: MEDICARE

## 2018-01-08 DIAGNOSIS — Z12.31 SCREENING MAMMOGRAM, ENCOUNTER FOR: ICD-10-CM

## 2018-01-08 DIAGNOSIS — H53.462 LEFT HOMONYMOUS HEMIANOPSIA: ICD-10-CM

## 2018-01-08 DIAGNOSIS — H52.4 PRESBYOPIA: ICD-10-CM

## 2018-01-08 DIAGNOSIS — E11.3293 MILD NONPROLIFERATIVE DIABETIC RETINOPATHY OF BOTH EYES WITHOUT MACULAR EDEMA ASSOCIATED WITH TYPE 2 DIABETES MELLITUS: Primary | ICD-10-CM

## 2018-01-08 DIAGNOSIS — Z96.1 PSEUDOPHAKIA OF BOTH EYES: ICD-10-CM

## 2018-01-08 DIAGNOSIS — Z13.5 SCREENING FOR GLAUCOMA: ICD-10-CM

## 2018-01-08 PROBLEM — M54.2 NECK PAIN: Status: ACTIVE | Noted: 2018-01-08

## 2018-01-08 PROBLEM — R53.1 DECREASED STRENGTH: Status: ACTIVE | Noted: 2018-01-08

## 2018-01-08 PROBLEM — Z78.9 DECREASED ACTIVITIES OF DAILY LIVING (ADL): Status: ACTIVE | Noted: 2018-01-08

## 2018-01-08 PROBLEM — R29.898 DECREASED ROM OF NECK: Status: ACTIVE | Noted: 2018-01-08

## 2018-01-08 PROCEDURE — 77067 SCR MAMMO BI INCL CAD: CPT | Mod: 26,,, | Performed by: RADIOLOGY

## 2018-01-08 PROCEDURE — 92014 COMPRE OPH EXAM EST PT 1/>: CPT | Mod: S$GLB,,, | Performed by: OPTOMETRIST

## 2018-01-08 PROCEDURE — 99499 UNLISTED E&M SERVICE: CPT | Mod: S$GLB,,, | Performed by: OPTOMETRIST

## 2018-01-08 PROCEDURE — 77067 SCR MAMMO BI INCL CAD: CPT | Mod: TC

## 2018-01-08 PROCEDURE — 92015 DETERMINE REFRACTIVE STATE: CPT | Mod: S$GLB,,, | Performed by: OPTOMETRIST

## 2018-01-08 PROCEDURE — 99999 PR PBB SHADOW E&M-EST. PATIENT-LVL II: CPT | Mod: PBBFAC,,, | Performed by: OPTOMETRIST

## 2018-01-08 NOTE — TELEPHONE ENCOUNTER
Called and spoke with daughter Becky regarding detailed message as per Windy Olivera, NP- appointment scheduled with Dr. Sam on April 16 at 10 am and lab work to be done Thursday 4/12/18.  Daughter verbalizes understanding.

## 2018-01-08 NOTE — PROGRESS NOTES
HPI     DLS: 1/4/2017  Pt states no noticeable va changes   Denies f/f    Refresh ou qhs     Sp PC IOL OU/ YAG OU pt feels VA stable  DM   Incomplete left homom hemianopsia sp stroke/pit hx    Does not remember  Hemoglobin A1C       Date                     Value               Ref Range             Status                10/27/2017               8.0 (H)             4.0 - 5.6 %           Final                  06/23/2017               7.6 (H)             4.0 - 5.6 %           Final                  03/01/2017               8.1 (H)             4.5 - 6.2 %           Final             ----------      Last edited by Mayco Correa, OD on 1/8/2018  1:21 PM. (History)        ROS     Positive for: Neurological (stroke/pit hx), Endocrine (DM), Eyes   (hemianopsia Hx)    Negative for: Constitutional, Gastrointestinal, Skin, Genitourinary,   Musculoskeletal, HENT, Cardiovascular, Respiratory, Psychiatric,   Allergic/Imm, Heme/Lymph    Last edited by Mayco Correa, OD on 1/8/2018  1:21 PM. (History)        Assessment /Plan     For exam results, see Encounter Report.    Mild nonproliferative diabetic retinopathy of both eyes without macular edema associated with type 2 diabetes mellitus    Left homonymous hemianopsia - Both Eyes    Screening for glaucoma    Presbyopia    Pseudophakia of both eyes      1. Sp pciol/YAG OU--pt wishes new Rx  2. Incomplete left homom hemianopsia sp stroke/pit hx  Will repeat VF in 6 months  3. DM--with mild NPDR today OU--will monitor       Plan:    rtc 6 months: HVF 24-2 sf/DFE

## 2018-01-09 ENCOUNTER — CLINICAL SUPPORT (OUTPATIENT)
Dept: REHABILITATION | Facility: HOSPITAL | Age: 73
End: 2018-01-09
Payer: MEDICARE

## 2018-01-09 DIAGNOSIS — R29.898 DECREASED ROM OF NECK: ICD-10-CM

## 2018-01-09 DIAGNOSIS — M54.2 NECK PAIN: ICD-10-CM

## 2018-01-09 DIAGNOSIS — R53.1 DECREASED STRENGTH: ICD-10-CM

## 2018-01-09 DIAGNOSIS — Z78.9 DECREASED ACTIVITIES OF DAILY LIVING (ADL): ICD-10-CM

## 2018-01-09 PROCEDURE — 97110 THERAPEUTIC EXERCISES: CPT | Mod: PN

## 2018-01-09 NOTE — PROGRESS NOTES
TIME RECORD    Date: 1/9/2018      Start Time:  9:05  Stop Time:  10:00    PROCEDURES:    TIMED  Procedure Min.   TE 40   Supervised TE 15                 UNTIMED  Procedure Min.             Total Timed Minutes: 40  Total Timed Units:  3 TE  Total Untimed Units:  0  Charges Billed/# of units: 3 TE      Progress/Current Status    Subjective:     Patient ID: June B Yris is a 72 y.o. female.  Diagnosis:   1. Decreased ROM of neck     2. Neck pain     3. Decreased activities of daily living (ADL)     4. Decreased strength       Pt reports that her neck and back feel ok. She states it feels a bit better than normal because she did some stretches yesterday.    Objective:     Pt participated in in supervised TE x 15 mins per log below f/b 1:1 TE x 40 mins with PT per log below.  DATE 1/9/18 1/4/2018   VISIT 2 1   POC 3/4/18  copay $5      G CODE 2/10 1/10   FOTO 2/5 1/5   Cap Visit  Cap Total 95.94  203.52 107.58  107.58   Stretches:      HS  2x30'' B     UT 2x30'' B 1x30''   Levator scap 2x30'' B 1x30''   Supine:      Chin tucks 10x5'' w/ cues 5x5''   Flexion with dowel 12x w/ 1# dowel     Scapular protraction 15x5'' w/ 1# dowel    SL abd      SL gator Next     Tas 10x5'' 5x5''   Bridges w/ hip add 3x10 10x   Clams Next     SL hip abd Next     Prone:      T's      Shoulder ext      Standing:      Lat pulls downs 2x10 RTB     Rows 2x10 RTB     Leg Press 2x10 DL  3 plates     INITIALS KALIN KALIN       Assessment:     Pt required moderate VC/TC for most TE and reminder to count due to memory problem. Pt challenged well by TE and reported no increased pain after session.    Patient Education/Response:     Cont HEP    Plans and Goals:     Cont per POC, progress pt per tolerance    GOALS: Short Term Goals: 4 weeks  1. Pt will demo good deep neck flexor and TA contraction strength to improve cervical lordosis and stabilization.  2. Increase cervical ROM by 5-10 degrees and lumbar ROM WFL in order to perform ADLs with decreased  difficulty.  3. Pt will demo good sitting and standing posture for improved spine health and decreased neck pain.  4. Pt to tolerate HEP to improve ROM and independence with ADL's     Long Term Goals: 8 weeks  1. Report decreased neck and back pain  < /= 2/10  to increase tolerance for ADLs.  2. Increase cervical AROM to WFL with min neck pain for increased functional mobility.  3. Increased strength in B shoulders/scapular stabilizers and B LE to >/= 4+/5 MMT grade to increase tolerance for ADL and work activities.  4. Pt will report at CK level on FOTO neck score and CJ level on FOTO thoracic spine for pain disability to demonstrate decrease in disability and improvement in neck pain.

## 2018-01-12 ENCOUNTER — CLINICAL SUPPORT (OUTPATIENT)
Dept: REHABILITATION | Facility: HOSPITAL | Age: 73
End: 2018-01-12
Payer: MEDICARE

## 2018-01-12 DIAGNOSIS — R53.1 DECREASED STRENGTH: ICD-10-CM

## 2018-01-12 DIAGNOSIS — Z78.9 DECREASED ACTIVITIES OF DAILY LIVING (ADL): ICD-10-CM

## 2018-01-12 DIAGNOSIS — M54.2 NECK PAIN: ICD-10-CM

## 2018-01-12 DIAGNOSIS — R29.898 DECREASED ROM OF NECK: ICD-10-CM

## 2018-01-12 PROCEDURE — 97110 THERAPEUTIC EXERCISES: CPT | Mod: PN

## 2018-01-12 NOTE — PROGRESS NOTES
"TIME RECORD    Date: 1/12/2018      Start Time:  1010  Stop Time:  1100    PROCEDURES:    TIMED  Procedure Min.   Therex supervised 25   Therex 25         Total Timed Minutes:  25  Total Timed Units:  2  Total Untimed Units:  0  Charges Billed/# of units:  2 TE      Progress/Current Status    Subjective:     Patient ID: June B Yris is a 72 y.o. female.  Diagnosis:   1. Decreased ROM of neck     2. Neck pain     3. Decreased activities of daily living (ADL)     4. Decreased strength       "So-so"  Patient voiced no specific complaints.    Objective:     Pt participated in in supervised TE x 25 mins per log below f/b 1:1 TE x 25 mins with PTA per log below. Increased reps as noted.  Required re-instruction of chin tucks with multiple verbal/tactile cues and demonstration for correct technique.  Tactile cues to increase scapular activation with sidelying TE as well.    DATE 1/12/18 1/9/18 1/4/2018   VISIT 3 2 1   POC 3/4/18  copay $5       G CODE 3/10 2/10 1/10   FOTO 3/5 2/5 1/5   Cap Visit  Cap Total   63.96  267.48 95.94  203.52 107.58  107.58   Stretches:       HS  30"x2 B 2x30'' B     UT 30"x2 B 2x30'' B 1x30''   Levator scap 30"x2 B 2x30'' B 1x30''   Supine:       Chin tucks 5"x10 w/cues 10x5'' w/ cues 5x5''   Flexion with dowel 1# dowel x 12 ^ next 12x w/ 1# dowel     Scapular protraction 1# dowel 5"x15 15x5'' w/ 1# dowel    SL abd --      SL gator 2x10 B Next     Tas 5"x10 10x5'' 5x5''   Bridges w/ hip add 2x15 w/ball 3x10 10x   Clams 2x10 B Next     SL hip abd Next Next     Prone:       T's --      Shoulder ext --      Standing:       Lat pulls downs RTC 2x12 2x10 RTB     Rows RTC 2x12 2x10 RTB     Leg Press NT - dizzy 2x10 DL  3 plates     INITIALS DH 1/6 KALIN KALIN       Assessment:     Treatment slightly limited due to patient complaint of "dizzy " when transitioning supine to sit and stand.  Held Leg Press to not exacerbate symptoms.  Able to complete all other activity without complaint of increased pain " or difficulty.  Max cueing required as noted for performance with best technique.     Patient Education/Response:     Patient educated to continue HEP.  Verbalized understanding.    Plans and Goals:     Cont per POC, progress pt per tolerance    GOALS: Short Term Goals: 4 weeks  1. Pt will demo good deep neck flexor and TA contraction strength to improve cervical lordosis and stabilization.  2. Increase cervical ROM by 5-10 degrees and lumbar ROM WFL in order to perform ADLs with decreased difficulty.  3. Pt will demo good sitting and standing posture for improved spine health and decreased neck pain.  4. Pt to tolerate HEP to improve ROM and independence with ADL's     Long Term Goals: 8 weeks  1. Report decreased neck and back pain  < /= 2/10  to increase tolerance for ADLs.  2. Increase cervical AROM to WFL with min neck pain for increased functional mobility.  3. Increased strength in B shoulders/scapular stabilizers and B LE to >/= 4+/5 MMT grade to increase tolerance for ADL and work activities.  4. Pt will report at CK level on FOTO neck score and CJ level on FOTO thoracic spine for pain disability to demonstrate decrease in disability and improvement in neck pain.

## 2018-01-15 ENCOUNTER — CLINICAL SUPPORT (OUTPATIENT)
Dept: REHABILITATION | Facility: HOSPITAL | Age: 73
End: 2018-01-15
Payer: MEDICARE

## 2018-01-15 ENCOUNTER — LAB VISIT (OUTPATIENT)
Dept: LAB | Facility: HOSPITAL | Age: 73
End: 2018-01-15
Attending: INTERNAL MEDICINE
Payer: MEDICARE

## 2018-01-15 DIAGNOSIS — E11.51 TYPE 2 DIABETES MELLITUS WITH DIABETIC PERIPHERAL ANGIOPATHY WITHOUT GANGRENE, WITH LONG-TERM CURRENT USE OF INSULIN: Chronic | ICD-10-CM

## 2018-01-15 DIAGNOSIS — I35.9 AORTIC VALVE DISEASE: ICD-10-CM

## 2018-01-15 DIAGNOSIS — R29.898 DECREASED ROM OF NECK: ICD-10-CM

## 2018-01-15 DIAGNOSIS — Z78.9 DECREASED ACTIVITIES OF DAILY LIVING (ADL): ICD-10-CM

## 2018-01-15 DIAGNOSIS — I15.2 HYPERTENSION ASSOCIATED WITH DIABETES: Chronic | ICD-10-CM

## 2018-01-15 DIAGNOSIS — I73.9 PVD (PERIPHERAL VASCULAR DISEASE): Chronic | ICD-10-CM

## 2018-01-15 DIAGNOSIS — I25.10 CORONARY ARTERY DISEASE INVOLVING NATIVE CORONARY ARTERY OF NATIVE HEART WITHOUT ANGINA PECTORIS: Chronic | ICD-10-CM

## 2018-01-15 DIAGNOSIS — R53.1 DECREASED STRENGTH: ICD-10-CM

## 2018-01-15 DIAGNOSIS — I70.0 STENOSIS OF INFRARENAL ABDOMINAL AORTA DUE TO ATHEROSCLEROSIS: ICD-10-CM

## 2018-01-15 DIAGNOSIS — Z79.4 TYPE 2 DIABETES MELLITUS WITH DIABETIC PERIPHERAL ANGIOPATHY WITHOUT GANGRENE, WITH LONG-TERM CURRENT USE OF INSULIN: Chronic | ICD-10-CM

## 2018-01-15 DIAGNOSIS — M54.2 NECK PAIN: ICD-10-CM

## 2018-01-15 DIAGNOSIS — I77.9 BILATERAL CAROTID ARTERY DISEASE: ICD-10-CM

## 2018-01-15 DIAGNOSIS — E11.59 HYPERTENSION ASSOCIATED WITH DIABETES: Chronic | ICD-10-CM

## 2018-01-15 LAB
ALBUMIN SERPL BCP-MCNC: 3.3 G/DL
ALP SERPL-CCNC: 48 U/L
ALT SERPL W/O P-5'-P-CCNC: 12 U/L
ANION GAP SERPL CALC-SCNC: 8 MMOL/L
AST SERPL-CCNC: 19 U/L
BILIRUB SERPL-MCNC: 0.3 MG/DL
BUN SERPL-MCNC: 26 MG/DL
CALCIUM SERPL-MCNC: 9.7 MG/DL
CHLORIDE SERPL-SCNC: 103 MMOL/L
CHOLEST SERPL-MCNC: 114 MG/DL
CHOLEST/HDLC SERPL: 2.2 {RATIO}
CO2 SERPL-SCNC: 26 MMOL/L
CREAT SERPL-MCNC: 1.8 MG/DL
EST. GFR  (AFRICAN AMERICAN): 32 ML/MIN/1.73 M^2
EST. GFR  (NON AFRICAN AMERICAN): 27.7 ML/MIN/1.73 M^2
ESTIMATED AVG GLUCOSE: 123 MG/DL
GLUCOSE SERPL-MCNC: 132 MG/DL
HBA1C MFR BLD HPLC: 5.9 %
HDLC SERPL-MCNC: 53 MG/DL
HDLC SERPL: 46.5 %
LDLC SERPL CALC-MCNC: 41.2 MG/DL
NONHDLC SERPL-MCNC: 61 MG/DL
POTASSIUM SERPL-SCNC: 3.9 MMOL/L
PROT SERPL-MCNC: 6.4 G/DL
SODIUM SERPL-SCNC: 137 MMOL/L
TRIGL SERPL-MCNC: 99 MG/DL

## 2018-01-15 PROCEDURE — 80053 COMPREHEN METABOLIC PANEL: CPT

## 2018-01-15 PROCEDURE — 36415 COLL VENOUS BLD VENIPUNCTURE: CPT | Mod: PO

## 2018-01-15 PROCEDURE — 97110 THERAPEUTIC EXERCISES: CPT | Mod: PN

## 2018-01-15 PROCEDURE — 83036 HEMOGLOBIN GLYCOSYLATED A1C: CPT

## 2018-01-15 PROCEDURE — 80061 LIPID PANEL: CPT

## 2018-01-15 NOTE — PROGRESS NOTES
"TIME RECORD    Date: 1/15/2018      Start Time:  10:05  Stop Time:  11:00    PROCEDURES:    TIMED  Procedure Min.   Therex supervised    Therex 55         Total Timed Minutes:  55  Total Timed Units:  4  Total Untimed Units:  0  Charges Billed/# of units:  4 TE      Progress/Current Status    Subjective:     Patient ID: June B Yris is a 72 y.o. female.  Diagnosis:   1. Decreased ROM of neck     2. Neck pain     3. Decreased activities of daily living (ADL)     4. Decreased strength       Pain: 4/10 neck and 5/10 low back pain    Objective:     Pt participated in TE x 55 mins 1:1 with PT per log below. Increased reps and weight as noted with addition of horizontal abd and SL should abd.  Required re-instruction of chin tucks with multiple verbal/tactile cues and demonstration for correct technique, and tactile cues to increase scapular activation with sidelying TE as well.    DATE 1/15/18 1/12/18 1/9/18 1/4/2018   VISIT 4 3 2 1   POC 3/4/18  copay $5        G CODE 4/10 3/10 2/10 1/10   FOTO 4/5 3/5 2/5 1/5   Cap Visit  Cap Total 127.92  395.40   63.96  267.48 95.94  203.52 107.58  107.58   Stretches:        HS  2x30'' B 30"x2 B 2x30'' B     UT HEP 30"x2 B 2x30'' B 1x30''   Levator scap HEP 30"x2 B 2x30'' B 1x30''   Supine:        Chin tucks 5''x10 w/cues 5"x10 w/cues 10x5'' w/ cues 5x5''   Flexion with dowel 1# dowel 15x 1# dowel x 12 ^ next 12x w/ 1# dowel     Scapular protraction 1# dowel 5''x15 1# dowel 5"x15 15x5'' w/ 1# dowel    SL abd -- --      SL gator 2x10 B 2x10 B Next     Tas 10x5'' 5"x10 10x5'' 5x5''   Bridges w/ hip add 2x15 w/bal 2x15 w/ball 3x10 10x   Clams 2x10 B RTB 2x10 B Next     SL hip abd 2x10 B Next Next     Prone:        T's over SB Next --      Y's over SB Next --      Standing:        Horizontal abd on wall 2x10 RTB      Lat pulls downs RTB 2x15 RTC 2x12 2x10 RTB     Rows GTC 2x15 RTC 2x12 2x10 RTB     Leg Press 2x15 DL  3.5 plates NT - dizzy 2x10 DL  3 plates     INITIALS KALIN PARNELL 1/6 KALIN " KALIN       Assessment:     Pt required moderate cueing with fair response for chin tucks and standing/sidelying TE today. Pt tolerated all TE well with improved BLE strength and no pain increase, and ended session at 3-4/10 neck/back pain.    Patient Education/Response:     Patient educated to continue HEP.  Verbalized understanding.    Plans and Goals:     Cont per POC, progress pt per tolerance    GOALS: Short Term Goals: 4 weeks  1. Pt will demo good deep neck flexor and TA contraction strength to improve cervical lordosis and stabilization.  2. Increase cervical ROM by 5-10 degrees and lumbar ROM WFL in order to perform ADLs with decreased difficulty.  3. Pt will demo good sitting and standing posture for improved spine health and decreased neck pain.  4. Pt to tolerate HEP to improve ROM and independence with ADL's     Long Term Goals: 8 weeks  1. Report decreased neck and back pain  < /= 2/10  to increase tolerance for ADLs.  2. Increase cervical AROM to WFL with min neck pain for increased functional mobility.  3. Increased strength in B shoulders/scapular stabilizers and B LE to >/= 4+/5 MMT grade to increase tolerance for ADL and work activities.  4. Pt will report at CK level on FOTO neck score and CJ level on FOTO thoracic spine for pain disability to demonstrate decrease in disability and improvement in neck pain.

## 2018-01-17 ENCOUNTER — TELEPHONE (OUTPATIENT)
Dept: CARDIOLOGY | Facility: CLINIC | Age: 73
End: 2018-01-17

## 2018-01-17 NOTE — TELEPHONE ENCOUNTER
----- Message from Isatu Ziegler MD sent at 1/17/2018 11:54 AM CST -----  Please review.  We will discuss the results during your upcoming visit with me. Kidney function is worse. Please make sure not to take any medications like Motrin and stay hydrated.  We should repeat  This test again in couple of weeks.

## 2018-01-22 ENCOUNTER — CLINICAL SUPPORT (OUTPATIENT)
Dept: REHABILITATION | Facility: HOSPITAL | Age: 73
End: 2018-01-22
Payer: MEDICARE

## 2018-01-22 DIAGNOSIS — R29.898 DECREASED ROM OF NECK: ICD-10-CM

## 2018-01-22 DIAGNOSIS — M54.2 NECK PAIN: ICD-10-CM

## 2018-01-22 DIAGNOSIS — Z78.9 DECREASED ACTIVITIES OF DAILY LIVING (ADL): ICD-10-CM

## 2018-01-22 DIAGNOSIS — R53.1 DECREASED STRENGTH: ICD-10-CM

## 2018-01-22 PROCEDURE — 97110 THERAPEUTIC EXERCISES: CPT | Mod: PN

## 2018-01-22 PROCEDURE — 97140 MANUAL THERAPY 1/> REGIONS: CPT | Mod: PN

## 2018-01-22 NOTE — PROGRESS NOTES
"TIME RECORD    Date: 1/22/2018      Start Time:  9:00  Stop Time:  10:00    PROCEDURES:    TIMED  Procedure Min.   Therex supervised 35   Therex 25         Total Timed Minutes:  60  Total Timed Units:  2  Total Untimed Units:  0  Charges Billed/# of units:  2 (1 TE + 1 MT)      Progress/Current Status    Subjective:     Patient ID: June B Yris is a 72 y.o. female.  Diagnosis:   1. Decreased ROM of neck     2. Neck pain     3. Decreased activities of daily living (ADL)     4. Decreased strength       Pain: 4/10 neck and 6/10 low back pain  Pt reports decreased neck pain and recent increased LBP the there day that is better today.    Objective:     Pt participate din supervised TE x 35 mins per log below. Pt participated in TE x 15 mins 1:1 with PT per log below f/b MT x 10 mins consisting of STM/MFR to L SI joint, lumbar paraspinals, R hip flexor stretch, and MET performed for anteriorly rotated L inominate and  posteriorly rotated R inominate.  Required re-instruction of chin tucks with multiple verbal/tactile cues and demonstration for correct technique, and tactile cues to increase scapular activation with sidelying TE as well.    DATE 1/22/18 1/15/18 1/12/18 1/9/18 1/4/2018   VISIT 5 4 3 2 1   POC 3/4/18  copay $5         G CODE 5/10 4/10 3/10 2/10 1/10   FOTO 5/5  NEXT 4/5 3/5 2/5 1/5   Cap Visit  Cap Total 61.84  457.24 127.92  395.40   63.96  267.48 95.94  203.52 107.58  107.58   Stretches:         HS  3x30'' B 2x30'' B 30"x2 B 2x30'' B     UT HEP HEP 30"x2 B 2x30'' B 1x30''   Levator scap HEP HEP 30"x2 B 2x30'' B 1x30''   Piriformis stretch 2x30'' B       Supine:         Chin tucks 5''x10 w/ cues 5''x10 w/cues 5"x10 w/cues 10x5'' w/ cues 5x5''   Flexion with dowel 1# dowel 15x 1# dowel 15x 1# dowel x 12 ^ next 12x w/ 1# dowel     Scapular protraction 1# dowel 5''x15 1# dowel 5''x15 1# dowel 5"x15 15x5'' w/ 1# dowel    SL abd Next -- --      SL gator 2x10 B  ^2x15 B 2x10 B 2x10 B Next     Tas 10x5'' 10x5'' " "5"x10 10x5'' 5x5''   Bridges w/ hip add 2x15 w/ ball 2x15 w/bal 2x15 w/ball 3x10 10x   Clams 2x10  ^2x15 2x10 B RTB 2x10 B Next     SL hip abd 2x10 B  ^2x15 2x10 B Next Next     Prone:         T's over SB next Next --      Y's over SB next Next --      Standing:         Horizontal abd on wall oot 2x10 RTB      Lat pulls downs oot RTB 2x15 RTC 2x12 2x10 RTB     Rows oot GTC 2x15 RTC 2x12 2x10 RTB     Leg Press oot 2x15 DL  3.5 plates NT - dizzy 2x10 DL  3 plates     INITIALS KALIN KALIN DH 1/6 KALIN KALIN       Assessment:     Pt reported 0/10 LBP at end of session today. Pt required moderate cueing with fair response throughout Te. Cont to progress pt in B LE strengthening.     Patient Education/Response:     Patient educated to continue HEP.  Verbalized understanding.    Plans and Goals:     Cont per POC, progress pt per tolerance    GOALS: Short Term Goals: 4 weeks  1. Pt will demo good deep neck flexor and TA contraction strength to improve cervical lordosis and stabilization.  2. Increase cervical ROM by 5-10 degrees and lumbar ROM WFL in order to perform ADLs with decreased difficulty.  3. Pt will demo good sitting and standing posture for improved spine health and decreased neck pain.  4. Pt to tolerate HEP to improve ROM and independence with ADL's     Long Term Goals: 8 weeks  1. Report decreased neck and back pain  < /= 2/10  to increase tolerance for ADLs.  2. Increase cervical AROM to WFL with min neck pain for increased functional mobility.  3. Increased strength in B shoulders/scapular stabilizers and B LE to >/= 4+/5 MMT grade to increase tolerance for ADL and work activities.  4. Pt will report at CK level on FOTO neck score and CJ level on FOTO thoracic spine for pain disability to demonstrate decrease in disability and improvement in neck pain.         "

## 2018-01-25 ENCOUNTER — OFFICE VISIT (OUTPATIENT)
Dept: INTERNAL MEDICINE | Facility: CLINIC | Age: 73
End: 2018-01-25
Payer: MEDICARE

## 2018-01-25 ENCOUNTER — PATIENT MESSAGE (OUTPATIENT)
Dept: INTERNAL MEDICINE | Facility: CLINIC | Age: 73
End: 2018-01-25

## 2018-01-25 VITALS
BODY MASS INDEX: 22.34 KG/M2 | OXYGEN SATURATION: 99 % | TEMPERATURE: 98 F | HEIGHT: 59 IN | WEIGHT: 110.81 LBS | HEART RATE: 82 BPM | SYSTOLIC BLOOD PRESSURE: 138 MMHG | DIASTOLIC BLOOD PRESSURE: 72 MMHG

## 2018-01-25 DIAGNOSIS — M70.62 TROCHANTERIC BURSITIS OF LEFT HIP: ICD-10-CM

## 2018-01-25 DIAGNOSIS — M54.16 LUMBAR RADICULOPATHY: Primary | ICD-10-CM

## 2018-01-25 PROCEDURE — 99214 OFFICE O/P EST MOD 30 MIN: CPT | Mod: S$GLB,,, | Performed by: INTERNAL MEDICINE

## 2018-01-25 PROCEDURE — 99499 UNLISTED E&M SERVICE: CPT | Mod: S$GLB,,, | Performed by: INTERNAL MEDICINE

## 2018-01-25 PROCEDURE — 99999 PR PBB SHADOW E&M-EST. PATIENT-LVL III: CPT | Mod: PBBFAC,,, | Performed by: INTERNAL MEDICINE

## 2018-01-25 RX ORDER — HYDROCODONE BITARTRATE AND ACETAMINOPHEN 5; 325 MG/1; MG/1
TABLET ORAL
Qty: 40 TABLET | Refills: 0 | Status: SHIPPED | OUTPATIENT
Start: 2018-01-25 | End: 2018-02-01 | Stop reason: SDUPTHER

## 2018-01-25 RX ORDER — GABAPENTIN 300 MG/1
CAPSULE ORAL
Qty: 270 CAPSULE | Refills: 3 | Status: SHIPPED | OUTPATIENT
Start: 2018-01-25 | End: 2018-09-19

## 2018-01-25 NOTE — PROGRESS NOTES
Subjective:       Patient ID: June B Yris is a 72 y.o. female.    Chief Complaint: Leg Pain (left side)    HPIc/o severe L leg pain  Began suddenly MOnday.  Pain buttom buttocks down to leg.  She tried aleve, aspercream, which didn't help.  Trouble walking. She tried gabapentin last night and this am, and aleve.    MRI 8/14:    1.  Small annular tear involving the posterior intervertebral disk at L5-S1.    2. Mild spondylosis of the lumbar spine as above.  10/10 pain.  Severe throbbing.  Unable to sleep last night.  Pain awakens her. Worse with walking.    No fever, urinary symtoms.     She had lumbar radiculopathy in 2015 which was alleviated with gabapentin, hydrocodone.  Cr - 1.8    She has long standing DM with multiple complications.  Reports good control of DM  Review of Systems   Constitutional: Negative for fever and unexpected weight change.   HENT: Negative for congestion and postnasal drip.    Eyes: Negative for pain, discharge and visual disturbance.   Respiratory: Negative for cough, chest tightness, shortness of breath and wheezing.    Cardiovascular: Negative for chest pain and leg swelling.   Gastrointestinal: Negative for abdominal pain, constipation, diarrhea and nausea.   Genitourinary: Negative for difficulty urinating, dysuria and hematuria.   Skin: Negative for rash.   Neurological: Negative for headaches.   Psychiatric/Behavioral: Negative for dysphoric mood and sleep disturbance. The patient is not nervous/anxious.        Objective:      Physical Exam   Constitutional: She is oriented to person, place, and time. She appears well-developed and well-nourished. She appears distressed (in obvious pain).   Eyes: No scleral icterus.   Neck: No JVD present. No thyromegaly present.   Cardiovascular: Normal rate, regular rhythm and normal heart sounds.    Pulmonary/Chest: Effort normal and breath sounds normal. No respiratory distress. She has no wheezes. She has no rales.   Abdominal: Soft. She  exhibits no mass. There is no tenderness.   Musculoskeletal: She exhibits no edema.        Left hip: She exhibits tenderness (head of femur).   Neurological: She is alert and oriented to person, place, and time.   Psychiatric: She has a normal mood and affect. Her behavior is normal.       Assessment:       1. Lumbar radiculopathy    2. Trochanteric bursitis of left hip        Plan:       Marlen was seen today for leg pain.    Diagnoses and all orders for this visit:    Lumbar radiculopathy    Trochanteric bursitis of left hip    Other orders  -     gabapentin (NEURONTIN) 300 MG capsule; 1-3 tabs po tid as needed  -     hydrocodone-acetaminophen 5-325mg (NORCO) 5-325 mg per tablet; 1-2 tabs po q 6-8 h prn pain       avoid nsaids     Consider bursa injection - but I believe primary pain is lumbar radiculopathy

## 2018-01-29 ENCOUNTER — LAB VISIT (OUTPATIENT)
Dept: LAB | Facility: HOSPITAL | Age: 73
End: 2018-01-29
Attending: INTERNAL MEDICINE
Payer: MEDICARE

## 2018-01-29 ENCOUNTER — TELEPHONE (OUTPATIENT)
Dept: CARDIOLOGY | Facility: CLINIC | Age: 73
End: 2018-01-29

## 2018-01-29 ENCOUNTER — OFFICE VISIT (OUTPATIENT)
Dept: CARDIOLOGY | Facility: CLINIC | Age: 73
End: 2018-01-29
Payer: MEDICARE

## 2018-01-29 ENCOUNTER — DOCUMENTATION ONLY (OUTPATIENT)
Dept: CARDIOLOGY | Facility: CLINIC | Age: 73
End: 2018-01-29

## 2018-01-29 VITALS
HEIGHT: 59 IN | BODY MASS INDEX: 22.32 KG/M2 | HEART RATE: 72 BPM | SYSTOLIC BLOOD PRESSURE: 100 MMHG | WEIGHT: 110.69 LBS | DIASTOLIC BLOOD PRESSURE: 50 MMHG

## 2018-01-29 DIAGNOSIS — E11.65 UNCONTROLLED TYPE 2 DIABETES MELLITUS WITH HYPERGLYCEMIA, WITH LONG-TERM CURRENT USE OF INSULIN: ICD-10-CM

## 2018-01-29 DIAGNOSIS — Z79.4 UNCONTROLLED TYPE 2 DIABETES MELLITUS WITH HYPERGLYCEMIA, WITH LONG-TERM CURRENT USE OF INSULIN: ICD-10-CM

## 2018-01-29 DIAGNOSIS — I15.2 HYPERTENSION ASSOCIATED WITH DIABETES: Chronic | ICD-10-CM

## 2018-01-29 DIAGNOSIS — I73.9 PVD (PERIPHERAL VASCULAR DISEASE): Chronic | ICD-10-CM

## 2018-01-29 DIAGNOSIS — F01.50 VASCULAR DEMENTIA WITHOUT BEHAVIORAL DISTURBANCE: ICD-10-CM

## 2018-01-29 DIAGNOSIS — N18.2 CHRONIC KIDNEY DISEASE, STAGE II (MILD): Chronic | ICD-10-CM

## 2018-01-29 DIAGNOSIS — I77.9 BILATERAL CAROTID ARTERY DISEASE: ICD-10-CM

## 2018-01-29 DIAGNOSIS — I70.0 STENOSIS OF INFRARENAL ABDOMINAL AORTA DUE TO ATHEROSCLEROSIS: ICD-10-CM

## 2018-01-29 DIAGNOSIS — I25.10 CORONARY ARTERY DISEASE INVOLVING NATIVE CORONARY ARTERY OF NATIVE HEART WITHOUT ANGINA PECTORIS: Primary | Chronic | ICD-10-CM

## 2018-01-29 DIAGNOSIS — R41.3 POOR SHORT TERM MEMORY: ICD-10-CM

## 2018-01-29 DIAGNOSIS — E11.42 DIABETIC POLYNEUROPATHY ASSOCIATED WITH TYPE 2 DIABETES MELLITUS: ICD-10-CM

## 2018-01-29 DIAGNOSIS — Z86.73 HISTORY OF CVA (CEREBROVASCULAR ACCIDENT): ICD-10-CM

## 2018-01-29 DIAGNOSIS — E78.5 DYSLIPIDEMIA ASSOCIATED WITH TYPE 2 DIABETES MELLITUS: ICD-10-CM

## 2018-01-29 DIAGNOSIS — I35.9 AORTIC VALVE DISEASE: ICD-10-CM

## 2018-01-29 DIAGNOSIS — I70.0 ATHEROSCLEROSIS OF AORTA: ICD-10-CM

## 2018-01-29 DIAGNOSIS — E11.69 DYSLIPIDEMIA ASSOCIATED WITH TYPE 2 DIABETES MELLITUS: ICD-10-CM

## 2018-01-29 DIAGNOSIS — Z78.9 DECREASED ACTIVITIES OF DAILY LIVING (ADL): ICD-10-CM

## 2018-01-29 DIAGNOSIS — E11.59 HYPERTENSION ASSOCIATED WITH DIABETES: Chronic | ICD-10-CM

## 2018-01-29 LAB
ANION GAP SERPL CALC-SCNC: 11 MMOL/L
BUN SERPL-MCNC: 21 MG/DL
CALCIUM SERPL-MCNC: 9.7 MG/DL
CHLORIDE SERPL-SCNC: 95 MMOL/L
CO2 SERPL-SCNC: 27 MMOL/L
CREAT SERPL-MCNC: 1.3 MG/DL
EST. GFR  (AFRICAN AMERICAN): 47.4 ML/MIN/1.73 M^2
EST. GFR  (NON AFRICAN AMERICAN): 41.1 ML/MIN/1.73 M^2
GLUCOSE SERPL-MCNC: 157 MG/DL
POTASSIUM SERPL-SCNC: 4 MMOL/L
SODIUM SERPL-SCNC: 133 MMOL/L

## 2018-01-29 PROCEDURE — 99214 OFFICE O/P EST MOD 30 MIN: CPT | Mod: S$GLB,,, | Performed by: INTERNAL MEDICINE

## 2018-01-29 PROCEDURE — 80048 BASIC METABOLIC PNL TOTAL CA: CPT

## 2018-01-29 PROCEDURE — 99499 UNLISTED E&M SERVICE: CPT | Mod: S$GLB,,, | Performed by: INTERNAL MEDICINE

## 2018-01-29 PROCEDURE — 36415 COLL VENOUS BLD VENIPUNCTURE: CPT | Mod: PO

## 2018-01-29 PROCEDURE — 99999 PR PBB SHADOW E&M-EST. PATIENT-LVL III: CPT | Mod: PBBFAC,,, | Performed by: INTERNAL MEDICINE

## 2018-01-29 NOTE — TELEPHONE ENCOUNTER
Brian at [facility] notified that pt is no longer taking hydrochlorothiazide (HYDRODIURIL) 12.5 MG Tab and to dc it from her profile. Brian verbalized understanding.

## 2018-01-29 NOTE — PROGRESS NOTES
Subjective:   Patient ID:  June B Yris is a 72 y.o. female who presents for follow-up of Coronary Artery Disease and Hypertension      HPI: Since the last visit patient lost 20 lbs.  Her daughter states that patient is forgetful and forgets her meals.  She is not he process of dementia work up.  Diabetes is now controlled on Trulicity.  LIpids are over controlled.  BP today is low normal.  Patient has no specific complaints except of loss of appetite and loss of taste.    She is no bothered with Sciatica.    Renal functions are worse, but patient was taking NSAID's for Sciatica.     Lab Results   Component Value Date     01/15/2018    K 3.9 01/15/2018     01/15/2018    CO2 26 01/15/2018    BUN 26 (H) 01/15/2018    CREATININE 1.8 (H) 01/15/2018     (H) 01/15/2018    HGBA1C 5.9 (H) 01/15/2018    MG 1.6 08/14/2013    AST 19 01/15/2018    ALT 12 01/15/2018    ALBUMIN 3.3 (L) 01/15/2018    PROT 6.4 01/15/2018    BILITOT 0.3 01/15/2018    WBC 7.05 11/30/2016    HGB 11.4 (L) 11/30/2016    HCT 33.6 (L) 11/30/2016    HCT 24 (L) 08/08/2013    MCV 86 11/30/2016     11/30/2016    INR 1.0 08/14/2013    TSH 1.796 08/22/2017    CHOL 114 (L) 01/15/2018    HDL 53 01/15/2018    LDLCALC 41.2 (L) 01/15/2018    TRIG 99 01/15/2018       Review of Systems   Constitution: Positive for decreased appetite and weight loss.   HENT: Negative.    Eyes: Negative.    Cardiovascular: Negative.  Negative for chest pain, claudication, dyspnea on exertion, irregular heartbeat, leg swelling, near-syncope, palpitations and syncope.   Respiratory: Negative.  Negative for cough, shortness of breath, snoring and wheezing.    Endocrine: Negative.  Negative for cold intolerance, heat intolerance, polydipsia, polyphagia and polyuria.   Skin: Negative.    Musculoskeletal: Positive for arthritis, back pain and falls.   Gastrointestinal: Negative.    Genitourinary: Negative.    Neurological: Negative.    Psychiatric/Behavioral:  "Negative.        Objective:   Physical Exam   Constitutional: She is oriented to person, place, and time. She appears well-developed and well-nourished.   BP (!) 100/50   Pulse 72   Ht 4' 11" (1.499 m)   Wt 50.2 kg (110 lb 10.7 oz)   BMI 22.35 kg/m²   Thin and frail   HENT:   Head: Normocephalic.   Eyes: Pupils are equal, round, and reactive to light.   Neck: Normal range of motion. Neck supple. No thyromegaly present.   Cardiovascular: Normal rate, regular rhythm and intact distal pulses.  Exam reveals no gallop and no friction rub.    Murmur heard.   Early systolic murmur is present with a grade of 2/6  at the upper right sternal border  Pulses:       Carotid pulses are 2+ on the right side with bruit, and 2+ on the left side with bruit.       Radial pulses are 2+ on the right side, and 2+ on the left side.        Femoral pulses are 2+ on the right side with bruit, and 2+ on the left side with bruit.       Popliteal pulses are 2+ on the right side, and 2+ on the left side.        Dorsalis pedis pulses are 2+ on the right side, and 2+ on the left side.        Posterior tibial pulses are 1+ on the right side, and 1+ on the left side.   Pulmonary/Chest: Effort normal and breath sounds normal. No respiratory distress. She has no wheezes. She has no rales. She exhibits no tenderness.   Abdominal: Soft. Bowel sounds are normal.   Musculoskeletal: Normal range of motion. She exhibits no edema.   Neurological: She is alert and oriented to person, place, and time.   Skin: Skin is warm and dry.   Psychiatric: She has a normal mood and affect.   Nursing note and vitals reviewed.        Assessment and Plan:     Problem List Items Addressed This Visit        Cardiology Problems    Hypertension associated with diabetes (Chronic)    Relevant Orders    Hemoglobin A1c    TSH    Comprehensive metabolic panel    Lipid panel    PVD (peripheral vascular disease) (Chronic)    Relevant Orders    Hemoglobin A1c    TSH    " Comprehensive metabolic panel    Lipid panel    Coronary artery disease involving native coronary artery of native heart without angina pectoris - Primary (Chronic)    Relevant Orders    Hemoglobin A1c    TSH    Comprehensive metabolic panel    Lipid panel    Stenosis of infrarenal abdominal aorta due to atherosclerosis    Relevant Orders    Hemoglobin A1c    TSH    Comprehensive metabolic panel    Lipid panel    Bilateral carotid artery disease    Relevant Orders    Hemoglobin A1c    TSH    Comprehensive metabolic panel    Lipid panel    Atherosclerosis of aorta    Relevant Orders    Hemoglobin A1c    TSH    Comprehensive metabolic panel    Lipid panel    Aortic valve disease    Relevant Orders    Hemoglobin A1c    TSH    Comprehensive metabolic panel    Lipid panel       Other    Chronic kidney disease, stage II (mild) (Chronic)    Relevant Orders    Basic metabolic panel    Hemoglobin A1c    TSH    Comprehensive metabolic panel    Lipid panel    Dyslipidemia associated with type 2 diabetes mellitus    Relevant Orders    Hemoglobin A1c    TSH    Comprehensive metabolic panel    Lipid panel    Poor short term memory    History of CVA (cerebrovascular accident)    Relevant Orders    Hemoglobin A1c    TSH    Comprehensive metabolic panel    Lipid panel    Vascular dementia without behavioral disturbance    Relevant Orders    Hemoglobin A1c    TSH    Comprehensive metabolic panel    Lipid panel    Uncontrolled type 2 diabetes mellitus with hyperglycemia, with long-term current use of insulin    Relevant Orders    Hemoglobin A1c    TSH    Comprehensive metabolic panel    Lipid panel    Diabetic polyneuropathy associated with type 2 diabetes mellitus    Relevant Orders    Hemoglobin A1c    TSH    Comprehensive metabolic panel    Lipid panel    Decreased activities of daily living (ADL)          Patient's Medications   New Prescriptions    No medications on file   Previous Medications    ACETAMINOPHEN (TYLENOL) 325 MG  TABLET    Take 325 mg by mouth daily as needed. Half Tablet Oral Every day as needed    ASCORBIC ACID (VITAMIN C) 1000 MG TABLET        ASPIRIN 81 MG CHEW    Take 81 mg by mouth once daily.    BENZONATATE (TESSALON PERLES) 100 MG CAPSULE    Take 2 capsules (200 mg total) by mouth 3 (three) times daily as needed for Cough.    BLOOD-GLUCOSE METER MISC    2 each by Misc.(Non-Drug; Combo Route) route 2 (two) times daily.    COENZYME Q10 (COQ-10) 100 MG CAPSULE    Take 100 mg by mouth Daily. 1 Capsule Oral Every day    DAILY MULTIVITAMIN TABLET    Take 1 tablet by mouth Daily. 1 Tablet Oral Every day    DULAGLUTIDE (TRULICITY) 1.5 MG/0.5 ML PNIJ    Inject 1.5 mg into the skin every 7 days.    ERGOCALCIFEROL (ERGOCALCIFEROL) 50,000 UNIT CAP    Take 1 capsule (50,000 Units total) by mouth every 7 days.    FENOFIBRATE (TRICOR) 48 MG TABLET    TAKE ONE TABLET BY MOUTH ONCE DAILY    GABAPENTIN (NEURONTIN) 300 MG CAPSULE    1-3 tabs po tid as needed    HYDROCHLOROTHIAZIDE (HYDRODIURIL) 12.5 MG TAB    Take 1 tablet (12.5 mg total) by mouth once daily.    HYDROCODONE-ACETAMINOPHEN 5-325MG (NORCO) 5-325 MG PER TABLET    1-2 tabs po q 6-8 h prn pain    LABETALOL (NORMODYNE) 200 MG TABLET    TAKE TWO TABLETS BY MOUTH TWICE DAILY    LANCETS 30 GAUGE MISC    1 lancet by Misc.(Non-Drug; Combo Route) route 4 (four) times daily.    LISINOPRIL (PRINIVIL,ZESTRIL) 40 MG TABLET    TAKE ONE TABLET BY MOUTH ONCE DAILY    MAGNESIUM 200 MG TAB    Take 200 mg by mouth once daily.    METFORMIN (GLUCOPHAGE) 1000 MG TABLET    Take 1 tablet (1,000 mg total) by mouth 2 (two) times daily with meals.    NITROGLYCERIN (NITROSTAT) 0.4 MG SL TABLET    1-3 Tablet, Sublingual   As directed    OMEGA-3 FATTY ACIDS-VITAMIN E (FISH OIL) 1,000 MG CAP        PHENOBARBITAL (LUMINAL) 64.8 MG TABLET    TAKE ONE TABLET BY MOUTH AT BEDTIME    ROSUVASTATIN (CRESTOR) 10 MG TABLET    Take 1 tablet (10 mg total) by mouth once daily.    TRUE METRIX GLUCOSE TEST STRIP STRP     1 strip by Misc.(Non-Drug; Combo Route) route 4 (four) times daily.   Modified Medications    No medications on file   Discontinued Medications    No medications on file     Decrease Crestor to M-W-F  D/c HCTZ.  Dependently of BMP results will either decrease Labetalol or ACEi if needed.  Weight loss is addressed by dr. Rendon.  Follow-up in about 6 months (around 7/29/2018).

## 2018-01-30 ENCOUNTER — TELEPHONE (OUTPATIENT)
Dept: CARDIOLOGY | Facility: CLINIC | Age: 73
End: 2018-01-30

## 2018-01-30 RX ORDER — HYDROCODONE BITARTRATE AND ACETAMINOPHEN 5; 325 MG/1; MG/1
TABLET ORAL
Qty: 40 TABLET | Refills: 0 | Status: CANCELLED | OUTPATIENT
Start: 2018-01-30

## 2018-01-30 NOTE — PROGRESS NOTES
Patient, Marlen Arndt (MRN #[id number]), presented with a recent Estimated Glumerular Filtration Rate (EGFR) between 15 and 29 consistent with the definition of chronic kidney disease stage 4 (ICD10 - N18.4).    eGFR if non    Date Value Ref Range Status   01/29/2018 41.1 (A) >60 mL/min/1.73 m^2 Final     Comment:     Calculation used to obtain the estimated glomerular filtration  rate (eGFR) is the CKD-EPI equation.          The patient's chronic kidney disease stage 4 was monitored, evaluated, addressed and/or treated. This addendum to the medical record is made on 01/30/2018.

## 2018-01-30 NOTE — TELEPHONE ENCOUNTER
----- Message from Isatu Ziegler MD sent at 1/30/2018 12:06 PM CST -----  Mrs. Arndt,  Please review results of your blood work. Kidney functions have improved. Good news. Please stay hydrated and do not take Advil etc.  For now the original plan we have outlined during your visit with me stands.

## 2018-01-30 NOTE — TELEPHONE ENCOUNTER
"Pt requested refill today on narco medication that was filled on 1/25/18. The first attempt I got disconnected, called pt a 2nd time. 2nd time I heard man yelling in background "all you want are f***ing friends to talk to" and heard phone drop and a woman crying in the background.   "

## 2018-02-01 ENCOUNTER — INITIAL CONSULT (OUTPATIENT)
Dept: NEUROLOGY | Facility: CLINIC | Age: 73
End: 2018-02-01
Payer: MEDICARE

## 2018-02-01 ENCOUNTER — PATIENT MESSAGE (OUTPATIENT)
Dept: INTERNAL MEDICINE | Facility: CLINIC | Age: 73
End: 2018-02-01

## 2018-02-01 DIAGNOSIS — M54.50 LUMBAR SPINE PAIN: Primary | ICD-10-CM

## 2018-02-01 DIAGNOSIS — F01.50 VASCULAR DEMENTIA WITHOUT BEHAVIORAL DISTURBANCE: ICD-10-CM

## 2018-02-01 PROCEDURE — 96119 PR NEUROPSYCH TESTING BY TECHNICIAN: CPT | Mod: 59,S$GLB,, | Performed by: CLINICAL NEUROPSYCHOLOGIST

## 2018-02-01 PROCEDURE — 96118 PR NEUROPSYCH TESTING BY PSYCH/PHYS: CPT | Mod: S$GLB,,, | Performed by: CLINICAL NEUROPSYCHOLOGIST

## 2018-02-01 PROCEDURE — 90791 PSYCH DIAGNOSTIC EVALUATION: CPT | Mod: S$GLB,,, | Performed by: CLINICAL NEUROPSYCHOLOGIST

## 2018-02-01 PROCEDURE — 99499 UNLISTED E&M SERVICE: CPT | Mod: S$GLB,,, | Performed by: CLINICAL NEUROPSYCHOLOGIST

## 2018-02-01 RX ORDER — HYDROCODONE BITARTRATE AND ACETAMINOPHEN 5; 325 MG/1; MG/1
TABLET ORAL
Qty: 40 TABLET | Refills: 0 | Status: SHIPPED | OUTPATIENT
Start: 2018-02-01 | End: 2018-02-02

## 2018-02-01 NOTE — PROGRESS NOTES
Outpatient Neuropsychological Evaluation    Referral Information  Name: Marlen Arndt  MRN: [id number]  LOCK: 2018  : 1945  Age: 72 y.o.  Handedness: Left  Race: White  Gender: female  Referring Provider: Archana Bello Md  [address] [place] [address]  Referral Reason/Medical Necessity: Patient has multiple cardio and cerebrovascular risk factors including a prior stroke in  along multiple other health issues noted below. Her family has noticed a decline in cognition over the past year and she was referred for a neuropsychological evaluation by Neurology to characterize her cognitive status, for differential diagnosis, and for treatment recommendations.   Billing:Total licensed neuropsychologists professional time includes: clinical interview (80792: 60-minutes), test administration and interpretation of tests administered by the billing neuropsychologist, integration of test results and other clinical data, preparing the final report, and personally reporting results to the patient ([id number])= 3 hours. Total technician time (37630) = 1 hours.   Consent: The patient expressed an understanding of the purpose of the evaluation and consented to all procedures.    Current Symptoms/HPI  Cognitive Sxs: Pre-, the family and patient noted some mild difficulties with memory. This began after her stroke in  (review below). However, she denied any functional changes. In 2017, she started having more trouble with short-term memory (forgetting appointments, events, tasks, whether she ate, why she went into a particular room, and needed more repetitions). Family notes that these symptoms have worsened over the last year such that she is having more trouble remembering her medications. Her  handles the finances more. Family notices that she defers big decisions now to the family rather than discuss them together. The patient and daughter reported no significant day to day  variability in her cognition. There has been no specific triggering event in the past year, per family.     Neuropsychiatric Sxs:  · Mood:   · Depression:  None  · Anxiety:  None  · Irritability:  Some mild irritability  · Neurovegetative:  · Sleep:  No problems with nighttime sleep.   · Appetite: Reduced and will forget whether she eats. Her taste is reduced following an aortic valve replacements.   · Energy:  Adequate during the day.   · Behavioral Concerns: None  · Delusions: None  · Hallucinations: None  · SI/HI: None    Physical  · Motor: No difficulties  · Sensory: No major issues  · Pain: Pain okay right now, but was an issue this morning    Current Functioning (I/ADLs):  · ADLs: Independent  · IADLs: Dependent  · Finances:  largely manages  · Medication Mgmt:  and family manages now  · Driving: Stopped driving in  after her stroke  · Household Mgmt: No major problems       Family History   Problem Relation Age of Onset    Diabetes Mother     Coronary artery disease Mother     Heart disease Mother 42      42 of MI    Heart attack Mother     Heart failure Mother     Hyperlipidemia Mother     Hypertension Mother     Heart disease Father     Heart attack Father     Heart failure Father     Hyperlipidemia Father     Hypertension Father     Stroke Father     Coronary artery disease Brother     Diabetes Brother     Heart disease Brother     Hyperlipidemia Brother     Hypertension Brother     Heart disease Sister 41    Heart attack Sister     Hyperlipidemia Sister     Hypertension Sister     Heart disease Sister     Heart attack Sister     Hyperlipidemia Sister     Hypertension Sister     Diabetes Daughter     No Known Problems Son     Amblyopia Neg Hx     Blindness Neg Hx     Cataracts Neg Hx     Glaucoma Neg Hx     Macular degeneration Neg Hx     Retinal detachment Neg Hx     Strabismus Neg Hx      Family Neurologic History: Negative for heritable risk  factors  Family Psychiatric History: Negative for heritable risk factors    Developmental/Academic Hx:  Developmental: No gestational or later developmental concerns.  Academic:  · Learning Difficulties:  None  · Attention Difficulties:  None  · Behavioral Difficulties: None  · Educational Attainment: Finished 8th grade and got  in 9th grade and did not complete.    Social/Occupational Hx:  Social:  · Current Relationship/Family Status:  for 56 years to her  and they get along well + 2 adult children (51 and 54) and everyone gets along well + 2 grand children and 2 great grand children  · Primary Source of Support: Good family support  · Current Hobbies: No major change and she babysits a grand child  · Stressors: None    Occupational Hx:  · Occupational Status: Retired in 1998 when she had a stroke and had some memory difficulty afterward. Her daughter, however, denies that she had any major change in cognition after this.   · Primary Occupation(s):  for many years    MEDICAL HISTORY  Patient Active Problem List   Diagnosis    Hypertension associated with diabetes    PVD (peripheral vascular disease)    Non-functioning pituitary adenoma    Chronic kidney disease, stage II (mild)    Coronary artery disease involving native coronary artery of native heart without angina pectoris    Left homonymous hemianopsia - Both Eyes    Anemia    Stenosis of infrarenal abdominal aorta due to atherosclerosis    Dyslipidemia associated with type 2 diabetes mellitus    Lumbar radiculopathy    Bilateral carotid artery disease    Poor short term memory    Carpal tunnel syndrome of left wrist    Atherosclerosis of aorta    Cerebrovascular disease    Aortic valve disease    S/P AVR (aortic valve replacement)    History of CVA (cerebrovascular accident)    TGA (transient global amnesia)    Vascular dementia without behavioral disturbance    Facet arthritis of lumbar region     Uncontrolled type 2 diabetes mellitus with hyperglycemia, with long-term current use of insulin    Diabetic polyneuropathy associated with type 2 diabetes mellitus    Overweight (BMI 25.0-29.9)    Decreased ROM of neck    Neck pain    Decreased activities of daily living (ADL)    Decreased strength     Past Medical History:   Diagnosis Date    Aortic stenosis     Arthritis     Back pain     Carotid artery occlusion     Cataract     Coronary artery disease     Diabetes mellitus type II     Heart murmur     Hyperlipidemia     Hypertension associated with diabetes 7/24/2012    Non-functioning pituitary adenoma 7/24/2012    Pituitary microadenoma 11/17/2015    Polyneuropathy     PVD (peripheral vascular disease)     PVD (peripheral vascular disease)     S/P AVR (aortic valve replacement) 8/14/2013    21 mm Medtronic Mosaic ultra porcine valve     Stenosis     Stented coronary artery 4/10/2013    2013 RCA QASIM     Stroke     Multiple mini strokes; decreased left peripheal vision    Type II or unspecified type diabetes mellitus with neurological manifestations, not stated as uncontrolled(250.60)     Type II or unspecified type diabetes mellitus with peripheral circulatory disorders, not stated as uncontrolled(250.70)      Past Surgical History:   Procedure Laterality Date    AORTIC VALVE REPLACEMENT  2013    BILATERAL SALPINGOOPHORECTOMY      BREAST BIOPSY      CARDIAC VALVE REPLACEMENT      aortic 2013    CAROTID ENDARTERECTOMY Right     CATARACT EXTRACTION W/  INTRAOCULAR LENS IMPLANT Bilateral     Cataract Surgery      Both eyes    CORONARY ANGIOPLASTY      DENTAL SURGERY      EYE SURGERY      HYSTERECTOMY      TUBAL LIGATION      YAG Bilateral      Neurologic History  · TBI: None  · Seizures: None  · Stroke: Yes  · 1998: Experienced a stroke, but has a difficulty remembering symptoms leading up to and after the event. Her daughter cannot remember any major issues aside from  possibly some mild change in memory.  · Variable TIA episodes throughout her life, but no hospitalizations.   · Movement Disorder: None  · Tumor: Stable pituitary adenoma with any appreciable growth or symptoms.    Lab Results   Component Value Date    [id number] 499 08/22/2017     Lab Results   Component Value Date    RPR Non-reactive 08/22/2017     No results found for: FOLATE  Lab Results   Component Value Date    TSH 1.796 08/22/2017     Lab Results   Component Value Date    HGBA1C 5.9 (H) 01/15/2018     No results found for: HIV1X2, OSS24BHCM    Neurodiagnostics   Brain MRI in 2016    Comparison: Multiple prior examinations, last MRI brain 7/2014.     Findings:    There is mild generalized cerebral volume loss with continued prominence of the ventricular system, slightly out of proportion to the degree of volume loss, however no significant change dating back to multiple prior examinations since at least 2011.  There is extensive supratentorial T2/FLAIR signal hyperintensity consistent with chronic microvascular ischemic disease  No intracranial hemorrhage or extraaxial fluid collection.  No mass or mass effect.  No diffusion signal abnormality to suggest acute infarction.  Flow voids are normal in appearance.      After administration of gadolinium, no abnormal foci of parenchymal enhancement identified.  There is stable small 3-4 mm hypoenhancing focus in the right aspect of the pituitary gland consistent with microadenoma, stable over multiple prior studies.    There is minimal patchy right anterior ethmoid sinus disease.  Remaining visualized nasal sinuses and mastoid air cells are clear.  Orbits appear normal.   Impression        1.  No acute intracranial abnormality identified, specifically no evidence of acute infarction.    2.  Generalized cerebral volume loss with moderate chronic microvascular ischemic disease.    3.  Stable prominence of the ventricular system possibly secondary to compensatory  enlargement, however somewhat out of proportion to the degree of volume loss.  Although component of normal pressure hydrocephalus is difficult to exclude, findings remain stable over multiple prior examinations since at least 2011.    4.  Stable small hypoenhancing lesion in the right aspect of the pituitary gland, consistent with history of pituitary microadenoma.   ______________________________________     Electronically signed by resident: Daily Cedeño MD  Date: 03/16/16         Current Outpatient Prescriptions:     acetaminophen (TYLENOL) 325 MG tablet, Take 325 mg by mouth daily as needed. Half Tablet Oral Every day as needed, Disp: , Rfl:     ascorbic acid (VITAMIN C) 1000 MG tablet, , Disp: , Rfl:     aspirin 81 MG Chew, Take 81 mg by mouth once daily., Disp: , Rfl:     benzonatate (TESSALON PERLES) 100 MG capsule, Take 2 capsules (200 mg total) by mouth 3 (three) times daily as needed for Cough., Disp: 20 capsule, Rfl: 0    blood-glucose meter Misc, 2 each by Misc.(Non-Drug; Combo Route) route 2 (two) times daily., Disp: 1 each, Rfl: 0    coenzyme Q10 (COQ-10) 100 mg capsule, Take 100 mg by mouth Daily. 1 Capsule Oral Every day, Disp: , Rfl:     Daily Multivitamin tablet, Take 1 tablet by mouth Daily. 1 Tablet Oral Every day, Disp: , Rfl:     dulaglutide (TRULICITY) 1.5 mg/0.5 mL PnIj, Inject 1.5 mg into the skin every 7 days., Disp: 4 Syringe, Rfl: 4    ergocalciferol (ERGOCALCIFEROL) 50,000 unit Cap, Take 1 capsule (50,000 Units total) by mouth every 7 days., Disp: 12 capsule, Rfl: 3    fenofibrate (TRICOR) 48 MG tablet, TAKE ONE TABLET BY MOUTH ONCE DAILY, Disp: 90 tablet, Rfl: 3    gabapentin (NEURONTIN) 300 MG capsule, 1-3 tabs po tid as needed, Disp: 270 capsule, Rfl: 3    hydrocodone-acetaminophen 5-325mg (NORCO) 5-325 mg per tablet, 1-2 tabs po q 6-8 h prn pain, Disp: 40 tablet, Rfl: 0    labetalol (NORMODYNE) 200 MG tablet, TAKE TWO TABLETS BY MOUTH TWICE DAILY, Disp: 180 tablet,  "Rfl: 3    lancets 30 gauge Misc, 1 lancet by Misc.(Non-Drug; Combo Route) route 4 (four) times daily., Disp: 120 each, Rfl: 11    lisinopril (PRINIVIL,ZESTRIL) 40 MG tablet, TAKE ONE TABLET BY MOUTH ONCE DAILY, Disp: 90 tablet, Rfl: 3    magnesium 200 mg Tab, Take 200 mg by mouth once daily., Disp: , Rfl:     metFORMIN (GLUCOPHAGE) 1000 MG tablet, Take 1 tablet (1,000 mg total) by mouth 2 (two) times daily with meals., Disp: 180 tablet, Rfl: 3    nitroGLYCERIN (NITROSTAT) 0.4 MG SL tablet, 1-3 Tablet, Sublingual   As directed, Disp: 30 tablet, Rfl: 11    omega-3 fatty acids-vitamin E (FISH OIL) 1,000 mg Cap, , Disp: , Rfl:     phenobarbital (LUMINAL) 64.8 MG tablet, TAKE ONE TABLET BY MOUTH AT BEDTIME, Disp: 90 tablet, Rfl: 3    rosuvastatin (CRESTOR) 10 MG tablet, Take 1 tablet (10 mg total) by mouth once daily. (Patient taking differently: Take 10 mg by mouth once daily. Pt to take one pill on Monday, Wednesday and Friday.), Disp: 90 tablet, Rfl: 3    TRUE METRIX GLUCOSE TEST STRIP Strp, 1 strip by Misc.(Non-Drug; Combo Route) route 4 (four) times daily., Disp: 120 strip, Rfl: 11    Psychiatric Hx:  · Childhood: None  · Adult: None  · Substance Abuse: None    Social History     Social History Main Topics    Smoking status: Former Smoker     Packs/day: 1.00     Years: 20.00     Quit date: 2/5/1987    Smokeless tobacco: Never Used    Alcohol use No    Drug use: No    Sexual activity: Yes     Partners: Male       MENTAL STATUS AND OBSERVATIONS:  APPEARANCE: Casually dressed and adequate grooming/hygiene.   ALERTNESS/ORIENTATION: Attentive and alert. Mostly oriented (x5) to time and place  GAIT: Slow and aided with a Rolator walker  MOTOR MOVEMENTS/MANNERISMS: Unremarkable  SPEECH/LANGUAGE: Normal in  rhythm, tone, and volume. Rate was somewhat slow. No significant word finding difficulty noted. Expressive and receptive language was normal.  STATED MOOD/AFFECT: The patients stated mood was "good." " Affect was congruent with stated mood.   INTERPERSONAL BEHAVIOR: Rapport was quickly and easily established   SUICIDALITY/HOMICIDALITY: Denied  HALLUCINATIONS/DELUSIONS: None evidenced or endorsed  THOUGHT PROCESSES: Thoughts seemed logical and goal-directed. However, she was often digressive at times and needed redirection. She has some insight into her cognitive difficulties, but is not bothered by them.  TEST TAKING BEHAVIOR and VALIDITY: Observation of effort were suggestive of adequate engagement. The current results, therefore, are likely a valid reflection of the patient's current functioning.     PROCEDURES/TESTS ADMINISTERED:  In addition to performing a review of pertinent medical records, reviewing limits to confidentiality, conducting a clinical interview, and explaining procedures, the following measures were administered: Mini-Mental State Examination (MMSE; Dwight et al., 1993 norms); Wide Range Achievement Test - Fourth Edition (WRAT-IV; Green Form) Reading Test; Wechsler Adult Intelligence Scale-IV (Digit Span); Trail Making Test, parts A and B (Chip et al., 2004 norms); [place] Naming Test (BNT-60; Chip et al., 2004 norms) Category and Letter-cued verbal fluency (animal naming/FAS; Chip et al., 2004 norms); Angel Verbal Learning Test - Revised (Form-1); Brief Visuospatial Memory Test- Revised (BVMT); WMS-IV Logical Memory (Older Adult Battery); Clock Drawing; Grooved Pegboard Test (Chip et al., 2004 norms); Geriatric Depression Scale (GDS-30); CATINA-7 Manual norms were used unless otherwise indicated.      TEST RESULTS      Percentile Interpretation:        </=3rd......................................Abnormal        4th-9th.....................................Borderline Abnormal        10th-24th...................................Low Average        25th-74th...................................Average        75th-90th...................................High Average         91st-97th...................................Superior        >/=97th.....................................Very Superior        SCREENING OF GENERAL COGNITIVE FUNCTIONING:    MMSE (total score/%ile):     Total Score (max = 30)...............23 / Impaired  Orientation to time..................4 / 5  Orientation to place.................5 / 5    ESTIMATED FSIQ and READING LEVEL:      WRAT-4 (Raw/SS/%ile)..................48 / 85 / 16th    AUDITORY ATTENTION AND WORKING MEMORY    [id number] Span        Forward raw.........................10 / 50th      Forward span.........................6 /       Backward raw.........................0 / <1st      Backward span........................0 /       Sequencing raw.......................2 / 2nd      Sequencing span......................2 /       Overall (SS/percentile)..............3 / 1st          MOTOR AND ORAL PROCESSING SPEED     Trail Making Test (sec. to completion/percentile):        Part A .....................................86 / 4th          Errors..................................0 /       MOTOR FUNCTIONS    Grooved Pegboard (sec. to completion/%ile)        Dominant (Right) Hand.......................236 / <1st      Non-dominant (Left) Hand....................218 / <1st    LANGUAGE FUNCTIONING    WORD PRODUCTIVITY    Verbal Fluency Tests (raw/percentiles):        FAS.........................................5 / <1st      Animals.....................................4 / <1st      CONFRONTATION NAMING    [place] Naming Test (raw/percentile)        Total Spontaneous (max. = 60)..............46 / 34th      CONSTRUCTIONAL PRAXIS     Clock Drawing:        Request (max. = 5)...........................3 / Impaired      Copy (max. = 5)..............................3 / Impaired    NONVERBAL LEARNING AND MEMORY    Brief Visuospatial Memory Test - Revised (raw score/percentile):        Form: 1        Trial 1......................................0 /       Trial  2......................................2 /       Trial 3......................................2 /       Total Recall.................................4 / <1st      Delayed Recall...............................0 / <1st      Recognition:            Hits.....................................0 /           False-Positives..........................2 /           Discrimination Index....................-2 /       VERBAL LEARNING AND MEMORY OF PROSE PASSAGES    WMS-IV (raw score/percentile):        Logical Memory I.............................11 / 1st      Logical Memory II............................2 / <1st      Recognition..................................15 / 10-16th      VERBAL LEARNING AND MEMORY OF A WORDLIST    Angel Verbal Learning Test - Revised (raw score/percentile):        Form: 1        Trial 1......................................1 /       Trial 2......................................2 /       Trial 3......................................3 /       Total Recall.................................6 / <1st      Delayed Recall...............................0 / <1st      Recognition:            Hits.....................................7 /           False-Positives..........................4 /           Discrimination Index.....................3 / <1st      EXECUTIVE FUNCTIONING    SET-SHIFTING  Trail Making Test (sec. to completion/%ile):        Part B .....................................DC / <1st          Errors...................................3 /     SELF-REPORTED MOOD  CATINA-7...........................................1 / No sig anxiety  GDS.............................................4 / No sig depression    OVERALL SUMMARY  Patient has multiple cardio and cerebrovascular risk factors including a prior stroke in 1998 along multiple other health issues noted above. MRI showed some atrophy, chronic vascular ischemic changes, and some ventricular enlargement that has been stable since 2011 along with a stable  pituitary adenoma. Her family has noticed a more significant decline in cognition over the past year and she was referred for a neuropsychological evaluation by Neurology to characterize her cognitive status, for differential diagnosis, and for treatment recommendations. The patient's baseline or pre-morbid intellectual functioning was estimated to be in the low-average range based on educational/occupational history and performance on a word reading measure. Results should be interpreted in that context.    Global mental mental status was grossly impaired (MMSE=23/30) with normal orientation to time and place. Basic attention was intact, but more complex working memory was very impaired. Mental speed and motor speed were broadly impaired. There was no lateralization for motor speed. Basic expressive and receptive language were normal in conversation. However, more complex receptive language was impaired for abstract/multi-part topics. Object naming was normal for her educational level. Verbal fluency was severely impaired for both semantic/phonemic conditions. Complex and simple construction were well below expectations. Learning and memory was broadly and severely impaired. Without structure/organization, her learning and memory revealed a primary encoding-based memory deficit with minimal benefit from cues/prompts. This means that she is having significant trouble initially learning information - despite repetition - and she cannot recall that information after a period of time even with hints/cues. However, with greater structure (e.g., information presented in a story) her recognition memory improves somewhat. Executive dysfunction was notable on multiple tasks for set-shifting, fluency, and planning/organization when drawing a clock.     There are no significant psychiatric/behavioral considerations.     Overall, the patient has a Major Neurocognitive Disorder or dementia. It is in the moderate severity based  on the severity of her cognitive impairment. Etiology is difficult to precisely pinpoint. However there are some considerations: 1. She has numerous vascular risk factors that are likely having an accumulating effect on her cognition. Some patients with various cerebrovascular co-morbidities have a vascular dementia that is progressively worsening. 2. It is difficult to completely rule out an Alzheimer's component at this time. However, we reviewed ways to improve/stabilize factors contributing to vascular/brain health over the next year. If she continues to worsen in terms of cognition and functional status, it may be possible that there is also an Alzheimer's etiology, as well. 3. There are no other significant symptoms associated with other dementia types. I discussed these results with the patient and her daughter after testing in detail for nearly an hour with recommendations discussed below and a handout provided.       Referral Dx:  F01.50 (ICD-10-CM) - Vascular dementia without behavioral disturbance    Consult Dx:  Major Neurocognitive Disorder, Likely due to vascular disease (possible Alzheimer's component, but needs re-eval in 12 months)    ROSAURA Be II, Ph.D., ABPP-CN  Board Certified Clinical Neuropsychologist  Co-Director, Cognitive Disorders and Brain Health Program  Department of Neurology and Neurosciences  [facility]    RECOMMENDATIONS/TREATMENT PLAN  Follow Up Recommendations:  · Neurology Follow-up: Continued Neurology follow-up as recommended by Ms. Logan neurologist.  · Medical Follow-up: Continued primary care follow-up as recommended by Ms. Logan treatment providers. Specific areas of concern include: good control of various vascular risk factors.   · Stop Driving: She no longer drives.   · Supervision/Monitoring: Daily and nightly supervision/care is recommended for Ms. Arndt to assist in daily living tasks and increase safety.    · Neuropsychology:  Neuropsychological reevaluation is recommended in 12-months following implementation of recommendations.    Recommendations for Ms. Arndt and Caregivers/Family:   · Brain Health: Will provide our lengthy handout on improving vascular health and brain health. This includes recommendations for physical activity, healthy diet (e.g., Mediterranean Style Diet), social activity, and mental activity.   · Dementia Recommendations: Will provide our lengthy handout detailing strategies to manage various aspects of dementia, communication, functional living needs, legal issues, and so forth. .   · Practice good cognitive hygiene:  · Engage in regular exercise, which increases alertness and arousal and can improve attention and focus.  Consider lower impact exercises, such as yoga or light walking.  · Get a good nights sleep, as this can enhance alertness and cognition.  · Eat healthy foods and balanced meals. It is notable that research indicates certain nutrients may aid in brain function, such as B vitamins (especially B6, B12, and folic acid), antioxidants (such as vitamins C and E, and beta carotene), and Omega-3 fatty acids. Talk with your physician or nutritionist about whats right for you.   · Keep your brain active. Find activities to stay mentally active, such as reading, games (cards, checkers), puzzles (crosswords, Sudoku, jig saw), crafts (models, woodworking), gardening, or participating in activities in the community.  · Stay socially engaged. Continue staying active with your family and friends.  · Prepare for the future: Ms. Arndt and caregivers should consider formal arrangements to allow a designated person to make medical and financial decisions for Ms. Arndt, should she become unable to do so.  Options to consider include designating a healthcare proxy, medical and/or financial power of , and completing advanced directives for healthcare decisions and estate planning (e.g., finalizing a  will).  If cost is prohibitive, [facility] Legal Services (https://[facility].org/) provides free  for individuals with low income.  · Resources: Consider resources for support through the [facility] Office of Elderly Affairs (http://goea.[place].[facility]/), [place] Chapter of the Alzheimers Association (www.alz.org/[place]/), the Family Caregiver [place] (www.caregiver.org), and the American Psychological Association (http://www.apa.org/pi/about/publications/caregivers/consumers/index.aspxconsumers/index.aspx).

## 2018-02-01 NOTE — TELEPHONE ENCOUNTER
Spoke with pharmacist pt p/u Rx. Pt take Rx 2 pills every 4-6 hrs as directed in need of refill. Please advise.

## 2018-02-02 ENCOUNTER — TELEPHONE (OUTPATIENT)
Dept: INTERNAL MEDICINE | Facility: CLINIC | Age: 73
End: 2018-02-02

## 2018-02-02 RX ORDER — HYDROCODONE BITARTRATE AND ACETAMINOPHEN 5; 325 MG/1; MG/1
1 TABLET ORAL EVERY 6 HOURS PRN
Qty: 40 TABLET | Refills: 0 | Status: SHIPPED | OUTPATIENT
Start: 2018-02-02 | End: 2018-02-12 | Stop reason: SDUPTHER

## 2018-02-10 DIAGNOSIS — E11.59 HYPERTENSION ASSOCIATED WITH DIABETES: Chronic | ICD-10-CM

## 2018-02-10 DIAGNOSIS — I15.2 HYPERTENSION ASSOCIATED WITH DIABETES: Chronic | ICD-10-CM

## 2018-02-11 RX ORDER — LISINOPRIL 40 MG/1
TABLET ORAL
Qty: 90 TABLET | Refills: 3 | Status: SHIPPED | OUTPATIENT
Start: 2018-02-11 | End: 2019-03-18 | Stop reason: SDUPTHER

## 2018-02-12 ENCOUNTER — PATIENT MESSAGE (OUTPATIENT)
Dept: INTERNAL MEDICINE | Facility: CLINIC | Age: 73
End: 2018-02-12

## 2018-02-12 DIAGNOSIS — M54.16 LUMBAR RADICULOPATHY: Primary | ICD-10-CM

## 2018-02-12 RX ORDER — HYDROCODONE BITARTRATE AND ACETAMINOPHEN 5; 325 MG/1; MG/1
1 TABLET ORAL EVERY 6 HOURS PRN
Qty: 40 TABLET | Refills: 0 | Status: SHIPPED | OUTPATIENT
Start: 2018-02-12 | End: 2018-02-20 | Stop reason: SDUPTHER

## 2018-02-14 ENCOUNTER — PATIENT MESSAGE (OUTPATIENT)
Dept: CARDIOLOGY | Facility: CLINIC | Age: 73
End: 2018-02-14

## 2018-02-14 ENCOUNTER — PATIENT MESSAGE (OUTPATIENT)
Dept: NEUROLOGY | Facility: CLINIC | Age: 73
End: 2018-02-14

## 2018-02-20 ENCOUNTER — PATIENT MESSAGE (OUTPATIENT)
Dept: INTERNAL MEDICINE | Facility: CLINIC | Age: 73
End: 2018-02-20

## 2018-02-20 RX ORDER — HYDROCODONE BITARTRATE AND ACETAMINOPHEN 5; 325 MG/1; MG/1
1 TABLET ORAL EVERY 6 HOURS PRN
Qty: 40 TABLET | Refills: 0 | Status: SHIPPED | OUTPATIENT
Start: 2018-02-20 | End: 2018-03-13 | Stop reason: SDUPTHER

## 2018-02-27 ENCOUNTER — OFFICE VISIT (OUTPATIENT)
Dept: ORTHOPEDICS | Facility: CLINIC | Age: 73
End: 2018-02-27
Payer: MEDICARE

## 2018-02-27 ENCOUNTER — HOSPITAL ENCOUNTER (OUTPATIENT)
Dept: RADIOLOGY | Facility: HOSPITAL | Age: 73
Discharge: HOME | End: 2018-02-27
Attending: PHYSICIAN ASSISTANT
Payer: MEDICARE

## 2018-02-27 VITALS — BODY MASS INDEX: 21.6 KG/M2 | HEIGHT: 59 IN | WEIGHT: 107.13 LBS

## 2018-02-27 DIAGNOSIS — M54.16 LUMBAR RADICULOPATHY: Primary | ICD-10-CM

## 2018-02-27 DIAGNOSIS — M54.50 LUMBAR SPINE PAIN: ICD-10-CM

## 2018-02-27 PROCEDURE — 99204 OFFICE O/P NEW MOD 45 MIN: CPT | Mod: S$GLB,,, | Performed by: PHYSICIAN ASSISTANT

## 2018-02-27 PROCEDURE — 99999 PR PBB SHADOW E&M-EST. PATIENT-LVL IV: CPT | Mod: PBBFAC,,, | Performed by: PHYSICIAN ASSISTANT

## 2018-02-27 PROCEDURE — 72100 X-RAY EXAM L-S SPINE 2/3 VWS: CPT | Mod: 26,,, | Performed by: RADIOLOGY

## 2018-02-27 PROCEDURE — 72100 X-RAY EXAM L-S SPINE 2/3 VWS: CPT | Mod: TC

## 2018-02-27 PROCEDURE — 72120 X-RAY BEND ONLY L-S SPINE: CPT | Mod: 26,,, | Performed by: RADIOLOGY

## 2018-02-27 NOTE — PROGRESS NOTES
DATE: 2/27/2018  PATIENT: Marlen Arndt    Supervising Physician: Triston Cruz M.D.    CHIEF COMPLAINT: back and left leg pain    HISTORY:  Marlen Arndt is a 72 y.o. female here for initial evaluation of low back and left leg pain (Back - 10, Leg - 0).  The pain in the back is what bothers her most today however the pain in her leg was really bad a few weeks ago.  The pain has been present for 6 weeks. The patient describes the pain as aching.  The pain is worse with walking and standing and improved by sitting. There is no associated numbness and tingling. There is no subjective weakness. Prior treatments have included gabapentin, hydrocodone, and physical therapy, but no ESIs or surgery.    The patient denies myelopathic symptoms such as handwriting changes or difficulty with buttons/coins/keys. Denies perineal paresthesias, bowel/bladder dysfunction.    PAST MEDICAL/SURGICAL HISTORY:  Past Medical History:   Diagnosis Date    Aortic stenosis     Arthritis     Back pain     Carotid artery occlusion     Cataract     Coronary artery disease     Diabetes mellitus type II     Heart murmur     Hyperlipidemia     Hypertension associated with diabetes 7/24/2012    Non-functioning pituitary adenoma 7/24/2012    Pituitary microadenoma 11/17/2015    Polyneuropathy     PVD (peripheral vascular disease)     PVD (peripheral vascular disease)     S/P AVR (aortic valve replacement) 8/14/2013    21 mm Medtronic Mosaic ultra porcine valve     Stenosis     Stented coronary artery 4/10/2013    2013 RCA QASIM     Stroke     Multiple mini strokes; decreased left peripheal vision    Type II or unspecified type diabetes mellitus with neurological manifestations, not stated as uncontrolled(250.60)     Type II or unspecified type diabetes mellitus with peripheral circulatory disorders, not stated as uncontrolled(250.70)      Past Surgical History:   Procedure Laterality Date    AORTIC VALVE REPLACEMENT  2013     BILATERAL SALPINGOOPHORECTOMY      BREAST BIOPSY      CARDIAC VALVE REPLACEMENT      aortic 2013    CAROTID ENDARTERECTOMY Right     CATARACT EXTRACTION W/  INTRAOCULAR LENS IMPLANT Bilateral     Cataract Surgery      Both eyes    CORONARY ANGIOPLASTY      DENTAL SURGERY      EYE SURGERY      HYSTERECTOMY      TUBAL LIGATION      YAG Bilateral        Medications:   Current Outpatient Prescriptions on File Prior to Visit   Medication Sig Dispense Refill    acetaminophen (TYLENOL) 325 MG tablet Take 325 mg by mouth daily as needed. Half Tablet Oral Every day as needed      ascorbic acid (VITAMIN C) 1000 MG tablet       aspirin 81 MG Chew Take 81 mg by mouth once daily.      benzonatate (TESSALON PERLES) 100 MG capsule Take 2 capsules (200 mg total) by mouth 3 (three) times daily as needed for Cough. 20 capsule 0    blood-glucose meter Misc 2 each by Misc.(Non-Drug; Combo Route) route 2 (two) times daily. 1 each 0    coenzyme Q10 (COQ-10) 100 mg capsule Take 100 mg by mouth Daily. 1 Capsule Oral Every day      Daily Multivitamin tablet Take 1 tablet by mouth Daily. 1 Tablet Oral Every day      dulaglutide (TRULICITY) 1.5 mg/0.5 mL PnIj Inject 1.5 mg into the skin every 7 days. 4 Syringe 4    ergocalciferol (ERGOCALCIFEROL) 50,000 unit Cap Take 1 capsule (50,000 Units total) by mouth every 7 days. 12 capsule 3    fenofibrate (TRICOR) 48 MG tablet TAKE ONE TABLET BY MOUTH ONCE DAILY 90 tablet 3    gabapentin (NEURONTIN) 300 MG capsule 1-3 tabs po tid as needed 270 capsule 3    hydrocodone-acetaminophen 5-325mg (NORCO) 5-325 mg per tablet Take 1 tablet by mouth every 6 (six) hours as needed for Pain. 40 tablet 0    labetalol (NORMODYNE) 200 MG tablet TAKE TWO TABLETS BY MOUTH TWICE DAILY 180 tablet 3    lancets 30 gauge Misc 1 lancet by Misc.(Non-Drug; Combo Route) route 4 (four) times daily. 120 each 11    lisinopril (PRINIVIL,ZESTRIL) 40 MG tablet TAKE ONE TABLET BY MOUTH ONCE DAILY 90  tablet 3    magnesium 200 mg Tab Take 200 mg by mouth once daily.      metFORMIN (GLUCOPHAGE) 1000 MG tablet Take 1 tablet (1,000 mg total) by mouth 2 (two) times daily with meals. 180 tablet 3    nitroGLYCERIN (NITROSTAT) 0.4 MG SL tablet 1-3 Tablet, Sublingual   As directed 30 tablet 11    omega-3 fatty acids-vitamin E (FISH OIL) 1,000 mg Cap       phenobarbital (LUMINAL) 64.8 MG tablet TAKE ONE TABLET BY MOUTH AT BEDTIME 90 tablet 3    rosuvastatin (CRESTOR) 10 MG tablet Take 1 tablet (10 mg total) by mouth once daily. (Patient taking differently: Take 10 mg by mouth once daily. Pt to take one pill on Monday, Wednesday and Friday.) 90 tablet 3    TRUE METRIX GLUCOSE TEST STRIP Strp 1 strip by Misc.(Non-Drug; Combo Route) route 4 (four) times daily. 120 strip 11     No current facility-administered medications on file prior to visit.        Social History:   Social History     Social History    Marital status:      Spouse name: N/A    Number of children: 2    Years of education: N/A     Occupational History    Not on file.     Social History Main Topics    Smoking status: Former Smoker     Packs/day: 1.00     Years: 20.00     Quit date: 2/5/1987    Smokeless tobacco: Never Used    Alcohol use No    Drug use: No    Sexual activity: Yes     Partners: Male     Other Topics Concern    Not on file     Social History Narrative    Retired.       REVIEW OF SYSTEMS:  Constitution: Negative. Negative for chills, fever and night sweats.   Cardiovascular: Negative for chest pain and syncope.   Respiratory: Negative for cough and shortness of breath.   Gastrointestinal: See HPI. Negative for nausea/vomiting. Negative for abdominal pain.  Genitourinary: See HPI. Negative for discoloration or dysuria.  Skin: Negative for dry skin, itching and rash.   Hematologic/Lymphatic: Negative for bleeding problem. Does not bruise/bleed easily.   Musculoskeletal: Negative for falls and muscle weakness.  "  Neurological: See HPI. No seizures.   Endocrine: Negative for polydipsia, polyphagia and polyuria.   Allergic/Immunologic: Negative for hives and persistent infections.     EXAM:  Ht 4' 11" (1.499 m)   Wt 48.6 kg (107 lb 2.3 oz)   BMI 21.64 kg/m²     General: The patient is a very pleasant 72 y.o. female in no apparent distress, the patient is oriented to person, place and time.  Psych: Normal mood and affect  HEENT: Vision grossly intact, hearing intact to the spoken word.  Lungs: Respirations unlabored.  Gait: Antalgic station and gait, she is ambulating with a Rolator.   Skin: Dorsal lumbar skin negative for rashes, lesions, hairy patches and surgical scars. There is mild left sided lumbar tenderness to palpation.  Range of motion: Lumbar range of motion is acceptable.  Spinal Balance: Global saggital and coronal spinal balance acceptable, not significant for scoliosis and kyphosis.  Musculoskeletal: No pain with the range of motion of the bilateral hips. No trochanteric tenderness to palpation.  Vascular: Bilateral lower extremities warm and well perfused, dorsalis pedis pulses 2+ bilaterally.  Neurological: Normal strength and tone in all major motor groups in the bilateral lower extremities. Normal sensation to light touch in the L2-S1 dermatomes bilaterally.  Deep tendon reflexes symmetric 2+ in the bilateral lower extremities.  Negative Babinski bilaterally. Straight leg raise negative bilaterally.    IMAGING:      Today I personally reviewed AP, Lat and Flex/Ex  upright L-spine films that demonstrate moderate degenerative changes.     ASSESSMENT/PLAN:    Diagnoses and all orders for this visit:    Lumbar radiculopathy  -     MRI Lumbar Spine Without Contrast; Future      MRI lumbar spine.  Follow up after the MRI to discuss results and further treatment including ESIs.    We discussed the department policy regarding pain medications.  Patient understands and agrees.         Follow-up if symptoms worsen " or fail to improve.

## 2018-03-08 ENCOUNTER — HOSPITAL ENCOUNTER (OUTPATIENT)
Dept: RADIOLOGY | Facility: HOSPITAL | Age: 73
Discharge: HOME | End: 2018-03-08
Attending: PHYSICIAN ASSISTANT
Payer: MEDICARE

## 2018-03-08 DIAGNOSIS — M54.16 LUMBAR RADICULOPATHY: ICD-10-CM

## 2018-03-08 PROCEDURE — 72148 MRI LUMBAR SPINE W/O DYE: CPT | Mod: 26,,, | Performed by: RADIOLOGY

## 2018-03-08 PROCEDURE — 72148 MRI LUMBAR SPINE W/O DYE: CPT | Mod: TC

## 2018-03-11 RX ORDER — FENOFIBRATE 48 MG/1
TABLET, FILM COATED ORAL
Qty: 90 TABLET | Refills: 3 | Status: SHIPPED | OUTPATIENT
Start: 2018-03-11 | End: 2018-08-30

## 2018-03-13 ENCOUNTER — PATIENT MESSAGE (OUTPATIENT)
Dept: INTERNAL MEDICINE | Facility: CLINIC | Age: 73
End: 2018-03-13

## 2018-03-13 RX ORDER — HYDROCODONE BITARTRATE AND ACETAMINOPHEN 5; 325 MG/1; MG/1
1 TABLET ORAL EVERY 6 HOURS PRN
Qty: 40 TABLET | Refills: 0 | Status: SHIPPED | OUTPATIENT
Start: 2018-03-13 | End: 2018-04-02 | Stop reason: SDUPTHER

## 2018-03-14 ENCOUNTER — OFFICE VISIT (OUTPATIENT)
Dept: ORTHOPEDICS | Facility: CLINIC | Age: 73
End: 2018-03-14
Payer: MEDICARE

## 2018-03-14 VITALS — HEIGHT: 59 IN | WEIGHT: 107.13 LBS | BODY MASS INDEX: 21.6 KG/M2

## 2018-03-14 DIAGNOSIS — M54.16 LUMBAR RADICULOPATHY: Primary | ICD-10-CM

## 2018-03-14 PROCEDURE — 99999 PR PBB SHADOW E&M-EST. PATIENT-LVL III: CPT | Mod: PBBFAC,,, | Performed by: PHYSICIAN ASSISTANT

## 2018-03-14 PROCEDURE — 99213 OFFICE O/P EST LOW 20 MIN: CPT | Mod: S$GLB,,, | Performed by: PHYSICIAN ASSISTANT

## 2018-03-15 ENCOUNTER — TELEPHONE (OUTPATIENT)
Dept: PAIN MEDICINE | Facility: CLINIC | Age: 73
End: 2018-03-15

## 2018-03-15 NOTE — PROGRESS NOTES
"DATE: 3/15/2018  PATIENT: Marlen Arndt    Attending Physician: Triston Cruz M.D.    HISTORY:  Marlen Arndt is a 72 y.o. female who returns to me today for MRI results.  She was last seen by me 2/27/2018.  Today she is doing well but notes her pain is 8/10 today.    The Patient denies myelopathic symptoms such as handwriting changes or difficulty with buttons/coins/keys. Denies perineal paresthesias, bowel/bladder dysfunction.    PMH/PSH/FamHx/SocHx:  Unchanged from prior visit    ROS:  REVIEW OF SYSTEMS:  Constitution: Negative. Negative for chills, fever and night sweats.   HENT: Negative for congestion and headaches.    Eyes: Negative for blurred vision, left vision loss and right vision loss.   Cardiovascular: Negative for chest pain and syncope.   Respiratory: Negative for cough and shortness of breath.    Endocrine: Negative for polydipsia, polyphagia and polyuria.   Hematologic/Lymphatic: Negative for bleeding problem. Does not bruise/bleed easily.   Skin: Negative for dry skin, itching and rash.   Musculoskeletal: Negative for falls and muscle weakness.   Gastrointestinal: Negative for abdominal pain and bowel incontinence.   Allergic/Immunologic: Negative for hives and persistent infections.  Genitourinary: Negative for urinary retention/incontinence and nocturia.   Neurological: Negative for disturbances in coordination, no myelopathic symptoms such as handwriting changes or difficulty with buttons, coins, keys or small objects. No loss of balance and seizures.   Psychiatric/Behavioral: Negative for depression. The patient does not have insomnia.   Denies perineal paresthesias, bowel or bladder incontinence    EXAM:  Ht 4' 11" (1.499 m)   Wt 48.6 kg (107 lb 2.3 oz)   BMI 21.64 kg/m²     My physical examination was notable for the following findings:     Normal station and gait, no difficulty with toe or heel walk.   Dorsal lumbar skin negative for rashes, lesions, hairy patches and surgical " scars. There is mild lumbar tenderness to palpation.  Lumbar range of motion is acceptable.  Global saggital and coronal spinal balance acceptable, not significant for scoliosis and kyphosis.  No pain with the range of motion of the bilateral hips. No trochanteric tenderness to palpation.  Bilateral lower extremities warm and well perfused, dorsalis pedis pulses 2+ bilaterally.  Normal strength and tone in all major motor groups in the bilateral lower extremities. Normal sensation to light touch in the L2-S1 dermatomes bilaterally.  Deep tendon reflexes symmetric 2+ in the bilateral lower extremities.  Negative Babinski bilaterally. Straight leg raise negative bilaterally.      IMAGING:  No new imaging today.    Today I personally re- reviewed AP, Lat and Flex/Ex  upright L-spine that demonstrate moderate degenerative changes.     MRI lumbar spine demonstrates a left paracentral disc extrusion at L3/4.       ASSESSMENT/PLAN:    Diagnoses and all orders for this visit:    Lumbar radiculopathy  -     Procedure Order to [facility]; Future    Plan for left L3/4 TFESI.  Follow up after injection if symptoms persist.      Follow-up if symptoms worsen or fail to improve.

## 2018-03-15 NOTE — TELEPHONE ENCOUNTER
Tried contacting patient to schedule for a Left L3/4 TF HECTOR with Dr. Bradford in [place], referred by Dr. Cruz.  Got the message unavailable try calling later.

## 2018-03-26 ENCOUNTER — PATIENT MESSAGE (OUTPATIENT)
Dept: ORTHOPEDICS | Facility: CLINIC | Age: 73
End: 2018-03-26

## 2018-03-29 ENCOUNTER — TELEPHONE (OUTPATIENT)
Dept: PAIN MEDICINE | Facility: CLINIC | Age: 73
End: 2018-03-29

## 2018-03-29 DIAGNOSIS — M54.16 LUMBAR RADICULOPATHY: Primary | ICD-10-CM

## 2018-03-29 NOTE — TELEPHONE ENCOUNTER
----- Message from Sujey Cleary MA sent at 3/29/2018  9:33 AM CDT -----      ----- Message -----  From: Griselda Messina  Sent: 3/29/2018   9:05 AM  To: Génesis Mendoza MA, #    Please schedule pt. In [place], Sneha tried call pt. 3/15/18 no answer according to her message in Epic.     Please call and schedule patient for injection in [place].  (Routing comment)     Marlen Ramirez PA-C 5 hours ago (3:47 AM)       sorry - 3 days later - still no call   2 weeks since it was ordered   Brittany Li [phone] ---please have them call me       JALIL Ruby 3 days ago       I apologize for the inconvience I have messaged the [place] nurse and she will call you later on today.     Thank you for your patience.     Sujey Ramirez PA-C 3 days ago       We are still waiting for a call from [facility] for my mom's injection.     Please have them call me to schedule instead of my mom, she can get things confused.   Thank you   Becky Cloud   [phone]

## 2018-03-29 NOTE — TELEPHONE ENCOUNTER
Spoke with patient's daughter regarding scheduling procedure. Procedure instructions for a TFESI Left L3/4 on 4/3/18 given. Patient's daughter verbalized understanding.

## 2018-04-02 RX ORDER — HYDROCODONE BITARTRATE AND ACETAMINOPHEN 5; 325 MG/1; MG/1
1 TABLET ORAL EVERY 6 HOURS PRN
Qty: 40 TABLET | Refills: 0 | Status: SHIPPED | OUTPATIENT
Start: 2018-04-02 | End: 2019-12-16

## 2018-04-02 NOTE — DISCHARGE INSTRUCTIONS
Home Care Instructions Pain Management:    1.  DIET:    You may resume your normal diet today.    2.  BATHING:    You may shower with luke warm water.    3.  DRESSING:    You may remove your bandage today.    4.  ACTIVITY LEVEL:      You may resume your normal activities 24 hours after your procedure.    5.  MEDICATIONS:    You may resume your normal medications today.    6.  SPECIAL INSTRUCTIONS:    No heat to the injection site for 24 hours including bath or shower, heating pad, moist heat or hot tubs.    Use an ice pack to the injection site for any pain or discomfort.  Apply ice packs for 20 minute intervals as needed.    If you have received any sedatives by mouth today, you can not drive for 12 hours.    If you have received sedation through an IV, you can not drive for 24 hours.    PLEASE CALL YOUR DOCTOR FOR THE FOLLOWIN.  Redness or swelling around the injection site.  2.  Fever of 101 degrees.  3.  Drainage (pus) from the injection site.  4.  For any continuous bleeding (some dried blood over the incision is normal.)    FOR EMERGENCIES:    If any unusual problems or difficulties occur during clinic hours, call [phone] or dial [phone].    Follow up with with your physician in 2-3 weeks.       Procedural Sedation  Procedural sedation is medicine to ease discomfort, pain, and anxiety during a procedure. The medicine is often given through an IV (intravenous) line in your arm or hand. In some cases, the medicine may be taken by mouth or inhaled. While you are under sedation, you will likely be awake. But you may not remember anything afterward.  Why procedural sedation is used  Sedation is used for many types of procedures. The goal is to reduce pain, anxiety, and stressful memories of a procedure. It can also help your healthcare provider treat you. For example, having a broken bone fixed may be easier if you feel relaxed.  Procedural sedation is used only for short, basic procedures. It is not used  for complex surgeries. Some procedures that use this type of sedation include:  · Dental surgery  · Breast biopsy, to take a sample of breast tissue  · Endoscopy, to look at gastrointestinal problems  · Bronchoscopy, to check for lung problems  · Bone or joint realignment, to fix a broken bone or dislocated joint  · Minor foot or skin surgery  · Electrical cardioversion, to restore a normal heart rhythm  · Lumbar puncture, to assess neurological disease  Risks of procedural sedation  Procedural sedation has some risks and possible side effects, such as:  · Headache  · Nausea and vomiting  · Unpleasant memory of the procedure  · Lowered rate of breathing  · Changes in heart rate and blood pressure (rare)  · Inhalation of stomach contents into your lungs (rare)  Side effects will likely go away shortly after the procedure. Your healthcare team will watch your heart rate and breathing during your sedation. This is to help prevent problems.  Your own risks may vary based on your age and your overall health. They also depend on the type of sedation you are given. Talk with your healthcare provider about the risks that apply most to you.  Getting ready for procedural sedation  Talk with your healthcare provider about how to get ready for your procedure. Tell him or her about all the medicines you take. This includes over-the-counter medicines such as ibuprofen. It also includes vitamins, herbs, and other supplements. You may need to stop taking some medicines before the procedure, such as blood thinners and aspirin. If you smoke, you should stop to lessen the chance of a lung issue. Talk with your healthcare provider if you need help to stop smoking.  Tell your healthcare provider if you:  · Have had any problems in the past with sedation or anesthesia  · Have had any recent changes in your health, such as an infection or fever  · Are pregnant or think you may be pregnant  Also be sure to:  · Ask a family member or friend  to take you home after the procedure. You cant drive on the day you receive sedation.  · Not eat or drink after midnight the night before your procedure, if advised.  · Not plan on making any important decisions, such as financial or legal, for the day after you receive the sedation.  · Follow all other instructions from your healthcare provider.  During your procedural sedation  You may have your procedure in a hospital or a medical clinic. Sedation is done by a trained healthcare provider. In general, you can expect the following:  · You will be given medicine through an IV line in your arm or hand. Or you may receive a shot. The medicine may also be given by mouth. Or you may inhale it through a mask.  · If you receive medicine through an IV, you may feel the effects very quickly. You will start to feel relaxed and drowsy.  · During the procedure, your heart rate, breathing, and blood pressure will be closely watched. Your breathing and blood pressure may decrease a little. But you will likely not need help with your breathing. You may receive a little extra oxygen through a mask or through some soft plastic prongs underneath your nose.  · You will probably be awake the entire time. If you do fall asleep, you should be easy to wake up, if needed. You should feel little or no pain.  · When your procedure is over, the sedative medicine will be stopped.  After your procedural sedation  You will begin to feel more awake and aware. But you will likely be drowsy for a while afterward. You will be closely watched as you become more alert. You may have a faint memory of the procedure. Or you may not remember it at all.  You should be able to return home within an hour or two after your procedure. Plan to have someone stay with you for a few hours. Side effects such as headache and nausea may go away quickly. Tell your healthcare provider if they continue.  Dont drive or make any important decisions for at least 24  hours. Be sure to follow all after-care instructions.     When to call your healthcare provider  Have someone call your healthcare provider right away if you have any of these:  · Drowsiness that gets worse  · Weakness or dizziness that gets worse  · Repeated vomiting  · You cant be awakened  · Severe or ongoing pain from the procedure, not relieved by the pain medicine   Date Last Reviewed: 2/1/2017  © 9869-3370 [employer]. [address] [place] [address], [place] [address]. All rights reserved. This information is not intended as a substitute for professional medical care. Always follow your healthcare professional's instructions.

## 2018-04-03 ENCOUNTER — SURGERY (OUTPATIENT)
Age: 73
End: 2018-04-03

## 2018-04-03 ENCOUNTER — HOSPITAL ENCOUNTER (OUTPATIENT)
Facility: HOSPITAL | Age: 73
Discharge: HOME | End: 2018-04-03
Attending: ANESTHESIOLOGY | Admitting: ANESTHESIOLOGY
Payer: MEDICARE

## 2018-04-03 VITALS
DIASTOLIC BLOOD PRESSURE: 76 MMHG | TEMPERATURE: 98 F | OXYGEN SATURATION: 100 % | HEART RATE: 77 BPM | RESPIRATION RATE: 16 BRPM | WEIGHT: 115 LBS | HEIGHT: 59 IN | BODY MASS INDEX: 23.18 KG/M2 | SYSTOLIC BLOOD PRESSURE: 184 MMHG

## 2018-04-03 DIAGNOSIS — M51.36 DDD (DEGENERATIVE DISC DISEASE), LUMBAR: Primary | ICD-10-CM

## 2018-04-03 DIAGNOSIS — M54.16 LUMBAR RADICULOPATHY: ICD-10-CM

## 2018-04-03 DIAGNOSIS — G89.29 CHRONIC PAIN: ICD-10-CM

## 2018-04-03 PROCEDURE — 25500020 PHARM REV CODE 255: Performed by: ANESTHESIOLOGY

## 2018-04-03 PROCEDURE — 64484 NJX AA&/STRD TFRM EPI L/S EA: CPT | Performed by: ANESTHESIOLOGY

## 2018-04-03 PROCEDURE — 64483 NJX AA&/STRD TFRM EPI L/S 1: CPT | Mod: LT,,, | Performed by: ANESTHESIOLOGY

## 2018-04-03 PROCEDURE — 64484 NJX AA&/STRD TFRM EPI L/S EA: CPT | Mod: LT,,, | Performed by: ANESTHESIOLOGY

## 2018-04-03 PROCEDURE — 25000003 PHARM REV CODE 250: Performed by: ANESTHESIOLOGY

## 2018-04-03 PROCEDURE — 63600175 PHARM REV CODE 636 W HCPCS: Performed by: ANESTHESIOLOGY

## 2018-04-03 PROCEDURE — 99152 MOD SED SAME PHYS/QHP 5/>YRS: CPT | Mod: ,,, | Performed by: ANESTHESIOLOGY

## 2018-04-03 PROCEDURE — 64483 NJX AA&/STRD TFRM EPI L/S 1: CPT | Performed by: ANESTHESIOLOGY

## 2018-04-03 RX ORDER — LIDOCAINE HYDROCHLORIDE 5 MG/ML
INJECTION, SOLUTION INFILTRATION; PERINEURAL
Status: DISCONTINUED | OUTPATIENT
Start: 2018-04-03 | End: 2018-04-03 | Stop reason: HOSPADM

## 2018-04-03 RX ORDER — DEXAMETHASONE SODIUM PHOSPHATE 100 MG/10ML
INJECTION INTRAMUSCULAR; INTRAVENOUS
Status: DISCONTINUED | OUTPATIENT
Start: 2018-04-03 | End: 2018-04-03 | Stop reason: HOSPADM

## 2018-04-03 RX ORDER — SODIUM CHLORIDE 9 MG/ML
500 INJECTION, SOLUTION INTRAVENOUS CONTINUOUS
Status: DISCONTINUED | OUTPATIENT
Start: 2018-04-03 | End: 2018-04-03 | Stop reason: HOSPADM

## 2018-04-03 RX ORDER — FENTANYL CITRATE 50 UG/ML
INJECTION, SOLUTION INTRAMUSCULAR; INTRAVENOUS
Status: DISCONTINUED | OUTPATIENT
Start: 2018-04-03 | End: 2018-04-03 | Stop reason: HOSPADM

## 2018-04-03 RX ORDER — LIDOCAINE HYDROCHLORIDE 10 MG/ML
INJECTION INFILTRATION; PERINEURAL
Status: DISCONTINUED | OUTPATIENT
Start: 2018-04-03 | End: 2018-04-03 | Stop reason: HOSPADM

## 2018-04-03 RX ORDER — MIDAZOLAM HYDROCHLORIDE 1 MG/ML
INJECTION, SOLUTION INTRAMUSCULAR; INTRAVENOUS
Status: DISCONTINUED | OUTPATIENT
Start: 2018-04-03 | End: 2018-04-03 | Stop reason: HOSPADM

## 2018-04-03 RX ADMIN — LIDOCAINE HYDROCHLORIDE 5 ML: 5 INJECTION, SOLUTION INFILTRATION; PERINEURAL at 12:04

## 2018-04-03 RX ADMIN — MIDAZOLAM 2 MG: 1 INJECTION INTRAMUSCULAR; INTRAVENOUS at 12:04

## 2018-04-03 RX ADMIN — LIDOCAINE HYDROCHLORIDE 5 ML: 10 INJECTION, SOLUTION INFILTRATION; PERINEURAL at 12:04

## 2018-04-03 RX ADMIN — FENTANYL CITRATE 100 MCG: 50 INJECTION, SOLUTION INTRAMUSCULAR; INTRAVENOUS at 12:04

## 2018-04-03 RX ADMIN — SODIUM CHLORIDE 500 ML: 9 INJECTION, SOLUTION INTRAVENOUS at 12:04

## 2018-04-03 RX ADMIN — DEXAMETHASONE SODIUM PHOSPHATE 10 MG: 10 INJECTION INTRAMUSCULAR; INTRAVENOUS at 12:04

## 2018-04-03 RX ADMIN — IOHEXOL 5 ML: 300 INJECTION, SOLUTION INTRAVENOUS at 12:04

## 2018-04-03 NOTE — OP NOTE
Patient Name: Marlen Arndt  MRN: [id number]    INFORMED CONSENT: The procedure, risks, benefits and options were discussed with patient. There are no contraindications to the procedure. The patient expressed understanding and agreed to proceed. The personnel performing the procedure was discussed. I verify that I personally obtained Marlen's consent prior to the start of the procedure and the signed consent can be found on the patient's chart.    Procedure Date: 04/03/2018    Anesthesia: Topical    Pre Procedure diagnosis: Lumbar radiculopathy [M54.16]  1. DDD (degenerative disc disease), lumbar    2. Lumbar radiculopathy    3. Chronic pain      Post-Procedure diagnosis: SAME    Sedation: Yes - Fentanyl 100 mcg and Midazolam 2 mg    PROCEDURE:Left L3-4 and L4-5   TRANSFORAMINAL EPIDURAL STEROID INJECTION        DESCRIPTION OF PROCEDURE: The patient was brought to the procedure room. After performing time out  IV access was obtained prior to the procedure. The patient was positioned prone on the fluoroscopy table. Continuous hemodynamic monitoring was initiated including blood pressure, EKG, and pulse oximetry. . The skin was prepped with chlorhexidine three times and draped in a sterile fashion. Skin anesthesia was achieved using 3 mL of lidocaine 1% over the respective injection site.     An oblique fluoroscopic view was obtained, with the superior articular process of the inferior vertebral body aligned with the pedicle. The tip of a 22-gauge 3.5-inch Quincke-type spinal needle was advanced toward the 6 oclock position of the pedicle under intermittent fluoroscopic guidance. Confirmation of proper needle position was made with AP, oblique, and lateral fluoroscopic views. Negative aspiration for blood or CSF was confirmed. 2 mL of Omnipaque 300 was injected. Live fluoroscopic imaging revealed a clear outline of the spinal nerve with proximal spread of agent through the neural foramen into the anterior epidural space.  A total combination of 3 mL of Lidocaine 0.5% and 5 mg decadron was injected at each level. Contrast spread was noted from L2 to L4 level. There was no pain on injection. The needle was removed and bleeding was nil.  A sterile dressing was applied. Marlen was taken back to the recovery room for further observation.     Blood Loss: Nill  Specimen: None        Discharge Diagnosis: Lumbar radiculopathy [M54.16]  Condition on Discharge: Stable.  Diet on Discharge: Same as before.  Activity: as per instruction sheet.  Discharge to: Home with a responsible adult.  Follow up: as per Discharge instructions        Chico Bradford MD

## 2018-04-03 NOTE — H&P (VIEW-ONLY)
"DATE: 3/15/2018  PATIENT: Marlen Arndt    Attending Physician: Triston Cruz M.D.    HISTORY:  Marlen Arndt is a 72 y.o. female who returns to me today for MRI results.  She was last seen by me 2/27/2018.  Today she is doing well but notes her pain is 8/10 today.    The Patient denies myelopathic symptoms such as handwriting changes or difficulty with buttons/coins/keys. Denies perineal paresthesias, bowel/bladder dysfunction.    PMH/PSH/FamHx/SocHx:  Unchanged from prior visit    ROS:  REVIEW OF SYSTEMS:  Constitution: Negative. Negative for chills, fever and night sweats.   HENT: Negative for congestion and headaches.    Eyes: Negative for blurred vision, left vision loss and right vision loss.   Cardiovascular: Negative for chest pain and syncope.   Respiratory: Negative for cough and shortness of breath.    Endocrine: Negative for polydipsia, polyphagia and polyuria.   Hematologic/Lymphatic: Negative for bleeding problem. Does not bruise/bleed easily.   Skin: Negative for dry skin, itching and rash.   Musculoskeletal: Negative for falls and muscle weakness.   Gastrointestinal: Negative for abdominal pain and bowel incontinence.   Allergic/Immunologic: Negative for hives and persistent infections.  Genitourinary: Negative for urinary retention/incontinence and nocturia.   Neurological: Negative for disturbances in coordination, no myelopathic symptoms such as handwriting changes or difficulty with buttons, coins, keys or small objects. No loss of balance and seizures.   Psychiatric/Behavioral: Negative for depression. The patient does not have insomnia.   Denies perineal paresthesias, bowel or bladder incontinence    EXAM:  Ht 4' 11" (1.499 m)   Wt 48.6 kg (107 lb 2.3 oz)   BMI 21.64 kg/m²     My physical examination was notable for the following findings:     Normal station and gait, no difficulty with toe or heel walk.   Dorsal lumbar skin negative for rashes, lesions, hairy patches and surgical " scars. There is mild lumbar tenderness to palpation.  Lumbar range of motion is acceptable.  Global saggital and coronal spinal balance acceptable, not significant for scoliosis and kyphosis.  No pain with the range of motion of the bilateral hips. No trochanteric tenderness to palpation.  Bilateral lower extremities warm and well perfused, dorsalis pedis pulses 2+ bilaterally.  Normal strength and tone in all major motor groups in the bilateral lower extremities. Normal sensation to light touch in the L2-S1 dermatomes bilaterally.  Deep tendon reflexes symmetric 2+ in the bilateral lower extremities.  Negative Babinski bilaterally. Straight leg raise negative bilaterally.      IMAGING:  No new imaging today.    Today I personally re- reviewed AP, Lat and Flex/Ex  upright L-spine that demonstrate moderate degenerative changes.     MRI lumbar spine demonstrates a left paracentral disc extrusion at L3/4.       ASSESSMENT/PLAN:    Diagnoses and all orders for this visit:    Lumbar radiculopathy  -     Procedure Order to [facility]; Future    Plan for left L3/4 TFESI.  Follow up after injection if symptoms persist.      Follow-up if symptoms worsen or fail to improve.

## 2018-04-03 NOTE — PROGRESS NOTES
Discharge instructions reviewed with patient, , and son. Verbalized understanding, no questions at this time. IV d/c'd with tip intact. Procedure sites are CDI. VSS. Pt's PE=179/76 and instructed pt's son and  to give pt her blood pressure medicine when she gets home. No acute distress noted. Staff at bedside to help pt change. Wheeled to [place] area by staff. Home with family in private vehicle.

## 2018-04-04 LAB — POCT GLUCOSE: 133 MG/DL (ref 70–110)

## 2018-04-09 DIAGNOSIS — E11.69 DYSLIPIDEMIA ASSOCIATED WITH TYPE 2 DIABETES MELLITUS: ICD-10-CM

## 2018-04-09 DIAGNOSIS — E78.5 DYSLIPIDEMIA ASSOCIATED WITH TYPE 2 DIABETES MELLITUS: ICD-10-CM

## 2018-04-10 RX ORDER — ROSUVASTATIN CALCIUM 10 MG/1
TABLET, COATED ORAL
Qty: 90 TABLET | Refills: 3 | Status: SHIPPED | OUTPATIENT
Start: 2018-04-10 | End: 2019-06-09 | Stop reason: SDUPTHER

## 2018-04-15 ENCOUNTER — PATIENT MESSAGE (OUTPATIENT)
Dept: ORTHOPEDICS | Facility: CLINIC | Age: 73
End: 2018-04-15

## 2018-04-20 ENCOUNTER — TELEPHONE (OUTPATIENT)
Dept: ENDOCRINOLOGY | Facility: CLINIC | Age: 73
End: 2018-04-20

## 2018-04-20 ENCOUNTER — OFFICE VISIT (OUTPATIENT)
Dept: ENDOCRINOLOGY | Facility: CLINIC | Age: 73
End: 2018-04-20
Payer: MEDICARE

## 2018-04-20 ENCOUNTER — LAB VISIT (OUTPATIENT)
Dept: LAB | Facility: HOSPITAL | Age: 73
End: 2018-04-20
Attending: INTERNAL MEDICINE
Payer: MEDICARE

## 2018-04-20 ENCOUNTER — PATIENT MESSAGE (OUTPATIENT)
Dept: ENDOCRINOLOGY | Facility: CLINIC | Age: 73
End: 2018-04-20

## 2018-04-20 VITALS
HEART RATE: 78 BPM | WEIGHT: 110.25 LBS | DIASTOLIC BLOOD PRESSURE: 50 MMHG | RESPIRATION RATE: 18 BRPM | SYSTOLIC BLOOD PRESSURE: 142 MMHG | BODY MASS INDEX: 22.23 KG/M2 | HEIGHT: 59 IN

## 2018-04-20 DIAGNOSIS — E11.8 CONTROLLED DIABETES MELLITUS TYPE 2 WITH COMPLICATIONS, UNSPECIFIED LONG TERM INSULIN USE STATUS: Primary | ICD-10-CM

## 2018-04-20 DIAGNOSIS — M89.9 DISORDER OF BONE: ICD-10-CM

## 2018-04-20 DIAGNOSIS — N17.9 AKI (ACUTE KIDNEY INJURY): ICD-10-CM

## 2018-04-20 DIAGNOSIS — M85.80 OSTEOPENIA, UNSPECIFIED LOCATION: ICD-10-CM

## 2018-04-20 LAB
ALBUMIN SERPL BCP-MCNC: 3.8 G/DL
ALP SERPL-CCNC: 43 U/L
ALT SERPL W/O P-5'-P-CCNC: 11 U/L
ANION GAP SERPL CALC-SCNC: 9 MMOL/L
AST SERPL-CCNC: 13 U/L
BILIRUB SERPL-MCNC: 0.4 MG/DL
BUN SERPL-MCNC: 23 MG/DL
CALCIUM SERPL-MCNC: 9.7 MG/DL
CHLORIDE SERPL-SCNC: 99 MMOL/L
CO2 SERPL-SCNC: 25 MMOL/L
CREAT SERPL-MCNC: 1.1 MG/DL
EST. GFR  (AFRICAN AMERICAN): 58 ML/MIN/1.73 M^2
EST. GFR  (NON AFRICAN AMERICAN): 50.3 ML/MIN/1.73 M^2
GLUCOSE SERPL-MCNC: 169 MG/DL
POTASSIUM SERPL-SCNC: 4.1 MMOL/L
PROT SERPL-MCNC: 6.4 G/DL
SODIUM SERPL-SCNC: 133 MMOL/L

## 2018-04-20 PROCEDURE — 99499 UNLISTED E&M SERVICE: CPT | Mod: S$PBB,,, | Performed by: INTERNAL MEDICINE

## 2018-04-20 PROCEDURE — 36415 COLL VENOUS BLD VENIPUNCTURE: CPT

## 2018-04-20 PROCEDURE — 3044F HG A1C LEVEL LT 7.0%: CPT | Mod: CPTII,S$GLB,, | Performed by: INTERNAL MEDICINE

## 2018-04-20 PROCEDURE — 3078F DIAST BP <80 MM HG: CPT | Mod: CPTII,S$GLB,, | Performed by: INTERNAL MEDICINE

## 2018-04-20 PROCEDURE — 99999 PR PBB SHADOW E&M-EST. PATIENT-LVL V: CPT | Mod: PBBFAC,,, | Performed by: INTERNAL MEDICINE

## 2018-04-20 PROCEDURE — 80053 COMPREHEN METABOLIC PANEL: CPT

## 2018-04-20 PROCEDURE — 99214 OFFICE O/P EST MOD 30 MIN: CPT | Mod: S$GLB,,, | Performed by: INTERNAL MEDICINE

## 2018-04-20 PROCEDURE — 3077F SYST BP >= 140 MM HG: CPT | Mod: CPTII,S$GLB,, | Performed by: INTERNAL MEDICINE

## 2018-04-20 NOTE — PROGRESS NOTES
Ms. Sotelo is a 72 y.o. F with history of DM2, HTN, HL, CAD, CVA, carotid artery disease, PVD, aortic valve replacement, here for followup.  She is forgetful but lives w her daughter who helps her.     Pt is new to me, previously seen by other providers in our clinic.     FS log reviewed - FS all in low to mid 100s, only 1 FS in last month of 78.      Currently on Trulicity 1.5mg weekly, Metformin 1g BID.    Lost a lot of weight in last 1 year due to memory issues and forgetting meals and not eating well.     Early 2018 saw ophtho visit. Has numbness in her feet chronically.     Noted 2015 had osteopenia meeting FRAX.  No hx of fracture except for a toe.  No recent falls. Takes vitD 50k weekly, but no calcium.     I also noticed recently she had HELADIO in Jan due to possibly ibuprofen use, no repeat CMP.       Assessment and Plan:  Ms. Sotelo is a 72 y.o. F with history of DM2, HTN, HL, CAD, CVA, carotid artery disease, PVD, aortic valve replacement, here for followup.    DM2: controlled well on Trulicity and metformin, likely due to weight loss  Trulicity very expensive for pt (200-300$ per month)  Recommend switch Trulicity to Januvia 100mg daily if covered w insurance  Continue metformin 1g BID    HELADIO: recheck CMP today, dose medications accordingly    Osteopenia: vitD, phos, pth, mg labs w next visit, repeat DXA prior to next visit  Dec vitD to 50k every other week   Add calcium 500mg BID    RTC 4 months w labs      Component      Latest Ref Rng & Units 1/29/2018 1/15/2018 10/27/2017 8/22/2017   eGFR if African American      >60 mL/min/1.73 m:2 47.4 (A) 32.0 (A) >60.0    eGFR if non African American      >60 mL/min/1.73 m:2 41.1 (A) 27.7 (A) >60.0    Cholesterol      120 - 199 mg/dL  114 (L)     Triglycerides      30 - 150 mg/dL  99     HDL      40 - 75 mg/dL  53     LDL Cholesterol      63.0 - 159.0 mg/dL  41.2 (L)     HDL/Chol Ratio      20.0 - 50.0 %  46.5     Total Cholesterol/HDL Ratio      2.0 - 5.0  2.2      Non-HDL Cholesterol      mg/dL  61     Hemoglobin A1C      4.0 - 5.6 %  5.9 (H) 8.0 (H)    Estimated Avg Glucose      68 - 131 mg/dL  123 183 (H)    TSH      0.400 - 4.000 uIU/mL    1.796       Past Medical History:   Diagnosis Date    Aortic stenosis     Arthritis     Back pain     Carotid artery occlusion     Cataract     Coronary artery disease     Diabetes mellitus type II     Heart murmur     Hyperlipidemia     Hypertension associated with diabetes 2012    Non-functioning pituitary adenoma 2012    Pituitary microadenoma 2015    Polyneuropathy     PVD (peripheral vascular disease)     PVD (peripheral vascular disease)     S/P AVR (aortic valve replacement) 2013    21 mm Medtronic Mosaic ultra porcine valve     Stenosis     Stented coronary artery 4/10/2013    2013 RCA QASIM     Stroke     Multiple mini strokes; decreased left peripheal vision    Type II or unspecified type diabetes mellitus with neurological manifestations, not stated as uncontrolled(250.60)     Type II or unspecified type diabetes mellitus with peripheral circulatory disorders, not stated as uncontrolled(250.70)         reports that she quit smoking about 31 years ago. She has a 20.00 pack-year smoking history. She has never used smokeless tobacco. She reports that she does not drink alcohol or use drugs.    Family History   Problem Relation Age of Onset    Diabetes Mother     Coronary artery disease Mother     Heart disease Mother 42      42 of MI    Heart attack Mother     Heart failure Mother     Hyperlipidemia Mother     Hypertension Mother     Heart disease Father     Heart attack Father     Heart failure Father     Hyperlipidemia Father     Hypertension Father     Stroke Father     Coronary artery disease Brother     Diabetes Brother     Heart disease Brother     Hyperlipidemia Brother     Hypertension Brother     Heart disease Sister 41    Heart attack Sister      Hyperlipidemia Sister     Hypertension Sister     Heart disease Sister     Heart attack Sister     Hyperlipidemia Sister     Hypertension Sister     Diabetes Daughter     No Known Problems Son     Amblyopia Neg Hx     Blindness Neg Hx     Cataracts Neg Hx     Glaucoma Neg Hx     Macular degeneration Neg Hx     Retinal detachment Neg Hx     Strabismus Neg Hx        Past Surgical History:   Procedure Laterality Date    AORTIC VALVE REPLACEMENT  2013    BILATERAL SALPINGOOPHORECTOMY      BREAST BIOPSY      CARDIAC VALVE REPLACEMENT      aortic 2013    CAROTID ENDARTERECTOMY Right     CATARACT EXTRACTION W/  INTRAOCULAR LENS IMPLANT Bilateral     Cataract Surgery      Both eyes    CORONARY ANGIOPLASTY      DENTAL SURGERY      EYE SURGERY      HYSTERECTOMY      TUBAL LIGATION      YAG Bilateral        Patient's Medications   New Prescriptions    No medications on file   Previous Medications    ACETAMINOPHEN (TYLENOL) 325 MG TABLET    Take 325 mg by mouth daily as needed. Half Tablet Oral Every day as needed    ASCORBIC ACID (VITAMIN C) 1000 MG TABLET        ASPIRIN 81 MG CHEW    Take 81 mg by mouth once daily.    BLOOD-GLUCOSE METER MISC    2 each by Misc.(Non-Drug; Combo Route) route 2 (two) times daily.    COENZYME Q10 (COQ-10) 100 MG CAPSULE    Take 100 mg by mouth Daily. 1 Capsule Oral Every day    DAILY MULTIVITAMIN TABLET    Take 1 tablet by mouth Daily. 1 Tablet Oral Every day    DULAGLUTIDE (TRULICITY) 1.5 MG/0.5 ML PNIJ    Inject 1.5 mg into the skin every 7 days.    ERGOCALCIFEROL (ERGOCALCIFEROL) 50,000 UNIT CAP    Take 1 capsule (50,000 Units total) by mouth every 7 days.    FENOFIBRATE (TRICOR) 48 MG TABLET    TAKE ONE TABLET BY MOUTH ONCE DAILY    GABAPENTIN (NEURONTIN) 300 MG CAPSULE    1-3 tabs po tid as needed    HYDROCODONE-ACETAMINOPHEN 5-325MG (NORCO) 5-325 MG PER TABLET    Take 1 tablet by mouth every 6 (six) hours as needed for Pain.    LABETALOL (NORMODYNE) 200 MG TABLET   "  TAKE TWO TABLETS BY MOUTH TWICE DAILY    LANCETS 30 GAUGE MISC    1 lancet by Misc.(Non-Drug; Combo Route) route 4 (four) times daily.    LISINOPRIL (PRINIVIL,ZESTRIL) 40 MG TABLET    TAKE ONE TABLET BY MOUTH ONCE DAILY    MAGNESIUM 200 MG TAB    Take 200 mg by mouth once daily.    METFORMIN (GLUCOPHAGE) 1000 MG TABLET    Take 1 tablet (1,000 mg total) by mouth 2 (two) times daily with meals.    NITROGLYCERIN (NITROSTAT) 0.4 MG SL TABLET    1-3 Tablet, Sublingual   As directed    OMEGA-3 FATTY ACIDS-VITAMIN E (FISH OIL) 1,000 MG CAP        PHENOBARBITAL (LUMINAL) 64.8 MG TABLET    TAKE ONE TABLET BY MOUTH AT BEDTIME    ROSUVASTATIN (CRESTOR) 10 MG TABLET    TAKE ONE TABLET (10 MG TOTAL) BY MOUTH ONCE DAILY    TRUE METRIX GLUCOSE TEST STRIP STRP    1 strip by Misc.(Non-Drug; Combo Route) route 4 (four) times daily.   Modified Medications    No medications on file   Discontinued Medications    BENZONATATE (TESSALON PERLES) 100 MG CAPSULE    Take 2 capsules (200 mg total) by mouth 3 (three) times daily as needed for Cough.         Review of Systems:  General: no fevers  Eyes: no vision changes  ENT: no sore throat  Lung: no sob  CV: no palpitations  GI: no nausea   : no dysuria   Endo: no heat intolerance  Heme: no easy bruising   MSK: no joint swelling      BP (!) 142/50 (BP Location: Left arm, Patient Position: Sitting, BP Method: Medium (Manual))   Pulse 78   Resp 18   Ht 4' 11" (1.499 m)   Wt 50 kg (110 lb 3.7 oz)   BMI 22.26 kg/m²     Physical Exam:  General: normal body habitus, not in acute distress   Eyes: anicteric, no proptosis   ENT: no facial lesions, no oral lesions   Neck: no masses, no LAD   Lung; ctabl, no wheezing or stridor   GI: normal bowel sounds, nondistended   CV: RRR, no rubs or murmurs   Skin: exposed skin without bruising or bleeding   Ext: no peripheral edema or erythema, feet without lesions, normal monofilament  Neuro: AOx3, moving all extremities, normal gait     Labs:    " Chemistry        Component Value Date/Time     (L) 01/29/2018 1222    K 4.0 01/29/2018 1222    CL 95 01/29/2018 1222    CO2 27 01/29/2018 1222    BUN 21 01/29/2018 1222    CREATININE 1.3 01/29/2018 1222     (H) 01/29/2018 1222        Component Value Date/Time    CALCIUM 9.7 01/29/2018 1222    ALKPHOS 48 (L) 01/15/2018 0905    AST 19 01/15/2018 0905    ALT 12 01/15/2018 0905    BILITOT 0.3 01/15/2018 0905    ESTGFRAFRICA 47.4 (A) 01/29/2018 1222    EGFRNONAA 41.1 (A) 01/29/2018 1222          Lab Results   Component Value Date    WBC 7.05 11/30/2016    HGB 11.4 (L) 11/30/2016    HCT 33.6 (L) 11/30/2016    MCV 86 11/30/2016     11/30/2016        Lab Results   Component Value Date    HDL 53 01/15/2018    HDL 71 06/23/2017    HDL 86 (H) 11/16/2016     Lab Results   Component Value Date    LDLCALC 41.2 (L) 01/15/2018    LDLCALC 64.0 06/23/2017    LDLCALC 77.2 11/16/2016     Lab Results   Component Value Date    TRIG 99 01/15/2018    TRIG 95 06/23/2017    TRIG 79 11/16/2016     Lab Results   Component Value Date    CHOLHDL 46.5 01/15/2018    CHOLHDL 46.1 06/23/2017    CHOLHDL 48.0 11/16/2016       Hemoglobin A1C   Date Value Ref Range Status   01/15/2018 5.9 (H) 4.0 - 5.6 % Final     Comment:     According to ADA guidelines, hemoglobin A1c <7.0% represents  optimal control in non-pregnant diabetic patients. Different  metrics may apply to specific patient populations.   Standards of Medical Care in Diabetes-2016.  For the purpose of screening for the presence of diabetes:  <5.7%     Consistent with the absence of diabetes  5.7-6.4%  Consistent with increasing risk for diabetes   (prediabetes)  >or=6.5%  Consistent with diabetes  Currently, no consensus exists for use of hemoglobin A1c  for diagnosis of diabetes for children.  This Hemoglobin A1c assay has significant interference with fetal   hemoglobin   (HbF). The results are invalid for patients with abnormal amounts of   HbF,   including those with  known Hereditary Persistence   of Fetal Hemoglobin. Heterozygous hemoglobin variants (HbAS, HbAC,   HbAD, HbAE, HbA2) do not significantly interfere with this assay;   however, presence of multiple variants in a sample may impact the %   interference.     10/27/2017 8.0 (H) 4.0 - 5.6 % Final     Comment:     According to ADA guidelines, hemoglobin A1c <7.0% represents  optimal control in non-pregnant diabetic patients. Different  metrics may apply to specific patient populations.   Standards of Medical Care in Diabetes-2016.  For the purpose of screening for the presence of diabetes:  <5.7%     Consistent with the absence of diabetes  5.7-6.4%  Consistent with increasing risk for diabetes   (prediabetes)  >or=6.5%  Consistent with diabetes  Currently, no consensus exists for use of hemoglobin A1c  for diagnosis of diabetes for children.  This Hemoglobin A1c assay has significant interference with fetal   hemoglobin   (HbF). The results are invalid for patients with abnormal amounts of   HbF,   including those with known Hereditary Persistence   of Fetal Hemoglobin. Heterozygous hemoglobin variants (HbAS, HbAC,   HbAD, HbAE, HbA2) do not significantly interfere with this assay;   however, presence of multiple variants in a sample may impact the %   interference.     06/23/2017 7.6 (H) 4.0 - 5.6 % Final     Comment:     According to ADA guidelines, hemoglobin A1c <7.0% represents  optimal control in non-pregnant diabetic patients. Different  metrics may apply to specific patient populations.   Standards of Medical Care in Diabetes-2016.  For the purpose of screening for the presence of diabetes:  <5.7%     Consistent with the absence of diabetes  5.7-6.4%  Consistent with increasing risk for diabetes   (prediabetes)  >or=6.5%  Consistent with diabetes  Currently, no consensus exists for use of hemoglobin A1c  for diagnosis of diabetes for children.  This Hemoglobin A1c assay has significant interference with fetal    hemoglobin   (HbF). The results are invalid for patients with abnormal amounts of   HbF,   including those with known Hereditary Persistence   of Fetal Hemoglobin. Heterozygous hemoglobin variants (HbAS, HbAC,   HbAD, HbAE, HbA2) do not significantly interfere with this assay;   however, presence of multiple variants in a sample may impact the %   interference.         Lab Results   Component Value Date    TSH 1.796 08/22/2017

## 2018-05-07 ENCOUNTER — PATIENT MESSAGE (OUTPATIENT)
Dept: ENDOCRINOLOGY | Facility: CLINIC | Age: 73
End: 2018-05-07

## 2018-05-08 RX ORDER — DEXTROSE 4 G
2 TABLET,CHEWABLE ORAL 2 TIMES DAILY
Qty: 1 EACH | Refills: 0 | Status: SHIPPED | OUTPATIENT
Start: 2018-05-08 | End: 2019-12-16

## 2018-05-28 ENCOUNTER — PES CALL (OUTPATIENT)
Dept: ADMINISTRATIVE | Facility: CLINIC | Age: 73
End: 2018-05-28

## 2018-06-07 ENCOUNTER — OFFICE VISIT (OUTPATIENT)
Dept: PODIATRY | Facility: CLINIC | Age: 73
End: 2018-06-07
Payer: MEDICARE

## 2018-06-07 VITALS
SYSTOLIC BLOOD PRESSURE: 173 MMHG | DIASTOLIC BLOOD PRESSURE: 66 MMHG | HEIGHT: 59 IN | BODY MASS INDEX: 23.95 KG/M2 | HEART RATE: 66 BPM | RESPIRATION RATE: 18 BRPM | WEIGHT: 118.81 LBS

## 2018-06-07 DIAGNOSIS — E11.49 TYPE II DIABETES MELLITUS WITH NEUROLOGICAL MANIFESTATIONS: Primary | ICD-10-CM

## 2018-06-07 DIAGNOSIS — B35.1 ONYCHOMYCOSIS DUE TO DERMATOPHYTE: ICD-10-CM

## 2018-06-07 DIAGNOSIS — L84 CORN OR CALLUS: ICD-10-CM

## 2018-06-07 PROCEDURE — 11056 PARNG/CUTG B9 HYPRKR LES 2-4: CPT | Mod: Q9,S$GLB,, | Performed by: PODIATRIST

## 2018-06-07 PROCEDURE — 99499 UNLISTED E&M SERVICE: CPT | Mod: S$PBB,,, | Performed by: PODIATRIST

## 2018-06-07 PROCEDURE — 11721 DEBRIDE NAIL 6 OR MORE: CPT | Mod: Q9,59,S$GLB, | Performed by: PODIATRIST

## 2018-06-07 PROCEDURE — 99999 PR PBB SHADOW E&M-EST. PATIENT-LVL III: CPT | Mod: PBBFAC,,, | Performed by: PODIATRIST

## 2018-06-07 PROCEDURE — 99499 UNLISTED E&M SERVICE: CPT | Mod: S$GLB,,, | Performed by: PODIATRIST

## 2018-06-07 NOTE — PROGRESS NOTES
Subjective:      Patient ID: Marlen Arndt is a 72 y.o. female.    Chief Complaint: PCP (Dominique Rendon MD 12/25/18); Diabetic Foot Exam; and Nail Care    Marlen is a 72 y.o. female who presents to the clinic for evaluation and treatment of high risk feet. Marlen has a past medical history of Aortic stenosis; Arthritis; Back pain; Carotid artery occlusion; Cataract; Coronary artery disease; Diabetes mellitus type II; Heart murmur; Hyperlipidemia; Hypertension associated with diabetes (7/24/2012); Non-functioning pituitary adenoma (7/24/2012); Pituitary microadenoma (11/17/2015); Polyneuropathy; PVD (peripheral vascular disease); PVD (peripheral vascular disease); S/P AVR (aortic valve replacement) (8/14/2013); Stenosis; Stented coronary artery (4/10/2013); Stroke; Type II or unspecified type diabetes mellitus with neurological manifestations, not stated as uncontrolled(250.60); and Type II or unspecified type diabetes mellitus with peripheral circulatory disorders, not stated as uncontrolled(250.70). The patient's chief complaint is long, thick toenails .   This patient has documented high risk feet requiring routine maintenance secondary to diabetes mellitis and those secondary complications of diabetes, as mentioned..    PCP: Dominique Rendon MD  Date Last Seen by PCP:  Chief Complaint   Patient presents with    PCP     Dominique Rendon MD 12/25/18    Diabetic Foot Exam    Nail Care       Current shoe gear:  sandals    Hemoglobin A1C   Date Value Ref Range Status   01/15/2018 5.9 (H) 4.0 - 5.6 % Final     Comment:     According to ADA guidelines, hemoglobin A1c <7.0% represents  optimal control in non-pregnant diabetic patients. Different  metrics may apply to specific patient populations.   Standards of Medical Care in Diabetes-2016.  For the purpose of screening for the presence of diabetes:  <5.7%     Consistent with the absence of diabetes  5.7-6.4%  Consistent with increasing risk for diabetes    (prediabetes)  >or=6.5%  Consistent with diabetes  Currently, no consensus exists for use of hemoglobin A1c  for diagnosis of diabetes for children.  This Hemoglobin A1c assay has significant interference with fetal   hemoglobin   (HbF). The results are invalid for patients with abnormal amounts of   HbF,   including those with known Hereditary Persistence   of Fetal Hemoglobin. Heterozygous hemoglobin variants (HbAS, HbAC,   HbAD, HbAE, HbA2) do not significantly interfere with this assay;   however, presence of multiple variants in a sample may impact the %   interference.     10/27/2017 8.0 (H) 4.0 - 5.6 % Final     Comment:     According to ADA guidelines, hemoglobin A1c <7.0% represents  optimal control in non-pregnant diabetic patients. Different  metrics may apply to specific patient populations.   Standards of Medical Care in Diabetes-2016.  For the purpose of screening for the presence of diabetes:  <5.7%     Consistent with the absence of diabetes  5.7-6.4%  Consistent with increasing risk for diabetes   (prediabetes)  >or=6.5%  Consistent with diabetes  Currently, no consensus exists for use of hemoglobin A1c  for diagnosis of diabetes for children.  This Hemoglobin A1c assay has significant interference with fetal   hemoglobin   (HbF). The results are invalid for patients with abnormal amounts of   HbF,   including those with known Hereditary Persistence   of Fetal Hemoglobin. Heterozygous hemoglobin variants (HbAS, HbAC,   HbAD, HbAE, HbA2) do not significantly interfere with this assay;   however, presence of multiple variants in a sample may impact the %   interference.     06/23/2017 7.6 (H) 4.0 - 5.6 % Final     Comment:     According to ADA guidelines, hemoglobin A1c <7.0% represents  optimal control in non-pregnant diabetic patients. Different  metrics may apply to specific patient populations.   Standards of Medical Care in Diabetes-2016.  For the purpose of screening for the presence of  diabetes:  <5.7%     Consistent with the absence of diabetes  5.7-6.4%  Consistent with increasing risk for diabetes   (prediabetes)  >or=6.5%  Consistent with diabetes  Currently, no consensus exists for use of hemoglobin A1c  for diagnosis of diabetes for children.  This Hemoglobin A1c assay has significant interference with fetal   hemoglobin   (HbF). The results are invalid for patients with abnormal amounts of   HbF,   including those with known Hereditary Persistence   of Fetal Hemoglobin. Heterozygous hemoglobin variants (HbAS, HbAC,   HbAD, HbAE, HbA2) do not significantly interfere with this assay;   however, presence of multiple variants in a sample may impact the %   interference.         Review of Systems   Constitution: Negative for chills, decreased appetite and fever.   Cardiovascular: Negative for chest pain, claudication and leg swelling.   Respiratory: Negative for cough.    Skin: Positive for dry skin and nail changes. Negative for color change, flushing, itching, poor wound healing and rash.   Musculoskeletal: Negative for arthritis, back pain, falls, gout, joint swelling and myalgias.   Gastrointestinal: Negative for nausea and vomiting.   Neurological: Positive for numbness. Negative for loss of balance and paresthesias.           Objective:      Physical Exam   Constitutional: She is oriented to person, place, and time. She appears well-developed and well-nourished. No distress.   Cardiovascular:   Pulses:       Dorsalis pedis pulses are 1+ on the right side, and 1+ on the left side.        Posterior tibial pulses are 1+ on the right side, and 1+ on the left side.   Toes are cool to touch. Feet are warm proximally.There is decreased digital hair. Skin is atrophic, slightly hyperpigmented, and mildly edematous       Musculoskeletal: Normal range of motion. She exhibits no edema or tenderness.   Adequate joint range of motion without pain, limitation, nor crepitation Bilateral feet and ankle  joints. Muscle strength is 5/5 in all groups bilaterally.         Neurological: She is alert and oriented to person, place, and time.   R[place]-Estuardo 5.07 monofilamant testing is diminished Carlos feet. Sharp/dull sensation diminished Bilaterally. Light touch absent Bilaterally.       Skin: Skin is warm, dry and intact. No abrasion, no bruising, no burn, no ecchymosis and no lesion noted. No erythema.   Nails x 10  are elongated by  2-4mm's, thickened by 2-4 mm's, dystrophic, and are darkened in  coloration . Xerosis Bilaterally. No open lesions noted.    Hyperkeratotic tissue noted to sub 1st mpj left and plantar medial 1st hallux IPJ.    Psychiatric: She has a normal mood and affect. Her behavior is normal.   Nursing note and vitals reviewed.            Assessment:       Encounter Diagnoses   Name Primary?    Type II diabetes mellitus with neurological manifestations Yes    Onychomycosis due to dermatophyte     Corn or callus          Plan:       Marlen was seen today for pcp, diabetic foot exam and nail care.    Diagnoses and all orders for this visit:    Type II diabetes mellitus with neurological manifestations  -     DIABETIC SHOES FOR HOME USE    Onychomycosis due to dermatophyte  -     DIABETIC SHOES FOR HOME USE    Corn or callus  -     DIABETIC SHOES FOR HOME USE      I counseled the patient on her conditions, their implications and medical management.        - Shoe inspection. Diabetic Foot Education. Patient reminded of the importance of good nutrition and blood sugar control to help prevent podiatric complications of diabetes. Patient instructed on proper foot hygeine. We discussed wearing proper shoe gear, daily foot inspections, never walking without protective shoe gear, never putting sharp instruments to feet, routine podiatric nail visits every 2-3 months.      - With patient's permission, nails were aggressively reduced and debrided x 10 to their soft tissue attachment mechanically and with  electric , removing all offending nail and debris. Patient relates relief following the procedure. She will continue to monitor the areas daily, inspect her feet, wear protective shoe gear when ambulatory, moisturizer to maintain skin integrity and follow in this office in approximately 2-3 months, sooner p.r.n.    - After cleansing the  area w/ alcohol prep pad the above mentioned hyperkeratosis was trimmed utilizing No 15 scapel, to a smooth base with out incident. Patient tolerated this  well and reported comfort to the area of sub 1st MPJ left and left hallux plantar medial IPJ.     - Patient requires diabetic shoes due to high risk for ulceration and /or amputation. Patient was  dispensed a prescription for diabetic shoes with one pair of custom molded inserts. A list of potential orthotist  was also provided

## 2018-06-13 ENCOUNTER — PATIENT MESSAGE (OUTPATIENT)
Dept: ENDOCRINOLOGY | Facility: CLINIC | Age: 73
End: 2018-06-13

## 2018-06-13 RX ORDER — GLIPIZIDE 5 MG/1
2.5 TABLET ORAL
Qty: 30 TABLET | Refills: 11 | Status: SHIPPED | OUTPATIENT
Start: 2018-06-13 | End: 2018-06-18

## 2018-06-14 ENCOUNTER — PATIENT MESSAGE (OUTPATIENT)
Dept: ENDOCRINOLOGY | Facility: CLINIC | Age: 73
End: 2018-06-14

## 2018-06-16 ENCOUNTER — PATIENT MESSAGE (OUTPATIENT)
Dept: ENDOCRINOLOGY | Facility: CLINIC | Age: 73
End: 2018-06-16

## 2018-06-18 RX ORDER — GLIPIZIDE 5 MG/1
5 TABLET, FILM COATED, EXTENDED RELEASE ORAL
Qty: 30 TABLET | Refills: 5 | Status: SHIPPED | OUTPATIENT
Start: 2018-06-18 | End: 2018-08-02 | Stop reason: SDUPTHER

## 2018-07-03 ENCOUNTER — PATIENT MESSAGE (OUTPATIENT)
Dept: ENDOCRINOLOGY | Facility: CLINIC | Age: 73
End: 2018-07-03

## 2018-07-17 ENCOUNTER — PATIENT MESSAGE (OUTPATIENT)
Dept: INTERNAL MEDICINE | Facility: CLINIC | Age: 73
End: 2018-07-17

## 2018-07-30 ENCOUNTER — OFFICE VISIT (OUTPATIENT)
Dept: INTERNAL MEDICINE | Facility: CLINIC | Age: 73
End: 2018-07-30
Payer: MEDICARE

## 2018-07-30 VITALS
HEIGHT: 59 IN | SYSTOLIC BLOOD PRESSURE: 158 MMHG | WEIGHT: 125 LBS | BODY MASS INDEX: 25.2 KG/M2 | HEART RATE: 60 BPM | OXYGEN SATURATION: 96 % | DIASTOLIC BLOOD PRESSURE: 58 MMHG

## 2018-07-30 DIAGNOSIS — E11.59 HYPERTENSION ASSOCIATED WITH DIABETES: Chronic | ICD-10-CM

## 2018-07-30 DIAGNOSIS — E11.42 DIABETIC POLYNEUROPATHY ASSOCIATED WITH TYPE 2 DIABETES MELLITUS: ICD-10-CM

## 2018-07-30 DIAGNOSIS — I15.2 HYPERTENSION ASSOCIATED WITH DIABETES: Chronic | ICD-10-CM

## 2018-07-30 DIAGNOSIS — F01.50 VASCULAR DEMENTIA WITHOUT BEHAVIORAL DISTURBANCE: Primary | ICD-10-CM

## 2018-07-30 PROCEDURE — 99214 OFFICE O/P EST MOD 30 MIN: CPT | Mod: S$GLB,,, | Performed by: INTERNAL MEDICINE

## 2018-07-30 PROCEDURE — 3077F SYST BP >= 140 MM HG: CPT | Mod: CPTII,S$GLB,, | Performed by: INTERNAL MEDICINE

## 2018-07-30 PROCEDURE — 3078F DIAST BP <80 MM HG: CPT | Mod: CPTII,S$GLB,, | Performed by: INTERNAL MEDICINE

## 2018-07-30 PROCEDURE — 99999 PR PBB SHADOW E&M-EST. PATIENT-LVL III: CPT | Mod: PBBFAC,,, | Performed by: INTERNAL MEDICINE

## 2018-07-30 PROCEDURE — 3044F HG A1C LEVEL LT 7.0%: CPT | Mod: CPTII,S$GLB,, | Performed by: INTERNAL MEDICINE

## 2018-07-30 RX ORDER — DONEPEZIL HYDROCHLORIDE 5 MG/1
5 TABLET, FILM COATED ORAL NIGHTLY
Qty: 30 TABLET | Refills: 11 | Status: SHIPPED | OUTPATIENT
Start: 2018-07-30 | End: 2018-11-19 | Stop reason: SDUPTHER

## 2018-07-30 NOTE — PROGRESS NOTES
Subjective:       Patient ID: June B Yris is a 73 y.o. female.    Chief Complaint: Follow-up (vascular dementia)    HPI   DM - trulicity stopped januvia started., but stopped due to expense.  Now taking Glipizide tr 24 and sugars controlled. Also taking metformin.        New dx of vascular dementia.  No neuro response.   we reviewed neurospych testing results and recs for tx.    Lumbar radiculopathy much improved.  Off pain meds.   Her son stopped gabapentin and he feared it was worsening memory.      Review of Systems   Constitutional: Negative for activity change and unexpected weight change.   HENT: Negative for hearing loss, rhinorrhea and trouble swallowing.    Eyes: Negative for discharge and visual disturbance.   Respiratory: Negative for chest tightness and wheezing.    Cardiovascular: Negative for chest pain and palpitations.   Gastrointestinal: Negative for blood in stool, constipation, diarrhea and vomiting.   Endocrine: Negative for polydipsia and polyuria.   Genitourinary: Negative for difficulty urinating, dysuria, hematuria and menstrual problem.   Musculoskeletal: Negative for arthralgias, joint swelling and neck pain.   Neurological: Negative for weakness and headaches.   Psychiatric/Behavioral: Negative for confusion and dysphoric mood.       Objective:      Physical Exam   Constitutional: She appears well-developed and well-nourished. No distress.   Musculoskeletal: She exhibits no edema.   Neurological: She is alert. No cranial nerve deficit.       162/74  Assessment:       1. Vascular dementia without behavioral disturbance - we discussed unclear benefit of aricept.   2. Diabetic polyneuropathy associated with type 2 diabetes mellitus    3. Hypertension associated with diabetes      4.    Lumbar radiculopathy - resolved.  Plan:       Marlen was seen today for follow-up.    Diagnoses and all orders for this visit:    Vascular dementia without behavioral disturbance    Diabetic polyneuropathy  associated with type 2 diabetes mellitus    Hypertension associated with diabetes    Other orders  -     donepezil (ARICEPT) 5 MG tablet; Take 1 tablet (5 mg total) by mouth every evening.       Discuss BP meds with Dr Ziegler.      Home readings controlled.  Consider restarting diuretic.      See pt instructions

## 2018-08-02 RX ORDER — GLIPIZIDE 5 MG/1
5 TABLET, FILM COATED, EXTENDED RELEASE ORAL
Qty: 90 TABLET | Refills: 2 | Status: SHIPPED | OUTPATIENT
Start: 2018-08-02 | End: 2018-08-30

## 2018-08-03 ENCOUNTER — PES CALL (OUTPATIENT)
Dept: ADMINISTRATIVE | Facility: CLINIC | Age: 73
End: 2018-08-03

## 2018-08-15 ENCOUNTER — TELEPHONE (OUTPATIENT)
Dept: ENDOCRINOLOGY | Facility: CLINIC | Age: 73
End: 2018-08-15

## 2018-08-15 NOTE — TELEPHONE ENCOUNTER
----- Message from Jemima Puri sent at 8/15/2018  9:25 AM CDT -----  Contact: Nando [phone]  .Refill request.  Pharmacy wants to know if glipiZIDE (GLUCOTROL) 5 MG TR24 should be regular 5 mg or extended XR.    ..  [address] [place] ([place], [place] - [address]  [address] ([place] [address]  Phone: [phone] Fax: [phone]

## 2018-08-15 NOTE — TELEPHONE ENCOUNTER
Talk to pharmacy employee she ask if dr pabon wants extend version of pt glipizide. I told her in pt chart she have the extend version already in.

## 2018-08-16 RX ORDER — ERGOCALCIFEROL 1.25 MG/1
CAPSULE ORAL
Qty: 12 CAPSULE | Refills: 3 | Status: SHIPPED | OUTPATIENT
Start: 2018-08-16 | End: 2019-10-24 | Stop reason: SDUPTHER

## 2018-08-20 ENCOUNTER — LAB VISIT (OUTPATIENT)
Dept: LAB | Facility: HOSPITAL | Age: 73
End: 2018-08-20
Attending: INTERNAL MEDICINE
Payer: MEDICARE

## 2018-08-20 DIAGNOSIS — E11.69 DYSLIPIDEMIA ASSOCIATED WITH TYPE 2 DIABETES MELLITUS: ICD-10-CM

## 2018-08-20 DIAGNOSIS — I15.2 HYPERTENSION ASSOCIATED WITH DIABETES: Chronic | ICD-10-CM

## 2018-08-20 DIAGNOSIS — M85.80 OSTEOPENIA, UNSPECIFIED LOCATION: ICD-10-CM

## 2018-08-20 DIAGNOSIS — I73.9 PVD (PERIPHERAL VASCULAR DISEASE): Chronic | ICD-10-CM

## 2018-08-20 DIAGNOSIS — E78.5 DYSLIPIDEMIA ASSOCIATED WITH TYPE 2 DIABETES MELLITUS: ICD-10-CM

## 2018-08-20 DIAGNOSIS — I25.10 CORONARY ARTERY DISEASE INVOLVING NATIVE CORONARY ARTERY OF NATIVE HEART WITHOUT ANGINA PECTORIS: Chronic | ICD-10-CM

## 2018-08-20 DIAGNOSIS — I35.9 AORTIC VALVE DISEASE: ICD-10-CM

## 2018-08-20 DIAGNOSIS — I70.0 STENOSIS OF INFRARENAL ABDOMINAL AORTA DUE TO ATHEROSCLEROSIS: ICD-10-CM

## 2018-08-20 DIAGNOSIS — N18.2 CHRONIC KIDNEY DISEASE, STAGE II (MILD): Chronic | ICD-10-CM

## 2018-08-20 DIAGNOSIS — E11.59 HYPERTENSION ASSOCIATED WITH DIABETES: Chronic | ICD-10-CM

## 2018-08-20 DIAGNOSIS — I77.9 BILATERAL CAROTID ARTERY DISEASE: ICD-10-CM

## 2018-08-20 DIAGNOSIS — F01.50 VASCULAR DEMENTIA WITHOUT BEHAVIORAL DISTURBANCE: ICD-10-CM

## 2018-08-20 DIAGNOSIS — Z79.4 UNCONTROLLED TYPE 2 DIABETES MELLITUS WITH HYPERGLYCEMIA, WITH LONG-TERM CURRENT USE OF INSULIN: ICD-10-CM

## 2018-08-20 DIAGNOSIS — E11.8 CONTROLLED DIABETES MELLITUS TYPE 2 WITH COMPLICATIONS: ICD-10-CM

## 2018-08-20 DIAGNOSIS — Z86.73 HISTORY OF CVA (CEREBROVASCULAR ACCIDENT): ICD-10-CM

## 2018-08-20 DIAGNOSIS — I70.0 ATHEROSCLEROSIS OF AORTA: ICD-10-CM

## 2018-08-20 DIAGNOSIS — E11.65 UNCONTROLLED TYPE 2 DIABETES MELLITUS WITH HYPERGLYCEMIA, WITH LONG-TERM CURRENT USE OF INSULIN: ICD-10-CM

## 2018-08-20 DIAGNOSIS — E11.42 DIABETIC POLYNEUROPATHY ASSOCIATED WITH TYPE 2 DIABETES MELLITUS: ICD-10-CM

## 2018-08-20 LAB
25(OH)D3+25(OH)D2 SERPL-MCNC: 32 NG/ML
ALBUMIN SERPL BCP-MCNC: 3.8 G/DL
ALP SERPL-CCNC: 40 U/L
ALT SERPL W/O P-5'-P-CCNC: 13 U/L
ANION GAP SERPL CALC-SCNC: 9 MMOL/L
AST SERPL-CCNC: 18 U/L
BILIRUB SERPL-MCNC: 0.2 MG/DL
BUN SERPL-MCNC: 23 MG/DL
CALCIUM SERPL-MCNC: 9.5 MG/DL
CHLORIDE SERPL-SCNC: 104 MMOL/L
CHOLEST SERPL-MCNC: 164 MG/DL
CHOLEST/HDLC SERPL: 1.9 {RATIO}
CO2 SERPL-SCNC: 27 MMOL/L
CREAT SERPL-MCNC: 1.1 MG/DL
EST. GFR  (AFRICAN AMERICAN): 57.6 ML/MIN/1.73 M^2
EST. GFR  (NON AFRICAN AMERICAN): 49.9 ML/MIN/1.73 M^2
ESTIMATED AVG GLUCOSE: 114 MG/DL
GLUCOSE SERPL-MCNC: 77 MG/DL
HBA1C MFR BLD HPLC: 5.6 %
HDLC SERPL-MCNC: 88 MG/DL
HDLC SERPL: 53.7 %
LDLC SERPL CALC-MCNC: 66.2 MG/DL
MAGNESIUM SERPL-MCNC: 2 MG/DL
NONHDLC SERPL-MCNC: 76 MG/DL
PHOSPHATE SERPL-MCNC: 3.7 MG/DL
POTASSIUM SERPL-SCNC: 4 MMOL/L
PROT SERPL-MCNC: 6.4 G/DL
PTH-INTACT SERPL-MCNC: 29 PG/ML
SODIUM SERPL-SCNC: 140 MMOL/L
TRIGL SERPL-MCNC: 49 MG/DL
TSH SERPL DL<=0.005 MIU/L-ACNC: 2.35 UIU/ML

## 2018-08-20 PROCEDURE — 83036 HEMOGLOBIN GLYCOSYLATED A1C: CPT

## 2018-08-20 PROCEDURE — 80061 LIPID PANEL: CPT

## 2018-08-20 PROCEDURE — 82306 VITAMIN D 25 HYDROXY: CPT

## 2018-08-20 PROCEDURE — 84100 ASSAY OF PHOSPHORUS: CPT

## 2018-08-20 PROCEDURE — 83735 ASSAY OF MAGNESIUM: CPT

## 2018-08-20 PROCEDURE — 83970 ASSAY OF PARATHORMONE: CPT

## 2018-08-20 PROCEDURE — 80053 COMPREHEN METABOLIC PANEL: CPT

## 2018-08-20 PROCEDURE — 84443 ASSAY THYROID STIM HORMONE: CPT

## 2018-08-20 PROCEDURE — 36415 COLL VENOUS BLD VENIPUNCTURE: CPT | Mod: PO

## 2018-08-21 ENCOUNTER — TELEPHONE (OUTPATIENT)
Dept: CARDIOLOGY | Facility: CLINIC | Age: 73
End: 2018-08-21

## 2018-08-21 NOTE — TELEPHONE ENCOUNTER
----- Message from Isatu Ziegler MD sent at 8/21/2018  2:08 PM CDT -----  Please review. It looks good.

## 2018-08-30 ENCOUNTER — OFFICE VISIT (OUTPATIENT)
Dept: ENDOCRINOLOGY | Facility: CLINIC | Age: 73
End: 2018-08-30
Payer: MEDICARE

## 2018-08-30 ENCOUNTER — HOSPITAL ENCOUNTER (OUTPATIENT)
Dept: RADIOLOGY | Facility: CLINIC | Age: 73
Discharge: HOME | End: 2018-08-30
Attending: INTERNAL MEDICINE
Payer: MEDICARE

## 2018-08-30 VITALS
HEIGHT: 59 IN | SYSTOLIC BLOOD PRESSURE: 178 MMHG | HEART RATE: 75 BPM | BODY MASS INDEX: 25.69 KG/M2 | DIASTOLIC BLOOD PRESSURE: 60 MMHG | WEIGHT: 127.44 LBS

## 2018-08-30 DIAGNOSIS — E11.649 TYPE 2 DIABETES MELLITUS WITH HYPOGLYCEMIA WITHOUT COMA, WITHOUT LONG-TERM CURRENT USE OF INSULIN: Primary | ICD-10-CM

## 2018-08-30 DIAGNOSIS — E11.8 CONTROLLED DIABETES MELLITUS TYPE 2 WITH COMPLICATIONS: ICD-10-CM

## 2018-08-30 DIAGNOSIS — M85.80 OSTEOPENIA, UNSPECIFIED LOCATION: ICD-10-CM

## 2018-08-30 DIAGNOSIS — E78.5 HYPERLIPIDEMIA, UNSPECIFIED HYPERLIPIDEMIA TYPE: ICD-10-CM

## 2018-08-30 DIAGNOSIS — M89.9 DISORDER OF BONE: ICD-10-CM

## 2018-08-30 DIAGNOSIS — I15.2 HYPERTENSION ASSOCIATED WITH DIABETES: Chronic | ICD-10-CM

## 2018-08-30 DIAGNOSIS — E11.59 HYPERTENSION ASSOCIATED WITH DIABETES: Chronic | ICD-10-CM

## 2018-08-30 PROCEDURE — 99999 PR PBB SHADOW E&M-EST. PATIENT-LVL IV: CPT | Mod: PBBFAC,,, | Performed by: INTERNAL MEDICINE

## 2018-08-30 PROCEDURE — 77080 DXA BONE DENSITY AXIAL: CPT | Mod: 26,,, | Performed by: INTERNAL MEDICINE

## 2018-08-30 PROCEDURE — 77080 DXA BONE DENSITY AXIAL: CPT | Mod: TC

## 2018-08-30 PROCEDURE — 3078F DIAST BP <80 MM HG: CPT | Mod: CPTII,S$GLB,, | Performed by: INTERNAL MEDICINE

## 2018-08-30 PROCEDURE — 99214 OFFICE O/P EST MOD 30 MIN: CPT | Mod: S$GLB,,, | Performed by: INTERNAL MEDICINE

## 2018-08-30 PROCEDURE — 3077F SYST BP >= 140 MM HG: CPT | Mod: CPTII,S$GLB,, | Performed by: INTERNAL MEDICINE

## 2018-08-30 PROCEDURE — 3044F HG A1C LEVEL LT 7.0%: CPT | Mod: CPTII,S$GLB,, | Performed by: INTERNAL MEDICINE

## 2018-08-30 RX ORDER — GLIPIZIDE 2.5 MG/1
2.5 TABLET, EXTENDED RELEASE ORAL
Qty: 90 TABLET | Refills: 3 | Status: SHIPPED | OUTPATIENT
Start: 2018-08-30 | End: 2019-12-16 | Stop reason: SDUPTHER

## 2018-08-30 RX ORDER — LABETALOL 200 MG/1
400 TABLET, FILM COATED ORAL 2 TIMES DAILY
Qty: 180 TABLET | Refills: 3 | Status: SHIPPED | OUTPATIENT
Start: 2018-08-30 | End: 2019-08-27 | Stop reason: SDUPTHER

## 2018-08-30 NOTE — PATIENT INSTRUCTIONS
Come back in 1 year, call ahead 1 month for labs    Decrease glipizide to 2.5mg XR daily    Continue metformin    Stop Tricor    Continue crestor     Dr. Marjan Arevalo

## 2018-08-30 NOTE — PROGRESS NOTES
Ms. Sotelo is a 73 y.o. F with history of DM2, HTN, HL, CAD, CVA, carotid artery disease, PVD, aortic valve replacement, here for followup.  She is forgetful but lives w her daughter who helps her.     FS log reviewed - fasting FS in low 100s, some in 70 - 80s occasionally.     Currently on glipizide 5mg XL daily and metformin 1g BID. A1c 5.4 recently (below).     Early 2018 saw ophtho visit. Has numbness in her feet chronically.     Regarding Osteopenia: not meeting FRAX Aug 2018 DXA.  No hx of fracture except for a toe.  Takes vitD 50k weekly, but no calcium. No recent falls.    Regarding HL: on fenofibrate and crestor, labs below      Assessment and Plan:  Ms. Sotelo with history of DM2, HTN, HL, CAD, CVA, carotid artery disease, PVD, aortic valve replacement, here for followup.    DM2: controlled well, some intermittent fasting hypoglycemia  Decrease glipizide to 2.5mg XR daily  Continue metformin 1g BID    HL: TG controlled too excessively, stop tricor, continue crestor    Osteopenia:   Continue vitD 50k weekly, calcium 500mg BID  Repeat DXA in Aug 2020 or 2021    RTC 1 year    Component      Latest Ref Rng & Units 8/20/2018 4/20/2018 1/15/2018   eGFR if African American      >60 mL/min/1.73 m:2 57.6 (A) 58.0 (A)    eGFR if non African American      >60 mL/min/1.73 m:2 49.9 (A) 50.3 (A)    Cholesterol      120 - 199 mg/dL 164     Triglycerides      30 - 150 mg/dL 49     HDL      40 - 75 mg/dL 88 (H)     LDL Cholesterol      63.0 - 159.0 mg/dL 66.2     HDL/Chol Ratio      20.0 - 50.0 % 53.7 (H)     Total Cholesterol/HDL Ratio      2.0 - 5.0 1.9 (L)     Non-HDL Cholesterol      mg/dL 76     Hemoglobin A1C      4.0 - 5.6 % 5.6  5.9 (H)   Estimated Avg Glucose      68 - 131 mg/dL 114  123   TSH      0.400 - 4.000 uIU/mL 2.347     Vit D, 25-Hydroxy      30 - 96 ng/mL 32     PTH      9.0 - 77.0 pg/mL 29.0     Phosphorus      2.7 - 4.5 mg/dL 3.7     Magnesium      1.6 - 2.6 mg/dL 2.0         Past Medical History:    Diagnosis Date    Aortic stenosis     Arthritis     Back pain     Carotid artery occlusion     Cataract     Coronary artery disease     Diabetes mellitus type II     Heart murmur     Hyperlipidemia     Hypertension associated with diabetes 2012    Non-functioning pituitary adenoma 2012    Pituitary microadenoma 2015    Polyneuropathy     PVD (peripheral vascular disease)     PVD (peripheral vascular disease)     S/P AVR (aortic valve replacement) 2013    21 mm Medtronic Mosaic ultra porcine valve     Stenosis     Stented coronary artery 4/10/2013    2013 RCA QASIM     Stroke     Multiple mini strokes; decreased left peripheal vision    Type II or unspecified type diabetes mellitus with neurological manifestations, not stated as uncontrolled(250.60)     Type II or unspecified type diabetes mellitus with peripheral circulatory disorders, not stated as uncontrolled(250.70)         reports that she quit smoking about 31 years ago. She has a 20.00 pack-year smoking history. she has never used smokeless tobacco. She reports that she does not drink alcohol or use drugs.    Family History   Problem Relation Age of Onset    Diabetes Mother     Coronary artery disease Mother     Heart disease Mother 42         42 of MI    Heart attack Mother     Heart failure Mother     Hyperlipidemia Mother     Hypertension Mother     Heart disease Father     Heart attack Father     Heart failure Father     Hyperlipidemia Father     Hypertension Father     Stroke Father     Coronary artery disease Brother     Diabetes Brother     Heart disease Brother     Hyperlipidemia Brother     Hypertension Brother     Heart disease Sister 41    Heart attack Sister     Hyperlipidemia Sister     Hypertension Sister     Heart disease Sister     Heart attack Sister     Hyperlipidemia Sister     Hypertension Sister     Diabetes Daughter     No Known Problems Son     Amblyopia Neg Hx      Blindness Neg Hx     Cataracts Neg Hx     Glaucoma Neg Hx     Macular degeneration Neg Hx     Retinal detachment Neg Hx     Strabismus Neg Hx        Past Surgical History:   Procedure Laterality Date    AORTIC VALVE REPLACEMENT  2013    BILATERAL SALPINGOOPHORECTOMY      BREAST BIOPSY      CARDIAC VALVE REPLACEMENT      aortic 2013    CAROTID ENDARTERECTOMY Right     CATARACT EXTRACTION W/  INTRAOCULAR LENS IMPLANT Bilateral     Cataract Surgery      Both eyes    CORONARY ANGIOPLASTY      DENTAL SURGERY      EYE SURGERY      HYSTERECTOMY      TUBAL LIGATION      YAG Bilateral        Patient's Medications   New Prescriptions    No medications on file   Previous Medications    ACETAMINOPHEN (TYLENOL) 325 MG TABLET    Take 325 mg by mouth daily as needed. Half Tablet Oral Every day as needed    ASCORBIC ACID (VITAMIN C) 1000 MG TABLET        ASPIRIN 81 MG CHEW    Take 81 mg by mouth once daily.    BLOOD-GLUCOSE METER MISC    2 each by Misc.(Non-Drug; Combo Route) route 2 (two) times daily.    COENZYME Q10 (COQ-10) 100 MG CAPSULE    Take 100 mg by mouth Daily. 1 Capsule Oral Every day    DAILY MULTIVITAMIN TABLET    Take 1 tablet by mouth Daily. 1 Tablet Oral Every day    DONEPEZIL (ARICEPT) 5 MG TABLET    Take 1 tablet (5 mg total) by mouth every evening.    FENOFIBRATE (TRICOR) 48 MG TABLET    TAKE ONE TABLET BY MOUTH ONCE DAILY    GABAPENTIN (NEURONTIN) 300 MG CAPSULE    1-3 tabs po tid as needed    GLIPIZIDE (GLUCOTROL) 5 MG TR24    Take 1 tablet (5 mg total) by mouth daily with breakfast.    HYDROCODONE-ACETAMINOPHEN 5-325MG (NORCO) 5-325 MG PER TABLET    Take 1 tablet by mouth every 6 (six) hours as needed for Pain.    LABETALOL (NORMODYNE) 200 MG TABLET    TAKE TWO TABLETS BY MOUTH TWICE DAILY    LANCETS 30 GAUGE MISC    1 lancet by Misc.(Non-Drug; Combo Route) route 4 (four) times daily.    LISINOPRIL (PRINIVIL,ZESTRIL) 40 MG TABLET    TAKE ONE TABLET BY MOUTH ONCE DAILY    MAGNESIUM 200 MG  "TAB    Take 200 mg by mouth once daily.    METFORMIN (GLUCOPHAGE) 1000 MG TABLET    Take 1 tablet (1,000 mg total) by mouth 2 (two) times daily with meals.    NITROGLYCERIN (NITROSTAT) 0.4 MG SL TABLET    1-3 Tablet, Sublingual   As directed    OMEGA-3 FATTY ACIDS-VITAMIN E (FISH OIL) 1,000 MG CAP        PHENOBARBITAL (LUMINAL) 64.8 MG TABLET    TAKE ONE TABLET BY MOUTH AT BEDTIME    ROSUVASTATIN (CRESTOR) 10 MG TABLET    TAKE ONE TABLET (10 MG TOTAL) BY MOUTH ONCE DAILY    TRUE METRIX GLUCOSE TEST STRIP STRP    1 strip by Misc.(Non-Drug; Combo Route) route 4 (four) times daily.    VITAMIN D2 50,000 UNIT CAPSULE    TAKE ONE CAPSULE BY MOUTH ONCE A WEEK   Modified Medications    No medications on file   Discontinued Medications    No medications on file         Review of Systems:  General: no fevers  Eyes: no vision changes  ENT: no sore throat  Lung: no sob  CV: no palpitations  GI: no nausea   : no dysuria   Endo: no heat intolerance  Heme: no easy bruising   MSK: no joint swelling      BP (!) 178/60   Pulse 75   Ht 4' 11" (1.499 m)   Wt 57.8 kg (127 lb 6.8 oz)   BMI 25.74 kg/m²     Physical Exam:  General: normal body habitus, not in acute distress   Eyes: anicteric, no proptosis   ENT: no facial lesions, no oral lesions   Skin: exposed skin without bruising or bleeding   Ext: no peripheral edema or erythema  Neuro: AOx3, moving all extremities, normal gait     Labs:    Chemistry        Component Value Date/Time     08/20/2018 0925    K 4.0 08/20/2018 0925     08/20/2018 0925    CO2 27 08/20/2018 0925    BUN 23 08/20/2018 0925    CREATININE 1.1 08/20/2018 0925    GLU 77 08/20/2018 0925        Component Value Date/Time    CALCIUM 9.5 08/20/2018 0925    ALKPHOS 40 (L) 08/20/2018 0925    AST 18 08/20/2018 0925    ALT 13 08/20/2018 0925    BILITOT 0.2 08/20/2018 0925    ESTGFRAFRICA 57.6 (A) 08/20/2018 0925    EGFRNONAA 49.9 (A) 08/20/2018 0925          Lab Results   Component Value Date    WBC 7.05 " 11/30/2016    HGB 11.4 (L) 11/30/2016    HCT 33.6 (L) 11/30/2016    MCV 86 11/30/2016     11/30/2016        Lab Results   Component Value Date    HDL 88 (H) 08/20/2018    HDL 53 01/15/2018    HDL 71 06/23/2017     Lab Results   Component Value Date    LDLCALC 66.2 08/20/2018    LDLCALC 41.2 (L) 01/15/2018    LDLCALC 64.0 06/23/2017     Lab Results   Component Value Date    TRIG 49 08/20/2018    TRIG 99 01/15/2018    TRIG 95 06/23/2017     Lab Results   Component Value Date    CHOLHDL 53.7 (H) 08/20/2018    CHOLHDL 46.5 01/15/2018    CHOLHDL 46.1 06/23/2017       Hemoglobin A1C   Date Value Ref Range Status   08/20/2018 5.6 4.0 - 5.6 % Final     Comment:     ADA Screening Guidelines:  5.7-6.4%  Consistent with prediabetes  >or=6.5%  Consistent with diabetes  High levels of fetal hemoglobin interfere with the HbA1C  assay. Heterozygous hemoglobin variants (HbS, HgC, etc)do  not significantly interfere with this assay.   However, presence of multiple variants may affect accuracy.     01/15/2018 5.9 (H) 4.0 - 5.6 % Final     Comment:     According to ADA guidelines, hemoglobin A1c <7.0% represents  optimal control in non-pregnant diabetic patients. Different  metrics may apply to specific patient populations.   Standards of Medical Care in Diabetes-2016.  For the purpose of screening for the presence of diabetes:  <5.7%     Consistent with the absence of diabetes  5.7-6.4%  Consistent with increasing risk for diabetes   (prediabetes)  >or=6.5%  Consistent with diabetes  Currently, no consensus exists for use of hemoglobin A1c  for diagnosis of diabetes for children.  This Hemoglobin A1c assay has significant interference with fetal   hemoglobin   (HbF). The results are invalid for patients with abnormal amounts of   HbF,   including those with known Hereditary Persistence   of Fetal Hemoglobin. Heterozygous hemoglobin variants (HbAS, HbAC,   HbAD, HbAE, HbA2) do not significantly interfere with this assay;    however, presence of multiple variants in a sample may impact the %   interference.     10/27/2017 8.0 (H) 4.0 - 5.6 % Final     Comment:     According to ADA guidelines, hemoglobin A1c <7.0% represents  optimal control in non-pregnant diabetic patients. Different  metrics may apply to specific patient populations.   Standards of Medical Care in Diabetes-2016.  For the purpose of screening for the presence of diabetes:  <5.7%     Consistent with the absence of diabetes  5.7-6.4%  Consistent with increasing risk for diabetes   (prediabetes)  >or=6.5%  Consistent with diabetes  Currently, no consensus exists for use of hemoglobin A1c  for diagnosis of diabetes for children.  This Hemoglobin A1c assay has significant interference with fetal   hemoglobin   (HbF). The results are invalid for patients with abnormal amounts of   HbF,   including those with known Hereditary Persistence   of Fetal Hemoglobin. Heterozygous hemoglobin variants (HbAS, HbAC,   HbAD, HbAE, HbA2) do not significantly interfere with this assay;   however, presence of multiple variants in a sample may impact the %   interference.         Lab Results   Component Value Date    TSH 2.347 08/20/2018

## 2018-09-11 ENCOUNTER — PES CALL (OUTPATIENT)
Dept: ADMINISTRATIVE | Facility: CLINIC | Age: 73
End: 2018-09-11

## 2018-09-13 ENCOUNTER — OFFICE VISIT (OUTPATIENT)
Dept: PODIATRY | Facility: CLINIC | Age: 73
End: 2018-09-13
Payer: MEDICARE

## 2018-09-13 VITALS
BODY MASS INDEX: 25.52 KG/M2 | HEIGHT: 59 IN | HEART RATE: 63 BPM | DIASTOLIC BLOOD PRESSURE: 69 MMHG | SYSTOLIC BLOOD PRESSURE: 187 MMHG | WEIGHT: 126.56 LBS

## 2018-09-13 DIAGNOSIS — B35.1 ONYCHOMYCOSIS DUE TO DERMATOPHYTE: ICD-10-CM

## 2018-09-13 DIAGNOSIS — E11.49 TYPE II DIABETES MELLITUS WITH NEUROLOGICAL MANIFESTATIONS: Primary | ICD-10-CM

## 2018-09-13 DIAGNOSIS — L84 CORN OR CALLUS: ICD-10-CM

## 2018-09-13 PROCEDURE — 11721 DEBRIDE NAIL 6 OR MORE: CPT | Mod: S$PBB,59,, | Performed by: PODIATRIST

## 2018-09-13 PROCEDURE — 11721 DEBRIDE NAIL 6 OR MORE: CPT | Mod: PBBFAC,59 | Performed by: PODIATRIST

## 2018-09-13 PROCEDURE — 11056 PARNG/CUTG B9 HYPRKR LES 2-4: CPT | Mod: PBBFAC | Performed by: PODIATRIST

## 2018-09-13 PROCEDURE — 11056 PARNG/CUTG B9 HYPRKR LES 2-4: CPT | Mod: S$PBB,,, | Performed by: PODIATRIST

## 2018-09-13 PROCEDURE — 99499 UNLISTED E&M SERVICE: CPT | Mod: S$PBB,,, | Performed by: PODIATRIST

## 2018-09-13 PROCEDURE — 99999 PR PBB SHADOW E&M-EST. PATIENT-LVL III: CPT | Mod: PBBFAC,,, | Performed by: PODIATRIST

## 2018-09-13 PROCEDURE — 99213 OFFICE O/P EST LOW 20 MIN: CPT | Mod: PBBFAC | Performed by: PODIATRIST

## 2018-09-13 NOTE — PROGRESS NOTES
Subjective:      Patient ID: Marlen Arndt is a 73 y.o. female.    Chief Complaint: PCP (Dominique Rendon MD 7/30/18); Diabetic Foot Exam; and Nail Care    Marlen is a 73 y.o. female who presents to the clinic for evaluation and treatment of high risk feet. Marlen has a past medical history of Aortic stenosis, Arthritis, Back pain, Carotid artery occlusion, Cataract, Coronary artery disease, Diabetes mellitus type II, Heart murmur, Hyperlipidemia, Hypertension associated with diabetes (7/24/2012), Non-functioning pituitary adenoma (7/24/2012), Pituitary microadenoma (11/17/2015), Polyneuropathy, PVD (peripheral vascular disease), PVD (peripheral vascular disease), S/P AVR (aortic valve replacement) (8/14/2013), Stenosis, Stented coronary artery (4/10/2013), Stroke, Type II or unspecified type diabetes mellitus with neurological manifestations, not stated as uncontrolled(250.60), and Type II or unspecified type diabetes mellitus with peripheral circulatory disorders, not stated as uncontrolled(250.70). The patient's chief complaint is long, thick toenails .   This patient has documented high risk feet requiring routine maintenance secondary to diabetes mellitis and those secondary complications of diabetes, as mentioned..    PCP: Dominique Rendon MD  Date Last Seen by PCP:  Chief Complaint   Patient presents with    PCP     Dominique Rendon MD 7/30/18    Diabetic Foot Exam    Nail Care       Current shoe gear:  sandals    Hemoglobin A1C   Date Value Ref Range Status   08/20/2018 5.6 4.0 - 5.6 % Final     Comment:     ADA Screening Guidelines:  5.7-6.4%  Consistent with prediabetes  >or=6.5%  Consistent with diabetes  High levels of fetal hemoglobin interfere with the HbA1C  assay. Heterozygous hemoglobin variants (HbS, HgC, etc)do  not significantly interfere with this assay.   However, presence of multiple variants may affect accuracy.     01/15/2018 5.9 (H) 4.0 - 5.6 % Final     Comment:     According to ADA  guidelines, hemoglobin A1c <7.0% represents  optimal control in non-pregnant diabetic patients. Different  metrics may apply to specific patient populations.   Standards of Medical Care in Diabetes-2016.  For the purpose of screening for the presence of diabetes:  <5.7%     Consistent with the absence of diabetes  5.7-6.4%  Consistent with increasing risk for diabetes   (prediabetes)  >or=6.5%  Consistent with diabetes  Currently, no consensus exists for use of hemoglobin A1c  for diagnosis of diabetes for children.  This Hemoglobin A1c assay has significant interference with fetal   hemoglobin   (HbF). The results are invalid for patients with abnormal amounts of   HbF,   including those with known Hereditary Persistence   of Fetal Hemoglobin. Heterozygous hemoglobin variants (HbAS, HbAC,   HbAD, HbAE, HbA2) do not significantly interfere with this assay;   however, presence of multiple variants in a sample may impact the %   interference.     10/27/2017 8.0 (H) 4.0 - 5.6 % Final     Comment:     According to ADA guidelines, hemoglobin A1c <7.0% represents  optimal control in non-pregnant diabetic patients. Different  metrics may apply to specific patient populations.   Standards of Medical Care in Diabetes-2016.  For the purpose of screening for the presence of diabetes:  <5.7%     Consistent with the absence of diabetes  5.7-6.4%  Consistent with increasing risk for diabetes   (prediabetes)  >or=6.5%  Consistent with diabetes  Currently, no consensus exists for use of hemoglobin A1c  for diagnosis of diabetes for children.  This Hemoglobin A1c assay has significant interference with fetal   hemoglobin   (HbF). The results are invalid for patients with abnormal amounts of   HbF,   including those with known Hereditary Persistence   of Fetal Hemoglobin. Heterozygous hemoglobin variants (HbAS, HbAC,   HbAD, HbAE, HbA2) do not significantly interfere with this assay;   however, presence of multiple variants in a  sample may impact the %   interference.         Review of Systems   Constitution: Negative for chills, decreased appetite and fever.   Cardiovascular: Negative for chest pain, claudication and leg swelling.   Respiratory: Negative for cough.    Skin: Positive for dry skin and nail changes. Negative for color change, flushing, itching, poor wound healing and rash.   Musculoskeletal: Negative for arthritis, back pain, falls, gout, joint swelling and myalgias.   Gastrointestinal: Negative for nausea and vomiting.   Neurological: Positive for numbness. Negative for loss of balance and paresthesias.           Objective:      Physical Exam   Constitutional: She is oriented to person, place, and time. She appears well-developed and well-nourished. No distress.   Cardiovascular:   Pulses:       Dorsalis pedis pulses are 1+ on the right side, and 1+ on the left side.        Posterior tibial pulses are 1+ on the right side, and 1+ on the left side.   Toes are cool to touch. Feet are warm proximally.There is decreased digital hair. Skin is atrophic, slightly hyperpigmented, and mildly edematous       Musculoskeletal: Normal range of motion. She exhibits no edema, tenderness or deformity.   Adequate joint range of motion without pain, limitation, nor crepitation Bilateral feet and ankle joints. Muscle strength is 5/5 in all groups bilaterally.         Neurological: She is alert and oriented to person, place, and time.   [place] 5.07 monofilamant testing is diminished Carlos feet. Sharp/dull sensation diminished Bilaterally. Light touch absent Bilaterally.       Skin: Skin is warm, dry and intact. No abrasion, no bruising, no burn, no ecchymosis and no lesion noted. No erythema. No pallor.   Nails x 10  are elongated by  2-8mm's, thickened by 2-4 mm's, dystrophic, and are darkened in  coloration . Xerosis Bilaterally. No open lesions noted.    Hyperkeratotic tissue noted to sub 1st mpj left and plantar medial 1st hallux  IPJ.    Psychiatric: She has a normal mood and affect. Her behavior is normal.   Vitals reviewed.            Assessment:       Encounter Diagnoses   Name Primary?    Type II diabetes mellitus with neurological manifestations Yes    Onychomycosis due to dermatophyte     Corn or callus          Plan:       Marlen was seen today for pcp, diabetic foot exam and nail care.    Diagnoses and all orders for this visit:    Type II diabetes mellitus with neurological manifestations    Onychomycosis due to dermatophyte    Corn or callus      I counseled the patient on her conditions, their implications and medical management.        - Shoe inspection. Diabetic Foot Education. Patient reminded of the importance of good nutrition and blood sugar control to help prevent podiatric complications of diabetes. Patient instructed on proper foot hygeine. We discussed wearing proper shoe gear, daily foot inspections, never walking without protective shoe gear, never putting sharp instruments to feet, routine podiatric nail visits every 2-3 months.      - With patient's permission, nails were aggressively reduced and debrided x 10 to their soft tissue attachment mechanically and with electric , removing all offending nail and debris. Patient relates relief following the procedure. She will continue to monitor the areas daily, inspect her feet, wear protective shoe gear when ambulatory, moisturizer to maintain skin integrity and follow in this office in approximately 2-3 months, sooner p.r.n.    - After cleansing the  area w/ alcohol prep pad the above mentioned hyperkeratosis was trimmed utilizing No 15 scapel, to a smooth base with out incident. Patient tolerated this  well and reported comfort to the area of sub 1st MPJ left and left hallux plantar medial IPJ.

## 2018-09-19 ENCOUNTER — CLINICAL SUPPORT (OUTPATIENT)
Dept: CARDIOLOGY | Facility: CLINIC | Age: 73
End: 2018-09-19
Attending: INTERNAL MEDICINE
Payer: MEDICARE

## 2018-09-19 ENCOUNTER — OFFICE VISIT (OUTPATIENT)
Dept: CARDIOLOGY | Facility: CLINIC | Age: 73
End: 2018-09-19
Payer: MEDICARE

## 2018-09-19 ENCOUNTER — DOCUMENTATION ONLY (OUTPATIENT)
Dept: CARDIOLOGY | Facility: CLINIC | Age: 73
End: 2018-09-19

## 2018-09-19 VITALS
BODY MASS INDEX: 25.34 KG/M2 | HEART RATE: 74 BPM | DIASTOLIC BLOOD PRESSURE: 70 MMHG | SYSTOLIC BLOOD PRESSURE: 184 MMHG | HEIGHT: 59 IN | WEIGHT: 125.69 LBS

## 2018-09-19 DIAGNOSIS — E66.3 OVERWEIGHT (BMI 25.0-29.9): ICD-10-CM

## 2018-09-19 DIAGNOSIS — I73.9 PVD (PERIPHERAL VASCULAR DISEASE): Chronic | ICD-10-CM

## 2018-09-19 DIAGNOSIS — E11.69 DYSLIPIDEMIA ASSOCIATED WITH TYPE 2 DIABETES MELLITUS: ICD-10-CM

## 2018-09-19 DIAGNOSIS — F01.50 VASCULAR DEMENTIA WITHOUT BEHAVIORAL DISTURBANCE: ICD-10-CM

## 2018-09-19 DIAGNOSIS — I70.0 ATHEROSCLEROSIS OF AORTA: ICD-10-CM

## 2018-09-19 DIAGNOSIS — I77.9 BILATERAL CAROTID ARTERY DISEASE: ICD-10-CM

## 2018-09-19 DIAGNOSIS — I25.10 ATHEROSCLEROSIS OF NATIVE CORONARY ARTERY OF NATIVE HEART WITHOUT ANGINA PECTORIS: ICD-10-CM

## 2018-09-19 DIAGNOSIS — E11.40 TYPE 2 DIABETES MELLITUS WITH DIABETIC NEUROPATHY: ICD-10-CM

## 2018-09-19 DIAGNOSIS — I10 ESSENTIAL HYPERTENSION: ICD-10-CM

## 2018-09-19 DIAGNOSIS — I35.9 AORTIC VALVE DISEASE: ICD-10-CM

## 2018-09-19 DIAGNOSIS — I70.0 STENOSIS OF INFRARENAL ABDOMINAL AORTA DUE TO ATHEROSCLEROSIS: ICD-10-CM

## 2018-09-19 DIAGNOSIS — E11.59 HYPERTENSION ASSOCIATED WITH DIABETES: Chronic | ICD-10-CM

## 2018-09-19 DIAGNOSIS — I15.2 HYPERTENSION ASSOCIATED WITH DIABETES: Chronic | ICD-10-CM

## 2018-09-19 DIAGNOSIS — I67.9 CEREBROVASCULAR DISEASE: ICD-10-CM

## 2018-09-19 DIAGNOSIS — Z78.9 DECREASED ACTIVITIES OF DAILY LIVING (ADL): ICD-10-CM

## 2018-09-19 DIAGNOSIS — I25.10 CORONARY ARTERY DISEASE INVOLVING NATIVE CORONARY ARTERY OF NATIVE HEART WITHOUT ANGINA PECTORIS: Primary | Chronic | ICD-10-CM

## 2018-09-19 DIAGNOSIS — E11.42 DIABETIC POLYNEUROPATHY ASSOCIATED WITH TYPE 2 DIABETES MELLITUS: ICD-10-CM

## 2018-09-19 DIAGNOSIS — E78.5 DYSLIPIDEMIA ASSOCIATED WITH TYPE 2 DIABETES MELLITUS: ICD-10-CM

## 2018-09-19 PROBLEM — E11.65 UNCONTROLLED TYPE 2 DIABETES MELLITUS WITH HYPERGLYCEMIA, WITH LONG-TERM CURRENT USE OF INSULIN: Status: RESOLVED | Noted: 2017-10-27 | Resolved: 2018-09-19

## 2018-09-19 PROBLEM — Z79.4 UNCONTROLLED TYPE 2 DIABETES MELLITUS WITH HYPERGLYCEMIA, WITH LONG-TERM CURRENT USE OF INSULIN: Status: RESOLVED | Noted: 2017-10-27 | Resolved: 2018-09-19

## 2018-09-19 PROCEDURE — 99999 PR PBB SHADOW E&M-EST. PATIENT-LVL III: CPT | Mod: PBBFAC,,, | Performed by: INTERNAL MEDICINE

## 2018-09-19 PROCEDURE — 3077F SYST BP >= 140 MM HG: CPT | Mod: CPTII,,, | Performed by: INTERNAL MEDICINE

## 2018-09-19 PROCEDURE — 3288F FALL RISK ASSESSMENT DOCD: CPT | Mod: CPTII,,, | Performed by: INTERNAL MEDICINE

## 2018-09-19 PROCEDURE — 99213 OFFICE O/P EST LOW 20 MIN: CPT | Mod: PBBFAC,PO,25 | Performed by: INTERNAL MEDICINE

## 2018-09-19 PROCEDURE — 93880 EXTRACRANIAL BILAT STUDY: CPT | Mod: PBBFAC,PO | Performed by: INTERNAL MEDICINE

## 2018-09-19 PROCEDURE — 3078F DIAST BP <80 MM HG: CPT | Mod: CPTII,,, | Performed by: INTERNAL MEDICINE

## 2018-09-19 PROCEDURE — 99214 OFFICE O/P EST MOD 30 MIN: CPT | Mod: S$PBB,,, | Performed by: INTERNAL MEDICINE

## 2018-09-19 PROCEDURE — 3044F HG A1C LEVEL LT 7.0%: CPT | Mod: CPTII,,, | Performed by: INTERNAL MEDICINE

## 2018-09-19 PROCEDURE — 1100F PTFALLS ASSESS-DOCD GE2>/YR: CPT | Mod: CPTII,,, | Performed by: INTERNAL MEDICINE

## 2018-09-19 RX ORDER — NITROGLYCERIN 0.4 MG/1
TABLET SUBLINGUAL
Qty: 30 TABLET | Refills: 11 | Status: SHIPPED | OUTPATIENT
Start: 2018-09-19 | End: 2018-09-20

## 2018-09-19 RX ORDER — HYDROCHLOROTHIAZIDE 12.5 MG/1
12.5 CAPSULE ORAL DAILY
COMMUNITY
End: 2018-09-24 | Stop reason: SDUPTHER

## 2018-09-19 NOTE — PROGRESS NOTES
"Subjective:   Patient ID:  June B Yris is a 73 y.o. female who presents for follow-up of Coronary Artery Disease and Hypertension      HPI: Doing well. No new complaints. Today BP is elevated. Last time we have stopped Hctz due to low BP and dehydration.  Patient states that BP is normal at home.     Lab Results   Component Value Date     08/20/2018    K 4.0 08/20/2018     08/20/2018    CO2 27 08/20/2018    BUN 23 08/20/2018    CREATININE 1.1 08/20/2018    GLU 77 08/20/2018    HGBA1C 5.6 08/20/2018    MG 2.0 08/20/2018    AST 18 08/20/2018    ALT 13 08/20/2018    ALBUMIN 3.8 08/20/2018    PROT 6.4 08/20/2018    BILITOT 0.2 08/20/2018    WBC 7.05 11/30/2016    HGB 11.4 (L) 11/30/2016    HCT 33.6 (L) 11/30/2016    HCT 24 (L) 08/08/2013    MCV 86 11/30/2016     11/30/2016    INR 1.0 08/14/2013    TSH 2.347 08/20/2018    CHOL 164 08/20/2018    HDL 88 (H) 08/20/2018    LDLCALC 66.2 08/20/2018    TRIG 49 08/20/2018       Review of Systems   Constitution: Negative.   HENT: Negative.    Eyes: Negative.    Cardiovascular: Negative.  Negative for chest pain, claudication, dyspnea on exertion, irregular heartbeat, leg swelling, near-syncope, palpitations and syncope.   Respiratory: Negative.  Negative for cough, shortness of breath, snoring and wheezing.    Endocrine: Negative.  Negative for cold intolerance, heat intolerance, polydipsia, polyphagia and polyuria.   Skin: Negative.    Musculoskeletal: Positive for arthritis.   Gastrointestinal: Negative.    Genitourinary: Negative.    Neurological: Negative.    Psychiatric/Behavioral: Negative.        Objective:   Physical Exam   Constitutional: She is oriented to person, place, and time. She appears well-developed and well-nourished.   BP (!) 184/70   Pulse 74   Ht 4' 11" (1.499 m)   Wt 57 kg (125 lb 10.6 oz)   BMI 25.38 kg/m²      HENT:   Head: Normocephalic.   Eyes: Pupils are equal, round, and reactive to light.   Neck: Normal range of motion. " Neck supple. Carotid bruit is present. No thyromegaly present.   Cardiovascular: Normal rate, regular rhythm and intact distal pulses. Exam reveals no gallop and no friction rub.   Murmur heard.   Harsh midsystolic murmur is present with a grade of 2/6 at the upper right sternal border radiating to the neck.  Pulses:       Carotid pulses are 2+ on the right side with bruit, and 2+ on the left side with bruit.       Radial pulses are 2+ on the right side, and 2+ on the left side.        Femoral pulses are 2+ on the right side with bruit, and 2+ on the left side with bruit.       Popliteal pulses are 2+ on the right side, and 2+ on the left side.        Dorsalis pedis pulses are 2+ on the right side, and 2+ on the left side.        Posterior tibial pulses are 2+ on the right side, and 2+ on the left side.   Pulmonary/Chest: Effort normal and breath sounds normal. No respiratory distress. She has no wheezes. She has no rales. She exhibits no tenderness.   Abdominal: Soft. Bowel sounds are normal.   Musculoskeletal: Normal range of motion. She exhibits no edema.   Neurological: She is alert and oriented to person, place, and time.   Skin: Skin is warm and dry.   Psychiatric: She has a normal mood and affect.   Nursing note and vitals reviewed.        Assessment and Plan:     Problem List Items Addressed This Visit        Cardiology Problems    Hypertension associated with diabetes (Chronic)    PVD (peripheral vascular disease) (Chronic)    Coronary artery disease involving native coronary artery of native heart without angina pectoris - Primary (Chronic)    Stenosis of infrarenal abdominal aorta due to atherosclerosis    Type 2 diabetes mellitus with circulatory disorder, without long-term current use of insulin    Relevant Medications    nitroGLYCERIN (NITROSTAT) 0.4 MG SL tablet    Bilateral carotid artery disease    Relevant Medications    nitroGLYCERIN (NITROSTAT) 0.4 MG SL tablet    Other Relevant Orders    CAR  Ultrasound doppler carotid bilateral    Atherosclerosis of aorta    Relevant Medications    nitroGLYCERIN (NITROSTAT) 0.4 MG SL tablet    Other Relevant Orders    CAR Ultrasound doppler carotid bilateral    Cerebrovascular disease    Aortic valve disease       Other    Vascular dementia without behavioral disturbance    Overweight (BMI 25.0-29.9)    Decreased activities of daily living (ADL)      Other Visit Diagnoses     Essential hypertension        Relevant Medications    nitroGLYCERIN (NITROSTAT) 0.4 MG SL tablet    Type 2 diabetes mellitus with diabetic neuropathy        Relevant Medications    nitroGLYCERIN (NITROSTAT) 0.4 MG SL tablet    Atherosclerosis of native coronary artery of native heart without angina pectoris         Relevant Orders    CAR Ultrasound doppler carotid bilateral             Medication List           Accurate as of 9/19/18  3:57 PM. If you have any questions, ask your nurse or doctor.               CHANGE how you take these medications    rosuvastatin 10 MG tablet  Commonly known as:  CRESTOR  TAKE ONE TABLET (10 MG TOTAL) BY MOUTH ONCE DAILY  What changed:  See the new instructions.        CONTINUE taking these medications    aspirin 81 MG Chew     blood-glucose meter Misc  2 each by Misc.(Non-Drug; Combo Route) route 2 (two) times daily.     COQ-10 100 mg capsule  Generic drug:  coenzyme Q10     DAILY MULTIVITAMIN per tablet  Generic drug:  multivitamin     donepezil 5 MG tablet  Commonly known as:  ARICEPT  Take 1 tablet (5 mg total) by mouth every evening.     FISH OIL 1,000 mg Cap  Generic drug:  omega-3 fatty acids-vitamin E     glipiZIDE 2.5 MG Tr24  Commonly known as:  GLUCOTROL  Take 1 tablet (2.5 mg total) by mouth daily with breakfast.     HYDROcodone-acetaminophen 5-325 mg per tablet  Commonly known as:  NORCO  Take 1 tablet by mouth every 6 (six) hours as needed for Pain.     labetalol 200 MG tablet  Commonly known as:  NORMODYNE  Take 2 tablets (400 mg total) by mouth 2  (two) times daily.     lancets 30 gauge Misc  1 lancet by Misc.(Non-Drug; Combo Route) route 4 (four) times daily.     lisinopril 40 MG tablet  Commonly known as:  PRINIVIL,ZESTRIL  TAKE ONE TABLET BY MOUTH ONCE DAILY     magnesium 200 mg Tab  Take 200 mg by mouth once daily.     metFORMIN 1000 MG tablet  Commonly known as:  GLUCOPHAGE  Take 1 tablet (1,000 mg total) by mouth 2 (two) times daily with meals.     nitroGLYCERIN 0.4 MG SL tablet  Commonly known as:  NITROSTAT  1-3 Tablet, Sublingual   As directed     PHENobarbital 64.8 MG tablet  Commonly known as:  LUMINAL  TAKE ONE TABLET BY MOUTH AT BEDTIME     TRUE METRIX GLUCOSE TEST STRIP Strp  Generic drug:  blood sugar diagnostic  1 strip by Misc.(Non-Drug; Combo Route) route 4 (four) times daily.     TYLENOL 325 MG tablet  Generic drug:  acetaminophen     VITAMIN C 1000 MG tablet  Generic drug:  ascorbic acid (vitamin C)     VITAMIN D2 50,000 unit Cap  Generic drug:  ergocalciferol  TAKE ONE CAPSULE BY MOUTH ONCE A WEEK        STOP taking these medications    gabapentin 300 MG capsule  Commonly known as:  NEURONTIN  Stopped by:  Isatu Ziegler MD           Where to Get Your Medications      These medications were sent to [facility] [place], [place] - [address]  [address] [address]    Phone:  [phone]   · nitroGLYCERIN 0.4 MG SL tablet       Discontinue fenofibrate. May also decrease Fish Oils if desired.  Review Carotid duplex and advise.  Restart Hctz and monitor BP  Follow-up in about 6 months (around 3/19/2019).

## 2018-09-20 ENCOUNTER — TELEPHONE (OUTPATIENT)
Dept: CARDIOLOGY | Facility: CLINIC | Age: 73
End: 2018-09-20

## 2018-09-20 ENCOUNTER — PATIENT MESSAGE (OUTPATIENT)
Dept: CARDIOLOGY | Facility: CLINIC | Age: 73
End: 2018-09-20

## 2018-09-20 DIAGNOSIS — I77.9 BILATERAL CAROTID ARTERY DISEASE: ICD-10-CM

## 2018-09-20 DIAGNOSIS — E11.69 DYSLIPIDEMIA ASSOCIATED WITH TYPE 2 DIABETES MELLITUS: ICD-10-CM

## 2018-09-20 DIAGNOSIS — E78.5 DYSLIPIDEMIA ASSOCIATED WITH TYPE 2 DIABETES MELLITUS: ICD-10-CM

## 2018-09-20 DIAGNOSIS — Z86.73 HISTORY OF CVA (CEREBROVASCULAR ACCIDENT): Primary | ICD-10-CM

## 2018-09-20 DIAGNOSIS — I10 ESSENTIAL HYPERTENSION: ICD-10-CM

## 2018-09-20 DIAGNOSIS — E11.40 TYPE 2 DIABETES MELLITUS WITH DIABETIC NEUROPATHY: ICD-10-CM

## 2018-09-20 DIAGNOSIS — I67.9 CEREBROVASCULAR DISEASE: ICD-10-CM

## 2018-09-20 DIAGNOSIS — I70.0 ATHEROSCLEROSIS OF AORTA: ICD-10-CM

## 2018-09-20 RX ORDER — NITROGLYCERIN 0.4 MG/1
0.4 TABLET SUBLINGUAL EVERY 5 MIN PRN
COMMUNITY
End: 2018-09-20 | Stop reason: SDUPTHER

## 2018-09-20 RX ORDER — NITROGLYCERIN 0.4 MG/1
0.4 TABLET SUBLINGUAL EVERY 5 MIN PRN
Qty: 30 TABLET | Refills: 6 | Status: SHIPPED | OUTPATIENT
Start: 2018-09-20

## 2018-09-20 NOTE — TELEPHONE ENCOUNTER
Pt notified, verbalized understanding. Pt stated that she will speak to her daughter and call back to schedule on tomorrow.

## 2018-09-20 NOTE — TELEPHONE ENCOUNTER
----- Message from Isatu Ziegler MD sent at 9/20/2018  2:21 PM CDT -----  Mrs. Arndt,please review results of your carotid duplex. IT shows significant blockage on the rigth. I think we should do CT of the carotid vessels to better quantify it. I will schedule.

## 2018-09-21 LAB — INTERNAL CAROTID STENOSIS: ABNORMAL

## 2018-09-22 ENCOUNTER — PATIENT MESSAGE (OUTPATIENT)
Dept: CARDIOLOGY | Facility: CLINIC | Age: 73
End: 2018-09-22

## 2018-09-24 DIAGNOSIS — I10 HYPERTENSION, ESSENTIAL: Primary | ICD-10-CM

## 2018-09-24 RX ORDER — HYDROCHLOROTHIAZIDE 12.5 MG/1
12.5 CAPSULE ORAL DAILY
Qty: 90 CAPSULE | Refills: 3 | Status: SHIPPED | OUTPATIENT
Start: 2018-09-24 | End: 2019-12-16

## 2018-10-04 ENCOUNTER — HOSPITAL ENCOUNTER (OUTPATIENT)
Dept: RADIOLOGY | Facility: HOSPITAL | Age: 73
Discharge: HOME | End: 2018-10-04
Attending: INTERNAL MEDICINE
Payer: MEDICARE

## 2018-10-04 DIAGNOSIS — Z86.73 HISTORY OF CVA (CEREBROVASCULAR ACCIDENT): ICD-10-CM

## 2018-10-04 DIAGNOSIS — I77.9 BILATERAL CAROTID ARTERY DISEASE: ICD-10-CM

## 2018-10-04 DIAGNOSIS — I67.9 CEREBROVASCULAR DISEASE: ICD-10-CM

## 2018-10-04 LAB
CREAT SERPL-MCNC: 1 MG/DL (ref 0.5–1.4)
SAMPLE: NORMAL

## 2018-10-04 PROCEDURE — 70498 CT ANGIOGRAPHY NECK: CPT | Mod: 26,,, | Performed by: RADIOLOGY

## 2018-10-04 PROCEDURE — 70498 CT ANGIOGRAPHY NECK: CPT | Mod: TC

## 2018-10-04 PROCEDURE — 25500020 PHARM REV CODE 255: Performed by: INTERNAL MEDICINE

## 2018-10-04 RX ADMIN — IOHEXOL 75 ML: 350 INJECTION, SOLUTION INTRAVENOUS at 02:10

## 2018-10-08 ENCOUNTER — PATIENT MESSAGE (OUTPATIENT)
Dept: CARDIOLOGY | Facility: CLINIC | Age: 73
End: 2018-10-08

## 2018-10-29 ENCOUNTER — PATIENT MESSAGE (OUTPATIENT)
Dept: PODIATRY | Facility: CLINIC | Age: 73
End: 2018-10-29

## 2018-10-29 DIAGNOSIS — E11.42 DIABETIC POLYNEUROPATHY ASSOCIATED WITH TYPE 2 DIABETES MELLITUS: Primary | ICD-10-CM

## 2018-11-19 ENCOUNTER — IMMUNIZATION (OUTPATIENT)
Dept: INTERNAL MEDICINE | Facility: CLINIC | Age: 73
End: 2018-11-19
Payer: MEDICARE

## 2018-11-19 ENCOUNTER — LAB VISIT (OUTPATIENT)
Dept: LAB | Facility: HOSPITAL | Age: 73
End: 2018-11-19
Attending: INTERNAL MEDICINE
Payer: MEDICARE

## 2018-11-19 ENCOUNTER — OFFICE VISIT (OUTPATIENT)
Dept: INTERNAL MEDICINE | Facility: CLINIC | Age: 73
End: 2018-11-19
Payer: MEDICARE

## 2018-11-19 VITALS
HEART RATE: 66 BPM | OXYGEN SATURATION: 96 % | HEIGHT: 58 IN | BODY MASS INDEX: 26.85 KG/M2 | DIASTOLIC BLOOD PRESSURE: 60 MMHG | WEIGHT: 127.88 LBS | SYSTOLIC BLOOD PRESSURE: 128 MMHG

## 2018-11-19 DIAGNOSIS — D35.2 PITUITARY MICROADENOMA: ICD-10-CM

## 2018-11-19 DIAGNOSIS — E11.59 TYPE 2 DIABETES MELLITUS WITH OTHER CIRCULATORY COMPLICATION, WITHOUT LONG-TERM CURRENT USE OF INSULIN: ICD-10-CM

## 2018-11-19 DIAGNOSIS — F01.50 VASCULAR DEMENTIA WITHOUT BEHAVIORAL DISTURBANCE: ICD-10-CM

## 2018-11-19 DIAGNOSIS — Z12.31 ENCOUNTER FOR SCREENING MAMMOGRAM FOR MALIGNANT NEOPLASM OF BREAST: ICD-10-CM

## 2018-11-19 DIAGNOSIS — E11.59 TYPE 2 DIABETES MELLITUS WITH OTHER CIRCULATORY COMPLICATION, WITHOUT LONG-TERM CURRENT USE OF INSULIN: Primary | ICD-10-CM

## 2018-11-19 DIAGNOSIS — M47.816 FACET ARTHRITIS OF LUMBAR REGION: ICD-10-CM

## 2018-11-19 LAB
ANION GAP SERPL CALC-SCNC: 8 MMOL/L
BASOPHILS # BLD AUTO: 0.03 K/UL
BASOPHILS NFR BLD: 0.4 %
BUN SERPL-MCNC: 24 MG/DL
CALCIUM SERPL-MCNC: 9.4 MG/DL
CHLORIDE SERPL-SCNC: 101 MMOL/L
CO2 SERPL-SCNC: 28 MMOL/L
CREAT SERPL-MCNC: 1.3 MG/DL
DIFFERENTIAL METHOD: ABNORMAL
EOSINOPHIL # BLD AUTO: 0.1 K/UL
EOSINOPHIL NFR BLD: 1.8 %
ERYTHROCYTE [DISTWIDTH] IN BLOOD BY AUTOMATED COUNT: 13.9 %
EST. GFR  (AFRICAN AMERICAN): 47 ML/MIN/1.73 M^2
EST. GFR  (NON AFRICAN AMERICAN): 41 ML/MIN/1.73 M^2
ESTIMATED AVG GLUCOSE: 169 MG/DL
GLUCOSE SERPL-MCNC: 292 MG/DL
HBA1C MFR BLD HPLC: 7.5 %
HCT VFR BLD AUTO: 34.9 %
HGB BLD-MCNC: 11.1 G/DL
LYMPHOCYTES # BLD AUTO: 1.8 K/UL
LYMPHOCYTES NFR BLD: 22.9 %
MCH RBC QN AUTO: 28.5 PG
MCHC RBC AUTO-ENTMCNC: 31.8 G/DL
MCV RBC AUTO: 90 FL
MONOCYTES # BLD AUTO: 0.4 K/UL
MONOCYTES NFR BLD: 5.5 %
NEUTROPHILS # BLD AUTO: 5.3 K/UL
NEUTROPHILS NFR BLD: 69 %
PLATELET # BLD AUTO: 262 K/UL
PMV BLD AUTO: 9 FL
POTASSIUM SERPL-SCNC: 5.3 MMOL/L
RBC # BLD AUTO: 3.9 M/UL
SODIUM SERPL-SCNC: 137 MMOL/L
WBC # BLD AUTO: 7.64 K/UL

## 2018-11-19 PROCEDURE — 80048 BASIC METABOLIC PNL TOTAL CA: CPT | Mod: HCNC

## 2018-11-19 PROCEDURE — 85025 COMPLETE CBC W/AUTO DIFF WBC: CPT | Mod: HCNC

## 2018-11-19 PROCEDURE — 1101F PT FALLS ASSESS-DOCD LE1/YR: CPT | Mod: CPTII,HCNC,S$GLB, | Performed by: INTERNAL MEDICINE

## 2018-11-19 PROCEDURE — 36415 COLL VENOUS BLD VENIPUNCTURE: CPT | Mod: HCNC

## 2018-11-19 PROCEDURE — 90662 IIV NO PRSV INCREASED AG IM: CPT | Mod: HCNC,S$GLB,, | Performed by: INTERNAL MEDICINE

## 2018-11-19 PROCEDURE — 3078F DIAST BP <80 MM HG: CPT | Mod: CPTII,HCNC,S$GLB, | Performed by: INTERNAL MEDICINE

## 2018-11-19 PROCEDURE — 99999 PR PBB SHADOW E&M-EST. PATIENT-LVL V: CPT | Mod: PBBFAC,HCNC,, | Performed by: INTERNAL MEDICINE

## 2018-11-19 PROCEDURE — 3045F PR MOST RECENT HEMOGLOBIN A1C LEVEL 7.0-9.0%: CPT | Mod: CPTII,HCNC,S$GLB, | Performed by: INTERNAL MEDICINE

## 2018-11-19 PROCEDURE — 83036 HEMOGLOBIN GLYCOSYLATED A1C: CPT | Mod: HCNC

## 2018-11-19 PROCEDURE — G0008 ADMIN INFLUENZA VIRUS VAC: HCPCS | Mod: HCNC,S$GLB,, | Performed by: INTERNAL MEDICINE

## 2018-11-19 PROCEDURE — 3074F SYST BP LT 130 MM HG: CPT | Mod: CPTII,HCNC,S$GLB, | Performed by: INTERNAL MEDICINE

## 2018-11-19 PROCEDURE — 99214 OFFICE O/P EST MOD 30 MIN: CPT | Mod: HCNC,S$GLB,, | Performed by: INTERNAL MEDICINE

## 2018-11-19 PROCEDURE — 99499 UNLISTED E&M SERVICE: CPT | Mod: HCNC,S$GLB,, | Performed by: INTERNAL MEDICINE

## 2018-11-19 RX ORDER — DONEPEZIL HYDROCHLORIDE 10 MG/1
10 TABLET, FILM COATED ORAL NIGHTLY
Qty: 30 TABLET | Refills: 11 | Status: SHIPPED | OUTPATIENT
Start: 2018-11-19 | End: 2019-11-08 | Stop reason: SDUPTHER

## 2018-11-19 NOTE — PROGRESS NOTES
Subjective:       Patient ID: June B Yris is a 73 y.o. female.    Chief Complaint: Follow-up                         Vascular dementia.  HPI   Glipizide dose was reduced to 2.5 mg.  a1c has been well controlled.  No hyoglycemic readings.    Statin dose reduced to qod.    HCTZ restarted.    Taking aricept 5 mg daily without side effects for vascular dementia.  Memory stable.      We reviewed CTA of neck - no significant stenosis.    Dexa reviewed - minimal osteopenia.     Low back pain, due to facet arthritis lumbar spine, improved.    She has non functioning pituitary microadenoma , still present by 2016 MRI.  No headache, visual defects.    Review of Systems   Constitutional: Negative for fever and unexpected weight change.   HENT: Negative for congestion and postnasal drip.    Eyes: Negative for pain, discharge and visual disturbance.   Respiratory: Negative for cough, chest tightness, shortness of breath and wheezing.    Cardiovascular: Negative for chest pain and leg swelling.   Gastrointestinal: Negative for abdominal pain, constipation, diarrhea and nausea.   Genitourinary: Negative for difficulty urinating, dysuria and hematuria.   Skin: Negative for rash.   Neurological: Negative for headaches.   Psychiatric/Behavioral: Negative for dysphoric mood and sleep disturbance. The patient is not nervous/anxious.        Objective:      Physical Exam   Constitutional: She is oriented to person, place, and time. She appears well-developed and well-nourished. No distress.   Neurological: She is alert and oriented to person, place, and time.   Psychiatric: She has a normal mood and affect. Her behavior is normal.       Assessment:       1. Type 2 diabetes mellitus with other circulatory complication, without long-term current use of insulin    2. Encounter for screening mammogram for malignant neoplasm of breast    3. Vascular dementia without behavioral disturbance    4. Facet arthritis of lumbar region    5.  Pituitary microadenoma        Plan:       June was seen today for follow-up.    Diagnoses and all orders for this visit:    Type 2 diabetes mellitus with other circulatory complication, without long-term current use of insulin  -     Hemoglobin A1c; Future  -     Hemoglobin A1c; Future  -     Basic metabolic panel; Future  -     CBC auto differential; Future    Encounter for screening mammogram for malignant neoplasm of breast  -     Mammo Digital Screening Bilat w/ Michael; Future    Vascular dementia without behavioral disturbance    Facet arthritis of lumbar region    Pituitary microadenoma    Other orders  -     INCR donepezil (ARICEPT) 10 MG tablet; Take 1 tablet (10 mg total) by mouth every evening.    Exercise!

## 2018-11-20 NOTE — PROGRESS NOTES
Patient, Marlen Arndt (MRN #[id number]), presented with a recent Estimated Glumerular Filtration Rate (EGFR) between 30 and 245 consistent with the definition of chronic kidney disease stage 4 (ICD10 - N18.3).    eGFR if non    Date Value Ref Range Status   11/19/2018 41 (A) >60 mL/min/1.73 m^2 Final     Comment:     Calculation used to obtain the estimated glomerular filtration  rate (eGFR) is the CKD-EPI equation.          The patient's chronic kidney disease stage 4 was monitored, evaluated, addressed and/or treated. This addendum to the medical record is made on 11/20/2018.

## 2018-12-02 DIAGNOSIS — N18.30 CKD (CHRONIC KIDNEY DISEASE) STAGE 3, GFR 30-59 ML/MIN: Primary | ICD-10-CM

## 2018-12-02 DIAGNOSIS — D64.9 ANEMIA, UNSPECIFIED TYPE: ICD-10-CM

## 2018-12-03 ENCOUNTER — TELEPHONE (OUTPATIENT)
Dept: INTERNAL MEDICINE | Facility: CLINIC | Age: 73
End: 2018-12-03

## 2018-12-03 NOTE — TELEPHONE ENCOUNTER
----- Message from Dominique Rendon MD sent at 12/2/2018  9:40 PM CST -----  pls call - diabetes is adequately controlled but could be better. a1c 7.5. Mild anemia is stable.  Mildly elevated potassium - may be lab error.  Kidney function reduced, but pretty stable.  Will follow.    Schedule bmp, cbc 4 weeks

## 2018-12-17 ENCOUNTER — OFFICE VISIT (OUTPATIENT)
Dept: PODIATRY | Facility: CLINIC | Age: 73
End: 2018-12-17
Payer: MEDICARE

## 2018-12-17 VITALS
DIASTOLIC BLOOD PRESSURE: 66 MMHG | RESPIRATION RATE: 18 BRPM | SYSTOLIC BLOOD PRESSURE: 175 MMHG | HEIGHT: 59 IN | HEART RATE: 72 BPM | WEIGHT: 127 LBS | BODY MASS INDEX: 25.6 KG/M2

## 2018-12-17 DIAGNOSIS — L84 CORN OR CALLUS: ICD-10-CM

## 2018-12-17 DIAGNOSIS — E11.42 DIABETIC POLYNEUROPATHY ASSOCIATED WITH TYPE 2 DIABETES MELLITUS: Primary | ICD-10-CM

## 2018-12-17 DIAGNOSIS — B35.1 ONYCHOMYCOSIS DUE TO DERMATOPHYTE: ICD-10-CM

## 2018-12-17 PROCEDURE — 99999 PR PBB SHADOW E&M-EST. PATIENT-LVL III: CPT | Mod: PBBFAC,HCNC,, | Performed by: PODIATRIST

## 2018-12-17 PROCEDURE — 11721 DEBRIDE NAIL 6 OR MORE: CPT | Mod: 59,Q9,HCNC,S$GLB | Performed by: PODIATRIST

## 2018-12-17 PROCEDURE — 11056 PARNG/CUTG B9 HYPRKR LES 2-4: CPT | Mod: Q9,HCNC,S$GLB, | Performed by: PODIATRIST

## 2018-12-17 PROCEDURE — 99499 UNLISTED E&M SERVICE: CPT | Mod: HCNC,S$GLB,, | Performed by: PODIATRIST

## 2018-12-17 NOTE — PROGRESS NOTES
Subjective:      Patient ID: Marlen Ardnt is a 73 y.o. female.    Chief Complaint: PCP (Dominique Rendon MD  11/19/18); Diabetic Foot Exam; and Nail Care    Marlen is a 73 y.o. female who presents to the clinic for evaluation and treatment of high risk feet. Marlen has a past medical history of Aortic stenosis, Arthritis, Back pain, Carotid artery occlusion, Cataract, Coronary artery disease, Diabetes mellitus type II, Heart murmur, Hyperlipidemia, Hypertension associated with diabetes (7/24/2012), Non-functioning pituitary adenoma (7/24/2012), Pituitary microadenoma (11/17/2015), Polyneuropathy, PVD (peripheral vascular disease), PVD (peripheral vascular disease), S/P AVR (aortic valve replacement) (8/14/2013), Stenosis, Stented coronary artery (4/10/2013), Stroke, Type II or unspecified type diabetes mellitus with neurological manifestations, not stated as uncontrolled(250.60), and Type II or unspecified type diabetes mellitus with peripheral circulatory disorders, not stated as uncontrolled(250.70). The patient's chief complaint is long, thick toenails .   This patient has documented high risk feet requiring routine maintenance secondary to diabetes mellitis and those secondary complications of diabetes, as mentioned..    PCP: Dominique Rendon MD  Date Last Seen by PCP:  Chief Complaint   Patient presents with    PCP     Dominique Rendon MD  11/19/18    Diabetic Foot Exam    Nail Care       Current shoe gear:  sandals    Hemoglobin A1C   Date Value Ref Range Status   11/19/2018 7.5 (H) 4.0 - 5.6 % Final     Comment:     ADA Screening Guidelines:  5.7-6.4%  Consistent with prediabetes  >or=6.5%  Consistent with diabetes  High levels of fetal hemoglobin interfere with the HbA1C  assay. Heterozygous hemoglobin variants (HbS, HgC, etc)do  not significantly interfere with this assay.   However, presence of multiple variants may affect accuracy.     08/20/2018 5.6 4.0 - 5.6 % Final     Comment:     ADA Screening  Guidelines:  5.7-6.4%  Consistent with prediabetes  >or=6.5%  Consistent with diabetes  High levels of fetal hemoglobin interfere with the HbA1C  assay. Heterozygous hemoglobin variants (HbS, HgC, etc)do  not significantly interfere with this assay.   However, presence of multiple variants may affect accuracy.     01/15/2018 5.9 (H) 4.0 - 5.6 % Final     Comment:     According to ADA guidelines, hemoglobin A1c <7.0% represents  optimal control in non-pregnant diabetic patients. Different  metrics may apply to specific patient populations.   Standards of Medical Care in Diabetes-2016.  For the purpose of screening for the presence of diabetes:  <5.7%     Consistent with the absence of diabetes  5.7-6.4%  Consistent with increasing risk for diabetes   (prediabetes)  >or=6.5%  Consistent with diabetes  Currently, no consensus exists for use of hemoglobin A1c  for diagnosis of diabetes for children.  This Hemoglobin A1c assay has significant interference with fetal   hemoglobin   (HbF). The results are invalid for patients with abnormal amounts of   HbF,   including those with known Hereditary Persistence   of Fetal Hemoglobin. Heterozygous hemoglobin variants (HbAS, HbAC,   HbAD, HbAE, HbA2) do not significantly interfere with this assay;   however, presence of multiple variants in a sample may impact the %   interference.         Review of Systems   Constitution: Negative for chills, decreased appetite and fever.   Cardiovascular: Negative for chest pain, claudication and leg swelling.   Respiratory: Negative for cough.    Skin: Positive for dry skin and nail changes. Negative for color change, flushing, itching, poor wound healing, rash and skin cancer.   Musculoskeletal: Negative for arthritis, back pain, falls, gout, joint swelling and myalgias.   Gastrointestinal: Negative for nausea and vomiting.   Neurological: Positive for numbness. Negative for loss of balance and paresthesias.           Objective:       Physical Exam   Constitutional: She is oriented to person, place, and time. She appears well-developed and well-nourished. No distress.   Cardiovascular:   Pulses:       Dorsalis pedis pulses are 1+ on the right side, and 1+ on the left side.        Posterior tibial pulses are 1+ on the right side, and 1+ on the left side.   Toes are cool to touch. Feet are warm proximally.There is decreased digital hair. Skin is atrophic, slightly hyperpigmented, and mildly edematous       Musculoskeletal: Normal range of motion. She exhibits no edema, tenderness or deformity.   Adequate joint range of motion without pain, limitation, nor crepitation Bilateral feet and ankle joints. Muscle strength is 5/5 in all groups bilaterally.         Neurological: She is alert and oriented to person, place, and time.   [place] 5.07 monofilamant testing is diminished Carlos feet. Sharp/dull sensation diminished Bilaterally. Light touch absent Bilaterally.       Skin: Skin is warm, dry and intact. No abrasion, no bruising, no burn, no ecchymosis and no lesion noted. No erythema. No pallor.   Nails x 10  are elongated by  5-8mm's, thickened by 2-4 mm's, dystrophic, and are darkened in  coloration . Xerosis Bilaterally. No open lesions noted.    Hyperkeratotic tissue noted to sub 1st mpj left and plantar medial 1st hallux IPJ.    Psychiatric: She has a normal mood and affect. Her behavior is normal.   Vitals reviewed.            Assessment:       Encounter Diagnoses   Name Primary?    Diabetic polyneuropathy associated with type 2 diabetes mellitus Yes    Onychomycosis due to dermatophyte     Corn or callus          Plan:       June was seen today for pcp, diabetic foot exam and nail care.    Diagnoses and all orders for this visit:    Diabetic polyneuropathy associated with type 2 diabetes mellitus  -     DIABETIC SHOES FOR HOME USE    Onychomycosis due to dermatophyte    Corn or callus      I counseled the patient on her conditions,  their implications and medical management.        - Shoe inspection. Diabetic Foot Education. Patient reminded of the importance of good nutrition and blood sugar control to help prevent podiatric complications of diabetes. Patient instructed on proper foot hygeine. We discussed wearing proper shoe gear, daily foot inspections, never walking without protective shoe gear, never putting sharp instruments to feet, routine podiatric nail visits every 2-3 months.      - With patient's permission, nails were aggressively reduced and debrided x 10 to their soft tissue attachment mechanically and with electric , removing all offending nail and debris. Patient relates relief following the procedure. She will continue to monitor the areas daily, inspect her feet, wear protective shoe gear when ambulatory, moisturizer to maintain skin integrity and follow in this office in approximately 2-3 months, sooner p.r.n.    - After cleansing the  area w/ alcohol prep pad the above mentioned hyperkeratosis was trimmed utilizing No 15 scapel, to a smooth base with out incident. Patient tolerated this  well and reported comfort to the area of sub 1st MPJ left and left hallux plantar medial IPJ.

## 2018-12-18 DIAGNOSIS — E11.42 DIABETIC POLYNEUROPATHY ASSOCIATED WITH TYPE 2 DIABETES MELLITUS: ICD-10-CM

## 2018-12-18 RX ORDER — METFORMIN HYDROCHLORIDE 1000 MG/1
TABLET ORAL
Qty: 180 TABLET | Refills: 3 | Status: SHIPPED | OUTPATIENT
Start: 2018-12-18 | End: 2019-12-12 | Stop reason: SDUPTHER

## 2018-12-26 ENCOUNTER — OFFICE VISIT (OUTPATIENT)
Dept: OPTOMETRY | Facility: CLINIC | Age: 73
End: 2018-12-26
Payer: MEDICARE

## 2018-12-26 DIAGNOSIS — E11.3293 MILD NONPROLIFERATIVE DIABETIC RETINOPATHY OF BOTH EYES WITHOUT MACULAR EDEMA ASSOCIATED WITH TYPE 2 DIABETES MELLITUS: Primary | ICD-10-CM

## 2018-12-26 DIAGNOSIS — H53.462 LEFT HOMONYMOUS HEMIANOPSIA: ICD-10-CM

## 2018-12-26 DIAGNOSIS — Z13.5 SCREENING FOR GLAUCOMA: ICD-10-CM

## 2018-12-26 DIAGNOSIS — H43.393 VITREOUS FLOATERS OF BOTH EYES: ICD-10-CM

## 2018-12-26 PROCEDURE — 99999 PR PBB SHADOW E&M-EST. PATIENT-LVL III: CPT | Mod: PBBFAC,HCNC,, | Performed by: OPTOMETRIST

## 2018-12-26 PROCEDURE — 92014 COMPRE OPH EXAM EST PT 1/>: CPT | Mod: HCNC,S$GLB,, | Performed by: OPTOMETRIST

## 2018-12-26 NOTE — PROGRESS NOTES
HPI     DLS:1/8/18  Pt states she has been having some floaters started Saturday  (not sure   which eye),  Then they went away after a day or two  No flashes   No gtts   LBS: did  Not check today   Hemoglobin A1C       Date                     Value               Ref Range             Status                11/19/2018               7.5 (H)             4.0 - 5.6 %           Final              Comment:    ADA Screening Guidelines:  5.7-6.4%  Consistent with   prediabetes  >or=6.5%  Consistent with diabetes  High levels of fetal   hemoglobin interfere with the HbA1C  assay. Heterozygous hemoglobin   variants (HbS, HgC, etc)do  not significantly interfere with this assay.     However, presence of multiple variants may affect accuracy.         08/20/2018               5.6                 4.0 - 5.6 %           Final              Comment:    ADA Screening Guidelines:  5.7-6.4%  Consistent with   prediabetes  >or=6.5%  Consistent with diabetes  High levels of fetal   hemoglobin interfere with the HbA1C  assay. Heterozygous hemoglobin   variants (HbS, HgC, etc)do  not significantly interfere with this assay.     However, presence of multiple variants may affect accuracy.         01/15/2018               5.9 (H)             4.0 - 5.6 %           Final              Comment:    According to ADA guidelines, hemoglobin A1c <7.0% represents  optimal   control in non-pregnant diabetic patients. Different  metrics may apply to   specific patient populations.   Standards of Medical Care in   Diabetes-2016.  For the purpose of screening for the presence of   diabetes:  <5.7%     Consistent with the absence of diabetes  5.7-6.4%    Consistent with increasing risk for diabetes   (prediabetes)  >or=6.5%    Consistent with diabetes  Currently, no consensus exists for use of   hemoglobin A1c  for diagnosis of diabetes for children.  This Hemoglobin   A1c assay has significant interference with fetal   hemoglobin   (HbF).   The results are  invalid for patients with abnormal amounts of   HbF,     including those with known Hereditary Persistence   of Fetal Hemoglobin.   Heterozygous hemoglobin variants (HbAS, HbAC,   HbAD, HbAE, HbA2) do not   significantly interfere with this assay;   however, presence of multiple   variants in a sample may impact the %   interference.    ----------      Last edited by Mayco Correa, OD on 12/26/2018 10:48 AM. (History)        ROS     Positive for: Neurological (stroke/pit hx), Endocrine (DM), Eyes   (hemianopsia Hx)    Negative for: Constitutional, Gastrointestinal, Skin, Genitourinary,   Musculoskeletal, HENT, Cardiovascular, Respiratory, Psychiatric,   Allergic/Imm, Heme/Lymph    Last edited by Mayco Correa, OD on 12/26/2018 10:48 AM. (History)        Assessment /Plan     For exam results, see Encounter Report.    Mild nonproliferative diabetic retinopathy of both eyes without macular edema associated with type 2 diabetes mellitus    Left homonymous hemianopsia - Both Eyes    Screening for glaucoma    Vitreous floaters of both eyes      1. Sp pciol/YAG OU  2. Incomplete left homom hemianopsia sp stroke/pit hx.  3. DM--with mild NPDR OU--stable  4. Vitreous floaters--discussed Signs/Symptoms of Retinal Detachment.  Pt to rtc immediately if increased Floaters/Flashes occur    PLAN:    rtc 1 yr, or immediately if inc F/F

## 2019-01-03 ENCOUNTER — LAB VISIT (OUTPATIENT)
Dept: LAB | Facility: HOSPITAL | Age: 74
End: 2019-01-03
Attending: INTERNAL MEDICINE
Payer: MEDICARE

## 2019-01-03 DIAGNOSIS — D64.9 ANEMIA, UNSPECIFIED TYPE: ICD-10-CM

## 2019-01-03 DIAGNOSIS — N18.30 CKD (CHRONIC KIDNEY DISEASE) STAGE 3, GFR 30-59 ML/MIN: ICD-10-CM

## 2019-01-03 LAB
ANION GAP SERPL CALC-SCNC: 8 MMOL/L
BASOPHILS # BLD AUTO: 0.02 K/UL
BASOPHILS NFR BLD: 0.3 %
BUN SERPL-MCNC: 19 MG/DL
CALCIUM SERPL-MCNC: 9.6 MG/DL
CHLORIDE SERPL-SCNC: 105 MMOL/L
CO2 SERPL-SCNC: 28 MMOL/L
CREAT SERPL-MCNC: 0.9 MG/DL
DIFFERENTIAL METHOD: ABNORMAL
EOSINOPHIL # BLD AUTO: 0.1 K/UL
EOSINOPHIL NFR BLD: 1.7 %
ERYTHROCYTE [DISTWIDTH] IN BLOOD BY AUTOMATED COUNT: 14.3 %
EST. GFR  (AFRICAN AMERICAN): >60 ML/MIN/1.73 M^2
EST. GFR  (NON AFRICAN AMERICAN): >60 ML/MIN/1.73 M^2
GLUCOSE SERPL-MCNC: 184 MG/DL
HCT VFR BLD AUTO: 35.3 %
HGB BLD-MCNC: 11.5 G/DL
LYMPHOCYTES # BLD AUTO: 1.5 K/UL
LYMPHOCYTES NFR BLD: 25.4 %
MCH RBC QN AUTO: 28.8 PG
MCHC RBC AUTO-ENTMCNC: 32.6 G/DL
MCV RBC AUTO: 88 FL
MONOCYTES # BLD AUTO: 0.4 K/UL
MONOCYTES NFR BLD: 6.3 %
NEUTROPHILS # BLD AUTO: 3.9 K/UL
NEUTROPHILS NFR BLD: 66 %
PLATELET # BLD AUTO: 244 K/UL
PMV BLD AUTO: 9.1 FL
POTASSIUM SERPL-SCNC: 4.9 MMOL/L
RBC # BLD AUTO: 4 M/UL
SODIUM SERPL-SCNC: 141 MMOL/L
WBC # BLD AUTO: 5.9 K/UL

## 2019-01-03 PROCEDURE — 36415 COLL VENOUS BLD VENIPUNCTURE: CPT | Mod: HCNC

## 2019-01-03 PROCEDURE — 85025 COMPLETE CBC W/AUTO DIFF WBC: CPT | Mod: HCNC

## 2019-01-03 PROCEDURE — 80048 BASIC METABOLIC PNL TOTAL CA: CPT | Mod: HCNC

## 2019-01-05 ENCOUNTER — PATIENT MESSAGE (OUTPATIENT)
Dept: HEPATOLOGY | Facility: CLINIC | Age: 74
End: 2019-01-05

## 2019-01-18 ENCOUNTER — TELEPHONE (OUTPATIENT)
Dept: CARDIOLOGY | Facility: CLINIC | Age: 74
End: 2019-01-18

## 2019-01-18 DIAGNOSIS — E78.5 DYSLIPIDEMIA ASSOCIATED WITH TYPE 2 DIABETES MELLITUS: ICD-10-CM

## 2019-01-18 DIAGNOSIS — I15.2 HYPERTENSION ASSOCIATED WITH DIABETES: ICD-10-CM

## 2019-01-18 DIAGNOSIS — I25.10 CORONARY ARTERY DISEASE INVOLVING NATIVE CORONARY ARTERY OF NATIVE HEART WITHOUT ANGINA PECTORIS: Primary | ICD-10-CM

## 2019-01-18 DIAGNOSIS — E11.59 HYPERTENSION ASSOCIATED WITH DIABETES: ICD-10-CM

## 2019-01-18 DIAGNOSIS — I10 HYPERTENSION, ESSENTIAL: ICD-10-CM

## 2019-01-18 DIAGNOSIS — E11.69 DYSLIPIDEMIA ASSOCIATED WITH TYPE 2 DIABETES MELLITUS: ICD-10-CM

## 2019-01-20 ENCOUNTER — PATIENT MESSAGE (OUTPATIENT)
Dept: HEPATOLOGY | Facility: CLINIC | Age: 74
End: 2019-01-20

## 2019-02-01 ENCOUNTER — HOSPITAL ENCOUNTER (OUTPATIENT)
Dept: RADIOLOGY | Facility: HOSPITAL | Age: 74
Discharge: HOME | End: 2019-02-01
Attending: INTERNAL MEDICINE
Payer: MEDICARE

## 2019-02-01 DIAGNOSIS — Z12.31 ENCOUNTER FOR SCREENING MAMMOGRAM FOR MALIGNANT NEOPLASM OF BREAST: ICD-10-CM

## 2019-02-01 PROCEDURE — 77067 MAMMO DIGITAL SCREENING BILAT WITH TOMOSYNTHESIS_CAD: ICD-10-PCS | Mod: 26,HCNC,, | Performed by: RADIOLOGY

## 2019-02-01 PROCEDURE — 77067 SCR MAMMO BI INCL CAD: CPT | Mod: TC,HCNC

## 2019-02-01 PROCEDURE — 77063 MAMMO DIGITAL SCREENING BILAT WITH TOMOSYNTHESIS_CAD: ICD-10-PCS | Mod: 26,HCNC,, | Performed by: RADIOLOGY

## 2019-02-01 PROCEDURE — 77067 SCR MAMMO BI INCL CAD: CPT | Mod: 26,HCNC,, | Performed by: RADIOLOGY

## 2019-02-01 PROCEDURE — 77063 BREAST TOMOSYNTHESIS BI: CPT | Mod: 26,HCNC,, | Performed by: RADIOLOGY

## 2019-02-11 ENCOUNTER — OFFICE VISIT (OUTPATIENT)
Dept: OPTOMETRY | Facility: CLINIC | Age: 74
End: 2019-02-11
Payer: COMMERCIAL

## 2019-02-11 DIAGNOSIS — E11.3293 MILD NONPROLIFERATIVE DIABETIC RETINOPATHY OF BOTH EYES WITHOUT MACULAR EDEMA ASSOCIATED WITH TYPE 2 DIABETES MELLITUS: ICD-10-CM

## 2019-02-11 DIAGNOSIS — H52.4 PRESBYOPIA: Primary | ICD-10-CM

## 2019-02-11 DIAGNOSIS — H53.462 LEFT HOMONYMOUS HEMIANOPSIA: ICD-10-CM

## 2019-02-11 PROCEDURE — 92012 PR EYE EXAM, EST PATIENT,INTERMED: ICD-10-PCS | Mod: S$GLB,,, | Performed by: OPTOMETRIST

## 2019-02-11 PROCEDURE — 92015 PR REFRACTION: ICD-10-PCS | Mod: S$GLB,,, | Performed by: OPTOMETRIST

## 2019-02-11 PROCEDURE — 99499 RISK ADDL DX/OHS AUDIT: ICD-10-PCS | Mod: S$GLB,,, | Performed by: OPTOMETRIST

## 2019-02-11 PROCEDURE — 99499 UNLISTED E&M SERVICE: CPT | Mod: S$GLB,,, | Performed by: OPTOMETRIST

## 2019-02-11 PROCEDURE — 92012 INTRM OPH EXAM EST PATIENT: CPT | Mod: S$GLB,,, | Performed by: OPTOMETRIST

## 2019-02-11 PROCEDURE — 92015 DETERMINE REFRACTIVE STATE: CPT | Mod: S$GLB,,, | Performed by: OPTOMETRIST

## 2019-02-11 PROCEDURE — 99999 PR PBB SHADOW E&M-EST. PATIENT-LVL II: CPT | Mod: PBBFAC,,, | Performed by: OPTOMETRIST

## 2019-02-11 PROCEDURE — 99999 PR PBB SHADOW E&M-EST. PATIENT-LVL II: ICD-10-PCS | Mod: PBBFAC,,, | Performed by: OPTOMETRIST

## 2019-02-11 NOTE — PROGRESS NOTES
HPI     Pt had DFE 2 months ago when presented for [facility] appt, but wishes to get   new spex today    Last edited by Mayco Correa, OD on 2/11/2019  3:13 PM. (History)        ROS     Positive for: Neurological (stroke/pit hx), Endocrine (DM), Eyes   (hemianopsia Hx)    Negative for: Constitutional, Gastrointestinal, Skin, Genitourinary,   Musculoskeletal, HENT, Cardiovascular, Respiratory, Psychiatric,   Allergic/Imm, Heme/Lymph    Last edited by Mayoc Correa, OD on 2/11/2019  3:13 PM. (History)        Assessment /Plan     For exam results, see Encounter Report.    Presbyopia    Left homonymous hemianopsia - Both Eyes    Mild nonproliferative diabetic retinopathy of both eyes without macular edema associated with type 2 diabetes mellitus      1. Sp pciol/YAG OU--pt wishes new spex Rx  2. Incomplete left homom hemianopsia sp stroke/pit hx.  3. DM--with mild NPDR OU--stable  4. Vitreous floaters--discussed Signs/Symptoms of Retinal Detachment.  Pt to rtc immediately if increased Floaters/Flashes occur    PLAN:    rtc 1 yr, or immediately if inc F/F

## 2019-02-12 ENCOUNTER — PATIENT MESSAGE (OUTPATIENT)
Dept: INTERNAL MEDICINE | Facility: CLINIC | Age: 74
End: 2019-02-12

## 2019-02-12 DIAGNOSIS — F03.90 DEMENTIA WITHOUT BEHAVIORAL DISTURBANCE, UNSPECIFIED DEMENTIA TYPE: Primary | ICD-10-CM

## 2019-02-12 DIAGNOSIS — F01.50 VASCULAR DEMENTIA WITHOUT BEHAVIORAL DISTURBANCE: ICD-10-CM

## 2019-02-12 RX ORDER — MEMANTINE HYDROCHLORIDE 5 MG/1
5 TABLET ORAL 2 TIMES DAILY
Qty: 60 TABLET | Refills: 11 | Status: SHIPPED | OUTPATIENT
Start: 2019-02-12 | End: 2019-05-06 | Stop reason: SDUPTHER

## 2019-03-08 ENCOUNTER — PES CALL (OUTPATIENT)
Dept: ADMINISTRATIVE | Facility: CLINIC | Age: 74
End: 2019-03-08

## 2019-03-18 DIAGNOSIS — I15.2 HYPERTENSION ASSOCIATED WITH DIABETES: Chronic | ICD-10-CM

## 2019-03-18 DIAGNOSIS — E11.59 HYPERTENSION ASSOCIATED WITH DIABETES: Chronic | ICD-10-CM

## 2019-03-18 RX ORDER — LISINOPRIL 40 MG/1
TABLET ORAL
Qty: 90 TABLET | Refills: 3 | Status: SHIPPED | OUTPATIENT
Start: 2019-03-18 | End: 2019-05-02 | Stop reason: ALTCHOICE

## 2019-03-25 ENCOUNTER — OFFICE VISIT (OUTPATIENT)
Dept: PODIATRY | Facility: CLINIC | Age: 74
End: 2019-03-25
Payer: MEDICARE

## 2019-03-25 VITALS
DIASTOLIC BLOOD PRESSURE: 70 MMHG | BODY MASS INDEX: 25.6 KG/M2 | WEIGHT: 127 LBS | RESPIRATION RATE: 18 BRPM | HEIGHT: 59 IN | SYSTOLIC BLOOD PRESSURE: 158 MMHG | HEART RATE: 68 BPM

## 2019-03-25 DIAGNOSIS — B35.1 ONYCHOMYCOSIS DUE TO DERMATOPHYTE: ICD-10-CM

## 2019-03-25 DIAGNOSIS — L84 CORN OR CALLUS: ICD-10-CM

## 2019-03-25 DIAGNOSIS — E11.42 DIABETIC POLYNEUROPATHY ASSOCIATED WITH TYPE 2 DIABETES MELLITUS: Primary | ICD-10-CM

## 2019-03-25 PROCEDURE — 11056 PARNG/CUTG B9 HYPRKR LES 2-4: CPT | Mod: Q9,HCNC,S$GLB, | Performed by: PODIATRIST

## 2019-03-25 PROCEDURE — 99499 UNLISTED E&M SERVICE: CPT | Mod: HCNC,S$GLB,, | Performed by: PODIATRIST

## 2019-03-25 PROCEDURE — 99999 PR PBB SHADOW E&M-EST. PATIENT-LVL IV: ICD-10-PCS | Mod: PBBFAC,HCNC,, | Performed by: PODIATRIST

## 2019-03-25 PROCEDURE — 99999 PR PBB SHADOW E&M-EST. PATIENT-LVL IV: CPT | Mod: PBBFAC,HCNC,, | Performed by: PODIATRIST

## 2019-03-25 PROCEDURE — 11721 PR DEBRIDEMENT OF NAILS, 6 OR MORE: ICD-10-PCS | Mod: Q9,59,HCNC,S$GLB | Performed by: PODIATRIST

## 2019-03-25 PROCEDURE — 11056 PR TRIM BENIGN HYPERKERATOTIC SKIN LESION,2-4: ICD-10-PCS | Mod: Q9,HCNC,S$GLB, | Performed by: PODIATRIST

## 2019-03-25 PROCEDURE — 99499 NO LOS: ICD-10-PCS | Mod: HCNC,S$GLB,, | Performed by: PODIATRIST

## 2019-03-25 PROCEDURE — 11721 DEBRIDE NAIL 6 OR MORE: CPT | Mod: Q9,59,HCNC,S$GLB | Performed by: PODIATRIST

## 2019-03-25 NOTE — PROGRESS NOTES
Subjective:      Patient ID: Marlen Arndt is a 73 y.o. female.    Chief Complaint: PCP (Dominique Rendon MD 11/19/18); Diabetic Foot Exam; Nail Problem; and Nail Care    Marlen is a 73 y.o. female who presents to the clinic for evaluation and treatment of high risk feet. Marlen has a past medical history of Aortic stenosis, Arthritis, Back pain, Carotid artery occlusion, Cataract, Coronary artery disease, Diabetes mellitus type II, Heart murmur, Hyperlipidemia, Hypertension associated with diabetes (7/24/2012), Non-functioning pituitary adenoma (7/24/2012), Pituitary microadenoma (11/17/2015), Polyneuropathy, PVD (peripheral vascular disease), PVD (peripheral vascular disease), S/P AVR (aortic valve replacement) (8/14/2013), Stenosis, Stented coronary artery (4/10/2013), Stroke, Type II or unspecified type diabetes mellitus with neurological manifestations, not stated as uncontrolled(250.60), and Type II or unspecified type diabetes mellitus with peripheral circulatory disorders, not stated as uncontrolled(250.70). The patient's chief complaint is long, thick toenails .   This patient has documented high risk feet requiring routine maintenance secondary to diabetes mellitis and those secondary complications of diabetes, as mentioned..    PCP: Dominique Rendon MD  Date Last Seen by PCP:  Chief Complaint   Patient presents with    PCP     Dominique Rendon MD 11/19/18    Diabetic Foot Exam    Nail Problem    Nail Care       Current shoe gear:  sandals    Hemoglobin A1C   Date Value Ref Range Status   11/19/2018 7.5 (H) 4.0 - 5.6 % Final     Comment:     ADA Screening Guidelines:  5.7-6.4%  Consistent with prediabetes  >or=6.5%  Consistent with diabetes  High levels of fetal hemoglobin interfere with the HbA1C  assay. Heterozygous hemoglobin variants (HbS, HgC, etc)do  not significantly interfere with this assay.   However, presence of multiple variants may affect accuracy.     08/20/2018 5.6 4.0 - 5.6 % Final      Comment:     ADA Screening Guidelines:  5.7-6.4%  Consistent with prediabetes  >or=6.5%  Consistent with diabetes  High levels of fetal hemoglobin interfere with the HbA1C  assay. Heterozygous hemoglobin variants (HbS, HgC, etc)do  not significantly interfere with this assay.   However, presence of multiple variants may affect accuracy.     01/15/2018 5.9 (H) 4.0 - 5.6 % Final     Comment:     According to ADA guidelines, hemoglobin A1c <7.0% represents  optimal control in non-pregnant diabetic patients. Different  metrics may apply to specific patient populations.   Standards of Medical Care in Diabetes-2016.  For the purpose of screening for the presence of diabetes:  <5.7%     Consistent with the absence of diabetes  5.7-6.4%  Consistent with increasing risk for diabetes   (prediabetes)  >or=6.5%  Consistent with diabetes  Currently, no consensus exists for use of hemoglobin A1c  for diagnosis of diabetes for children.  This Hemoglobin A1c assay has significant interference with fetal   hemoglobin   (HbF). The results are invalid for patients with abnormal amounts of   HbF,   including those with known Hereditary Persistence   of Fetal Hemoglobin. Heterozygous hemoglobin variants (HbAS, HbAC,   HbAD, HbAE, HbA2) do not significantly interfere with this assay;   however, presence of multiple variants in a sample may impact the %   interference.         Review of Systems   Constitution: Negative for chills, decreased appetite and fever.   Cardiovascular: Negative for chest pain, claudication and leg swelling.   Respiratory: Negative for cough.    Skin: Positive for dry skin and nail changes. Negative for color change, flushing, itching, poor wound healing, rash and skin cancer.   Musculoskeletal: Negative for arthritis, back pain, falls, gout, joint swelling and myalgias.   Gastrointestinal: Negative for nausea and vomiting.   Neurological: Positive for numbness. Negative for loss of balance and paresthesias.            Objective:      Physical Exam   Constitutional: She is oriented to person, place, and time. She appears well-developed and well-nourished. No distress.   Cardiovascular:   Pulses:       Dorsalis pedis pulses are 1+ on the right side, and 1+ on the left side.        Posterior tibial pulses are 1+ on the right side, and 1+ on the left side.   Toes are cool to touch. Feet are warm proximally.There is decreased digital hair. Skin is atrophic, slightly hyperpigmented, and mildly edematous       Musculoskeletal: Normal range of motion. She exhibits no edema, tenderness or deformity.   Adequate joint range of motion without pain, limitation, nor crepitation Bilateral feet and ankle joints. Muscle strength is 5/5 in all groups bilaterally.         Neurological: She is alert and oriented to person, place, and time.   [place] 5.07 monofilamant testing is diminished Carlos feet. Sharp/dull sensation diminished Bilaterally. Light touch absent Bilaterally.       Skin: Skin is warm, dry and intact. No abrasion, no bruising, no burn, no ecchymosis and no lesion noted. No erythema. No pallor.   Nails x 10  are elongated by  2-4mm's, thickened by 2-4 mm's, dystrophic, and are darkened in  coloration . Xerosis Bilaterally. No open lesions noted.    Hyperkeratotic tissue noted to sub 1st mpj left and plantar medial 1st hallux IPJ.    Psychiatric: She has a normal mood and affect. Her behavior is normal.   Vitals reviewed.            Assessment:       Encounter Diagnoses   Name Primary?    Diabetic polyneuropathy associated with type 2 diabetes mellitus Yes    Onychomycosis due to dermatophyte     Corn or callus          Plan:       Marlen was seen today for pcp, diabetic foot exam, nail problem and nail care.    Diagnoses and all orders for this visit:    Diabetic polyneuropathy associated with type 2 diabetes mellitus    Onychomycosis due to dermatophyte    Corn or callus      I counseled the patient on her  conditions, their implications and medical management.        - Shoe inspection. Diabetic Foot Education. Patient reminded of the importance of good nutrition and blood sugar control to help prevent podiatric complications of diabetes. Patient instructed on proper foot hygeine. We discussed wearing proper shoe gear, daily foot inspections, never walking without protective shoe gear, never putting sharp instruments to feet, routine podiatric nail visits every 2-3 months.      - With patient's permission, nails were aggressively reduced and debrided x 10 to their soft tissue attachment mechanically and with electric , removing all offending nail and debris. Patient relates relief following the procedure. She will continue to monitor the areas daily, inspect her feet, wear protective shoe gear when ambulatory, moisturizer to maintain skin integrity and follow in this office in approximately 2-3 months, sooner p.r.n.    - After cleansing the  area w/ alcohol prep pad the above mentioned hyperkeratosis was trimmed utilizing No 15 scapel, to a smooth base with out incident. Patient tolerated this  well and reported comfort to the area of sub 1st MPJ left and left hallux plantar medial IPJ.

## 2019-04-05 DIAGNOSIS — E11.42 DIABETIC POLYNEUROPATHY ASSOCIATED WITH TYPE 2 DIABETES MELLITUS: ICD-10-CM

## 2019-04-20 ENCOUNTER — PATIENT MESSAGE (OUTPATIENT)
Dept: CARDIOLOGY | Facility: CLINIC | Age: 74
End: 2019-04-20

## 2019-04-22 ENCOUNTER — TELEPHONE (OUTPATIENT)
Dept: CARDIOLOGY | Facility: CLINIC | Age: 74
End: 2019-04-22

## 2019-04-22 DIAGNOSIS — I35.0 AORTIC VALVE STENOSIS, ETIOLOGY OF CARDIAC VALVE DISEASE UNSPECIFIED: Primary | ICD-10-CM

## 2019-04-29 ENCOUNTER — LAB VISIT (OUTPATIENT)
Dept: LAB | Facility: HOSPITAL | Age: 74
End: 2019-04-29
Attending: INTERNAL MEDICINE
Payer: MEDICARE

## 2019-04-29 DIAGNOSIS — I10 HYPERTENSION, ESSENTIAL: ICD-10-CM

## 2019-04-29 DIAGNOSIS — E11.59 HYPERTENSION ASSOCIATED WITH DIABETES: ICD-10-CM

## 2019-04-29 DIAGNOSIS — E78.5 DYSLIPIDEMIA ASSOCIATED WITH TYPE 2 DIABETES MELLITUS: ICD-10-CM

## 2019-04-29 DIAGNOSIS — I15.2 HYPERTENSION ASSOCIATED WITH DIABETES: ICD-10-CM

## 2019-04-29 DIAGNOSIS — E11.69 DYSLIPIDEMIA ASSOCIATED WITH TYPE 2 DIABETES MELLITUS: ICD-10-CM

## 2019-04-29 DIAGNOSIS — I25.10 CORONARY ARTERY DISEASE INVOLVING NATIVE CORONARY ARTERY OF NATIVE HEART WITHOUT ANGINA PECTORIS: ICD-10-CM

## 2019-04-29 LAB
ALBUMIN SERPL BCP-MCNC: 3.5 G/DL (ref 3.5–5.2)
ALP SERPL-CCNC: 58 U/L (ref 55–135)
ALT SERPL W/O P-5'-P-CCNC: 16 U/L (ref 10–44)
ANION GAP SERPL CALC-SCNC: 9 MMOL/L (ref 8–16)
AST SERPL-CCNC: 17 U/L (ref 10–40)
BILIRUB SERPL-MCNC: 0.3 MG/DL (ref 0.1–1)
BUN SERPL-MCNC: 22 MG/DL (ref 8–23)
CALCIUM SERPL-MCNC: 9.8 MG/DL (ref 8.7–10.5)
CHLORIDE SERPL-SCNC: 106 MMOL/L (ref 95–110)
CHOLEST SERPL-MCNC: 187 MG/DL (ref 120–199)
CHOLEST/HDLC SERPL: 2.1 {RATIO} (ref 2–5)
CO2 SERPL-SCNC: 25 MMOL/L (ref 23–29)
CREAT SERPL-MCNC: 1 MG/DL (ref 0.5–1.4)
EST. GFR  (AFRICAN AMERICAN): >60 ML/MIN/1.73 M^2
EST. GFR  (NON AFRICAN AMERICAN): 56 ML/MIN/1.73 M^2
ESTIMATED AVG GLUCOSE: 197 MG/DL (ref 68–131)
GLUCOSE SERPL-MCNC: 159 MG/DL (ref 70–110)
HBA1C MFR BLD HPLC: 8.5 % (ref 4–5.6)
HDLC SERPL-MCNC: 88 MG/DL (ref 40–75)
HDLC SERPL: 47.1 % (ref 20–50)
LDLC SERPL CALC-MCNC: 79.2 MG/DL (ref 63–159)
NONHDLC SERPL-MCNC: 99 MG/DL
POTASSIUM SERPL-SCNC: 4.8 MMOL/L (ref 3.5–5.1)
PROT SERPL-MCNC: 6.2 G/DL (ref 6–8.4)
SODIUM SERPL-SCNC: 140 MMOL/L (ref 136–145)
TRIGL SERPL-MCNC: 99 MG/DL (ref 30–150)

## 2019-04-29 PROCEDURE — 80053 COMPREHEN METABOLIC PANEL: CPT | Mod: HCNC

## 2019-04-29 PROCEDURE — 80061 LIPID PANEL: CPT | Mod: HCNC

## 2019-04-29 PROCEDURE — 36415 COLL VENOUS BLD VENIPUNCTURE: CPT | Mod: HCNC,PO

## 2019-04-29 PROCEDURE — 83036 HEMOGLOBIN GLYCOSYLATED A1C: CPT | Mod: HCNC

## 2019-04-30 ENCOUNTER — TELEPHONE (OUTPATIENT)
Dept: CARDIOLOGY | Facility: CLINIC | Age: 74
End: 2019-04-30

## 2019-04-30 NOTE — TELEPHONE ENCOUNTER
----- Message from Isatu Ziegler MD sent at 4/30/2019  8:36 AM CDT -----  Please review.  We will discuss the results during your upcoming visit with me. It looks good.

## 2019-05-02 ENCOUNTER — TELEPHONE (OUTPATIENT)
Dept: CARDIOLOGY | Facility: CLINIC | Age: 74
End: 2019-05-02

## 2019-05-02 ENCOUNTER — CLINICAL SUPPORT (OUTPATIENT)
Dept: CARDIOLOGY | Facility: CLINIC | Age: 74
End: 2019-05-02
Attending: INTERNAL MEDICINE
Payer: MEDICARE

## 2019-05-02 VITALS
BODY MASS INDEX: 25.6 KG/M2 | DIASTOLIC BLOOD PRESSURE: 84 MMHG | SYSTOLIC BLOOD PRESSURE: 200 MMHG | HEIGHT: 59 IN | HEART RATE: 67 BPM | WEIGHT: 127 LBS

## 2019-05-02 DIAGNOSIS — E11.69 DYSLIPIDEMIA ASSOCIATED WITH TYPE 2 DIABETES MELLITUS: ICD-10-CM

## 2019-05-02 DIAGNOSIS — I10 ESSENTIAL HYPERTENSION: ICD-10-CM

## 2019-05-02 DIAGNOSIS — E78.5 DYSLIPIDEMIA ASSOCIATED WITH TYPE 2 DIABETES MELLITUS: ICD-10-CM

## 2019-05-02 DIAGNOSIS — E11.59 HYPERTENSION ASSOCIATED WITH DIABETES: ICD-10-CM

## 2019-05-02 DIAGNOSIS — I67.9 CEREBROVASCULAR DISEASE: ICD-10-CM

## 2019-05-02 DIAGNOSIS — I35.0 AORTIC VALVE STENOSIS, ETIOLOGY OF CARDIAC VALVE DISEASE UNSPECIFIED: Primary | ICD-10-CM

## 2019-05-02 DIAGNOSIS — I25.10 CORONARY ARTERY DISEASE INVOLVING NATIVE CORONARY ARTERY OF NATIVE HEART WITHOUT ANGINA PECTORIS: ICD-10-CM

## 2019-05-02 DIAGNOSIS — I10 HYPERTENSION, UNSPECIFIED TYPE: Primary | ICD-10-CM

## 2019-05-02 DIAGNOSIS — I15.2 HYPERTENSION ASSOCIATED WITH DIABETES: ICD-10-CM

## 2019-05-02 DIAGNOSIS — I65.23 BILATERAL CAROTID ARTERY STENOSIS: ICD-10-CM

## 2019-05-02 DIAGNOSIS — I35.0 AORTIC VALVE STENOSIS, ETIOLOGY OF CARDIAC VALVE DISEASE UNSPECIFIED: ICD-10-CM

## 2019-05-02 DIAGNOSIS — Z86.73 HISTORY OF CVA (CEREBROVASCULAR ACCIDENT): ICD-10-CM

## 2019-05-02 DIAGNOSIS — I10 HYPERTENSION, ESSENTIAL: ICD-10-CM

## 2019-05-02 LAB
ASCENDING AORTA: 2.61 CM
AV INDEX (PROSTH): 0.31
AV MEAN GRADIENT: 25.39 MMHG
AV PEAK GRADIENT: 39.94 MMHG
AV VALVE AREA: 1.09 CM2
AV VELOCITY RATIO: 0.31
BSA FOR ECHO PROCEDURE: 1.55 M2
CV ECHO LV RWT: 0.38 CM
DOP CALC AO PEAK VEL: 3.16 M/S
DOP CALC AO VTI: 93.88 CM
DOP CALC LVOT AREA: 3.49 CM2
DOP CALC LVOT DIAMETER: 2.11 CM
DOP CALC LVOT PEAK VEL: 0.97 M/S
DOP CALC LVOT STROKE VOLUME: 101.98 CM3
DOP CALCLVOT PEAK VEL VTI: 29.18 CM
E WAVE DECELERATION TIME: 287.74 MSEC
E/A RATIO: 0.74
E/E' RATIO: 12.46
ECHO LV POSTERIOR WALL: 0.85 CM (ref 0.6–1.1)
FRACTIONAL SHORTENING: 30 % (ref 28–44)
INTERVENTRICULAR SEPTUM: 0.87 CM (ref 0.6–1.1)
IVRT: 0.09 MSEC
LA MAJOR: 5.1 CM
LA MINOR: 4.9 CM
LA WIDTH: 4 CM
LEFT ATRIUM SIZE: 4 CM
LEFT ATRIUM VOLUME INDEX: 44.7 ML/M2
LEFT ATRIUM VOLUME: 67.97 CM3
LEFT INTERNAL DIMENSION IN SYSTOLE: 3.15 CM (ref 2.1–4)
LEFT VENTRICLE DIASTOLIC VOLUME INDEX: 60.24 ML/M2
LEFT VENTRICLE DIASTOLIC VOLUME: 91.59 ML
LEFT VENTRICLE MASS INDEX: 81.6 G/M2
LEFT VENTRICLE SYSTOLIC VOLUME INDEX: 25.9 ML/M2
LEFT VENTRICLE SYSTOLIC VOLUME: 39.33 ML
LEFT VENTRICULAR INTERNAL DIMENSION IN DIASTOLE: 4.48 CM (ref 3.5–6)
LEFT VENTRICULAR MASS: 124.08 G
LV LATERAL E/E' RATIO: 11.57
LV SEPTAL E/E' RATIO: 13.5
MV PEAK A VEL: 1.1 M/S
MV PEAK E VEL: 0.81 M/S
PISA TR MAX VEL: 2.21 M/S
PULM VEIN S/D RATIO: 1.72
PV PEAK D VEL: 0.32 M/S
PV PEAK S VEL: 0.55 M/S
RA MAJOR: 4.16 CM
RA PRESSURE: 3 MMHG
RA WIDTH: 3.99 CM
RIGHT VENTRICULAR END-DIASTOLIC DIMENSION: 3.6 CM
SINUS: 2.22 CM
STJ: 2.05 CM
TDI LATERAL: 0.07
TDI SEPTAL: 0.06
TDI: 0.07
TR MAX PG: 19.54 MMHG
TRICUSPID ANNULAR PLANE SYSTOLIC EXCURSION: 2.23 CM
TV REST PULMONARY ARTERY PRESSURE: 23 MMHG

## 2019-05-02 PROCEDURE — 93306 TRANSTHORACIC ECHO (TTE) COMPLETE (CUPID ONLY): ICD-10-PCS | Mod: HCNC,S$GLB,, | Performed by: INTERNAL MEDICINE

## 2019-05-02 PROCEDURE — 99999 PR PBB SHADOW E&M-EST. PATIENT-LVL II: CPT | Mod: PBBFAC,HCNC,,

## 2019-05-02 PROCEDURE — 93306 TTE W/DOPPLER COMPLETE: CPT | Mod: HCNC,S$GLB,, | Performed by: INTERNAL MEDICINE

## 2019-05-02 PROCEDURE — 99999 PR PBB SHADOW E&M-EST. PATIENT-LVL II: ICD-10-PCS | Mod: PBBFAC,HCNC,,

## 2019-05-02 RX ORDER — LOSARTAN POTASSIUM 50 MG/1
50 TABLET ORAL DAILY
COMMUNITY
End: 2019-05-02 | Stop reason: SDUPTHER

## 2019-05-02 RX ORDER — NITROGLYCERIN 400 UG/1
1 SPRAY ORAL ONCE
Status: COMPLETED | OUTPATIENT
Start: 2019-05-02 | End: 2019-05-02

## 2019-05-02 RX ORDER — LOSARTAN POTASSIUM 50 MG/1
50 TABLET ORAL DAILY
Qty: 30 TABLET | Refills: 6 | Status: SHIPPED | OUTPATIENT
Start: 2019-05-02 | End: 2019-05-06 | Stop reason: DRUGHIGH

## 2019-05-02 RX ADMIN — NITROGLYCERIN 1 SPRAY: 400 SPRAY ORAL at 11:05

## 2019-05-02 NOTE — TELEPHONE ENCOUNTER
----- Message from Isatu Ziegler MD sent at 5/2/2019 12:00 PM CDT -----  Please inform the patient that heart echo looks good. Valve is working well and heart function is normal.

## 2019-05-02 NOTE — PROGRESS NOTES
Patient here today for Echo. BP taken and noted to be 200/84. Dr. Ziegler notified. Telephone order received to administer 1 spray of NTG to get BP down to 150 systolic. NTG administered as ordered.     1145. Rechecked /78. Dr. Ziegler notified. Order received to administer 1 spray of NTG.SL. Given as ordered.     1200 Rechecked BP. Noted to be 128/78. Patient was discharged to home with daughter in not acute distress.

## 2019-05-06 ENCOUNTER — OFFICE VISIT (OUTPATIENT)
Dept: CARDIOLOGY | Facility: CLINIC | Age: 74
End: 2019-05-06
Payer: MEDICARE

## 2019-05-06 ENCOUNTER — OFFICE VISIT (OUTPATIENT)
Dept: INTERNAL MEDICINE | Facility: CLINIC | Age: 74
End: 2019-05-06
Payer: MEDICARE

## 2019-05-06 VITALS
BODY MASS INDEX: 26 KG/M2 | HEIGHT: 59 IN | WEIGHT: 129 LBS | DIASTOLIC BLOOD PRESSURE: 62 MMHG | HEART RATE: 67 BPM | SYSTOLIC BLOOD PRESSURE: 134 MMHG

## 2019-05-06 VITALS
WEIGHT: 127.88 LBS | OXYGEN SATURATION: 98 % | DIASTOLIC BLOOD PRESSURE: 60 MMHG | HEART RATE: 67 BPM | BODY MASS INDEX: 25.78 KG/M2 | HEIGHT: 59 IN | SYSTOLIC BLOOD PRESSURE: 132 MMHG

## 2019-05-06 DIAGNOSIS — R41.3 POOR SHORT TERM MEMORY: ICD-10-CM

## 2019-05-06 DIAGNOSIS — E11.59 TYPE 2 DIABETES MELLITUS WITH OTHER CIRCULATORY COMPLICATION, WITHOUT LONG-TERM CURRENT USE OF INSULIN: ICD-10-CM

## 2019-05-06 DIAGNOSIS — I67.9 CEREBROVASCULAR DISEASE: ICD-10-CM

## 2019-05-06 DIAGNOSIS — I70.0 STENOSIS OF INFRARENAL ABDOMINAL AORTA DUE TO ATHEROSCLEROSIS: ICD-10-CM

## 2019-05-06 DIAGNOSIS — I35.9 AORTIC VALVE DISEASE: ICD-10-CM

## 2019-05-06 DIAGNOSIS — I15.2 HYPERTENSION ASSOCIATED WITH DIABETES: Chronic | ICD-10-CM

## 2019-05-06 DIAGNOSIS — F01.50 VASCULAR DEMENTIA WITHOUT BEHAVIORAL DISTURBANCE: ICD-10-CM

## 2019-05-06 DIAGNOSIS — Z86.73 HISTORY OF CVA (CEREBROVASCULAR ACCIDENT): ICD-10-CM

## 2019-05-06 DIAGNOSIS — I25.10 CORONARY ARTERY DISEASE INVOLVING NATIVE CORONARY ARTERY OF NATIVE HEART WITHOUT ANGINA PECTORIS: Primary | Chronic | ICD-10-CM

## 2019-05-06 DIAGNOSIS — I65.23 BILATERAL CAROTID ARTERY STENOSIS: ICD-10-CM

## 2019-05-06 DIAGNOSIS — E11.42 DIABETIC POLYNEUROPATHY ASSOCIATED WITH TYPE 2 DIABETES MELLITUS: ICD-10-CM

## 2019-05-06 DIAGNOSIS — I70.0 ATHEROSCLEROSIS OF AORTA: ICD-10-CM

## 2019-05-06 DIAGNOSIS — Z12.11 COLON CANCER SCREENING: ICD-10-CM

## 2019-05-06 DIAGNOSIS — I73.9 PVD (PERIPHERAL VASCULAR DISEASE): Chronic | ICD-10-CM

## 2019-05-06 DIAGNOSIS — D64.9 ANEMIA, UNSPECIFIED TYPE: ICD-10-CM

## 2019-05-06 DIAGNOSIS — E11.59 HYPERTENSION ASSOCIATED WITH DIABETES: Chronic | ICD-10-CM

## 2019-05-06 DIAGNOSIS — F01.50 VASCULAR DEMENTIA WITHOUT BEHAVIORAL DISTURBANCE: Primary | ICD-10-CM

## 2019-05-06 PROCEDURE — 1101F PR PT FALLS ASSESS DOC 0-1 FALLS W/OUT INJ PAST YR: ICD-10-PCS | Mod: HCNC,CPTII,S$GLB, | Performed by: INTERNAL MEDICINE

## 2019-05-06 PROCEDURE — 3075F PR MOST RECENT SYSTOLIC BLOOD PRESS GE 130-139MM HG: ICD-10-PCS | Mod: HCNC,CPTII,S$GLB, | Performed by: INTERNAL MEDICINE

## 2019-05-06 PROCEDURE — 99999 PR PBB SHADOW E&M-EST. PATIENT-LVL III: ICD-10-PCS | Mod: PBBFAC,HCNC,, | Performed by: INTERNAL MEDICINE

## 2019-05-06 PROCEDURE — 3075F SYST BP GE 130 - 139MM HG: CPT | Mod: HCNC,CPTII,S$GLB, | Performed by: INTERNAL MEDICINE

## 2019-05-06 PROCEDURE — 99214 PR OFFICE/OUTPT VISIT, EST, LEVL IV, 30-39 MIN: ICD-10-PCS | Mod: 25,HCNC,S$GLB, | Performed by: INTERNAL MEDICINE

## 2019-05-06 PROCEDURE — 99499 RISK ADDL DX/OHS AUDIT: ICD-10-PCS | Mod: S$GLB,,, | Performed by: INTERNAL MEDICINE

## 2019-05-06 PROCEDURE — 99214 OFFICE O/P EST MOD 30 MIN: CPT | Mod: HCNC,S$GLB,, | Performed by: INTERNAL MEDICINE

## 2019-05-06 PROCEDURE — 99214 PR OFFICE/OUTPT VISIT, EST, LEVL IV, 30-39 MIN: ICD-10-PCS | Mod: HCNC,S$GLB,, | Performed by: INTERNAL MEDICINE

## 2019-05-06 PROCEDURE — 1101F PT FALLS ASSESS-DOCD LE1/YR: CPT | Mod: HCNC,CPTII,S$GLB, | Performed by: INTERNAL MEDICINE

## 2019-05-06 PROCEDURE — 99999 PR PBB SHADOW E&M-EST. PATIENT-LVL III: CPT | Mod: PBBFAC,HCNC,, | Performed by: INTERNAL MEDICINE

## 2019-05-06 PROCEDURE — 99499 UNLISTED E&M SERVICE: CPT | Mod: S$GLB,,, | Performed by: INTERNAL MEDICINE

## 2019-05-06 PROCEDURE — 99214 OFFICE O/P EST MOD 30 MIN: CPT | Mod: 25,HCNC,S$GLB, | Performed by: INTERNAL MEDICINE

## 2019-05-06 PROCEDURE — 3045F PR MOST RECENT HEMOGLOBIN A1C LEVEL 7.0-9.0%: ICD-10-PCS | Mod: HCNC,CPTII,S$GLB, | Performed by: INTERNAL MEDICINE

## 2019-05-06 PROCEDURE — 3045F PR MOST RECENT HEMOGLOBIN A1C LEVEL 7.0-9.0%: CPT | Mod: HCNC,CPTII,S$GLB, | Performed by: INTERNAL MEDICINE

## 2019-05-06 PROCEDURE — 3078F PR MOST RECENT DIASTOLIC BLOOD PRESSURE < 80 MM HG: ICD-10-PCS | Mod: HCNC,CPTII,S$GLB, | Performed by: INTERNAL MEDICINE

## 2019-05-06 PROCEDURE — 3078F DIAST BP <80 MM HG: CPT | Mod: HCNC,CPTII,S$GLB, | Performed by: INTERNAL MEDICINE

## 2019-05-06 RX ORDER — LOSARTAN POTASSIUM 100 MG/1
100 TABLET ORAL DAILY
Qty: 90 TABLET | Refills: 3 | Status: SHIPPED | OUTPATIENT
Start: 2019-05-06 | End: 2020-03-12 | Stop reason: DRUGHIGH

## 2019-05-06 RX ORDER — MEMANTINE HYDROCHLORIDE 10 MG/1
10 TABLET ORAL 2 TIMES DAILY
Qty: 60 TABLET | Refills: 11 | Status: SHIPPED | OUTPATIENT
Start: 2019-05-06 | End: 2020-05-07

## 2019-05-06 RX ORDER — LOSARTAN POTASSIUM 100 MG/1
100 TABLET ORAL DAILY
COMMUNITY
End: 2019-05-06 | Stop reason: SDUPTHER

## 2019-05-06 NOTE — PROGRESS NOTES
"Subjective:   Patient ID:  June B Yris is a 73 y.o. female who presents for follow-up of Coronary Artery Disease and Hypertension      HPI: Doing well. BP improved on Losartan, but records from home still indicate elevated BP at evening.  Diabetes is uncontrolled.      Lab Results   Component Value Date     04/29/2019    K 4.8 04/29/2019     04/29/2019    CO2 25 04/29/2019    BUN 22 04/29/2019    CREATININE 1.0 04/29/2019     (H) 04/29/2019    HGBA1C 8.5 (H) 04/29/2019    MG 2.0 08/20/2018    AST 17 04/29/2019    ALT 16 04/29/2019    ALBUMIN 3.5 04/29/2019    PROT 6.2 04/29/2019    BILITOT 0.3 04/29/2019    WBC 5.90 01/03/2019    HGB 11.5 (L) 01/03/2019    HCT 35.3 (L) 01/03/2019    HCT 24 (L) 08/08/2013    MCV 88 01/03/2019     01/03/2019    INR 1.0 08/14/2013    TSH 2.347 08/20/2018    CHOL 187 04/29/2019    HDL 88 (H) 04/29/2019    LDLCALC 79.2 04/29/2019    TRIG 99 04/29/2019       Review of Systems   Musculoskeletal: Positive for arthritis and back pain.   Psychiatric/Behavioral: Positive for memory loss.       Objective:   Physical Exam   Constitutional: She is oriented to person, place, and time. She appears well-developed and well-nourished.   /62   Pulse 67   Ht 4' 11" (1.499 m)   Wt 58.5 kg (128 lb 15.5 oz)   BMI 26.05 kg/m²      HENT:   Head: Normocephalic.   Eyes: Pupils are equal, round, and reactive to light.   Neck: Normal range of motion. Neck supple. Carotid bruit is present. No thyromegaly present.   Cardiovascular: Normal rate, regular rhythm and intact distal pulses. Exam reveals no gallop and no friction rub.   Murmur heard.   Midsystolic murmur is present with a grade of 2/6 at the upper right sternal border radiating to the neck.  Pulses:       Carotid pulses are 2+ on the right side with bruit, and 2+ on the left side with bruit.       Radial pulses are 2+ on the right side, and 2+ on the left side.        Femoral pulses are 2+ on the right side, and 2+ " on the left side.       Popliteal pulses are 2+ on the right side, and 2+ on the left side.        Dorsalis pedis pulses are 1+ on the right side, and 1+ on the left side.        Posterior tibial pulses are 1+ on the right side, and 1+ on the left side.   Pulmonary/Chest: Effort normal and breath sounds normal. No respiratory distress. She has no wheezes. She has no rales. She exhibits no tenderness.   Abdominal: Soft. Bowel sounds are normal.   Musculoskeletal: Normal range of motion. She exhibits no edema.   Neurological: She is alert and oriented to person, place, and time.   Skin: Skin is warm and dry.   Psychiatric: She has a normal mood and affect.   Nursing note and vitals reviewed.        Assessment and Plan:     Problem List Items Addressed This Visit        Cardiology Problems    Hypertension associated with diabetes (Chronic)    PVD (peripheral vascular disease) (Chronic)    Coronary artery disease involving native coronary artery of native heart without angina pectoris - Primary (Chronic)    Stenosis of infrarenal abdominal aorta due to atherosclerosis    Bilateral carotid artery disease    Atherosclerosis of aorta    Cerebrovascular disease    Aortic valve disease       Other    Poor short term memory    History of CVA (cerebrovascular accident)    Vascular dementia without behavioral disturbance    Diabetic polyneuropathy associated with type 2 diabetes mellitus          Patient's Medications   New Prescriptions    No medications on file   Previous Medications    ACETAMINOPHEN (TYLENOL) 325 MG TABLET    Take 325 mg by mouth daily as needed. Half Tablet Oral Every day as needed    ASCORBIC ACID (VITAMIN C) 1000 MG TABLET        ASPIRIN 81 MG CHEW    Take 81 mg by mouth once daily.    BLOOD-GLUCOSE METER MISC    2 each by Misc.(Non-Drug; Combo Route) route 2 (two) times daily.    COENZYME Q10 (COQ-10) 100 MG CAPSULE    Take 100 mg by mouth Daily. 1 Capsule Oral Every day    DAILY MULTIVITAMIN TABLET     Take 1 tablet by mouth Daily. 1 Tablet Oral Every day    DONEPEZIL (ARICEPT) 10 MG TABLET    Take 1 tablet (10 mg total) by mouth every evening.    GLIPIZIDE (GLUCOTROL) 2.5 MG TR24    Take 1 tablet (2.5 mg total) by mouth daily with breakfast.    HYDROCHLOROTHIAZIDE (MICROZIDE) 12.5 MG CAPSULE    Take 1 capsule (12.5 mg total) by mouth once daily.    HYDROCODONE-ACETAMINOPHEN 5-325MG (NORCO) 5-325 MG PER TABLET    Take 1 tablet by mouth every 6 (six) hours as needed for Pain.    LABETALOL (NORMODYNE) 200 MG TABLET    Take 2 tablets (400 mg total) by mouth 2 (two) times daily.    LANCETS 30 GAUGE MISC    1 lancet by Misc.(Non-Drug; Combo Route) route 4 (four) times daily.    LOSARTAN (COZAAR) 50 MG TABLET    Take 1 tablet (50 mg total) by mouth once daily.    MAGNESIUM 200 MG TAB    Take 200 mg by mouth once daily.    MEMANTINE (NAMENDA) 5 MG TAB    Take 1 tablet (5 mg total) by mouth 2 (two) times daily.    METFORMIN (GLUCOPHAGE) 1000 MG TABLET    TAKE ONE TABLET (1,000 MG TOTAL) BY MOUTH TWICE DAILY WITH MEALS    NITROGLYCERIN (NITROSTAT) 0.4 MG SL TABLET    Place 1 tablet (0.4 mg total) under the tongue every 5 (five) minutes as needed for Chest pain.    OMEGA-3 FATTY ACIDS-VITAMIN E (FISH OIL) 1,000 MG CAP        PHENOBARBITAL (LUMINAL) 64.8 MG TABLET    TAKE ONE TABLET BY MOUTH AT BEDTIME    ROSUVASTATIN (CRESTOR) 10 MG TABLET    TAKE ONE TABLET (10 MG TOTAL) BY MOUTH ONCE DAILY    TRUE METRIX GLUCOSE TEST STRIP STRP    1 strip by Misc.(Non-Drug; Combo Route) route 4 (four) times daily.    VITAMIN D2 50,000 UNIT CAPSULE    TAKE ONE CAPSULE BY MOUTH ONCE A WEEK   Modified Medications    No medications on file   Discontinued Medications    No medications on file     Discussed digital blood pressure program. Patient and her daughter declined.  Increase Losartan to 100 mg daily and continue monitoring BP.  Repeat Carotid duplex and refer back to Dr. Serrano to re-evalute (remote CVA, s/p right CEA in 2004 by   Darrell, now left 70-80% stenosis with progressive vascular dementia).  Diabetes management as per Dr. Rendon.  No follow-ups on file.

## 2019-05-06 NOTE — PROGRESS NOTES
Subjective:       Patient ID: June B Yris is a 73 y.o. female.    Chief Complaint: Annual Exam    HPI   Dr Ziegler just adjusted medication, as BP recently elevated.      DM is uncontrolled.  She is not checking sugars.      Daughter, Becky, doesn't think insulin is an option, as no one reliable at home to manage.  Daughter stopped trulicity.    H/o vascular dementia.   Taking Namenda and     Forgets to take medications.  Becky thinks memory is progressively worse.     Neuropsych testing 2/1/18 Consult Dx:  Major Neurocognitive Disorder, Likely due to vascular disease (possible Alzheimer's component, but needs re-eval in 12 months)     ROSAURA Be II, Ph.D., ABPP-CN  Board Certified Clinical Neuropsychologist  Co-Director, Cognitive Disorders and Brain Health Program  Department of Neurology and Neurosciences  [facility]    Stable anemia.    Review of Systems   Constitutional: Negative for fever and unexpected weight change.   HENT: Negative for congestion and postnasal drip.    Eyes: Negative for pain, discharge and visual disturbance.   Respiratory: Negative for cough, chest tightness, shortness of breath and wheezing.    Cardiovascular: Negative for chest pain and leg swelling.   Gastrointestinal: Negative for abdominal pain, anal bleeding, constipation, diarrhea, nausea and vomiting.   Genitourinary: Negative for difficulty urinating, dysuria, hematuria and urgency.   Skin: Negative for rash.   Neurological: Negative for headaches.   Psychiatric/Behavioral: Negative for dysphoric mood and sleep disturbance. The patient is not nervous/anxious.        Objective:      Physical Exam   Constitutional: She is oriented to person, place, and time. She appears well-developed and well-nourished.   Eyes: No scleral icterus.   Neck: No JVD present. No thyromegaly present.   Cardiovascular: Normal rate, regular rhythm and normal heart sounds.   Pulmonary/Chest: Effort normal and breath sounds normal. No  respiratory distress. She has no wheezes. She has no rales.   Abdominal: Soft. She exhibits no mass. There is no tenderness.   Musculoskeletal: She exhibits no edema.   Neurological: She is alert and oriented to person, place, and time.   Psychiatric: She has a normal mood and affect. Her behavior is normal.       Lab Results   Component Value Date    WBC 5.90 01/03/2019    HGB 11.5 (L) 01/03/2019    HCT 35.3 (L) 01/03/2019     01/03/2019    CHOL 187 04/29/2019    TRIG 99 04/29/2019    HDL 88 (H) 04/29/2019    ALT 16 04/29/2019    AST 17 04/29/2019     04/29/2019    K 4.8 04/29/2019     04/29/2019    CREATININE 1.0 04/29/2019    BUN 22 04/29/2019    CO2 25 04/29/2019    TSH 2.347 08/20/2018    INR 1.0 08/14/2013    HGBA1C 8.5 (H) 04/29/2019     Assessment:       1. Vascular dementia without behavioral disturbance    2. Type 2 diabetes mellitus with other circulatory complication, without long-term current use of insulin    3. Poor short term memory    4. Anemia, unspecified type    5. Colon cancer screening        Plan:       Marlen was seen today for annual exam.    Diagnoses and all orders for this visit:    Vascular dementia without behavioral disturbance  -     memantine (NAMENDA) 10 MG Tab; Take 1 tablet (10 mg total) by mouth 2 (two) times daily.  -     Ambulatory consult to Neurology    Type 2 diabetes mellitus with other circulatory complication, without long-term current use of insulin  -     dulaglutide (TRULICITY) 1.5 mg/0.5 mL PnIj; 1.5 mg sq q week  .  -     Hemoglobin A1c; Future    Poor short term memory    Anemia, unspecified type    Colon cancer screening  -     Fecal Immunochemical Test (iFOBT); Future

## 2019-05-07 ENCOUNTER — PATIENT MESSAGE (OUTPATIENT)
Dept: CARDIOLOGY | Facility: CLINIC | Age: 74
End: 2019-05-07

## 2019-05-10 ENCOUNTER — TELEPHONE (OUTPATIENT)
Dept: CARDIOLOGY | Facility: CLINIC | Age: 74
End: 2019-05-10

## 2019-05-10 NOTE — TELEPHONE ENCOUNTER
----- Message from Isatu Ziegler MD sent at 5/10/2019  9:49 AM CDT -----  There is no change in the amount of carotid artery stenosis, but I would like you to see Dr. Serrano in a follow up

## 2019-05-14 ENCOUNTER — PATIENT MESSAGE (OUTPATIENT)
Dept: CARDIOLOGY | Facility: CLINIC | Age: 74
End: 2019-05-14

## 2019-05-15 ENCOUNTER — LAB VISIT (OUTPATIENT)
Dept: LAB | Facility: HOSPITAL | Age: 74
End: 2019-05-15
Attending: INTERNAL MEDICINE
Payer: MEDICARE

## 2019-05-15 DIAGNOSIS — E78.5 DYSLIPIDEMIA ASSOCIATED WITH TYPE 2 DIABETES MELLITUS: ICD-10-CM

## 2019-05-15 DIAGNOSIS — I15.2 HYPERTENSION ASSOCIATED WITH DIABETES: ICD-10-CM

## 2019-05-15 DIAGNOSIS — I35.0 AORTIC VALVE STENOSIS, ETIOLOGY OF CARDIAC VALVE DISEASE UNSPECIFIED: ICD-10-CM

## 2019-05-15 DIAGNOSIS — I67.9 CEREBROVASCULAR DISEASE: ICD-10-CM

## 2019-05-15 DIAGNOSIS — I25.10 CORONARY ARTERY DISEASE INVOLVING NATIVE CORONARY ARTERY OF NATIVE HEART WITHOUT ANGINA PECTORIS: ICD-10-CM

## 2019-05-15 DIAGNOSIS — I65.23 BILATERAL CAROTID ARTERY STENOSIS: ICD-10-CM

## 2019-05-15 DIAGNOSIS — E11.69 DYSLIPIDEMIA ASSOCIATED WITH TYPE 2 DIABETES MELLITUS: ICD-10-CM

## 2019-05-15 DIAGNOSIS — I10 ESSENTIAL HYPERTENSION: ICD-10-CM

## 2019-05-15 DIAGNOSIS — I10 HYPERTENSION, ESSENTIAL: ICD-10-CM

## 2019-05-15 DIAGNOSIS — E11.59 HYPERTENSION ASSOCIATED WITH DIABETES: ICD-10-CM

## 2019-05-15 DIAGNOSIS — Z86.73 HISTORY OF CVA (CEREBROVASCULAR ACCIDENT): ICD-10-CM

## 2019-05-15 LAB
ANION GAP SERPL CALC-SCNC: 10 MMOL/L (ref 8–16)
BUN SERPL-MCNC: 22 MG/DL (ref 8–23)
CALCIUM SERPL-MCNC: 9.8 MG/DL (ref 8.7–10.5)
CHLORIDE SERPL-SCNC: 102 MMOL/L (ref 95–110)
CO2 SERPL-SCNC: 25 MMOL/L (ref 23–29)
CREAT SERPL-MCNC: 1.4 MG/DL (ref 0.5–1.4)
EST. GFR  (AFRICAN AMERICAN): 43 ML/MIN/1.73 M^2
EST. GFR  (NON AFRICAN AMERICAN): 37.3 ML/MIN/1.73 M^2
GLUCOSE SERPL-MCNC: 226 MG/DL (ref 70–110)
POTASSIUM SERPL-SCNC: 4.6 MMOL/L (ref 3.5–5.1)
SODIUM SERPL-SCNC: 137 MMOL/L (ref 136–145)

## 2019-05-15 PROCEDURE — 36415 COLL VENOUS BLD VENIPUNCTURE: CPT | Mod: HCNC,PO

## 2019-05-15 PROCEDURE — 80048 BASIC METABOLIC PNL TOTAL CA: CPT | Mod: HCNC

## 2019-05-16 ENCOUNTER — TELEPHONE (OUTPATIENT)
Dept: CARDIOLOGY | Facility: CLINIC | Age: 74
End: 2019-05-16

## 2019-05-16 NOTE — TELEPHONE ENCOUNTER
Pt daughter Becky verbalized understanding of her mother's results.     ----- Message from Isatu Ziegler MD sent at 5/16/2019  9:57 AM CDT -----  Please inform the patient that kidney functions have slightly worsen. Please encourage hydration and repeat BMP in 1 week.

## 2019-05-17 ENCOUNTER — TELEPHONE (OUTPATIENT)
Dept: CARDIOLOGY | Facility: CLINIC | Age: 74
End: 2019-05-17

## 2019-05-17 DIAGNOSIS — N18.9 CHRONIC KIDNEY DISEASE, UNSPECIFIED CKD STAGE: Primary | ICD-10-CM

## 2019-05-17 NOTE — TELEPHONE ENCOUNTER
Adivsed daughter Becky about the scheduled appt for mothers' lab     ----- Message from Yanci Dior sent at 5/16/2019 10:43 AM CDT -----  Scheduled pt for labs on 5/22/19 at 10am [place]

## 2019-05-20 ENCOUNTER — TELEPHONE (OUTPATIENT)
Dept: VASCULAR SURGERY | Facility: CLINIC | Age: 74
End: 2019-05-20

## 2019-05-20 DIAGNOSIS — I70.0 STENOSIS OF INFRARENAL ABDOMINAL AORTA DUE TO ATHEROSCLEROSIS: Primary | ICD-10-CM

## 2019-05-20 NOTE — TELEPHONE ENCOUNTER
Contacted patient's daughter to schedule FU with Dr. Serrano for infrarenal aortic stenosis. Appts scheduled, wife verified. Appt letter placed in mail.

## 2019-05-22 ENCOUNTER — LAB VISIT (OUTPATIENT)
Dept: LAB | Facility: HOSPITAL | Age: 74
End: 2019-05-22
Attending: INTERNAL MEDICINE
Payer: MEDICARE

## 2019-05-22 DIAGNOSIS — N18.9 CHRONIC KIDNEY DISEASE, UNSPECIFIED CKD STAGE: ICD-10-CM

## 2019-05-22 PROCEDURE — 36415 COLL VENOUS BLD VENIPUNCTURE: CPT | Mod: PO

## 2019-05-22 PROCEDURE — 80048 BASIC METABOLIC PNL TOTAL CA: CPT

## 2019-05-23 ENCOUNTER — PATIENT MESSAGE (OUTPATIENT)
Dept: CARDIOLOGY | Facility: CLINIC | Age: 74
End: 2019-05-23

## 2019-05-23 LAB
ANION GAP SERPL CALC-SCNC: 12 MMOL/L (ref 8–16)
BUN SERPL-MCNC: 26 MG/DL (ref 8–23)
CALCIUM SERPL-MCNC: 10.2 MG/DL (ref 8.7–10.5)
CHLORIDE SERPL-SCNC: 100 MMOL/L (ref 95–110)
CO2 SERPL-SCNC: 23 MMOL/L (ref 23–29)
CREAT SERPL-MCNC: 1.6 MG/DL (ref 0.5–1.4)
EST. GFR  (AFRICAN AMERICAN): 36.6 ML/MIN/1.73 M^2
EST. GFR  (NON AFRICAN AMERICAN): 31.7 ML/MIN/1.73 M^2
GLUCOSE SERPL-MCNC: 242 MG/DL (ref 70–110)
POTASSIUM SERPL-SCNC: 5.1 MMOL/L (ref 3.5–5.1)
SODIUM SERPL-SCNC: 135 MMOL/L (ref 136–145)

## 2019-05-24 ENCOUNTER — TELEPHONE (OUTPATIENT)
Dept: CARDIOLOGY | Facility: CLINIC | Age: 74
End: 2019-05-24

## 2019-05-24 DIAGNOSIS — E78.5 DYSLIPIDEMIA ASSOCIATED WITH TYPE 2 DIABETES MELLITUS: ICD-10-CM

## 2019-05-24 DIAGNOSIS — I73.9 PVD (PERIPHERAL VASCULAR DISEASE): ICD-10-CM

## 2019-05-24 DIAGNOSIS — N18.9 CHRONIC KIDNEY DISEASE, UNSPECIFIED CKD STAGE: Primary | ICD-10-CM

## 2019-05-24 DIAGNOSIS — I15.2 HYPERTENSION ASSOCIATED WITH DIABETES: ICD-10-CM

## 2019-05-24 DIAGNOSIS — I25.10 CORONARY ARTERY DISEASE INVOLVING NATIVE CORONARY ARTERY OF NATIVE HEART WITHOUT ANGINA PECTORIS: ICD-10-CM

## 2019-05-24 DIAGNOSIS — E11.59 HYPERTENSION ASSOCIATED WITH DIABETES: ICD-10-CM

## 2019-05-24 DIAGNOSIS — E11.69 DYSLIPIDEMIA ASSOCIATED WITH TYPE 2 DIABETES MELLITUS: ICD-10-CM

## 2019-05-24 DIAGNOSIS — I10 HYPERTENSION, UNSPECIFIED TYPE: ICD-10-CM

## 2019-05-24 NOTE — TELEPHONE ENCOUNTER
Becky notified again per Dr. Ziegler that pt kidney functions slightly worsened. Please ask her to go back to the original dose of Losartan ( 50 mg daily) and let's repeat BMP in 1 week. Becky verbalized understanding.

## 2019-05-24 NOTE — TELEPHONE ENCOUNTER
----- Message from Isatu Ziegler MD sent at 5/24/2019  2:19 AM CDT -----  Please inform the patient that   kidney functions slightly worsened. Please ask her to go back to the original dose of Losartan ( 50 mg daily) and let's repeat BMP in 1 week.

## 2019-06-01 ENCOUNTER — LAB VISIT (OUTPATIENT)
Dept: LAB | Facility: HOSPITAL | Age: 74
End: 2019-06-01
Attending: INTERNAL MEDICINE
Payer: MEDICARE

## 2019-06-01 DIAGNOSIS — E11.59 HYPERTENSION ASSOCIATED WITH DIABETES: ICD-10-CM

## 2019-06-01 DIAGNOSIS — I10 HYPERTENSION, UNSPECIFIED TYPE: ICD-10-CM

## 2019-06-01 DIAGNOSIS — I25.10 CORONARY ARTERY DISEASE INVOLVING NATIVE CORONARY ARTERY OF NATIVE HEART WITHOUT ANGINA PECTORIS: ICD-10-CM

## 2019-06-01 DIAGNOSIS — E11.69 DYSLIPIDEMIA ASSOCIATED WITH TYPE 2 DIABETES MELLITUS: ICD-10-CM

## 2019-06-01 DIAGNOSIS — I73.9 PVD (PERIPHERAL VASCULAR DISEASE): ICD-10-CM

## 2019-06-01 DIAGNOSIS — N18.9 CHRONIC KIDNEY DISEASE, UNSPECIFIED CKD STAGE: ICD-10-CM

## 2019-06-01 DIAGNOSIS — I15.2 HYPERTENSION ASSOCIATED WITH DIABETES: ICD-10-CM

## 2019-06-01 DIAGNOSIS — E78.5 DYSLIPIDEMIA ASSOCIATED WITH TYPE 2 DIABETES MELLITUS: ICD-10-CM

## 2019-06-01 LAB
ANION GAP SERPL CALC-SCNC: 11 MMOL/L (ref 8–16)
BUN SERPL-MCNC: 25 MG/DL (ref 8–23)
CALCIUM SERPL-MCNC: 9.8 MG/DL (ref 8.7–10.5)
CHLORIDE SERPL-SCNC: 101 MMOL/L (ref 95–110)
CO2 SERPL-SCNC: 25 MMOL/L (ref 23–29)
CREAT SERPL-MCNC: 1.4 MG/DL (ref 0.5–1.4)
EST. GFR  (AFRICAN AMERICAN): 43 ML/MIN/1.73 M^2
EST. GFR  (NON AFRICAN AMERICAN): 37.3 ML/MIN/1.73 M^2
GLUCOSE SERPL-MCNC: 166 MG/DL (ref 70–110)
POTASSIUM SERPL-SCNC: 4.9 MMOL/L (ref 3.5–5.1)
SODIUM SERPL-SCNC: 137 MMOL/L (ref 136–145)

## 2019-06-01 PROCEDURE — 80048 BASIC METABOLIC PNL TOTAL CA: CPT | Mod: HCNC

## 2019-06-01 PROCEDURE — 36415 COLL VENOUS BLD VENIPUNCTURE: CPT | Mod: HCNC,PO

## 2019-06-03 ENCOUNTER — TELEPHONE (OUTPATIENT)
Dept: CARDIOLOGY | Facility: CLINIC | Age: 74
End: 2019-06-03

## 2019-06-03 NOTE — TELEPHONE ENCOUNTER
----- Message from Isatu Ziegler MD sent at 6/3/2019  7:56 AM CDT -----  Please inform the patient that blood work is jeb, but diabetes is still not controlled.

## 2019-06-09 DIAGNOSIS — E78.5 DYSLIPIDEMIA ASSOCIATED WITH TYPE 2 DIABETES MELLITUS: ICD-10-CM

## 2019-06-09 DIAGNOSIS — E11.69 DYSLIPIDEMIA ASSOCIATED WITH TYPE 2 DIABETES MELLITUS: ICD-10-CM

## 2019-06-10 RX ORDER — ROSUVASTATIN CALCIUM 10 MG/1
TABLET, COATED ORAL
Qty: 90 TABLET | Refills: 3 | Status: SHIPPED | OUTPATIENT
Start: 2019-06-10 | End: 2020-09-21

## 2019-06-17 ENCOUNTER — HOSPITAL ENCOUNTER (OUTPATIENT)
Dept: VASCULAR SURGERY | Facility: CLINIC | Age: 74
Discharge: HOME | End: 2019-06-17
Attending: SURGERY
Payer: MEDICARE

## 2019-06-17 DIAGNOSIS — I70.0 STENOSIS OF INFRARENAL ABDOMINAL AORTA DUE TO ATHEROSCLEROSIS: ICD-10-CM

## 2019-06-17 PROCEDURE — 93978 VASCULAR STUDY: CPT | Mod: HCNC,S$GLB,, | Performed by: SURGERY

## 2019-06-17 PROCEDURE — 93978 PR DUPLEX LARGE VESSEL(S),COMPLETE: ICD-10-PCS | Mod: HCNC,S$GLB,, | Performed by: SURGERY

## 2019-06-25 ENCOUNTER — INITIAL CONSULT (OUTPATIENT)
Dept: VASCULAR SURGERY | Facility: CLINIC | Age: 74
End: 2019-06-25
Payer: MEDICARE

## 2019-06-25 VITALS
WEIGHT: 119.06 LBS | TEMPERATURE: 98 F | DIASTOLIC BLOOD PRESSURE: 59 MMHG | SYSTOLIC BLOOD PRESSURE: 129 MMHG | HEART RATE: 78 BPM | HEIGHT: 58 IN | BODY MASS INDEX: 24.99 KG/M2

## 2019-06-25 DIAGNOSIS — I65.23 BILATERAL CAROTID ARTERY STENOSIS: Primary | ICD-10-CM

## 2019-06-25 PROCEDURE — 99999 PR PBB SHADOW E&M-EST. PATIENT-LVL III: ICD-10-PCS | Mod: PBBFAC,HCNC,, | Performed by: SURGERY

## 2019-06-25 PROCEDURE — 99205 PR OFFICE/OUTPT VISIT, NEW, LEVL V, 60-74 MIN: ICD-10-PCS | Mod: HCNC,S$GLB,, | Performed by: SURGERY

## 2019-06-25 PROCEDURE — 1101F PR PT FALLS ASSESS DOC 0-1 FALLS W/OUT INJ PAST YR: ICD-10-PCS | Mod: HCNC,CPTII,S$GLB, | Performed by: SURGERY

## 2019-06-25 PROCEDURE — 3078F DIAST BP <80 MM HG: CPT | Mod: HCNC,CPTII,S$GLB, | Performed by: SURGERY

## 2019-06-25 PROCEDURE — 3074F SYST BP LT 130 MM HG: CPT | Mod: HCNC,CPTII,S$GLB, | Performed by: SURGERY

## 2019-06-25 PROCEDURE — 99205 OFFICE O/P NEW HI 60 MIN: CPT | Mod: HCNC,S$GLB,, | Performed by: SURGERY

## 2019-06-25 PROCEDURE — 1101F PT FALLS ASSESS-DOCD LE1/YR: CPT | Mod: HCNC,CPTII,S$GLB, | Performed by: SURGERY

## 2019-06-25 PROCEDURE — 99999 PR PBB SHADOW E&M-EST. PATIENT-LVL III: CPT | Mod: PBBFAC,HCNC,, | Performed by: SURGERY

## 2019-06-25 PROCEDURE — 3074F PR MOST RECENT SYSTOLIC BLOOD PRESSURE < 130 MM HG: ICD-10-PCS | Mod: HCNC,CPTII,S$GLB, | Performed by: SURGERY

## 2019-06-25 PROCEDURE — 3078F PR MOST RECENT DIASTOLIC BLOOD PRESSURE < 80 MM HG: ICD-10-PCS | Mod: HCNC,CPTII,S$GLB, | Performed by: SURGERY

## 2019-06-25 NOTE — LETTER
[facility]  [address]  [facility] [address]  Phone: [phone]  Fax: [phone] June 25, 2019      Isatu Ziegler MD  [address] [facility]  [address]  [place] [address]    Patient: Marlen Arndt   MR Number: [id number]   YOB: 1945   Date of Visit: 6/25/2019       Dear Dr. Isatu Ziegler:    Thank you for referring Marlen Arndt to me for evaluation. Attached you will find relevant portions of my assessment and plan of care.    If you have questions, please do not hesitate to call me. I look forward to following Marlen Arndt along with you.    Sincerely,        MD TAMI Macias III/mehran Kolbosure

## 2019-06-25 NOTE — PROGRESS NOTES
REFERRING PHYSICIAN:  Isatu Ziegler M.D. (Cardiology).    HISTORY OF PRESENT ILLNESS:  A 73-year-old female sent for evaluation of right   greater than left carotid stenosis.  She is status post a right carotid   endarterectomy approximately 15+ years ago.    She denies stroke, TIA or amaurosis; however, has had progressive memory loss   and there is a question of vascular dementia.    A recent carotid duplex suggests a 70%-79% right carotid stenosis and a minimal   left carotid stenosis.  This is unchanged from a year ago when the peak systolic   velocity on the right was listed at 284 and end-diastolic of 30.    She also underwent a CT arteriogram on 10/20/2018 which suggested a much less   stenosis on the right.    PAST MEDICAL HISTORY:  1.  Aortic stenosis, status post valve replacement.  2.  Coronary artery disease.  3.  Type 2 diabetes.  4.  Hyperlipidemia.  5.  Peripheral arterial occlusive disease with? aortic stent placed.    PAST SURGICAL HISTORY:  1.  Hysterectomy.  2.  Dental surgery.  3.  Salpingo-oophorectomy.  4.  Aortic valve replacement in 2013.  5.  Right carotid endarterectomy as above.    FAMILY HISTORY:  Positive for coronary artery disease.    SOCIAL HISTORY:  She is a former smoker, quit in 1987.    MEDICINES:  Include aspirin and a statin.  See EPIC for full list.    ALLERGIES:  None.    REVIEW OF SYSTEMS:  Denies postprandial pain or DVT.  All other systems   including eyes, ENT, respiratory, , musculoskeletal, psychiatric, heme, lymph,   allergy and immune are negative.    PHYSICAL EXAMINATION:  VITAL SIGNS:  See nursing notes.  GENERAL:  She is in no acute distress.  RESPIRATORY:  Normal effort.  Clear to auscultation.  CARDIOVASCULAR:  Regular rate and rhythm, nondisplaced PMI, no murmur.  VASCULAR:  2+ radial, brachial and femoral pulses.  ABDOMEN:  No mass or tenderness.  No hepatosplenomegaly.  Aorta cannot be   palpated.  EYES:  Normal conjunctivae and lids.  ENT:  Good  dentition.  NECK:  No JVD or thyromegaly.  MUSCULOSKELETAL:  No kyphosis or scoliosis.  EXTREMITIES:  Without clubbing or cyanosis.  SKIN:  Warm and dry.  NEUROLOGIC:  Alert and oriented x3.  Normal mood and affect.  Midline tongue.    No speech difficulty or hoarseness and 5/5 motor strength in all extremities.    IMAGING:  Prior carotid CTA was personally reviewed.  This suggests a much less   than 50% right carotid stenosis.    ASSESSMENT:  Moderate right carotid stenosis, recurrent from endarterectomy   greater than 15 years ago, neurologically asymptomatic.    With this degree of stenosis, I would recommend continued medical treatment.    I have told him that the association between the critically stenotic carotid   disease and vascular dementia is a controversial area.  However, her degree of   stenosis is, in my opinion certainly not severe enough to consider her carotid   stenosis contributing to her progressive memory loss or dementia.  As such, I   would not recommend surgical or interventional treatment.    RECOMMENDATION:  Continue current medical therapy.      EVER/ADOLPH  dd: 06/25/2019 14:59:23 (CDT)  td: 06/26/2019 00:41:23 (CDT)  Doc ID   #[id number]  Job ID #[id number]    CC: Isatu Ziegler MD

## 2019-08-05 ENCOUNTER — PATIENT MESSAGE (OUTPATIENT)
Dept: INTERNAL MEDICINE | Facility: CLINIC | Age: 74
End: 2019-08-05

## 2019-08-05 DIAGNOSIS — F01.50 VASCULAR DEMENTIA WITHOUT BEHAVIORAL DISTURBANCE: Primary | ICD-10-CM

## 2019-08-06 ENCOUNTER — LAB VISIT (OUTPATIENT)
Dept: LAB | Facility: HOSPITAL | Age: 74
End: 2019-08-06
Attending: INTERNAL MEDICINE
Payer: MEDICARE

## 2019-08-06 DIAGNOSIS — E11.59 TYPE 2 DIABETES MELLITUS WITH OTHER CIRCULATORY COMPLICATION, WITHOUT LONG-TERM CURRENT USE OF INSULIN: ICD-10-CM

## 2019-08-06 DIAGNOSIS — F01.50 VASCULAR DEMENTIA WITHOUT BEHAVIORAL DISTURBANCE: ICD-10-CM

## 2019-08-06 PROCEDURE — 36415 COLL VENOUS BLD VENIPUNCTURE: CPT | Mod: HCNC,PO

## 2019-08-06 PROCEDURE — 82300 ASSAY OF CADMIUM: CPT | Mod: HCNC

## 2019-08-06 PROCEDURE — 83036 HEMOGLOBIN GLYCOSYLATED A1C: CPT | Mod: HCNC

## 2019-08-07 LAB
ESTIMATED AVG GLUCOSE: 177 MG/DL (ref 68–131)
HBA1C MFR BLD HPLC: 7.8 % (ref 4–5.6)

## 2019-08-08 ENCOUNTER — OFFICE VISIT (OUTPATIENT)
Dept: INTERNAL MEDICINE | Facility: CLINIC | Age: 74
End: 2019-08-08
Payer: MEDICARE

## 2019-08-08 VITALS
SYSTOLIC BLOOD PRESSURE: 124 MMHG | HEIGHT: 59 IN | BODY MASS INDEX: 24 KG/M2 | HEART RATE: 82 BPM | DIASTOLIC BLOOD PRESSURE: 84 MMHG | WEIGHT: 119.06 LBS | OXYGEN SATURATION: 98 %

## 2019-08-08 DIAGNOSIS — E11.59 TYPE 2 DIABETES MELLITUS WITH OTHER CIRCULATORY COMPLICATION, WITHOUT LONG-TERM CURRENT USE OF INSULIN: Primary | ICD-10-CM

## 2019-08-08 DIAGNOSIS — D64.9 CHRONIC ANEMIA: ICD-10-CM

## 2019-08-08 DIAGNOSIS — M47.816 FACET ARTHRITIS OF LUMBAR REGION: ICD-10-CM

## 2019-08-08 DIAGNOSIS — D35.2 PITUITARY MICROADENOMA: ICD-10-CM

## 2019-08-08 DIAGNOSIS — E11.65 TYPE 2 DIABETES MELLITUS WITH HYPERGLYCEMIA, WITH LONG-TERM CURRENT USE OF INSULIN: ICD-10-CM

## 2019-08-08 DIAGNOSIS — Z79.4 TYPE 2 DIABETES MELLITUS WITH HYPERGLYCEMIA, WITH LONG-TERM CURRENT USE OF INSULIN: ICD-10-CM

## 2019-08-08 LAB
ARSENIC BLD-MCNC: <1 NG/ML (ref 0–12)
CADMIUM BLD-MCNC: 0.5 NG/ML (ref 0–4.9)
CITY: NORMAL
COUNTY: NORMAL
GUARDIAN FIRST NAME: NORMAL
GUARDIAN LAST NAME: NORMAL
HOME PHONE: NORMAL
LEAD BLDV-MCNC: 1.9 MCG/DL (ref 0–4.9)
MERCURY BLD-MCNC: <1 NG/ML (ref 0–9)
RACE: NORMAL
STATE: NORMAL
STREET ADDRESS: NORMAL
VENOUS/CAPILLARY: NORMAL
ZIP: NORMAL

## 2019-08-08 PROCEDURE — 3079F DIAST BP 80-89 MM HG: CPT | Mod: HCNC,CPTII,S$GLB, | Performed by: INTERNAL MEDICINE

## 2019-08-08 PROCEDURE — 1101F PR PT FALLS ASSESS DOC 0-1 FALLS W/OUT INJ PAST YR: ICD-10-PCS | Mod: HCNC,CPTII,S$GLB, | Performed by: INTERNAL MEDICINE

## 2019-08-08 PROCEDURE — 1101F PT FALLS ASSESS-DOCD LE1/YR: CPT | Mod: HCNC,CPTII,S$GLB, | Performed by: INTERNAL MEDICINE

## 2019-08-08 PROCEDURE — 99999 PR PBB SHADOW E&M-EST. PATIENT-LVL III: ICD-10-PCS | Mod: PBBFAC,HCNC,, | Performed by: INTERNAL MEDICINE

## 2019-08-08 PROCEDURE — 3045F PR MOST RECENT HEMOGLOBIN A1C LEVEL 7.0-9.0%: CPT | Mod: HCNC,CPTII,S$GLB, | Performed by: INTERNAL MEDICINE

## 2019-08-08 PROCEDURE — 99214 PR OFFICE/OUTPT VISIT, EST, LEVL IV, 30-39 MIN: ICD-10-PCS | Mod: HCNC,S$GLB,, | Performed by: INTERNAL MEDICINE

## 2019-08-08 PROCEDURE — 3079F PR MOST RECENT DIASTOLIC BLOOD PRESSURE 80-89 MM HG: ICD-10-PCS | Mod: HCNC,CPTII,S$GLB, | Performed by: INTERNAL MEDICINE

## 2019-08-08 PROCEDURE — 99999 PR PBB SHADOW E&M-EST. PATIENT-LVL III: CPT | Mod: PBBFAC,HCNC,, | Performed by: INTERNAL MEDICINE

## 2019-08-08 PROCEDURE — 3045F PR MOST RECENT HEMOGLOBIN A1C LEVEL 7.0-9.0%: ICD-10-PCS | Mod: HCNC,CPTII,S$GLB, | Performed by: INTERNAL MEDICINE

## 2019-08-08 PROCEDURE — 3074F SYST BP LT 130 MM HG: CPT | Mod: HCNC,CPTII,S$GLB, | Performed by: INTERNAL MEDICINE

## 2019-08-08 PROCEDURE — 99214 OFFICE O/P EST MOD 30 MIN: CPT | Mod: HCNC,S$GLB,, | Performed by: INTERNAL MEDICINE

## 2019-08-08 PROCEDURE — 3074F PR MOST RECENT SYSTOLIC BLOOD PRESSURE < 130 MM HG: ICD-10-PCS | Mod: HCNC,CPTII,S$GLB, | Performed by: INTERNAL MEDICINE

## 2019-08-08 NOTE — PROGRESS NOTES
Subjective:       Patient ID: Marlen B Yris is a 74 y.o. female.    Chief Complaint: Follow-up                       DM  HPI   Here with daughter.    At last visit, started Trulicity and stopped glimepiride.    a1c down to 7.8.    Also with htn, well controlled.    At son's request, have ordered mercury level.      She has pituitary microadenoma.  No headaches or signs of hormone production    She has facet arthritis of lumbar spine.  Intermittent back pain.  Leg weakness has resolved.  Review of Systems   Constitutional: Negative for fatigue.   Cardiovascular: Negative for chest pain.   Endocrine: Negative for polydipsia, polyphagia and polyuria.   Skin: Negative for pallor.   Neurological: Negative for dizziness, tremors, seizures, speech difficulty, weakness and headaches.   Psychiatric/Behavioral: Negative for confusion. The patient is not nervous/anxious.        Objective:      Physical Exam   Constitutional: She is oriented to person, place, and time. She appears well-developed and well-nourished.   Eyes: No scleral icterus.   Neck: No JVD present. No thyromegaly present.   Cardiovascular: Normal rate and regular rhythm.   Murmur heard.  Pulmonary/Chest: Effort normal and breath sounds normal. No respiratory distress. She has no wheezes. She has no rales.   Abdominal: Soft.   Musculoskeletal: She exhibits no edema.   Neurological: She is alert and oriented to person, place, and time.   Psychiatric: She has a normal mood and affect. Her behavior is normal.       Assessment:       1. Type 2 diabetes mellitus with other circulatory complication, without long-term current use of insulin    2. Pituitary microadenoma    3. Facet arthritis of lumbar region    4. Type 2 diabetes mellitus with hyperglycemia, with long-term current use of insulin    5. Chronic anemia        Plan:       Marlen was seen today for follow-up.    Diagnoses and all orders for this visit:    Type 2 diabetes mellitus with other circulatory  complication, without long-term current use of insulin  -     Hemoglobin A1c; Future    Pituitary microadenoma    Facet arthritis of lumbar region    Type 2 diabetes mellitus with hyperglycemia, with long-term current use of insulin    Chronic anemia  -     CBC Without Differential; Future

## 2019-08-22 ENCOUNTER — PATIENT OUTREACH (OUTPATIENT)
Dept: ADMINISTRATIVE | Facility: OTHER | Age: 74
End: 2019-08-22

## 2019-08-26 ENCOUNTER — OFFICE VISIT (OUTPATIENT)
Dept: NEUROLOGY | Facility: CLINIC | Age: 74
End: 2019-08-26
Payer: MEDICARE

## 2019-08-26 VITALS
HEIGHT: 59 IN | SYSTOLIC BLOOD PRESSURE: 96 MMHG | WEIGHT: 120.56 LBS | BODY MASS INDEX: 24.31 KG/M2 | DIASTOLIC BLOOD PRESSURE: 58 MMHG | HEART RATE: 91 BPM

## 2019-08-26 DIAGNOSIS — F03.90 MULTIFACTORIAL DEMENTIA: Primary | ICD-10-CM

## 2019-08-26 DIAGNOSIS — F03.90 MAJOR NEUROCOGNITIVE DISORDER: ICD-10-CM

## 2019-08-26 PROCEDURE — 3078F PR MOST RECENT DIASTOLIC BLOOD PRESSURE < 80 MM HG: ICD-10-PCS | Mod: HCNC,CPTII,S$GLB, | Performed by: PSYCHIATRY & NEUROLOGY

## 2019-08-26 PROCEDURE — 3078F DIAST BP <80 MM HG: CPT | Mod: HCNC,CPTII,S$GLB, | Performed by: PSYCHIATRY & NEUROLOGY

## 2019-08-26 PROCEDURE — 1101F PR PT FALLS ASSESS DOC 0-1 FALLS W/OUT INJ PAST YR: ICD-10-PCS | Mod: HCNC,CPTII,S$GLB, | Performed by: PSYCHIATRY & NEUROLOGY

## 2019-08-26 PROCEDURE — 99999 PR PBB SHADOW E&M-EST. PATIENT-LVL V: ICD-10-PCS | Mod: PBBFAC,HCNC,, | Performed by: PSYCHIATRY & NEUROLOGY

## 2019-08-26 PROCEDURE — 1101F PT FALLS ASSESS-DOCD LE1/YR: CPT | Mod: HCNC,CPTII,S$GLB, | Performed by: PSYCHIATRY & NEUROLOGY

## 2019-08-26 PROCEDURE — 99999 PR PBB SHADOW E&M-EST. PATIENT-LVL V: CPT | Mod: PBBFAC,HCNC,, | Performed by: PSYCHIATRY & NEUROLOGY

## 2019-08-26 PROCEDURE — 3074F PR MOST RECENT SYSTOLIC BLOOD PRESSURE < 130 MM HG: ICD-10-PCS | Mod: HCNC,CPTII,S$GLB, | Performed by: PSYCHIATRY & NEUROLOGY

## 2019-08-26 PROCEDURE — 99215 PR OFFICE/OUTPT VISIT, EST, LEVL V, 40-54 MIN: ICD-10-PCS | Mod: HCNC,S$GLB,, | Performed by: PSYCHIATRY & NEUROLOGY

## 2019-08-26 PROCEDURE — 99215 OFFICE O/P EST HI 40 MIN: CPT | Mod: HCNC,S$GLB,, | Performed by: PSYCHIATRY & NEUROLOGY

## 2019-08-26 PROCEDURE — 3074F SYST BP LT 130 MM HG: CPT | Mod: HCNC,CPTII,S$GLB, | Performed by: PSYCHIATRY & NEUROLOGY

## 2019-08-26 NOTE — PROGRESS NOTES
[facility] NEUROLOGY  [facility] [place]    Date: 8/26/19  Patient Name: Marlen Arndt   MRN: [id number]   PCP: Dominique Rendon  Referring Provider: Dominique Rendon MD    Assessment:   Marlen Arndt is a 74 y.o. female presenting in follow-up for major cognitive disorder with likely Alzheimer's per neuropsychiatry review.  [place] score 13/30 today.  Provided extensive counseling on advanced care planning and safety.  Patient is already currently managed on Namenda and donepezil.  Will make no changes to this however have provided repeat referral for follow-up neuropsychiatry testing per recommendations in their note.  Plan:     Problem List Items Addressed This Visit        Neuro    Major neurocognitive disorder    Current Assessment & Plan     -- continue aricept and namenda  -- repeat neuropsychiatry referral            Other Visit Diagnoses     Multifactorial dementia    -  Primary    Relevant Orders    Ambulatory referral to Neuropsychology        I spent a total of 42 minutes in face to face time with the patient, over half of which was spent on counseling and education about the patient's diagnosis and medications.     Andrade Friedman MD  [facility]   Department of Neurology    Patient note was created using MModal Dictation.  Any errors in syntax or even information may not have been identified and edited on initial review prior to signing this note.  Subjective:   Patient seen in consultation at the request of Dominique Rendon MD for the evaluation of dementia. A copy of this note will be sent to the referring physician.        HPI:   Ms. Marlen Arndt is a 74 y.o. female presenting in follow-up for dementia.  The patient was previously evaluated by another neurologist who unfortunately recently passed away and I will be taking over her care.    She was evaluated in 2018 by neuropsychiatry diagnosed her with a mixed neuro major cognitive disorder at that time.  She has  been symptomatic for at least 4 years according to her family.  At present, she is living with her  who is very frail.  They have significant support from family who live nearby and to prepare the couple's meals and have been helping with medications.  Her family is aware that they do have increasing needs for support in the home and are looking at options to assist with this.    PAST MEDICAL HISTORY:  Past Medical History:   Diagnosis Date    Aortic stenosis     Arthritis     Back pain     Carotid artery occlusion     Cataract     Coronary artery disease     Diabetes mellitus type II     Heart murmur     Hyperlipidemia     Hypertension associated with diabetes 7/24/2012    Non-functioning pituitary adenoma 7/24/2012    Pituitary microadenoma 11/17/2015    Polyneuropathy     PVD (peripheral vascular disease)     PVD (peripheral vascular disease)     S/P AVR (aortic valve replacement) 8/14/2013    21 mm Medtronic Mosaic ultra porcine valve     Stenosis     Stented coronary artery 4/10/2013    2013 RCA QASIM     Stroke     Multiple mini strokes; decreased left peripheal vision    Type II or unspecified type diabetes mellitus with neurological manifestations, not stated as uncontrolled(250.60)     Type II or unspecified type diabetes mellitus with peripheral circulatory disorders, not stated as uncontrolled(250.70)        PAST SURGICAL HISTORY:  Past Surgical History:   Procedure Laterality Date    AORTIC VALVE REPLACEMENT  2013    BILATERAL SALPINGOOPHORECTOMY      BREAST BIOPSY Left     CARDIAC VALVE REPLACEMENT      aortic 2013    CAROTID ENDARTERECTOMY Right     CATARACT EXTRACTION W/  INTRAOCULAR LENS IMPLANT Bilateral     Cataract Surgery      Both eyes    CATHETERIZATION, HEART, LEFT N/A 2/7/2013    [facility] CATH LAB    CORONARY ANGIOPLASTY      DENTAL SURGERY      EYE SURGERY      HYSTERECTOMY       INJECTION-STEROID-EPIDURAL-TRANSFORAMINAL- Left L3-4 Left 4/3/2018    Performed by Chico Bradford [facility] at [facility] PAIN MGT    RELEASE-CARPAL TUNNEL Left 4/19/2016    [facility] Sammy [facility]amesh Jr., [facility] at [facility] OR    REPLACEMENT, AORTIC VALVE N/A 8/8/2013    Performed by Sd Lance [facility] at [facility] OR 2ND FLR    TUBAL LIGATION      YAG Bilateral        CURRENT MEDS:  Current Outpatient Medications   Medication Sig Dispense Refill    acetaminophen (TYLENOL) 325 MG tablet Take 325 mg by mouth daily as needed. Half Tablet Oral Every day as needed      ascorbic acid (VITAMIN C) 1000 MG tablet       aspirin 81 MG Chew Take 81 mg by mouth once daily.      coenzyme Q10 (COQ-10) 100 mg capsule Take 100 mg by mouth Daily. 1 Capsule Oral Every day      Daily Multivitamin tablet Take 1 tablet by mouth Daily. 1 Tablet Oral Every day      donepezil (ARICEPT) 10 MG tablet Take 1 tablet (10 mg total) by mouth every evening. 30 tablet 11    dulaglutide (TRULICITY) 1.5 mg/0.5 mL PnIj 1.5 mg sq q week . 1 Syringe 11    glipiZIDE (GLUCOTROL) 2.5 MG TR24 Take 1 tablet (2.5 mg total) by mouth daily with breakfast. 90 tablet 3    hydroCHLOROthiazide (MICROZIDE) 12.5 mg capsule Take 1 capsule (12.5 mg total) by mouth once daily. 90 capsule 3    losartan (COZAAR) 100 MG tablet Take 1 tablet (100 mg total) by mouth once daily. (Patient taking differently: Take 100 mg by mouth once daily. Pt to take 1/2 pill(50mg) per Dr. Ziegler on 5/24/19.) 90 tablet 3    magnesium 200 mg Tab Take 200 mg by mouth once daily.      memantine (NAMENDA) 10 MG Tab Take 1 tablet (10 mg total) by mouth 2 (two) times daily. 60 tablet 11    metFORMIN (GLUCOPHAGE) 1000 MG tablet TAKE ONE TABLET (1,000 MG TOTAL) BY MOUTH TWICE DAILY WITH MEALS 180 tablet 3    nitroGLYCERIN (NITROSTAT) 0.4 MG SL tablet Place 1 tablet (0.4 mg total) under the tongue every 5 (five) minutes as needed for Chest pain. 30 tablet 6    omega-3 fatty acids-vitamin E  (FISH OIL) 1,000 mg Cap       phenobarbital (LUMINAL) 64.8 MG tablet TAKE ONE TABLET BY MOUTH AT BEDTIME 90 tablet 3    rosuvastatin (CRESTOR) 10 MG tablet Pt taking on Monday, Wednesday, and Friday. 90 tablet 3    VITAMIN D2 50,000 unit capsule TAKE ONE CAPSULE BY MOUTH ONCE A WEEK 12 capsule 3    blood-glucose meter Misc 2 each by Misc.(Non-Drug; Combo Route) route 2 (two) times daily. 1 each 0    hydrocodone-acetaminophen 5-325mg (NORCO) 5-325 mg per tablet Take 1 tablet by mouth every 6 (six) hours as needed for Pain. 40 tablet 0    labetalol (NORMODYNE) 200 MG tablet Take 2 tablets (400 mg total) by mouth 2 (two) times daily. 180 tablet 2    lancets 30 gauge Misc 1 lancet by Misc.(Non-Drug; Combo Route) route 4 (four) times daily. 120 each 11    TRUE METRIX GLUCOSE TEST STRIP Strp 1 strip by Misc.(Non-Drug; Combo Route) route 4 (four) times daily. 120 strip 11     No current facility-administered medications for this visit.        ALLERGIES:  Review of patient's allergies indicates:   Allergen Reactions    No known drug allergies        FAMILY HISTORY:  Family History   Problem Relation Age of Onset    Diabetes Mother     Coronary artery disease Mother     Heart disease Mother 42         42 of MI    Heart attack Mother     Heart failure Mother     Hyperlipidemia Mother     Hypertension Mother     Heart disease Father     Heart attack Father     Heart failure Father     Hyperlipidemia Father     Hypertension Father     Stroke Father     Coronary artery disease Brother     Diabetes Brother     Heart disease Brother     Hyperlipidemia Brother     Hypertension Brother     Heart disease Sister 41    Heart attack Sister     Hyperlipidemia Sister     Hypertension Sister     Heart disease Sister     Heart attack Sister     Hyperlipidemia Sister     Hypertension Sister     Diabetes Daughter     No Known Problems Son     Amblyopia Neg Hx     Blindness Neg Hx     Cataracts Neg  "Hx     Glaucoma Neg Hx     Macular degeneration Neg Hx     Retinal detachment Neg Hx     Strabismus Neg Hx        SOCIAL HISTORY:  Social History     Tobacco Use    Smoking status: Former Smoker     Packs/day: 1.00     Years: 20.00     Pack years: 20.00     Last attempt to quit: 1987     Years since quittin.5    Smokeless tobacco: Never Used   Substance Use Topics    Alcohol use: No     Alcohol/week: 0.0 oz     Frequency: Monthly or less     Drinks per session: 1 or 2     Binge frequency: Never    Drug use: No       Review of Systems:  12 system review of systems is negative except for the symptoms mentioned in HPI.      Objective:     Vitals:    19 1525   BP: (!) 96/58   Pulse: 91   Weight: 54.7 kg (120 lb 9.5 oz)   Height: 4' 11" (1.499 m)     General: NAD, well nourished   Eyes: no tearing, discharge, no erythema   ENT: moist mucous membranes of the oral cavity, nares patent    Neck: Supple, full range of motion  Cardiovascular: Warm and well perfused, pulses equal and symmetrical  Lungs: Normal work of breathing, normal chest wall excursions  Skin: No rash, lesions, or breakdown on exposed skin  Psychiatry: Mood and affect are appropriate   Abdomen: soft, non tender, non distended  Extremeties: No cyanosis, clubbing or edema.    Neurological   MENTAL STATUS: Alert and oriented to person, place, but not time. Attention and concentration within normal limits. Speech without dysarthria, able to name and repeat without difficulty. Recent and remote memory fair  CRANIAL NERVES: Visual fields intact. PERRL. EOMI. Facial sensation intact. Face symmetrical. Hearing grossly intact. Full shoulder shrug bilaterally. Tongue protrudes midline   SENSORY: Sensation is intact to light touch throughout.   MOTOR: Normal bulk and tone. No pronator drift.  5/5 deltoid, biceps, triceps, interosseous, hand  bilaterally. 5/5 iliopsoas, knee extension/flexion, foot dorsi/plantarflexion bilaterally.  "   REFLEXES: Symmetric and 2+ throughout.   CEREBELLAR/COORDINATION/GAIT: Gait steady with normal arm swing and stride length.  Heel to shin intact.  Normal rapid alternating movements.

## 2019-08-26 NOTE — LETTER
August 28, 2019      Dominique Rendon MD  [address]  [facility] [address]           [facility]  [address] [place] [address], [address]  [facility] [address]  Phone: [phone]  Fax: [phone]          Patient: Marlen Arndt   MR Number: [id number]   YOB: 1945   Date of Visit: 8/26/2019       Dear Dr. Dominique Rendon:    Thank you for referring Marlen Arndt to me for evaluation. Attached you will find relevant portions of my assessment and plan of care.    If you have questions, please do not hesitate to call me. I look forward to following Marlen Arndt along with you.    Sincerely,    Andrade Friedman MD    Enclosure  CC:  No Recipients    If you would like to receive this communication electronically, please contact [email or web address] or [phone] to request more information on [employer] Link access.    For providers and/or their staff who would like to refer a patient to [facility], please contact us through our one-stop-shop provider referral line, [facility], at [phone].    If you feel you have received this communication in error or would no longer like to receive these types of communications, please e-mail [email or web address]

## 2019-08-27 DIAGNOSIS — E11.59 HYPERTENSION ASSOCIATED WITH DIABETES: Chronic | ICD-10-CM

## 2019-08-27 DIAGNOSIS — I15.2 HYPERTENSION ASSOCIATED WITH DIABETES: Chronic | ICD-10-CM

## 2019-08-27 RX ORDER — LABETALOL 200 MG/1
400 TABLET, FILM COATED ORAL 2 TIMES DAILY
Qty: 180 TABLET | Refills: 2 | Status: SHIPPED | OUTPATIENT
Start: 2019-08-27 | End: 2019-09-03 | Stop reason: SDUPTHER

## 2019-08-28 PROBLEM — F03.90 MAJOR NEUROCOGNITIVE DISORDER: Status: ACTIVE | Noted: 2019-08-28

## 2019-09-03 DIAGNOSIS — E11.59 HYPERTENSION ASSOCIATED WITH DIABETES: Chronic | ICD-10-CM

## 2019-09-03 DIAGNOSIS — I15.2 HYPERTENSION ASSOCIATED WITH DIABETES: Chronic | ICD-10-CM

## 2019-09-03 RX ORDER — LABETALOL 200 MG/1
400 TABLET, FILM COATED ORAL 2 TIMES DAILY
Qty: 180 TABLET | Refills: 2 | Status: SHIPPED | OUTPATIENT
Start: 2019-09-03 | End: 2021-01-18

## 2019-09-10 ENCOUNTER — TELEPHONE (OUTPATIENT)
Dept: CARDIOLOGY | Facility: CLINIC | Age: 74
End: 2019-09-10

## 2019-09-10 DIAGNOSIS — I10 HYPERTENSION, UNSPECIFIED TYPE: ICD-10-CM

## 2019-09-10 DIAGNOSIS — I15.2 HYPERTENSION ASSOCIATED WITH DIABETES: ICD-10-CM

## 2019-09-10 DIAGNOSIS — E11.59 HYPERTENSION ASSOCIATED WITH DIABETES: ICD-10-CM

## 2019-09-10 DIAGNOSIS — N18.9 CHRONIC KIDNEY DISEASE, UNSPECIFIED CKD STAGE: Primary | ICD-10-CM

## 2019-09-10 DIAGNOSIS — E11.69 DYSLIPIDEMIA ASSOCIATED WITH TYPE 2 DIABETES MELLITUS: ICD-10-CM

## 2019-09-10 DIAGNOSIS — I25.10 CORONARY ARTERY DISEASE INVOLVING NATIVE CORONARY ARTERY OF NATIVE HEART WITHOUT ANGINA PECTORIS: ICD-10-CM

## 2019-09-10 DIAGNOSIS — E78.5 DYSLIPIDEMIA ASSOCIATED WITH TYPE 2 DIABETES MELLITUS: ICD-10-CM

## 2019-10-04 ENCOUNTER — PATIENT MESSAGE (OUTPATIENT)
Dept: INTERNAL MEDICINE | Facility: CLINIC | Age: 74
End: 2019-10-04

## 2019-10-17 ENCOUNTER — PATIENT OUTREACH (OUTPATIENT)
Dept: ADMINISTRATIVE | Facility: OTHER | Age: 74
End: 2019-10-17

## 2019-10-18 ENCOUNTER — TELEPHONE (OUTPATIENT)
Dept: PULMONOLOGY | Facility: CLINIC | Age: 74
End: 2019-10-18

## 2019-10-21 ENCOUNTER — OFFICE VISIT (OUTPATIENT)
Dept: PODIATRY | Facility: CLINIC | Age: 74
End: 2019-10-21
Payer: MEDICARE

## 2019-10-21 VITALS
SYSTOLIC BLOOD PRESSURE: 131 MMHG | WEIGHT: 120 LBS | BODY MASS INDEX: 24.19 KG/M2 | HEART RATE: 79 BPM | DIASTOLIC BLOOD PRESSURE: 57 MMHG | HEIGHT: 59 IN

## 2019-10-21 DIAGNOSIS — B35.1 ONYCHOMYCOSIS: ICD-10-CM

## 2019-10-21 DIAGNOSIS — E11.51 TYPE 2 DIABETES MELLITUS WITH PERIPHERAL VASCULAR DISEASE: Primary | ICD-10-CM

## 2019-10-21 PROCEDURE — 99999 PR PBB SHADOW E&M-EST. PATIENT-LVL III: ICD-10-PCS | Mod: PBBFAC,HCNC,, | Performed by: PODIATRIST

## 2019-10-21 PROCEDURE — 3078F DIAST BP <80 MM HG: CPT | Mod: HCNC,CPTII,S$GLB, | Performed by: PODIATRIST

## 2019-10-21 PROCEDURE — 3075F SYST BP GE 130 - 139MM HG: CPT | Mod: HCNC,CPTII,S$GLB, | Performed by: PODIATRIST

## 2019-10-21 PROCEDURE — 99999 PR PBB SHADOW E&M-EST. PATIENT-LVL III: CPT | Mod: PBBFAC,HCNC,, | Performed by: PODIATRIST

## 2019-10-21 PROCEDURE — 1101F PR PT FALLS ASSESS DOC 0-1 FALLS W/OUT INJ PAST YR: ICD-10-PCS | Mod: HCNC,CPTII,S$GLB, | Performed by: PODIATRIST

## 2019-10-21 PROCEDURE — 11721 DEBRIDE NAIL 6 OR MORE: CPT | Mod: Q8,HCNC,S$GLB, | Performed by: PODIATRIST

## 2019-10-21 PROCEDURE — 99213 OFFICE O/P EST LOW 20 MIN: CPT | Mod: 25,HCNC,S$GLB, | Performed by: PODIATRIST

## 2019-10-21 PROCEDURE — 99213 PR OFFICE/OUTPT VISIT, EST, LEVL III, 20-29 MIN: ICD-10-PCS | Mod: 25,HCNC,S$GLB, | Performed by: PODIATRIST

## 2019-10-21 PROCEDURE — 3078F PR MOST RECENT DIASTOLIC BLOOD PRESSURE < 80 MM HG: ICD-10-PCS | Mod: HCNC,CPTII,S$GLB, | Performed by: PODIATRIST

## 2019-10-21 PROCEDURE — 11721 ROUTINE FOOT CARE: ICD-10-PCS | Mod: Q8,HCNC,S$GLB, | Performed by: PODIATRIST

## 2019-10-21 PROCEDURE — 3075F PR MOST RECENT SYSTOLIC BLOOD PRESS GE 130-139MM HG: ICD-10-PCS | Mod: HCNC,CPTII,S$GLB, | Performed by: PODIATRIST

## 2019-10-21 PROCEDURE — 1101F PT FALLS ASSESS-DOCD LE1/YR: CPT | Mod: HCNC,CPTII,S$GLB, | Performed by: PODIATRIST

## 2019-10-21 NOTE — PROCEDURES
Routine Foot Care  Date/Time: 10/21/2019 2:30 PM  Performed by: Kamari Santos DPM  Authorized by: Kamari Santos DPM     Consent Done?:  Yes (Verbal)  Hyperkeratotic Skin Lesions?: No      Nail Care Type:  Debride  Location(s): All  (Left 1st Toe, Left 3rd Toe, Left 2nd Toe, Left 4th Toe, Left 5th Toe, Right 1st Toe, Right 2nd Toe, Right 3rd Toe, Right 4th Toe and Right 5th Toe)  Patient tolerance:  Patient tolerated the procedure well with no immediate complications     Used sterile nail nipper.

## 2019-10-22 NOTE — PROGRESS NOTES
Subjective:      Patient ID: Marlen Arndt is a 74 y.o. female.    Chief Complaint: Nail Care    Marlen is a 74 y.o. female who presents to the clinic for evaluation and treatment of high risk feet. Marlen has a past medical history of Aortic stenosis, Arthritis, Back pain, Carotid artery occlusion, Cataract, Coronary artery disease, Diabetes mellitus type II, Heart murmur, Hyperlipidemia, Hypertension associated with diabetes (7/24/2012), Non-functioning pituitary adenoma (7/24/2012), Pituitary microadenoma (11/17/2015), Polyneuropathy, PVD (peripheral vascular disease), PVD (peripheral vascular disease), S/P AVR (aortic valve replacement) (8/14/2013), Stenosis, Stented coronary artery (4/10/2013), Stroke, Type II or unspecified type diabetes mellitus with neurological manifestations, not stated as uncontrolled(250.60), and Type II or unspecified type diabetes mellitus with peripheral circulatory disorders, not stated as uncontrolled(250.70). The patient's chief complaint is long, thick toenails. This patient has documented high risk feet requiring routine maintenance secondary to peripheral neuropathy.  History of CVA residual cognitive deficit.  Previous treatment per .  History obtained from chart check.  Followed by Dr. Andrade Steven of Neurology for multifactorial dementia and major neuro cognitive disorder.    PCP: Dominique Rendon MD    Date Last Seen by PCP:  08/08/2019    Current shoe gear:  Affected Foot: Tennis shoes     Unaffected Foot: Tennis shoes    Hemoglobin A1C   Date Value Ref Range Status   08/06/2019 7.8 (H) 4.0 - 5.6 % Final     Comment:     ADA Screening Guidelines:  5.7-6.4%  Consistent with prediabetes  >or=6.5%  Consistent with diabetes  High levels of fetal hemoglobin interfere with the HbA1C  assay. Heterozygous hemoglobin variants (HbS, HgC, etc)do  not significantly interfere with this assay.   However, presence of multiple variants may affect accuracy.     04/29/2019 8.5 (H) 4.0 -  "5.6 % Final     Comment:     ADA Screening Guidelines:  5.7-6.4%  Consistent with prediabetes  >or=6.5%  Consistent with diabetes  High levels of fetal hemoglobin interfere with the HbA1C  assay. Heterozygous hemoglobin variants (HbS, HgC, etc)do  not significantly interfere with this assay.   However, presence of multiple variants may affect accuracy.     11/19/2018 7.5 (H) 4.0 - 5.6 % Final     Comment:     ADA Screening Guidelines:  5.7-6.4%  Consistent with prediabetes  >or=6.5%  Consistent with diabetes  High levels of fetal hemoglobin interfere with the HbA1C  assay. Heterozygous hemoglobin variants (HbS, HgC, etc)do  not significantly interfere with this assay.   However, presence of multiple variants may affect accuracy.       Vitals:    10/21/19 1452   BP: (!) 131/57   Pulse: 79   Weight: 54.4 kg (120 lb)   Height: 4' 11" (1.499 m)   PainSc: 0-No pain      Past Medical History:   Diagnosis Date    Aortic stenosis     Arthritis     Back pain     Carotid artery occlusion     Cataract     Coronary artery disease     Diabetes mellitus type II     Heart murmur     Hyperlipidemia     Hypertension associated with diabetes 7/24/2012    Non-functioning pituitary adenoma 7/24/2012    Pituitary microadenoma 11/17/2015    Polyneuropathy     PVD (peripheral vascular disease)     PVD (peripheral vascular disease)     S/P AVR (aortic valve replacement) 8/14/2013    21 mm Medtronic Mosaic ultra porcine valve     Stenosis     Stented coronary artery 4/10/2013    2013 RCA QASIM     Stroke     Multiple mini strokes; decreased left peripheal vision    Type II or unspecified type diabetes mellitus with neurological manifestations, not stated as uncontrolled(250.60)     Type II or unspecified type diabetes mellitus with peripheral circulatory disorders, not stated as uncontrolled(250.70)        Past Surgical History:   Procedure Laterality Date    AORTIC VALVE REPLACEMENT  2013    BILATERAL " SALPINGOOPHORECTOMY      BREAST BIOPSY Left     CARDIAC VALVE REPLACEMENT      aortic 2013    CAROTID ENDARTERECTOMY Right     CATARACT EXTRACTION W/  INTRAOCULAR LENS IMPLANT Bilateral     Cataract Surgery      Both eyes    CORONARY ANGIOPLASTY      DENTAL SURGERY      EYE SURGERY      HYSTERECTOMY      TUBAL LIGATION      YAG Bilateral        Family History   Problem Relation Age of Onset    Diabetes Mother     Coronary artery disease Mother     Heart disease Mother 42         42 of MI    Heart attack Mother     Heart failure Mother     Hyperlipidemia Mother     Hypertension Mother     Heart disease Father     Heart attack Father     Heart failure Father     Hyperlipidemia Father     Hypertension Father     Stroke Father     Coronary artery disease Brother     Diabetes Brother     Heart disease Brother     Hyperlipidemia Brother     Hypertension Brother     Heart disease Sister 41    Heart attack Sister     Hyperlipidemia Sister     Hypertension Sister     Heart disease Sister     Heart attack Sister     Hyperlipidemia Sister     Hypertension Sister     Diabetes Daughter     No Known Problems Son     Amblyopia Neg Hx     Blindness Neg Hx     Cataracts Neg Hx     Glaucoma Neg Hx     Macular degeneration Neg Hx     Retinal detachment Neg Hx     Strabismus Neg Hx        Social History     Socioeconomic History    Marital status:      Spouse name: Not on file    Number of children: 2    Years of education: Not on file    Highest education level: Not on file   Occupational History    Not on file   Social Needs    Financial resource strain: Not very hard    Food insecurity:     Worry: Never true     Inability: Never true    Transportation needs:     Medical: No     Non-medical: No   Tobacco Use    Smoking status: Former Smoker     Packs/day: 1.00     Years: 20.00     Pack years: 20.00     Last attempt to quit: 1987     Years since quittin.7     Smokeless tobacco: Never Used   Substance and Sexual Activity    Alcohol use: No     Alcohol/week: 0.0 standard drinks     Frequency: Monthly or less     Drinks per session: 1 or 2     Binge frequency: Never    Drug use: No    Sexual activity: Yes     Partners: Male   Lifestyle    Physical activity:     Days per week: Not on file     Minutes per session: Not on file    Stress: Not on file   Relationships    Social connections:     Talks on phone: More than three times a week     Gets together: More than three times a week     Attends [employer] service: Not on file     Active member of club or organization: Patient refused     Attends meetings of clubs or organizations: Patient refused     Relationship status:    Other Topics Concern    Not on file   Social History Narrative    Retired.       Current Outpatient Medications   Medication Sig Dispense Refill    acetaminophen (TYLENOL) 325 MG tablet Take 325 mg by mouth daily as needed. Half Tablet Oral Every day as needed      ascorbic acid (VITAMIN C) 1000 MG tablet       aspirin 81 MG Chew Take 81 mg by mouth once daily.      blood-glucose meter Misc 2 each by Misc.(Non-Drug; Combo Route) route 2 (two) times daily. 1 each 0    coenzyme Q10 (COQ-10) 100 mg capsule Take 100 mg by mouth Daily. 1 Capsule Oral Every day      Daily Multivitamin tablet Take 1 tablet by mouth Daily. 1 Tablet Oral Every day      donepezil (ARICEPT) 10 MG tablet Take 1 tablet (10 mg total) by mouth every evening. 30 tablet 11    dulaglutide (TRULICITY) 1.5 mg/0.5 mL PnIj 1.5 mg sq q week . 1 Syringe 11    glipiZIDE (GLUCOTROL) 2.5 MG TR24 Take 1 tablet (2.5 mg total) by mouth daily with breakfast. 90 tablet 3    hydroCHLOROthiazide (MICROZIDE) 12.5 mg capsule Take 1 capsule (12.5 mg total) by mouth once daily. 90 capsule 3    hydrocodone-acetaminophen 5-325mg (NORCO) 5-325 mg per tablet Take 1 tablet by mouth every 6 (six) hours as needed for Pain. 40 tablet 0     labetalol (NORMODYNE) 200 MG tablet Take 2 tablets (400 mg total) by mouth 2 (two) times daily. 180 tablet 2    lancets 30 gauge Misc 1 lancet by Misc.(Non-Drug; Combo Route) route 4 (four) times daily. 120 each 11    losartan (COZAAR) 100 MG tablet Take 1 tablet (100 mg total) by mouth once daily. (Patient taking differently: Take 100 mg by mouth once daily. Pt to take 1/2 pill(50mg) per Dr. Ziegler on 5/24/19.) 90 tablet 3    magnesium 200 mg Tab Take 200 mg by mouth once daily.      memantine (NAMENDA) 10 MG Tab Take 1 tablet (10 mg total) by mouth 2 (two) times daily. 60 tablet 11    metFORMIN (GLUCOPHAGE) 1000 MG tablet TAKE ONE TABLET (1,000 MG TOTAL) BY MOUTH TWICE DAILY WITH MEALS 180 tablet 3    nitroGLYCERIN (NITROSTAT) 0.4 MG SL tablet Place 1 tablet (0.4 mg total) under the tongue every 5 (five) minutes as needed for Chest pain. 30 tablet 6    omega-3 fatty acids-vitamin E (FISH OIL) 1,000 mg Cap       phenobarbital (LUMINAL) 64.8 MG tablet TAKE ONE TABLET BY MOUTH AT BEDTIME 90 tablet 3    rosuvastatin (CRESTOR) 10 MG tablet Pt taking on Monday, Wednesday, and Friday. 90 tablet 3    TRUE METRIX GLUCOSE TEST STRIP Strp 1 strip by Misc.(Non-Drug; Combo Route) route 4 (four) times daily. 120 strip 11    VITAMIN D2 50,000 unit capsule TAKE ONE CAPSULE BY MOUTH ONCE A WEEK 12 capsule 3     No current facility-administered medications for this visit.        Review of patient's allergies indicates:   Allergen Reactions    No known drug allergies          Review of Systems   Constitution: Negative for chills and fever.   HENT: Negative for hearing loss.    Cardiovascular: Negative for chest pain.   Respiratory: Negative for cough and shortness of breath.    Skin: Positive for nail changes. Negative for color change and itching.   Musculoskeletal: Negative for back pain and joint pain.   Gastrointestinal: Negative for nausea and vomiting.   Neurological: Negative for numbness.    Psychiatric/Behavioral: Positive for memory loss. Negative for altered mental status.           Objective:      Physical Exam   Constitutional: She is oriented to person, place, and time. She appears well-developed and well-nourished. No distress.   Cardiovascular: Intact distal pulses.   Pulses:       Dorsalis pedis pulses are 1+ on the right side, and 1+ on the left side.        Posterior tibial pulses are 1+ on the right side, and 1+ on the left side.   No hair growth bilateral extremity.  Mild rubor on dependency bilateral foot. Skin temp is warm to cool bilateral   Musculoskeletal:   No pain with ROM or MMT bilateral lower extremity.    Mild non track bound hallux valgus bilateral foot.    Rectus of appearing foot type bilateral.   Neurological: She is alert and oriented to person, place, and time. She has normal strength. A sensory deficit is present.   Unable to fully assess her epicritic sensation due to her cognitive deficit.  Muscle strength appears to be within normal limits   Skin: Skin is warm, dry and intact. Capillary refill takes 2 to 3 seconds. No ecchymosis and no rash noted. She is not diaphoretic. No cyanosis or erythema. No pallor. Nails show no clubbing.   Nails 1-5 bilateral foot are thickened, elongated 3-4 mm, and painted with nail Polish.    No open lesions or macerations bilateral lower extremity.    Skin atrophy noted bilateral lower extremity.             Assessment:       Encounter Diagnoses   Name Primary?    Type 2 diabetes mellitus with peripheral vascular disease Yes    Onychomycosis          Plan:       Marlen was seen today for nail care.    Diagnoses and all orders for this visit:    Type 2 diabetes mellitus with peripheral vascular disease  -     Routine Foot Care    Onychomycosis  -     Routine Foot Care      I counseled the patient on her conditions, their implications and medical management.    Shoe inspection. Diabetic Foot Education. Patient reminded of the importance of  good nutrition and blood sugar control to help prevent podiatric complications of diabetes. Patient instructed on proper foot hygeine. We discussed wearing proper shoe gear, daily foot inspections, never walking without protective shoe gear, never putting sharp instruments to feet, routine podiatric nail visits every 2-3 months.      Routine foot care per attached note.. Patient relates relief following the procedure. She will continue to monitor the areas daily, inspect her feet, wear protective shoe gear when ambulatory, moisturizer to maintain skin integrity and follow in this office in approximately 2-3 months, sooner p.r.n.

## 2019-10-24 RX ORDER — ERGOCALCIFEROL 1.25 MG/1
CAPSULE ORAL
Qty: 12 CAPSULE | Refills: 3 | Status: SHIPPED | OUTPATIENT
Start: 2019-10-24 | End: 2020-12-22

## 2019-10-25 DIAGNOSIS — Z12.39 BREAST CANCER SCREENING: ICD-10-CM

## 2019-10-25 DIAGNOSIS — N18.9 CHRONIC KIDNEY DISEASE, UNSPECIFIED CKD STAGE: ICD-10-CM

## 2019-11-08 RX ORDER — DONEPEZIL HYDROCHLORIDE 10 MG/1
TABLET, FILM COATED ORAL
Qty: 90 TABLET | Refills: 3 | Status: SHIPPED | OUTPATIENT
Start: 2019-11-08 | End: 2021-01-03

## 2019-11-15 ENCOUNTER — CLINICAL SUPPORT (OUTPATIENT)
Dept: URGENT CARE | Facility: CLINIC | Age: 74
End: 2019-11-15
Payer: MEDICARE

## 2019-11-15 DIAGNOSIS — Z23 IMMUNIZATION DUE: Primary | ICD-10-CM

## 2019-11-15 PROCEDURE — G0008 FLU VACCINE - HIGH DOSE (65+) PRESERVATIVE FREE IM: ICD-10-PCS | Mod: S$GLB,,, | Performed by: PHYSICIAN ASSISTANT

## 2019-11-15 PROCEDURE — 90662 FLU VACCINE - HIGH DOSE (65+) PRESERVATIVE FREE IM: ICD-10-PCS | Mod: S$GLB,,, | Performed by: PHYSICIAN ASSISTANT

## 2019-11-15 PROCEDURE — 90662 IIV NO PRSV INCREASED AG IM: CPT | Mod: S$GLB,,, | Performed by: PHYSICIAN ASSISTANT

## 2019-11-15 PROCEDURE — G0008 ADMIN INFLUENZA VIRUS VAC: HCPCS | Mod: S$GLB,,, | Performed by: PHYSICIAN ASSISTANT

## 2019-11-20 ENCOUNTER — PATIENT MESSAGE (OUTPATIENT)
Dept: INTERNAL MEDICINE | Facility: CLINIC | Age: 74
End: 2019-11-20

## 2019-12-09 ENCOUNTER — LAB VISIT (OUTPATIENT)
Dept: LAB | Facility: HOSPITAL | Age: 74
End: 2019-12-09
Attending: INTERNAL MEDICINE
Payer: MEDICARE

## 2019-12-09 DIAGNOSIS — E11.59 HYPERTENSION ASSOCIATED WITH DIABETES: ICD-10-CM

## 2019-12-09 DIAGNOSIS — E11.69 DYSLIPIDEMIA ASSOCIATED WITH TYPE 2 DIABETES MELLITUS: ICD-10-CM

## 2019-12-09 DIAGNOSIS — I25.10 CORONARY ARTERY DISEASE INVOLVING NATIVE CORONARY ARTERY OF NATIVE HEART WITHOUT ANGINA PECTORIS: ICD-10-CM

## 2019-12-09 DIAGNOSIS — N18.9 CHRONIC KIDNEY DISEASE, UNSPECIFIED CKD STAGE: ICD-10-CM

## 2019-12-09 DIAGNOSIS — D64.9 CHRONIC ANEMIA: ICD-10-CM

## 2019-12-09 DIAGNOSIS — I10 HYPERTENSION, UNSPECIFIED TYPE: ICD-10-CM

## 2019-12-09 DIAGNOSIS — E78.5 DYSLIPIDEMIA ASSOCIATED WITH TYPE 2 DIABETES MELLITUS: ICD-10-CM

## 2019-12-09 DIAGNOSIS — E11.59 TYPE 2 DIABETES MELLITUS WITH OTHER CIRCULATORY COMPLICATION, WITHOUT LONG-TERM CURRENT USE OF INSULIN: ICD-10-CM

## 2019-12-09 DIAGNOSIS — I15.2 HYPERTENSION ASSOCIATED WITH DIABETES: ICD-10-CM

## 2019-12-09 LAB
ALBUMIN SERPL BCP-MCNC: 3.6 G/DL (ref 3.5–5.2)
ALP SERPL-CCNC: 69 U/L (ref 55–135)
ALT SERPL W/O P-5'-P-CCNC: 15 U/L (ref 10–44)
ANION GAP SERPL CALC-SCNC: 10 MMOL/L (ref 8–16)
AST SERPL-CCNC: 16 U/L (ref 10–40)
BILIRUB SERPL-MCNC: 0.4 MG/DL (ref 0.1–1)
BUN SERPL-MCNC: 22 MG/DL (ref 8–23)
CALCIUM SERPL-MCNC: 9.8 MG/DL (ref 8.7–10.5)
CHLORIDE SERPL-SCNC: 105 MMOL/L (ref 95–110)
CHOLEST SERPL-MCNC: 187 MG/DL (ref 120–199)
CHOLEST/HDLC SERPL: 2 {RATIO} (ref 2–5)
CO2 SERPL-SCNC: 28 MMOL/L (ref 23–29)
CREAT SERPL-MCNC: 1.1 MG/DL (ref 0.5–1.4)
ERYTHROCYTE [DISTWIDTH] IN BLOOD BY AUTOMATED COUNT: 13.7 % (ref 11.5–14.5)
EST. GFR  (AFRICAN AMERICAN): 57.2 ML/MIN/1.73 M^2
EST. GFR  (NON AFRICAN AMERICAN): 49.6 ML/MIN/1.73 M^2
ESTIMATED AVG GLUCOSE: 177 MG/DL (ref 68–131)
GLUCOSE SERPL-MCNC: 175 MG/DL (ref 70–110)
HBA1C MFR BLD HPLC: 7.8 % (ref 4–5.6)
HCT VFR BLD AUTO: 34.3 % (ref 37–48.5)
HDLC SERPL-MCNC: 92 MG/DL (ref 40–75)
HDLC SERPL: 49.2 % (ref 20–50)
HGB BLD-MCNC: 10.8 G/DL (ref 12–16)
LDLC SERPL CALC-MCNC: 79 MG/DL (ref 63–159)
MCH RBC QN AUTO: 29 PG (ref 27–31)
MCHC RBC AUTO-ENTMCNC: 31.5 G/DL (ref 32–36)
MCV RBC AUTO: 92 FL (ref 82–98)
NONHDLC SERPL-MCNC: 95 MG/DL
PLATELET # BLD AUTO: 260 K/UL (ref 150–350)
PMV BLD AUTO: 10.4 FL (ref 9.2–12.9)
POTASSIUM SERPL-SCNC: 4.4 MMOL/L (ref 3.5–5.1)
PROT SERPL-MCNC: 6.3 G/DL (ref 6–8.4)
RBC # BLD AUTO: 3.73 M/UL (ref 4–5.4)
SODIUM SERPL-SCNC: 143 MMOL/L (ref 136–145)
TRIGL SERPL-MCNC: 80 MG/DL (ref 30–150)
TSH SERPL DL<=0.005 MIU/L-ACNC: 2.79 UIU/ML (ref 0.4–4)
WBC # BLD AUTO: 6.22 K/UL (ref 3.9–12.7)

## 2019-12-09 PROCEDURE — 36415 COLL VENOUS BLD VENIPUNCTURE: CPT | Mod: HCNC,PO

## 2019-12-09 PROCEDURE — 84443 ASSAY THYROID STIM HORMONE: CPT | Mod: HCNC

## 2019-12-09 PROCEDURE — 83036 HEMOGLOBIN GLYCOSYLATED A1C: CPT | Mod: HCNC

## 2019-12-09 PROCEDURE — 85027 COMPLETE CBC AUTOMATED: CPT | Mod: HCNC

## 2019-12-09 PROCEDURE — 80061 LIPID PANEL: CPT | Mod: HCNC

## 2019-12-09 PROCEDURE — 80053 COMPREHEN METABOLIC PANEL: CPT | Mod: HCNC

## 2019-12-10 ENCOUNTER — TELEPHONE (OUTPATIENT)
Dept: CARDIOLOGY | Facility: CLINIC | Age: 74
End: 2019-12-10

## 2019-12-10 NOTE — TELEPHONE ENCOUNTER
----- Message from Isatu Ziegler MD sent at 12/10/2019 11:14 AM CST -----  Please review.  We will discuss the results during your upcoming visit with me. It looks good.

## 2019-12-11 ENCOUNTER — PATIENT OUTREACH (OUTPATIENT)
Dept: ADMINISTRATIVE | Facility: OTHER | Age: 74
End: 2019-12-11

## 2019-12-12 DIAGNOSIS — E11.42 DIABETIC POLYNEUROPATHY ASSOCIATED WITH TYPE 2 DIABETES MELLITUS: ICD-10-CM

## 2019-12-13 RX ORDER — METFORMIN HYDROCHLORIDE 1000 MG/1
TABLET ORAL
Qty: 180 TABLET | Refills: 0 | Status: SHIPPED | OUTPATIENT
Start: 2019-12-13 | End: 2020-03-09

## 2019-12-16 ENCOUNTER — OFFICE VISIT (OUTPATIENT)
Dept: INTERNAL MEDICINE | Facility: CLINIC | Age: 74
End: 2019-12-16
Payer: MEDICARE

## 2019-12-16 ENCOUNTER — LAB VISIT (OUTPATIENT)
Dept: LAB | Facility: HOSPITAL | Age: 74
End: 2019-12-16
Attending: INTERNAL MEDICINE
Payer: MEDICARE

## 2019-12-16 ENCOUNTER — OFFICE VISIT (OUTPATIENT)
Dept: CARDIOLOGY | Facility: CLINIC | Age: 74
End: 2019-12-16
Payer: MEDICARE

## 2019-12-16 VITALS
HEART RATE: 77 BPM | SYSTOLIC BLOOD PRESSURE: 120 MMHG | WEIGHT: 124.75 LBS | DIASTOLIC BLOOD PRESSURE: 78 MMHG | BODY MASS INDEX: 25.15 KG/M2 | HEIGHT: 59 IN | OXYGEN SATURATION: 99 %

## 2019-12-16 VITALS
DIASTOLIC BLOOD PRESSURE: 53 MMHG | SYSTOLIC BLOOD PRESSURE: 130 MMHG | HEART RATE: 73 BPM | HEIGHT: 59 IN | WEIGHT: 124.56 LBS | OXYGEN SATURATION: 96 % | BODY MASS INDEX: 25.11 KG/M2

## 2019-12-16 DIAGNOSIS — I25.10 CORONARY ARTERY DISEASE INVOLVING NATIVE CORONARY ARTERY OF NATIVE HEART WITHOUT ANGINA PECTORIS: Primary | Chronic | ICD-10-CM

## 2019-12-16 DIAGNOSIS — I65.23 BILATERAL CAROTID ARTERY STENOSIS: ICD-10-CM

## 2019-12-16 DIAGNOSIS — E11.42 DIABETIC POLYNEUROPATHY ASSOCIATED WITH TYPE 2 DIABETES MELLITUS: ICD-10-CM

## 2019-12-16 DIAGNOSIS — R41.3 POOR SHORT TERM MEMORY: ICD-10-CM

## 2019-12-16 DIAGNOSIS — E11.51 TYPE 2 DIABETES MELLITUS WITH DIABETIC PERIPHERAL ANGIOPATHY WITHOUT GANGRENE, WITHOUT LONG-TERM CURRENT USE OF INSULIN: Primary | ICD-10-CM

## 2019-12-16 DIAGNOSIS — I70.0 ATHEROSCLEROSIS OF AORTA: ICD-10-CM

## 2019-12-16 DIAGNOSIS — N18.30 CKD (CHRONIC KIDNEY DISEASE) STAGE 3, GFR 30-59 ML/MIN: ICD-10-CM

## 2019-12-16 DIAGNOSIS — Z95.2 S/P AVR (AORTIC VALVE REPLACEMENT): ICD-10-CM

## 2019-12-16 DIAGNOSIS — I15.2 HYPERTENSION ASSOCIATED WITH DIABETES: Chronic | ICD-10-CM

## 2019-12-16 DIAGNOSIS — D64.9 ANEMIA, UNSPECIFIED TYPE: ICD-10-CM

## 2019-12-16 DIAGNOSIS — F03.90 MAJOR NEUROCOGNITIVE DISORDER: ICD-10-CM

## 2019-12-16 DIAGNOSIS — E11.51 TYPE 2 DIABETES MELLITUS WITH DIABETIC PERIPHERAL ANGIOPATHY WITHOUT GANGRENE, WITHOUT LONG-TERM CURRENT USE OF INSULIN: ICD-10-CM

## 2019-12-16 DIAGNOSIS — G61.82 MULTIFOCAL MOTOR NEUROPATHY: ICD-10-CM

## 2019-12-16 DIAGNOSIS — E11.59 HYPERTENSION ASSOCIATED WITH DIABETES: Chronic | ICD-10-CM

## 2019-12-16 DIAGNOSIS — I35.9 AORTIC VALVE DISEASE: ICD-10-CM

## 2019-12-16 DIAGNOSIS — N18.2 CHRONIC KIDNEY DISEASE, STAGE II (MILD): Chronic | ICD-10-CM

## 2019-12-16 DIAGNOSIS — F01.50 VASCULAR DEMENTIA WITHOUT BEHAVIORAL DISTURBANCE: ICD-10-CM

## 2019-12-16 DIAGNOSIS — E66.3 OVERWEIGHT (BMI 25.0-29.9): ICD-10-CM

## 2019-12-16 DIAGNOSIS — I73.9 PVD (PERIPHERAL VASCULAR DISEASE): Chronic | ICD-10-CM

## 2019-12-16 DIAGNOSIS — I70.0 STENOSIS OF INFRARENAL ABDOMINAL AORTA DUE TO ATHEROSCLEROSIS: ICD-10-CM

## 2019-12-16 DIAGNOSIS — Z86.73 HISTORY OF CVA (CEREBROVASCULAR ACCIDENT): ICD-10-CM

## 2019-12-16 LAB
FERRITIN SERPL-MCNC: 16 NG/ML (ref 20–300)
IRON SERPL-MCNC: 58 UG/DL (ref 30–160)
SATURATED IRON: 13 % (ref 20–50)
TOTAL IRON BINDING CAPACITY: 438 UG/DL (ref 250–450)
TRANSFERRIN SERPL-MCNC: 296 MG/DL (ref 200–375)

## 2019-12-16 PROCEDURE — 99214 PR OFFICE/OUTPT VISIT, EST, LEVL IV, 30-39 MIN: ICD-10-PCS | Mod: HCNC,S$GLB,, | Performed by: INTERNAL MEDICINE

## 2019-12-16 PROCEDURE — 1159F MED LIST DOCD IN RCRD: CPT | Mod: HCNC,S$GLB,, | Performed by: INTERNAL MEDICINE

## 2019-12-16 PROCEDURE — 1101F PT FALLS ASSESS-DOCD LE1/YR: CPT | Mod: HCNC,CPTII,S$GLB, | Performed by: INTERNAL MEDICINE

## 2019-12-16 PROCEDURE — 99999 PR PBB SHADOW E&M-EST. PATIENT-LVL III: ICD-10-PCS | Mod: PBBFAC,HCNC,, | Performed by: INTERNAL MEDICINE

## 2019-12-16 PROCEDURE — 3078F DIAST BP <80 MM HG: CPT | Mod: HCNC,CPTII,S$GLB, | Performed by: INTERNAL MEDICINE

## 2019-12-16 PROCEDURE — 99214 OFFICE O/P EST MOD 30 MIN: CPT | Mod: HCNC,S$GLB,, | Performed by: INTERNAL MEDICINE

## 2019-12-16 PROCEDURE — 3074F SYST BP LT 130 MM HG: CPT | Mod: HCNC,CPTII,S$GLB, | Performed by: INTERNAL MEDICINE

## 2019-12-16 PROCEDURE — 1101F PR PT FALLS ASSESS DOC 0-1 FALLS W/OUT INJ PAST YR: ICD-10-PCS | Mod: HCNC,CPTII,S$GLB, | Performed by: INTERNAL MEDICINE

## 2019-12-16 PROCEDURE — 3078F PR MOST RECENT DIASTOLIC BLOOD PRESSURE < 80 MM HG: ICD-10-PCS | Mod: HCNC,CPTII,S$GLB, | Performed by: INTERNAL MEDICINE

## 2019-12-16 PROCEDURE — 99999 PR PBB SHADOW E&M-EST. PATIENT-LVL III: CPT | Mod: PBBFAC,HCNC,, | Performed by: INTERNAL MEDICINE

## 2019-12-16 PROCEDURE — 1126F PR PAIN SEVERITY QUANTIFIED, NO PAIN PRESENT: ICD-10-PCS | Mod: HCNC,S$GLB,, | Performed by: INTERNAL MEDICINE

## 2019-12-16 PROCEDURE — 36415 COLL VENOUS BLD VENIPUNCTURE: CPT | Mod: HCNC

## 2019-12-16 PROCEDURE — 82728 ASSAY OF FERRITIN: CPT | Mod: HCNC

## 2019-12-16 PROCEDURE — 99499 UNLISTED E&M SERVICE: CPT | Mod: HCNC,S$GLB,, | Performed by: INTERNAL MEDICINE

## 2019-12-16 PROCEDURE — 1125F AMNT PAIN NOTED PAIN PRSNT: CPT | Mod: HCNC,S$GLB,, | Performed by: INTERNAL MEDICINE

## 2019-12-16 PROCEDURE — 99499 RISK ADDL DX/OHS AUDIT: ICD-10-PCS | Mod: HCNC,S$GLB,, | Performed by: INTERNAL MEDICINE

## 2019-12-16 PROCEDURE — 1159F PR MEDICATION LIST DOCUMENTED IN MEDICAL RECORD: ICD-10-PCS | Mod: HCNC,S$GLB,, | Performed by: INTERNAL MEDICINE

## 2019-12-16 PROCEDURE — 3074F PR MOST RECENT SYSTOLIC BLOOD PRESSURE < 130 MM HG: ICD-10-PCS | Mod: HCNC,CPTII,S$GLB, | Performed by: INTERNAL MEDICINE

## 2019-12-16 PROCEDURE — 83540 ASSAY OF IRON: CPT | Mod: HCNC

## 2019-12-16 PROCEDURE — 1126F AMNT PAIN NOTED NONE PRSNT: CPT | Mod: HCNC,S$GLB,, | Performed by: INTERNAL MEDICINE

## 2019-12-16 PROCEDURE — 1125F PR PAIN SEVERITY QUANTIFIED, PAIN PRESENT: ICD-10-PCS | Mod: HCNC,S$GLB,, | Performed by: INTERNAL MEDICINE

## 2019-12-16 PROCEDURE — 3075F SYST BP GE 130 - 139MM HG: CPT | Mod: HCNC,CPTII,S$GLB, | Performed by: INTERNAL MEDICINE

## 2019-12-16 PROCEDURE — 3075F PR MOST RECENT SYSTOLIC BLOOD PRESS GE 130-139MM HG: ICD-10-PCS | Mod: HCNC,CPTII,S$GLB, | Performed by: INTERNAL MEDICINE

## 2019-12-16 RX ORDER — GLIPIZIDE 5 MG/1
5 TABLET, FILM COATED, EXTENDED RELEASE ORAL
Qty: 90 TABLET | Refills: 3 | Status: SHIPPED | OUTPATIENT
Start: 2019-12-16 | End: 2020-12-10

## 2019-12-16 NOTE — PROGRESS NOTES
"Subjective:   Patient ID:  June B Yris is a 74 y.o. female who presents for follow-up of Coronary Artery Disease and Hypertension      HPI: BP is controlled. Lipids at goal. Diabetes not at goal. Patient stopped Trulicity due to prohibitive cost.  She has no CV symptoms, but is quite sedentary and her cognitive abilities decreased significantly.    Lab Results   Component Value Date     12/09/2019    K 4.4 12/09/2019     12/09/2019    CO2 28 12/09/2019    BUN 22 12/09/2019    CREATININE 1.1 12/09/2019     (H) 12/09/2019    HGBA1C 7.8 (H) 12/09/2019    MG 2.0 08/20/2018    AST 16 12/09/2019    ALT 15 12/09/2019    ALBUMIN 3.6 12/09/2019    PROT 6.3 12/09/2019    BILITOT 0.4 12/09/2019    WBC 6.22 12/09/2019    HGB 10.8 (L) 12/09/2019    HCT 34.3 (L) 12/09/2019    HCT 24 (L) 08/08/2013    MCV 92 12/09/2019     12/09/2019    INR 1.0 08/14/2013    TSH 2.793 12/09/2019    CHOL 187 12/09/2019    HDL 92 (H) 12/09/2019    LDLCALC 79.0 12/09/2019    TRIG 80 12/09/2019       Review of Systems   Constitution: Negative.   HENT: Negative.    Eyes: Negative.    Cardiovascular: Positive for claudication. Negative for chest pain, dyspnea on exertion, irregular heartbeat, leg swelling, near-syncope, palpitations and syncope.   Respiratory: Negative.  Negative for cough, shortness of breath, snoring and wheezing.    Endocrine: Negative.  Negative for cold intolerance, heat intolerance, polydipsia, polyphagia and polyuria.   Skin: Negative.    Musculoskeletal: Positive for arthritis and back pain.   Gastrointestinal: Negative.    Genitourinary: Negative.    Neurological: Negative.    Psychiatric/Behavioral: Negative.        Objective:   Physical Exam   Constitutional: She appears well-developed and well-nourished.   BP (!) 130/53   Pulse 73   Ht 4' 11" (1.499 m)   Wt 56.5 kg (124 lb 9 oz)   SpO2 96%   BMI 25.16 kg/m²      HENT:   Head: Normocephalic.   Eyes: Pupils are equal, round, and reactive to " light.   Neck: Normal range of motion. Neck supple. Carotid bruit is present. No thyromegaly present.   Cardiovascular: Normal rate, regular rhythm and intact distal pulses. Exam reveals no gallop and no friction rub.   Murmur heard.   Harsh midsystolic murmur is present with a grade of 2/6 at the upper right sternal border radiating to the neck.  Pulses:       Carotid pulses are 2+ on the right side with bruit, and 2+ on the left side with bruit.       Radial pulses are 2+ on the right side, and 2+ on the left side.        Femoral pulses are 2+ on the right side with bruit, and 2+ on the left side with bruit.       Popliteal pulses are 2+ on the right side, and 2+ on the left side.        Dorsalis pedis pulses are 1+ on the right side, and 1+ on the left side.        Posterior tibial pulses are 1+ on the right side, and 1+ on the left side.   Pulmonary/Chest: Effort normal and breath sounds normal. No respiratory distress. She has no wheezes. She has no rales. She exhibits no tenderness.   Abdominal: Soft. Bowel sounds are normal.   Musculoskeletal: Normal range of motion. She exhibits no edema.   Skin: Skin is warm and dry.   Psychiatric: She has a normal mood and affect.   Nursing note and vitals reviewed.        Assessment and Plan:     Problem List Items Addressed This Visit        Cardiology Problems    Hypertension associated with diabetes (Chronic)    PVD (peripheral vascular disease) (Chronic)    Coronary artery disease involving native coronary artery of native heart without angina pectoris - Primary (Chronic)    Stenosis of infrarenal abdominal aorta due to atherosclerosis    Type 2 diabetes mellitus with circulatory disorder, without long-term current use of insulin    Bilateral carotid artery disease    Atherosclerosis of aorta    Aortic valve disease       Other    Chronic kidney disease, stage II (mild) (Chronic)    Poor short term memory    S/P AVR (aortic valve replacement)    History of CVA  (cerebrovascular accident)    Vascular dementia without behavioral disturbance    Overweight (BMI 25.0-29.9)    Major neurocognitive disorder          Patient's Medications   New Prescriptions    No medications on file   Previous Medications    ACETAMINOPHEN (TYLENOL) 325 MG TABLET    Take 325 mg by mouth daily as needed. Half Tablet Oral Every day as needed    ASCORBIC ACID (VITAMIN C) 1000 MG TABLET        ASPIRIN 81 MG CHEW    Take 81 mg by mouth once daily.    BLOOD-GLUCOSE METER MISC    2 each by Misc.(Non-Drug; Combo Route) route 2 (two) times daily.    COENZYME Q10 (COQ-10) 100 MG CAPSULE    Take 100 mg by mouth Daily. 1 Capsule Oral Every day    DAILY MULTIVITAMIN TABLET    Take 1 tablet by mouth Daily. 1 Tablet Oral Every day    DONEPEZIL (ARICEPT) 10 MG TABLET    TAKE 1 TABLET BY MOUTH ONCE DAILY IN THE EVENING    GLIPIZIDE (GLUCOTROL) 2.5 MG TR24    Take 1 tablet (2.5 mg total) by mouth daily with breakfast.    HYDROCHLOROTHIAZIDE (MICROZIDE) 12.5 MG CAPSULE    Take 1 capsule (12.5 mg total) by mouth once daily.    LABETALOL (NORMODYNE) 200 MG TABLET    Take 2 tablets (400 mg total) by mouth 2 (two) times daily.    LANCETS 30 GAUGE MISC    1 lancet by Misc.(Non-Drug; Combo Route) route 4 (four) times daily.    LOSARTAN (COZAAR) 100 MG TABLET    Take 1 tablet (100 mg total) by mouth once daily.    MAGNESIUM 200 MG TAB    Take 200 mg by mouth once daily.    MEMANTINE (NAMENDA) 10 MG TAB    Take 1 tablet (10 mg total) by mouth 2 (two) times daily.    METFORMIN (GLUCOPHAGE) 1000 MG TABLET    TAKE 1 TABLET BY MOUTH TWICE DAILY WITH MEALS    NITROGLYCERIN (NITROSTAT) 0.4 MG SL TABLET    Place 1 tablet (0.4 mg total) under the tongue every 5 (five) minutes as needed for Chest pain.    OMEGA-3 FATTY ACIDS-VITAMIN E (FISH OIL) 1,000 MG CAP        PHENOBARBITAL (LUMINAL) 64.8 MG TABLET    TAKE ONE TABLET BY MOUTH AT BEDTIME    ROSUVASTATIN (CRESTOR) 10 MG TABLET    Pt taking on Monday, Wednesday, and Friday.     TRUE METRIX GLUCOSE TEST STRIP STRP    1 strip by Misc.(Non-Drug; Combo Route) route 4 (four) times daily.    VITAMIN D2 50,000 UNIT CAPSULE    TAKE 1 CAPSULE BY MOUTH ONCE A WEEK   Modified Medications    No medications on file   Discontinued Medications    DULAGLUTIDE (TRULICITY) 1.5 MG/0.5 ML PNIJ    1.5 mg sq q week .    HYDROCODONE-ACETAMINOPHEN 5-325MG (NORCO) 5-325 MG PER TABLET    Take 1 tablet by mouth every 6 (six) hours as needed for Pain.   repeat Carotid Duplex in March/April.  Continue on the present regimen for now    Follow up in about 6 months (around 6/16/2020).

## 2019-12-16 NOTE — PROGRESS NOTES
Subjective:       Patient ID: June B Yris is a 74 y.o. female.    Chief Complaint: Follow-up                  DM, htn  HPI   Trulicity too expensive, so she went back to glipizide about 2 mo ago.  She has chronic motor neuropathy and has chornic foot numbness.see 9/17 neuro note.    Hasn't taken phenobarb since 2017.  No seizures.    Memory loss slowly progressive.  Family supervises meds and ensure she eats.   watches over her.    Chronic anemia -worse lately.       Has refused colonoscopy, but has agreed to ifobt.    htn cont rolled - according to Epic, not taking normodyne - daughter will check.      .      Review of Systems   Constitutional: Negative for fever and unexpected weight change.   HENT: Negative for congestion and postnasal drip.    Eyes: Negative for pain, discharge and visual disturbance.   Respiratory: Negative for cough, chest tightness, shortness of breath and wheezing.    Cardiovascular: Negative for chest pain and leg swelling.   Gastrointestinal: Negative for abdominal pain, constipation, diarrhea and nausea.   Genitourinary: Negative for difficulty urinating, dysuria and hematuria.   Skin: Negative for rash.   Neurological: Negative for headaches.   Psychiatric/Behavioral: Negative for dysphoric mood and sleep disturbance. The patient is not nervous/anxious.        Objective:      Physical Exam   Constitutional: She is oriented to person, place, and time. She appears well-developed and well-nourished.   Eyes: No scleral icterus.   Neck: No JVD present. No thyromegaly present.   Cardiovascular: Normal rate, regular rhythm and normal heart sounds.   Pulmonary/Chest: Effort normal and breath sounds normal. No respiratory distress. She has no wheezes. She has no rales.   Abdominal: Soft. She exhibits no mass. There is no tenderness.   Musculoskeletal: She exhibits no edema.   Neurological: She is alert and oriented to person, place, and time.   Psychiatric: She has a normal mood and  affect. Her behavior is normal.       \  Results for orders placed or performed in visit on 12/09/19   CBC Without Differential   Result Value Ref Range    WBC 6.22 3.90 - 12.70 K/uL    RBC 3.73 (L) 4.00 - 5.40 M/uL    Hemoglobin 10.8 (L) 12.0 - 16.0 g/dL    Hematocrit 34.3 (L) 37.0 - 48.5 %    Mean Corpuscular Volume 92 82 - 98 fL    Mean Corpuscular Hemoglobin 29.0 27.0 - 31.0 pg    Mean Corpuscular Hemoglobin Conc 31.5 (L) 32.0 - 36.0 g/dL    RDW 13.7 11.5 - 14.5 %    Platelets 260 150 - 350 K/uL    MPV 10.4 9.2 - 12.9 fL   Hemoglobin A1c   Result Value Ref Range    Hemoglobin A1C 7.8 (H) 4.0 - 5.6 %    Estimated Avg Glucose 177 (H) 68 - 131 mg/dL   Lipid panel   Result Value Ref Range    Cholesterol 187 120 - 199 mg/dL    Triglycerides 80 30 - 150 mg/dL    HDL 92 (H) 40 - 75 mg/dL    LDL Cholesterol 79.0 63.0 - 159.0 mg/dL    Hdl/Cholesterol Ratio 49.2 20.0 - 50.0 %    Total Cholesterol/HDL Ratio 2.0 2.0 - 5.0    Non-HDL Cholesterol 95 mg/dL   TSH   Result Value Ref Range    TSH 2.793 0.400 - 4.000 uIU/mL   Comprehensive metabolic panel   Result Value Ref Range    Sodium 143 136 - 145 mmol/L    Potassium 4.4 3.5 - 5.1 mmol/L    Chloride 105 95 - 110 mmol/L    CO2 28 23 - 29 mmol/L    Glucose 175 (H) 70 - 110 mg/dL    BUN, Bld 22 8 - 23 mg/dL    Creatinine 1.1 0.5 - 1.4 mg/dL    Calcium 9.8 8.7 - 10.5 mg/dL    Total Protein 6.3 6.0 - 8.4 g/dL    Albumin 3.6 3.5 - 5.2 g/dL    Total Bilirubin 0.4 0.1 - 1.0 mg/dL    Alkaline Phosphatase 69 55 - 135 U/L    AST 16 10 - 40 U/L    ALT 15 10 - 44 U/L    Anion Gap 10 8 - 16 mmol/L    eGFR if African American 57.2 (A) >60 mL/min/1.73 m^2    eGFR if non  49.6 (A) >60 mL/min/1.73 m^2     Assessment:       1. Type 2 diabetes mellitus with diabetic peripheral angiopathy without gangrene, without long-term current use of insulin    2. CKD (chronic kidney disease) stage 3, GFR 30-59 ml/min    3. Hypertension associated with diabetes    4. Diabetic polyneuropathy  associated with type 2 diabetes mellitus    5. Anemia, unspecified type        Plan:       June was seen today for follow-up.    Diagnoses and all orders for this visit:    Type 2 diabetes mellitus with diabetic peripheral angiopathy without gangrene, without long-term current use of insulin    CKD (chronic kidney disease) stage 3, GFR 30-59 ml/min    Hypertension associated with diabetes    Diabetic polyneuropathy associated with type 2 diabetes mellitus    Anemia, unspecified type  -     Iron and TIBC; Future  -     Ferritin; Future    Other orders  -     glipiZIDE (GLUCOTROL) 5 MG TR24; Take 1 tablet (5 mg total) by mouth daily with breakfast.       Podiatry and eye exams upcoming.    Iron now    a1c 4 mo  Incr glipizide to 5 mg daily.  Eat regularly.    See pt instructions

## 2019-12-19 NOTE — TELEPHONE ENCOUNTER
----- Message from Isatu Mcmahon MA sent at 2/2/2018  9:30 AM CST -----  Good morning, [facility] just called in reference to prescription e-prescribed yesterday for Norco for the pt above. The pharmacist stated that the directions need to be changed to 1 tablet every 6-8 hours and not 2 pills every 6-8 hours. The pharmacist stated that the acetaminophen level according to Government regulations will be putting the pt over the limit. The pt is standing by at the pharmacy. Please advise.   Age appropriate

## 2019-12-20 DIAGNOSIS — Z12.11 COLON CANCER SCREENING: ICD-10-CM

## 2019-12-20 DIAGNOSIS — D64.9 ANEMIA, UNSPECIFIED TYPE: Primary | ICD-10-CM

## 2019-12-20 DIAGNOSIS — D50.9 IRON DEFICIENCY ANEMIA, UNSPECIFIED IRON DEFICIENCY ANEMIA TYPE: ICD-10-CM

## 2019-12-20 RX ORDER — FERROUS SULFATE 325(65) MG
325 TABLET ORAL
Qty: 30 TABLET | Refills: 0
Start: 2019-12-20

## 2019-12-23 ENCOUNTER — TELEPHONE (OUTPATIENT)
Dept: INTERNAL MEDICINE | Facility: CLINIC | Age: 74
End: 2019-12-23

## 2019-12-23 NOTE — TELEPHONE ENCOUNTER
----- Message from Dominique Rendon MD sent at 12/20/2019  3:58 PM CST -----  pls call daughter, Becky - iron is low, so I would recommend a colonoscopy to r/o GI blood loss. Is that ok?  I have placed order.  No need to submit fitkit.    In meantime, start 1 iron pill daily (ferrous sulfate 325 mg)    Schedule cbc, ferritin 1 month

## 2020-01-14 ENCOUNTER — PATIENT MESSAGE (OUTPATIENT)
Dept: INTERNAL MEDICINE | Facility: CLINIC | Age: 75
End: 2020-01-14

## 2020-02-11 ENCOUNTER — PATIENT OUTREACH (OUTPATIENT)
Dept: ADMINISTRATIVE | Facility: OTHER | Age: 75
End: 2020-02-11

## 2020-02-12 NOTE — PROGRESS NOTES
Care Everywhere: n/a  Immunization: updated  Health Maintenance: updated  Media Review: reviewed for possible outside colon cancer report  Legacy Review: reviewed colonoscopy reports from 2004 and 2009  Order placed: n/a  Upcoming appts:optometry 2.13.2020   Colonoscopy case request in on 12.20.19

## 2020-02-13 ENCOUNTER — OFFICE VISIT (OUTPATIENT)
Dept: OPTOMETRY | Facility: CLINIC | Age: 75
End: 2020-02-13
Payer: COMMERCIAL

## 2020-02-13 ENCOUNTER — HOSPITAL ENCOUNTER (OUTPATIENT)
Dept: RADIOLOGY | Facility: HOSPITAL | Age: 75
Discharge: HOME | End: 2020-02-13
Attending: INTERNAL MEDICINE
Payer: MEDICARE

## 2020-02-13 DIAGNOSIS — H52.4 PRESBYOPIA: ICD-10-CM

## 2020-02-13 DIAGNOSIS — H53.462 LEFT HOMONYMOUS HEMIANOPSIA: ICD-10-CM

## 2020-02-13 DIAGNOSIS — E11.9 TYPE 2 DIABETES MELLITUS WITHOUT RETINOPATHY: Primary | ICD-10-CM

## 2020-02-13 DIAGNOSIS — Z13.5 GLAUCOMA SCREENING: ICD-10-CM

## 2020-02-13 DIAGNOSIS — Z12.39 BREAST CANCER SCREENING: ICD-10-CM

## 2020-02-13 PROCEDURE — 77067 SCR MAMMO BI INCL CAD: CPT | Mod: 26,HCNC,, | Performed by: RADIOLOGY

## 2020-02-13 PROCEDURE — 77067 SCR MAMMO BI INCL CAD: CPT | Mod: TC,HCNC

## 2020-02-13 PROCEDURE — 99499 UNLISTED E&M SERVICE: CPT | Mod: S$GLB,,, | Performed by: OPTOMETRIST

## 2020-02-13 PROCEDURE — 77067 MAMMO DIGITAL SCREENING BILAT WITH CAD: ICD-10-PCS | Mod: 26,HCNC,, | Performed by: RADIOLOGY

## 2020-02-13 PROCEDURE — 92015 DETERMINE REFRACTIVE STATE: CPT | Mod: S$GLB,,, | Performed by: OPTOMETRIST

## 2020-02-13 PROCEDURE — 92015 PR REFRACTION: ICD-10-PCS | Mod: S$GLB,,, | Performed by: OPTOMETRIST

## 2020-02-13 PROCEDURE — 92014 PR EYE EXAM, EST PATIENT,COMPREHESV: ICD-10-PCS | Mod: S$GLB,,, | Performed by: OPTOMETRIST

## 2020-02-13 PROCEDURE — 99999 PR PBB SHADOW E&M-EST. PATIENT-LVL III: CPT | Mod: PBBFAC,,, | Performed by: OPTOMETRIST

## 2020-02-13 PROCEDURE — 99499 RISK ADDL DX/OHS AUDIT: ICD-10-PCS | Mod: S$GLB,,, | Performed by: OPTOMETRIST

## 2020-02-13 PROCEDURE — 99999 PR PBB SHADOW E&M-EST. PATIENT-LVL III: ICD-10-PCS | Mod: PBBFAC,,, | Performed by: OPTOMETRIST

## 2020-02-13 PROCEDURE — 92014 COMPRE OPH EXAM EST PT 1/>: CPT | Mod: S$GLB,,, | Performed by: OPTOMETRIST

## 2020-02-13 NOTE — PROGRESS NOTES
HPI     DLS: 2/11/19  Pt states no VA problems   No f/f  No gtts   Hs VF loss  LBS:does not check   Hemoglobin A1C       Date                     Value               Ref Range             Status                12/09/2019               7.8 (H)             4.0 - 5.6 %           Final                     08/06/2019               7.8 (H)             4.0 - 5.6 %           Final                     04/29/2019               8.5 (H)             4.0 - 5.6 %           Final                   Last edited by Mayco Correa, OD on 2/13/2020  2:27 PM. (History)        ROS     Positive for: Neurological (stroke/pit hx), Endocrine (DM), Eyes   (hemianopsia Hx)    Negative for: Constitutional, Gastrointestinal, Skin, Genitourinary,   Musculoskeletal, HENT, Cardiovascular, Respiratory, Psychiatric,   Allergic/Imm, Heme/Lymph    Last edited by Mayco Correa, OD on 2/13/2020  2:27 PM. (History)        Assessment /Plan     For exam results, see Encounter Report.    Type 2 diabetes mellitus without retinopathy    Glaucoma screening    Presbyopia    Left homonymous hemianopsia - Both Eyes      1. Sp pciol/YAG OU--pt wishes new spex Rx  2. Incomplete left homom hemianopsia sp stroke/pit hx.  3. DM- WITHOUT RETINOPATHY.  Advised yearly DFE    PLAN:    rtc 1 yr

## 2020-02-21 ENCOUNTER — PATIENT OUTREACH (OUTPATIENT)
Dept: ADMINISTRATIVE | Facility: OTHER | Age: 75
End: 2020-02-21

## 2020-03-09 DIAGNOSIS — E11.42 DIABETIC POLYNEUROPATHY ASSOCIATED WITH TYPE 2 DIABETES MELLITUS: ICD-10-CM

## 2020-03-09 RX ORDER — METFORMIN HYDROCHLORIDE 1000 MG/1
TABLET ORAL
Qty: 120 TABLET | Refills: 6 | Status: SHIPPED | OUTPATIENT
Start: 2020-03-09 | End: 2021-05-31

## 2020-03-12 ENCOUNTER — PATIENT OUTREACH (OUTPATIENT)
Dept: ADMINISTRATIVE | Facility: HOSPITAL | Age: 75
End: 2020-03-12

## 2020-03-12 DIAGNOSIS — I15.2 HYPERTENSION ASSOCIATED WITH DIABETES: Primary | ICD-10-CM

## 2020-03-12 DIAGNOSIS — Z12.11 SPECIAL SCREENING FOR MALIGNANT NEOPLASMS, COLON: Primary | ICD-10-CM

## 2020-03-12 DIAGNOSIS — E11.42 DIABETIC POLYNEUROPATHY ASSOCIATED WITH TYPE 2 DIABETES MELLITUS: ICD-10-CM

## 2020-03-12 DIAGNOSIS — E11.59 HYPERTENSION ASSOCIATED WITH DIABETES: Primary | ICD-10-CM

## 2020-03-12 RX ORDER — POLYETHYLENE GLYCOL 3350, SODIUM SULFATE ANHYDROUS, SODIUM BICARBONATE, SODIUM CHLORIDE, POTASSIUM CHLORIDE 236; 22.74; 6.74; 5.86; 2.97 G/4L; G/4L; G/4L; G/4L; G/4L
4 POWDER, FOR SOLUTION ORAL ONCE
Qty: 4000 ML | Refills: 0 | Status: SHIPPED | OUTPATIENT
Start: 2020-03-12 | End: 2020-03-12

## 2020-03-12 RX ORDER — LOSARTAN POTASSIUM 50 MG/1
50 TABLET ORAL DAILY
COMMUNITY
End: 2020-03-12 | Stop reason: SDUPTHER

## 2020-03-12 RX ORDER — LOSARTAN POTASSIUM 50 MG/1
50 TABLET ORAL DAILY
Qty: 90 TABLET | Refills: 3 | Status: SHIPPED | OUTPATIENT
Start: 2020-03-12 | End: 2020-12-10

## 2020-03-13 RX ORDER — METFORMIN HYDROCHLORIDE 1000 MG/1
TABLET ORAL
Refills: 0 | OUTPATIENT
Start: 2020-03-13

## 2020-03-16 ENCOUNTER — TELEPHONE (OUTPATIENT)
Dept: CARDIOLOGY | Facility: CLINIC | Age: 75
End: 2020-03-16

## 2020-03-16 DIAGNOSIS — I65.23 CAROTID STENOSIS, ASYMPTOMATIC, BILATERAL: Primary | ICD-10-CM

## 2020-03-16 NOTE — TELEPHONE ENCOUNTER
I spoke w/Becky to schedule Carotid Ultrasound for pt 3/2020 or 4/2020. Becky stated that she wants to wait and schedule pt for the ultrasound at a later date because of the coronavirus. Becky stated that she will call back when she is ready to schedule pt.

## 2020-05-07 DIAGNOSIS — F01.50 VASCULAR DEMENTIA WITHOUT BEHAVIORAL DISTURBANCE: ICD-10-CM

## 2020-05-07 RX ORDER — MEMANTINE HYDROCHLORIDE 10 MG/1
TABLET ORAL
Qty: 60 TABLET | Refills: 11 | Status: SHIPPED | OUTPATIENT
Start: 2020-05-07 | End: 2021-05-13

## 2020-05-07 NOTE — PROGRESS NOTES
Refill Routing Note    Medication(s) are not appropriate for processing by [facility]:       Outside of protocol           Medication reconciliation completed: No      Automatic Epic Protocol Generated Data:    Requested Prescriptions   Pending Prescriptions Disp Refills    memantine (NAMENDA) 10 MG Tab [Pharmacy Med Name: Memantine HCl 10 MG Oral Tablet] 60 tablet 0     Sig: Take 1 tablet by mouth twice daily       There is no refill protocol information for this order           Appointments  past 12m or future 3m with PCP    Date Provider   Last Visit   12/16/2019 Dominique Rendon MD   Next Visit   7/27/2020 Dominique Rendon MD   ED visits in past 90 days: 0     Note composed:6:03 PM 05/07/2020

## 2020-05-15 DIAGNOSIS — E11.9 TYPE 2 DIABETES MELLITUS WITHOUT COMPLICATION: ICD-10-CM

## 2020-08-06 ENCOUNTER — PATIENT OUTREACH (OUTPATIENT)
Dept: ADMINISTRATIVE | Facility: OTHER | Age: 75
End: 2020-08-06

## 2020-08-06 NOTE — PROGRESS NOTES
Requested updates within Care Everywhere.  Patient's chart was reviewed for overdue LASHA topics.  Immunizations reconciled.    Orders placed:  Tasked appts:  Labs Linked:

## 2020-09-09 LAB — Lab: NEGATIVE

## 2020-09-12 LAB — Lab: NEGATIVE

## 2020-09-14 LAB — HBA1C MFR BLD: 7.7 %

## 2020-09-18 ENCOUNTER — PATIENT MESSAGE (OUTPATIENT)
Dept: INTERNAL MEDICINE | Facility: CLINIC | Age: 75
End: 2020-09-18

## 2020-10-05 ENCOUNTER — PATIENT MESSAGE (OUTPATIENT)
Dept: ADMINISTRATIVE | Facility: HOSPITAL | Age: 75
End: 2020-10-05

## 2020-10-31 ENCOUNTER — IMMUNIZATION (OUTPATIENT)
Dept: INTERNAL MEDICINE | Facility: CLINIC | Age: 75
End: 2020-10-31
Payer: MEDICARE

## 2020-10-31 PROCEDURE — G0008 FLU VACCINE - QUADRIVALENT - ADJUVANTED: ICD-10-PCS | Mod: HCNC,S$GLB,, | Performed by: INTERNAL MEDICINE

## 2020-10-31 PROCEDURE — 90694 FLU VACCINE - QUADRIVALENT - ADJUVANTED: ICD-10-PCS | Mod: HCNC,S$GLB,, | Performed by: INTERNAL MEDICINE

## 2020-10-31 PROCEDURE — 90694 VACC AIIV4 NO PRSRV 0.5ML IM: CPT | Mod: HCNC,S$GLB,, | Performed by: INTERNAL MEDICINE

## 2020-10-31 PROCEDURE — G0008 ADMIN INFLUENZA VIRUS VAC: HCPCS | Mod: HCNC,S$GLB,, | Performed by: INTERNAL MEDICINE

## 2020-11-19 ENCOUNTER — PATIENT MESSAGE (OUTPATIENT)
Dept: INTERNAL MEDICINE | Facility: CLINIC | Age: 75
End: 2020-11-19

## 2020-11-20 ENCOUNTER — PATIENT OUTREACH (OUTPATIENT)
Dept: ADMINISTRATIVE | Facility: HOSPITAL | Age: 75
End: 2020-11-20

## 2020-11-20 NOTE — PROGRESS NOTES
Colorectal care gap closed.  Mili Jerez LPN  Lead Clinical Care Coordinator   [facility]

## 2020-11-23 ENCOUNTER — PATIENT OUTREACH (OUTPATIENT)
Dept: ADMINISTRATIVE | Facility: HOSPITAL | Age: 75
End: 2020-11-23

## 2020-12-05 ENCOUNTER — PATIENT MESSAGE (OUTPATIENT)
Dept: INTERNAL MEDICINE | Facility: CLINIC | Age: 75
End: 2020-12-05

## 2020-12-22 RX ORDER — ERGOCALCIFEROL 1.25 MG/1
CAPSULE ORAL
Qty: 12 CAPSULE | Refills: 0 | Status: SHIPPED | OUTPATIENT
Start: 2020-12-22 | End: 2021-08-16

## 2020-12-22 NOTE — PROGRESS NOTES
Refill Routing Note   Medication(s) are not appropriate for processing by [facility] for the following reason(s):     - Outside of protocol  [facility] action(s):  Route        Medication reconciliation completed: No   Automatic Epic Generated Protocol Data:        Requested Prescriptions   Pending Prescriptions Disp Refills    ergocalciferol (ERGOCALCIFEROL) 50,000 unit Cap [Pharmacy Med Name: Vitamin D (Ergocalciferol) 1.25 MG (36680 UT) Oral Capsule] 12 capsule 0     Sig: Take 1 capsule by mouth once a week       There is no refill protocol information for this order           Appointments  past 12m or future 3m with PCP    Date Provider   Last Visit   12/16/2019 Dominique Rendon MD   Next Visit   Visit date not found Dominique Rendon MD   ED visits in past 90 days: 0        Note composed:7:18 PM 12/21/2020

## 2021-01-04 ENCOUNTER — PATIENT MESSAGE (OUTPATIENT)
Dept: ADMINISTRATIVE | Facility: HOSPITAL | Age: 76
End: 2021-01-04

## 2021-01-10 ENCOUNTER — IMMUNIZATION (OUTPATIENT)
Dept: INTERNAL MEDICINE | Facility: CLINIC | Age: 76
End: 2021-01-10
Payer: MEDICARE

## 2021-01-10 DIAGNOSIS — Z23 NEED FOR VACCINATION: ICD-10-CM

## 2021-01-10 PROCEDURE — 91300 COVID-19, MRNA, LNP-S, PF, 30 MCG/0.3 ML DOSE VACCINE: CPT | Mod: PBBFAC | Performed by: INTERNAL MEDICINE

## 2021-01-15 RX ORDER — DONEPEZIL HYDROCHLORIDE 10 MG/1
TABLET, FILM COATED ORAL
Qty: 90 TABLET | Refills: 3 | Status: SHIPPED | OUTPATIENT
Start: 2021-01-15 | End: 2022-04-11

## 2021-01-18 DIAGNOSIS — I15.2 HYPERTENSION ASSOCIATED WITH DIABETES: Chronic | ICD-10-CM

## 2021-01-18 DIAGNOSIS — E11.59 HYPERTENSION ASSOCIATED WITH DIABETES: Chronic | ICD-10-CM

## 2021-01-18 RX ORDER — LABETALOL 200 MG/1
TABLET, FILM COATED ORAL
Qty: 180 TABLET | Refills: 0 | Status: SHIPPED | OUTPATIENT
Start: 2021-01-18 | End: 2021-04-07

## 2021-01-31 ENCOUNTER — IMMUNIZATION (OUTPATIENT)
Dept: INTERNAL MEDICINE | Facility: CLINIC | Age: 76
End: 2021-01-31
Payer: MEDICARE

## 2021-01-31 DIAGNOSIS — Z23 NEED FOR VACCINATION: Primary | ICD-10-CM

## 2021-01-31 PROCEDURE — 91300 PR SARS-COV- 2 COVID-19 VACCINE, NO PRSV, 30MCG/0.3ML, IM: CPT | Mod: PBBFAC | Performed by: INTERNAL MEDICINE

## 2021-01-31 PROCEDURE — 0002A PR IMMUNIZ ADMIN, SARS-COV-2 COVID-19 VACC, 30MCG/0.3ML, 2ND DOSE: CPT | Mod: PBBFAC | Performed by: INTERNAL MEDICINE

## 2021-01-31 RX ADMIN — RNA INGREDIENT BNT-162B2 0.3 ML: 0.23 INJECTION, SUSPENSION INTRAMUSCULAR at 02:01

## 2021-02-11 ENCOUNTER — PATIENT MESSAGE (OUTPATIENT)
Dept: INTERNAL MEDICINE | Facility: CLINIC | Age: 76
End: 2021-02-11

## 2021-03-02 RX ORDER — GLIPIZIDE 5 MG/1
TABLET, FILM COATED, EXTENDED RELEASE ORAL
Qty: 90 TABLET | Refills: 0 | Status: SHIPPED | OUTPATIENT
Start: 2021-03-02 | End: 2021-06-02

## 2021-03-23 ENCOUNTER — PES CALL (OUTPATIENT)
Dept: ADMINISTRATIVE | Facility: CLINIC | Age: 76
End: 2021-03-23

## 2021-04-07 DIAGNOSIS — E11.59 HYPERTENSION ASSOCIATED WITH DIABETES: Chronic | ICD-10-CM

## 2021-04-07 DIAGNOSIS — I15.2 HYPERTENSION ASSOCIATED WITH DIABETES: Chronic | ICD-10-CM

## 2021-04-08 RX ORDER — LABETALOL 200 MG/1
TABLET, FILM COATED ORAL
Qty: 180 TABLET | Refills: 0 | Status: SHIPPED | OUTPATIENT
Start: 2021-04-08 | End: 2021-07-08

## 2021-04-23 ENCOUNTER — TELEPHONE (OUTPATIENT)
Dept: OPTOMETRY | Facility: CLINIC | Age: 76
End: 2021-04-23

## 2021-04-26 ENCOUNTER — LAB VISIT (OUTPATIENT)
Dept: LAB | Facility: HOSPITAL | Age: 76
End: 2021-04-26
Attending: INTERNAL MEDICINE
Payer: MEDICARE

## 2021-04-26 ENCOUNTER — PATIENT MESSAGE (OUTPATIENT)
Dept: INTERNAL MEDICINE | Facility: CLINIC | Age: 76
End: 2021-04-26

## 2021-04-26 DIAGNOSIS — D64.9 ANEMIA, UNSPECIFIED TYPE: ICD-10-CM

## 2021-04-26 DIAGNOSIS — E11.59 HYPERTENSION ASSOCIATED WITH DIABETES: Chronic | ICD-10-CM

## 2021-04-26 DIAGNOSIS — I15.2 HYPERTENSION ASSOCIATED WITH DIABETES: Chronic | ICD-10-CM

## 2021-04-26 DIAGNOSIS — E11.9 TYPE 2 DIABETES MELLITUS WITHOUT COMPLICATION: ICD-10-CM

## 2021-04-26 LAB
BASOPHILS # BLD AUTO: 0.03 K/UL (ref 0–0.2)
BASOPHILS NFR BLD: 0.4 % (ref 0–1.9)
DIFFERENTIAL METHOD: ABNORMAL
EOSINOPHIL # BLD AUTO: 0.1 K/UL (ref 0–0.5)
EOSINOPHIL NFR BLD: 0.8 % (ref 0–8)
ERYTHROCYTE [DISTWIDTH] IN BLOOD BY AUTOMATED COUNT: 13.7 % (ref 11.5–14.5)
HCT VFR BLD AUTO: 35.2 % (ref 37–48.5)
HGB BLD-MCNC: 11.3 G/DL (ref 12–16)
IMM GRANULOCYTES # BLD AUTO: 0.05 K/UL (ref 0–0.04)
IMM GRANULOCYTES NFR BLD AUTO: 0.6 % (ref 0–0.5)
LYMPHOCYTES # BLD AUTO: 1.4 K/UL (ref 1–4.8)
LYMPHOCYTES NFR BLD: 17.4 % (ref 18–48)
MCH RBC QN AUTO: 30.1 PG (ref 27–31)
MCHC RBC AUTO-ENTMCNC: 32.1 G/DL (ref 32–36)
MCV RBC AUTO: 94 FL (ref 82–98)
MONOCYTES # BLD AUTO: 0.5 K/UL (ref 0.3–1)
MONOCYTES NFR BLD: 5.9 % (ref 4–15)
NEUTROPHILS # BLD AUTO: 5.9 K/UL (ref 1.8–7.7)
NEUTROPHILS NFR BLD: 74.9 % (ref 38–73)
NRBC BLD-RTO: 0 /100 WBC
PLATELET # BLD AUTO: 282 K/UL (ref 150–450)
PMV BLD AUTO: 9.7 FL (ref 9.2–12.9)
RBC # BLD AUTO: 3.75 M/UL (ref 4–5.4)
WBC # BLD AUTO: 7.91 K/UL (ref 3.9–12.7)

## 2021-04-26 PROCEDURE — 82728 ASSAY OF FERRITIN: CPT | Performed by: INTERNAL MEDICINE

## 2021-04-26 PROCEDURE — 85025 COMPLETE CBC W/AUTO DIFF WBC: CPT | Performed by: INTERNAL MEDICINE

## 2021-04-26 PROCEDURE — 83036 HEMOGLOBIN GLYCOSYLATED A1C: CPT | Performed by: INTERNAL MEDICINE

## 2021-04-26 PROCEDURE — 80061 LIPID PANEL: CPT | Performed by: INTERNAL MEDICINE

## 2021-04-26 PROCEDURE — 36415 COLL VENOUS BLD VENIPUNCTURE: CPT | Mod: PO | Performed by: INTERNAL MEDICINE

## 2021-04-26 PROCEDURE — 80053 COMPREHEN METABOLIC PANEL: CPT | Performed by: INTERNAL MEDICINE

## 2021-04-27 ENCOUNTER — OFFICE VISIT (OUTPATIENT)
Dept: INTERNAL MEDICINE | Facility: CLINIC | Age: 76
End: 2021-04-27
Payer: MEDICARE

## 2021-04-27 VITALS
DIASTOLIC BLOOD PRESSURE: 72 MMHG | BODY MASS INDEX: 24.93 KG/M2 | HEIGHT: 59 IN | SYSTOLIC BLOOD PRESSURE: 138 MMHG | HEART RATE: 67 BPM | OXYGEN SATURATION: 99 % | WEIGHT: 123.69 LBS

## 2021-04-27 DIAGNOSIS — L98.9 SKIN LESION: ICD-10-CM

## 2021-04-27 DIAGNOSIS — Z95.2 S/P AVR (AORTIC VALVE REPLACEMENT): ICD-10-CM

## 2021-04-27 DIAGNOSIS — D64.9 ANEMIA, UNSPECIFIED TYPE: ICD-10-CM

## 2021-04-27 DIAGNOSIS — E11.51 TYPE 2 DIABETES MELLITUS WITH DIABETIC PERIPHERAL ANGIOPATHY WITHOUT GANGRENE, WITHOUT LONG-TERM CURRENT USE OF INSULIN: Primary | ICD-10-CM

## 2021-04-27 DIAGNOSIS — D35.2 NON-FUNCTIONING PITUITARY ADENOMA: ICD-10-CM

## 2021-04-27 DIAGNOSIS — F01.50 VASCULAR DEMENTIA WITHOUT BEHAVIORAL DISTURBANCE: ICD-10-CM

## 2021-04-27 DIAGNOSIS — M46.96 UNSPECIFIED INFLAMMATORY SPONDYLOPATHY, LUMBAR REGION: ICD-10-CM

## 2021-04-27 DIAGNOSIS — E11.42 DIABETIC POLYNEUROPATHY ASSOCIATED WITH TYPE 2 DIABETES MELLITUS: ICD-10-CM

## 2021-04-27 PROBLEM — R53.1 DECREASED STRENGTH: Status: RESOLVED | Noted: 2018-01-08 | Resolved: 2021-04-27

## 2021-04-27 PROBLEM — R29.898 DECREASED ROM OF NECK: Status: RESOLVED | Noted: 2018-01-08 | Resolved: 2021-04-27

## 2021-04-27 PROBLEM — G61.82 MULTIFOCAL MOTOR NEUROPATHY: Status: RESOLVED | Noted: 2019-12-16 | Resolved: 2021-04-27

## 2021-04-27 LAB
ALBUMIN SERPL BCP-MCNC: 3.6 G/DL (ref 3.5–5.2)
ALP SERPL-CCNC: 62 U/L (ref 55–135)
ALT SERPL W/O P-5'-P-CCNC: 18 U/L (ref 10–44)
ANION GAP SERPL CALC-SCNC: 12 MMOL/L (ref 8–16)
AST SERPL-CCNC: 17 U/L (ref 10–40)
BILIRUB SERPL-MCNC: 0.3 MG/DL (ref 0.1–1)
BUN SERPL-MCNC: 29 MG/DL (ref 8–23)
CALCIUM SERPL-MCNC: 9.4 MG/DL (ref 8.7–10.5)
CHLORIDE SERPL-SCNC: 101 MMOL/L (ref 95–110)
CHOLEST SERPL-MCNC: 211 MG/DL (ref 120–199)
CHOLEST/HDLC SERPL: 2.2 {RATIO} (ref 2–5)
CO2 SERPL-SCNC: 24 MMOL/L (ref 23–29)
CREAT SERPL-MCNC: 1.2 MG/DL (ref 0.5–1.4)
EST. GFR  (AFRICAN AMERICAN): 51.1 ML/MIN/1.73 M^2
EST. GFR  (NON AFRICAN AMERICAN): 44.3 ML/MIN/1.73 M^2
ESTIMATED AVG GLUCOSE: 183 MG/DL (ref 68–131)
FERRITIN SERPL-MCNC: 41 NG/ML (ref 20–300)
GLUCOSE SERPL-MCNC: 240 MG/DL (ref 70–110)
HBA1C MFR BLD: 8 % (ref 4–5.6)
HDLC SERPL-MCNC: 96 MG/DL (ref 40–75)
HDLC SERPL: 45.5 % (ref 20–50)
LDLC SERPL CALC-MCNC: 90.6 MG/DL (ref 63–159)
NONHDLC SERPL-MCNC: 115 MG/DL
POTASSIUM SERPL-SCNC: 5 MMOL/L (ref 3.5–5.1)
PROT SERPL-MCNC: 6.7 G/DL (ref 6–8.4)
SODIUM SERPL-SCNC: 137 MMOL/L (ref 136–145)
TRIGL SERPL-MCNC: 122 MG/DL (ref 30–150)

## 2021-04-27 PROCEDURE — 99499 RISK ADDL DX/OHS AUDIT: ICD-10-PCS | Mod: S$GLB,,, | Performed by: INTERNAL MEDICINE

## 2021-04-27 PROCEDURE — 99397 PER PM REEVAL EST PAT 65+ YR: CPT | Mod: S$GLB,,, | Performed by: INTERNAL MEDICINE

## 2021-04-27 PROCEDURE — 1126F AMNT PAIN NOTED NONE PRSNT: CPT | Mod: S$GLB,,, | Performed by: INTERNAL MEDICINE

## 2021-04-27 PROCEDURE — 3052F HG A1C>EQUAL 8.0%<EQUAL 9.0%: CPT | Mod: CPTII,S$GLB,, | Performed by: INTERNAL MEDICINE

## 2021-04-27 PROCEDURE — 1157F ADVNC CARE PLAN IN RCRD: CPT | Mod: S$GLB,,, | Performed by: INTERNAL MEDICINE

## 2021-04-27 PROCEDURE — 3052F PR MOST RECENT HEMOGLOBIN A1C LEVEL 8.0 - < 9.0%: ICD-10-PCS | Mod: CPTII,S$GLB,, | Performed by: INTERNAL MEDICINE

## 2021-04-27 PROCEDURE — 99999 PR PBB SHADOW E&M-EST. PATIENT-LVL V: ICD-10-PCS | Mod: PBBFAC,,, | Performed by: INTERNAL MEDICINE

## 2021-04-27 PROCEDURE — 1157F PR ADVANCE CARE PLAN OR EQUIV PRESENT IN MEDICAL RECORD: ICD-10-PCS | Mod: S$GLB,,, | Performed by: INTERNAL MEDICINE

## 2021-04-27 PROCEDURE — 3288F PR FALLS RISK ASSESSMENT DOCUMENTED: ICD-10-PCS | Mod: CPTII,S$GLB,, | Performed by: INTERNAL MEDICINE

## 2021-04-27 PROCEDURE — 3072F PR LOW RISK FOR RETINOPATHY: ICD-10-PCS | Mod: S$GLB,,, | Performed by: INTERNAL MEDICINE

## 2021-04-27 PROCEDURE — 3072F LOW RISK FOR RETINOPATHY: CPT | Mod: S$GLB,,, | Performed by: INTERNAL MEDICINE

## 2021-04-27 PROCEDURE — 1101F PT FALLS ASSESS-DOCD LE1/YR: CPT | Mod: CPTII,S$GLB,, | Performed by: INTERNAL MEDICINE

## 2021-04-27 PROCEDURE — 99499 UNLISTED E&M SERVICE: CPT | Mod: S$GLB,,, | Performed by: INTERNAL MEDICINE

## 2021-04-27 PROCEDURE — 1101F PR PT FALLS ASSESS DOC 0-1 FALLS W/OUT INJ PAST YR: ICD-10-PCS | Mod: CPTII,S$GLB,, | Performed by: INTERNAL MEDICINE

## 2021-04-27 PROCEDURE — 1126F PR PAIN SEVERITY QUANTIFIED, NO PAIN PRESENT: ICD-10-PCS | Mod: S$GLB,,, | Performed by: INTERNAL MEDICINE

## 2021-04-27 PROCEDURE — 99397 PR PREVENTIVE VISIT,EST,65 & OVER: ICD-10-PCS | Mod: S$GLB,,, | Performed by: INTERNAL MEDICINE

## 2021-04-27 PROCEDURE — 3288F FALL RISK ASSESSMENT DOCD: CPT | Mod: CPTII,S$GLB,, | Performed by: INTERNAL MEDICINE

## 2021-04-27 PROCEDURE — 99999 PR PBB SHADOW E&M-EST. PATIENT-LVL V: CPT | Mod: PBBFAC,,, | Performed by: INTERNAL MEDICINE

## 2021-04-27 RX ORDER — FUROSEMIDE 20 MG/1
TABLET ORAL
Qty: 30 TABLET | Refills: 1 | Status: SHIPPED | OUTPATIENT
Start: 2021-04-27

## 2021-04-27 RX ORDER — IPRATROPIUM BROMIDE 42 UG/1
2 SPRAY, METERED NASAL 3 TIMES DAILY
Qty: 15 ML | Refills: 4 | Status: SHIPPED | OUTPATIENT
Start: 2021-04-27 | End: 2022-09-13

## 2021-04-28 ENCOUNTER — TELEPHONE (OUTPATIENT)
Dept: CARDIOLOGY | Facility: CLINIC | Age: 76
End: 2021-04-28

## 2021-05-05 DIAGNOSIS — E11.9 TYPE 2 DIABETES MELLITUS WITHOUT COMPLICATION: ICD-10-CM

## 2021-05-12 DIAGNOSIS — E11.9 TYPE 2 DIABETES MELLITUS WITHOUT COMPLICATION: ICD-10-CM

## 2021-05-12 DIAGNOSIS — F01.50 VASCULAR DEMENTIA WITHOUT BEHAVIORAL DISTURBANCE: ICD-10-CM

## 2021-05-13 RX ORDER — MEMANTINE HYDROCHLORIDE 10 MG/1
TABLET ORAL
Qty: 60 TABLET | Refills: 11 | Status: SHIPPED | OUTPATIENT
Start: 2021-05-13 | End: 2022-05-23

## 2021-05-16 ENCOUNTER — PATIENT MESSAGE (OUTPATIENT)
Dept: INTERNAL MEDICINE | Facility: CLINIC | Age: 76
End: 2021-05-16

## 2021-05-19 DIAGNOSIS — E11.42 DIABETIC POLYNEUROPATHY ASSOCIATED WITH TYPE 2 DIABETES MELLITUS: ICD-10-CM

## 2021-05-20 ENCOUNTER — PATIENT OUTREACH (OUTPATIENT)
Dept: ADMINISTRATIVE | Facility: OTHER | Age: 76
End: 2021-05-20

## 2021-05-21 ENCOUNTER — OFFICE VISIT (OUTPATIENT)
Dept: DERMATOLOGY | Facility: CLINIC | Age: 76
End: 2021-05-21
Payer: MEDICARE

## 2021-05-21 ENCOUNTER — PATIENT MESSAGE (OUTPATIENT)
Dept: INTERNAL MEDICINE | Facility: CLINIC | Age: 76
End: 2021-05-21

## 2021-05-21 VITALS — BODY MASS INDEX: 24.84 KG/M2 | WEIGHT: 123 LBS

## 2021-05-21 DIAGNOSIS — L72.9 BENIGN CYST OF SKIN: Primary | ICD-10-CM

## 2021-05-21 DIAGNOSIS — L98.9 SKIN LESION: ICD-10-CM

## 2021-05-21 PROCEDURE — 1101F PR PT FALLS ASSESS DOC 0-1 FALLS W/OUT INJ PAST YR: ICD-10-PCS | Mod: CPTII,S$GLB,, | Performed by: DERMATOLOGY

## 2021-05-21 PROCEDURE — 99202 OFFICE O/P NEW SF 15 MIN: CPT | Mod: S$GLB,,, | Performed by: DERMATOLOGY

## 2021-05-21 PROCEDURE — 1159F MED LIST DOCD IN RCRD: CPT | Mod: S$GLB,,, | Performed by: DERMATOLOGY

## 2021-05-21 PROCEDURE — 3052F PR MOST RECENT HEMOGLOBIN A1C LEVEL 8.0 - < 9.0%: ICD-10-PCS | Mod: CPTII,S$GLB,, | Performed by: DERMATOLOGY

## 2021-05-21 PROCEDURE — 1126F AMNT PAIN NOTED NONE PRSNT: CPT | Mod: S$GLB,,, | Performed by: DERMATOLOGY

## 2021-05-21 PROCEDURE — 1101F PT FALLS ASSESS-DOCD LE1/YR: CPT | Mod: CPTII,S$GLB,, | Performed by: DERMATOLOGY

## 2021-05-21 PROCEDURE — 3052F HG A1C>EQUAL 8.0%<EQUAL 9.0%: CPT | Mod: CPTII,S$GLB,, | Performed by: DERMATOLOGY

## 2021-05-21 PROCEDURE — 99999 PR PBB SHADOW E&M-EST. PATIENT-LVL IV: CPT | Mod: PBBFAC,,, | Performed by: DERMATOLOGY

## 2021-05-21 PROCEDURE — 99999 PR PBB SHADOW E&M-EST. PATIENT-LVL IV: ICD-10-PCS | Mod: PBBFAC,,, | Performed by: DERMATOLOGY

## 2021-05-21 PROCEDURE — 3288F FALL RISK ASSESSMENT DOCD: CPT | Mod: CPTII,S$GLB,, | Performed by: DERMATOLOGY

## 2021-05-21 PROCEDURE — 3288F PR FALLS RISK ASSESSMENT DOCUMENTED: ICD-10-PCS | Mod: CPTII,S$GLB,, | Performed by: DERMATOLOGY

## 2021-05-21 PROCEDURE — 99202 PR OFFICE/OUTPT VISIT, NEW, LEVL II, 15-29 MIN: ICD-10-PCS | Mod: S$GLB,,, | Performed by: DERMATOLOGY

## 2021-05-21 PROCEDURE — 1157F ADVNC CARE PLAN IN RCRD: CPT | Mod: S$GLB,,, | Performed by: DERMATOLOGY

## 2021-05-21 PROCEDURE — 1126F PR PAIN SEVERITY QUANTIFIED, NO PAIN PRESENT: ICD-10-PCS | Mod: S$GLB,,, | Performed by: DERMATOLOGY

## 2021-05-21 PROCEDURE — 1159F PR MEDICATION LIST DOCUMENTED IN MEDICAL RECORD: ICD-10-PCS | Mod: S$GLB,,, | Performed by: DERMATOLOGY

## 2021-05-21 PROCEDURE — 1157F PR ADVANCE CARE PLAN OR EQUIV PRESENT IN MEDICAL RECORD: ICD-10-PCS | Mod: S$GLB,,, | Performed by: DERMATOLOGY

## 2021-05-21 PROCEDURE — 3072F LOW RISK FOR RETINOPATHY: CPT | Mod: S$GLB,,, | Performed by: DERMATOLOGY

## 2021-05-21 PROCEDURE — 3072F PR LOW RISK FOR RETINOPATHY: ICD-10-PCS | Mod: S$GLB,,, | Performed by: DERMATOLOGY

## 2021-05-23 ENCOUNTER — PATIENT MESSAGE (OUTPATIENT)
Dept: INTERNAL MEDICINE | Facility: CLINIC | Age: 76
End: 2021-05-23

## 2021-05-26 ENCOUNTER — PATIENT MESSAGE (OUTPATIENT)
Dept: INTERNAL MEDICINE | Facility: CLINIC | Age: 76
End: 2021-05-26

## 2021-05-26 DIAGNOSIS — E11.42 DIABETIC POLYNEUROPATHY ASSOCIATED WITH TYPE 2 DIABETES MELLITUS: ICD-10-CM

## 2021-05-31 DIAGNOSIS — E11.42 DIABETIC POLYNEUROPATHY ASSOCIATED WITH TYPE 2 DIABETES MELLITUS: ICD-10-CM

## 2021-05-31 RX ORDER — METFORMIN HYDROCHLORIDE 1000 MG/1
TABLET ORAL
Qty: 120 TABLET | Refills: 3 | Status: SHIPPED | OUTPATIENT
Start: 2021-05-31 | End: 2022-02-06

## 2021-06-01 RX ORDER — METFORMIN HYDROCHLORIDE 1000 MG/1
1000 TABLET ORAL 2 TIMES DAILY WITH MEALS
Qty: 120 TABLET | Refills: 6 | OUTPATIENT
Start: 2021-06-01

## 2021-06-02 RX ORDER — GLIPIZIDE 5 MG/1
TABLET, FILM COATED, EXTENDED RELEASE ORAL
Qty: 90 TABLET | Refills: 1 | Status: SHIPPED | OUTPATIENT
Start: 2021-06-02 | End: 2021-07-28 | Stop reason: SDUPTHER

## 2021-07-01 ENCOUNTER — PATIENT OUTREACH (OUTPATIENT)
Dept: ADMINISTRATIVE | Facility: OTHER | Age: 76
End: 2021-07-01

## 2021-07-02 ENCOUNTER — OFFICE VISIT (OUTPATIENT)
Dept: OPTOMETRY | Facility: CLINIC | Age: 76
End: 2021-07-02
Payer: COMMERCIAL

## 2021-07-02 DIAGNOSIS — Z13.5 GLAUCOMA SCREENING: ICD-10-CM

## 2021-07-02 DIAGNOSIS — H52.4 PRESBYOPIA: ICD-10-CM

## 2021-07-02 DIAGNOSIS — H53.462 LEFT HOMONYMOUS HEMIANOPSIA: ICD-10-CM

## 2021-07-02 DIAGNOSIS — E11.9 TYPE 2 DIABETES MELLITUS WITHOUT RETINOPATHY: Primary | ICD-10-CM

## 2021-07-02 PROCEDURE — 92014 COMPRE OPH EXAM EST PT 1/>: CPT | Mod: S$GLB,,, | Performed by: OPTOMETRIST

## 2021-07-02 PROCEDURE — 92015 PR REFRACTION: ICD-10-PCS | Mod: S$GLB,,, | Performed by: OPTOMETRIST

## 2021-07-02 PROCEDURE — 99999 PR PBB SHADOW E&M-EST. PATIENT-LVL IV: ICD-10-PCS | Mod: PBBFAC,,, | Performed by: OPTOMETRIST

## 2021-07-02 PROCEDURE — 99499 UNLISTED E&M SERVICE: CPT | Mod: S$GLB,,, | Performed by: OPTOMETRIST

## 2021-07-02 PROCEDURE — 99999 PR PBB SHADOW E&M-EST. PATIENT-LVL IV: CPT | Mod: PBBFAC,,, | Performed by: OPTOMETRIST

## 2021-07-02 PROCEDURE — 92015 DETERMINE REFRACTIVE STATE: CPT | Mod: S$GLB,,, | Performed by: OPTOMETRIST

## 2021-07-02 PROCEDURE — 92014 PR EYE EXAM, EST PATIENT,COMPREHESV: ICD-10-PCS | Mod: S$GLB,,, | Performed by: OPTOMETRIST

## 2021-07-02 PROCEDURE — 99499 RISK ADDL DX/OHS AUDIT: ICD-10-PCS | Mod: S$GLB,,, | Performed by: OPTOMETRIST

## 2021-07-07 DIAGNOSIS — E11.59 HYPERTENSION ASSOCIATED WITH DIABETES: Chronic | ICD-10-CM

## 2021-07-07 DIAGNOSIS — I15.2 HYPERTENSION ASSOCIATED WITH DIABETES: Chronic | ICD-10-CM

## 2021-07-08 RX ORDER — LABETALOL 200 MG/1
TABLET, FILM COATED ORAL
Qty: 180 TABLET | Refills: 3 | Status: SHIPPED | OUTPATIENT
Start: 2021-07-08 | End: 2022-07-12

## 2021-07-26 ENCOUNTER — TELEPHONE (OUTPATIENT)
Dept: INTERNAL MEDICINE | Facility: CLINIC | Age: 76
End: 2021-07-26

## 2021-07-26 ENCOUNTER — PATIENT MESSAGE (OUTPATIENT)
Dept: INTERNAL MEDICINE | Facility: CLINIC | Age: 76
End: 2021-07-26

## 2021-07-27 ENCOUNTER — LAB VISIT (OUTPATIENT)
Dept: LAB | Facility: HOSPITAL | Age: 76
End: 2021-07-27
Attending: INTERNAL MEDICINE
Payer: MEDICARE

## 2021-07-27 DIAGNOSIS — E11.51 TYPE 2 DIABETES MELLITUS WITH DIABETIC PERIPHERAL ANGIOPATHY WITHOUT GANGRENE, WITHOUT LONG-TERM CURRENT USE OF INSULIN: ICD-10-CM

## 2021-07-27 PROCEDURE — 83036 HEMOGLOBIN GLYCOSYLATED A1C: CPT | Performed by: INTERNAL MEDICINE

## 2021-07-27 PROCEDURE — 36415 COLL VENOUS BLD VENIPUNCTURE: CPT | Mod: PO | Performed by: INTERNAL MEDICINE

## 2021-07-28 ENCOUNTER — PATIENT MESSAGE (OUTPATIENT)
Dept: INTERNAL MEDICINE | Facility: CLINIC | Age: 76
End: 2021-07-28

## 2021-07-28 DIAGNOSIS — E11.51 TYPE 2 DIABETES MELLITUS WITH DIABETIC PERIPHERAL ANGIOPATHY WITHOUT GANGRENE, WITHOUT LONG-TERM CURRENT USE OF INSULIN: Primary | ICD-10-CM

## 2021-07-28 LAB
ESTIMATED AVG GLUCOSE: 177 MG/DL (ref 68–131)
HBA1C MFR BLD: 7.8 % (ref 4–5.6)

## 2021-07-28 RX ORDER — GLIPIZIDE 10 MG/1
10 TABLET, FILM COATED, EXTENDED RELEASE ORAL
Qty: 90 TABLET | Refills: 3 | Status: SHIPPED | OUTPATIENT
Start: 2021-07-28

## 2021-08-16 RX ORDER — ERGOCALCIFEROL 1.25 MG/1
CAPSULE ORAL
Qty: 12 CAPSULE | Refills: 0 | Status: SHIPPED | OUTPATIENT
Start: 2021-08-16

## 2021-12-10 NOTE — PATIENT INSTRUCTIONS
Vitamin D 50,000 once every 2 weeks    Calcium 500mg twice daily (OTC)    >>>>>>>    Labs for kidney today    Januvia 100mg once daily - if covered by insurance - stop Trulicity    Detail Level: Zone Detail Level: Detailed

## 2022-01-25 ENCOUNTER — PATIENT MESSAGE (OUTPATIENT)
Dept: ADMINISTRATIVE | Facility: HOSPITAL | Age: 77
End: 2022-01-25
Payer: MEDICARE

## 2022-02-06 ENCOUNTER — TELEPHONE (OUTPATIENT)
Dept: INTERNAL MEDICINE | Facility: CLINIC | Age: 77
End: 2022-02-06
Payer: MEDICARE

## 2022-02-06 DIAGNOSIS — D64.9 ANEMIA, UNSPECIFIED TYPE: ICD-10-CM

## 2022-02-06 DIAGNOSIS — E11.59 HYPERTENSION ASSOCIATED WITH DIABETES: ICD-10-CM

## 2022-02-06 DIAGNOSIS — I15.2 HYPERTENSION ASSOCIATED WITH DIABETES: ICD-10-CM

## 2022-02-06 DIAGNOSIS — E11.51 TYPE 2 DIABETES MELLITUS WITH DIABETIC PERIPHERAL ANGIOPATHY WITHOUT GANGRENE, WITHOUT LONG-TERM CURRENT USE OF INSULIN: Primary | ICD-10-CM

## 2022-02-07 DIAGNOSIS — E11.42 DIABETIC POLYNEUROPATHY ASSOCIATED WITH TYPE 2 DIABETES MELLITUS: ICD-10-CM

## 2022-02-07 NOTE — TELEPHONE ENCOUNTER
----- Message from Roger Guerra sent at 2/7/2022  9:29 AM CST -----  Contact: Becky  (daughter)  Pt having labs done on 2/11/22 ut is out of her Metformin medication    Please Contact      Contact  [phone]

## 2022-02-07 NOTE — TELEPHONE ENCOUNTER
Care Due:                  Date            Visit Type   Department     Provider  --------------------------------------------------------------------------------                                MYCHAR                              ANNUAL                              CHECKUP/PHY  [facility] INTERNAL  Last Visit: 04-      [facility]       HERNANDO BEJARANO                              [facility]                              ANNUAL                              CHECKUP/Y  [facility] INTERNAL  Next Visit: 05-      [facility]       HERNANDO BEJARANO                                                            Last  Test          Frequency    Reason                     Performed    Due Date  --------------------------------------------------------------------------------    Cr..........  12 months..  glipiZIDE, metFORMIN.....  04- 04-    HBA1C.......  6 months...  glipiZIDE, metFORMIN.....  07- 01-    Powered by [employer]. Reference number: [id number].   2/07/2022 3:06:03 PM CST

## 2022-02-11 ENCOUNTER — LAB VISIT (OUTPATIENT)
Dept: LAB | Facility: HOSPITAL | Age: 77
End: 2022-02-11
Attending: INTERNAL MEDICINE
Payer: MEDICARE

## 2022-02-11 DIAGNOSIS — E11.51 TYPE 2 DIABETES MELLITUS WITH DIABETIC PERIPHERAL ANGIOPATHY WITHOUT GANGRENE, WITHOUT LONG-TERM CURRENT USE OF INSULIN: ICD-10-CM

## 2022-02-11 DIAGNOSIS — D64.9 ANEMIA, UNSPECIFIED TYPE: ICD-10-CM

## 2022-02-11 LAB
ERYTHROCYTE [DISTWIDTH] IN BLOOD BY AUTOMATED COUNT: 14 % (ref 11.5–14.5)
HCT VFR BLD AUTO: 38.9 % (ref 37–48.5)
HGB BLD-MCNC: 11.9 G/DL (ref 12–16)
MCH RBC QN AUTO: 29.7 PG (ref 27–31)
MCHC RBC AUTO-ENTMCNC: 30.6 G/DL (ref 32–36)
MCV RBC AUTO: 97 FL (ref 82–98)
PLATELET # BLD AUTO: 333 K/UL (ref 150–450)
PMV BLD AUTO: 10 FL (ref 9.2–12.9)
RBC # BLD AUTO: 4.01 M/UL (ref 4–5.4)
WBC # BLD AUTO: 7.61 K/UL (ref 3.9–12.7)

## 2022-02-11 PROCEDURE — 80053 COMPREHEN METABOLIC PANEL: CPT | Performed by: INTERNAL MEDICINE

## 2022-02-11 PROCEDURE — 84466 ASSAY OF TRANSFERRIN: CPT | Performed by: INTERNAL MEDICINE

## 2022-02-11 PROCEDURE — 82728 ASSAY OF FERRITIN: CPT | Performed by: INTERNAL MEDICINE

## 2022-02-11 PROCEDURE — 80061 LIPID PANEL: CPT | Performed by: INTERNAL MEDICINE

## 2022-02-11 PROCEDURE — 36415 COLL VENOUS BLD VENIPUNCTURE: CPT | Mod: PO | Performed by: INTERNAL MEDICINE

## 2022-02-11 PROCEDURE — 83036 HEMOGLOBIN GLYCOSYLATED A1C: CPT | Performed by: INTERNAL MEDICINE

## 2022-02-11 PROCEDURE — 85027 COMPLETE CBC AUTOMATED: CPT | Performed by: INTERNAL MEDICINE

## 2022-02-12 LAB
ALBUMIN SERPL BCP-MCNC: 3.6 G/DL (ref 3.5–5.2)
ALP SERPL-CCNC: 51 U/L (ref 55–135)
ALT SERPL W/O P-5'-P-CCNC: 15 U/L (ref 10–44)
ANION GAP SERPL CALC-SCNC: 11 MMOL/L (ref 8–16)
AST SERPL-CCNC: 18 U/L (ref 10–40)
BILIRUB SERPL-MCNC: 0.3 MG/DL (ref 0.1–1)
BUN SERPL-MCNC: 21 MG/DL (ref 8–23)
CALCIUM SERPL-MCNC: 10.8 MG/DL (ref 8.7–10.5)
CHLORIDE SERPL-SCNC: 101 MMOL/L (ref 95–110)
CHOLEST SERPL-MCNC: 157 MG/DL (ref 120–199)
CHOLEST/HDLC SERPL: 2.3 {RATIO} (ref 2–5)
CO2 SERPL-SCNC: 29 MMOL/L (ref 23–29)
CREAT SERPL-MCNC: 0.9 MG/DL (ref 0.5–1.4)
EST. GFR  (AFRICAN AMERICAN): >60 ML/MIN/1.73 M^2
EST. GFR  (NON AFRICAN AMERICAN): >60 ML/MIN/1.73 M^2
ESTIMATED AVG GLUCOSE: 134 MG/DL (ref 68–131)
FERRITIN SERPL-MCNC: 41 NG/ML (ref 20–300)
GLUCOSE SERPL-MCNC: 84 MG/DL (ref 70–110)
HBA1C MFR BLD: 6.3 % (ref 4–5.6)
HDLC SERPL-MCNC: 67 MG/DL (ref 40–75)
HDLC SERPL: 42.7 % (ref 20–50)
IRON SERPL-MCNC: 64 UG/DL (ref 30–160)
LDLC SERPL CALC-MCNC: 64.2 MG/DL (ref 63–159)
NONHDLC SERPL-MCNC: 90 MG/DL
POTASSIUM SERPL-SCNC: 4.5 MMOL/L (ref 3.5–5.1)
PROT SERPL-MCNC: 6.3 G/DL (ref 6–8.4)
SATURATED IRON: 19 % (ref 20–50)
SODIUM SERPL-SCNC: 141 MMOL/L (ref 136–145)
TOTAL IRON BINDING CAPACITY: 336 UG/DL (ref 250–450)
TRANSFERRIN SERPL-MCNC: 227 MG/DL (ref 200–375)
TRIGL SERPL-MCNC: 129 MG/DL (ref 30–150)

## 2022-02-16 RX ORDER — METFORMIN HYDROCHLORIDE 1000 MG/1
1000 TABLET ORAL 2 TIMES DAILY WITH MEALS
Qty: 120 TABLET | Refills: 0 | Status: SHIPPED | OUTPATIENT
Start: 2022-02-16 | End: 2022-06-17

## 2022-02-27 ENCOUNTER — PATIENT MESSAGE (OUTPATIENT)
Dept: INTERNAL MEDICINE | Facility: CLINIC | Age: 77
End: 2022-02-27
Payer: MEDICARE

## 2022-03-08 ENCOUNTER — PATIENT MESSAGE (OUTPATIENT)
Dept: INTERNAL MEDICINE | Facility: CLINIC | Age: 77
End: 2022-03-08
Payer: MEDICARE

## 2022-05-22 DIAGNOSIS — F01.50 VASCULAR DEMENTIA WITHOUT BEHAVIORAL DISTURBANCE: ICD-10-CM

## 2022-05-23 RX ORDER — MEMANTINE HYDROCHLORIDE 10 MG/1
TABLET ORAL
Qty: 180 TABLET | Refills: 0 | Status: SHIPPED | OUTPATIENT
Start: 2022-05-23 | End: 2022-08-23

## 2022-06-16 DIAGNOSIS — E11.42 DIABETIC POLYNEUROPATHY ASSOCIATED WITH TYPE 2 DIABETES MELLITUS: ICD-10-CM

## 2022-06-16 NOTE — TELEPHONE ENCOUNTER
Care Due:                  Date            Visit Type   Department     Provider  --------------------------------------------------------------------------------                                MYCHAR                              ANNUAL                              CHECKUP/PHY  [facility] INTERNAL  Last Visit: 04-      S            MEDICINE       HERNANDO BEJARANO                              [facility]                              ANNUAL                              CHECKUP/Y  [facility] INTERNAL  Next Visit: 09-      S            MEDICINE       HERNANDO BEJARANO                                                            Last  Test          Frequency    Reason                     Performed    Due Date  --------------------------------------------------------------------------------    HBA1C.......  6 months...  glipiZIDE, metFORMIN.....  02- 08-    [facility] Embedded Care Gaps. Reference number: [id number]. 6/16/2022   6:55:45 AM CDT

## 2022-06-17 RX ORDER — METFORMIN HYDROCHLORIDE 1000 MG/1
TABLET ORAL
Qty: 360 TABLET | Refills: 0 | Status: ON HOLD | OUTPATIENT
Start: 2022-06-17 | End: 2022-10-28 | Stop reason: SINTOL

## 2022-06-17 NOTE — TELEPHONE ENCOUNTER
Refill Decision Note   Marlen Arndt  is requesting a refill authorization.  Brief Assessment and Rationale for Refill:  Approve    -Medication-Related Problems Identified: Requires labs  Medication Therapy Plan:       Medication Reconciliation Completed: No   Comments:     Provider Staff:     Action is required for this patient.   Please see care gap opportunities below in Care Due Message.     Thanks!  [facility]     Appointments      Date Provider   Last Visit   4/27/2021 Dominique Rendon MD   Next Visit   9/13/2022 Dominique Rendon MD     Note composed:3:28 PM 06/17/2022           Note composed:3:28 PM 06/17/2022

## 2022-07-22 ENCOUNTER — IMMUNIZATION (OUTPATIENT)
Dept: INTERNAL MEDICINE | Facility: CLINIC | Age: 77
End: 2022-07-22
Payer: MEDICARE

## 2022-07-22 DIAGNOSIS — Z23 NEED FOR VACCINATION: Primary | ICD-10-CM

## 2022-07-22 PROCEDURE — 91305 COVID-19, MRNA, LNP-S, PF, 30 MCG/0.3 ML DOSE VACCINE (PFIZER): CPT | Mod: PBBFAC | Performed by: INTERNAL MEDICINE

## 2022-08-31 DIAGNOSIS — Z78.0 MENOPAUSE: ICD-10-CM

## 2022-09-02 ENCOUNTER — PATIENT MESSAGE (OUTPATIENT)
Dept: INTERNAL MEDICINE | Facility: CLINIC | Age: 77
End: 2022-09-02
Payer: MEDICARE

## 2022-09-02 DIAGNOSIS — E55.9 VITAMIN D DEFICIENCY: ICD-10-CM

## 2022-09-02 DIAGNOSIS — E11.51 TYPE 2 DIABETES MELLITUS WITH DIABETIC PERIPHERAL ANGIOPATHY WITHOUT GANGRENE, WITHOUT LONG-TERM CURRENT USE OF INSULIN: ICD-10-CM

## 2022-09-02 DIAGNOSIS — D64.9 ANEMIA, UNSPECIFIED TYPE: Primary | ICD-10-CM

## 2022-09-09 ENCOUNTER — LAB VISIT (OUTPATIENT)
Dept: LAB | Facility: HOSPITAL | Age: 77
End: 2022-09-09
Attending: INTERNAL MEDICINE
Payer: MEDICARE

## 2022-09-09 DIAGNOSIS — E11.51 TYPE 2 DIABETES MELLITUS WITH DIABETIC PERIPHERAL ANGIOPATHY WITHOUT GANGRENE, WITHOUT LONG-TERM CURRENT USE OF INSULIN: ICD-10-CM

## 2022-09-09 DIAGNOSIS — D64.9 ANEMIA, UNSPECIFIED TYPE: ICD-10-CM

## 2022-09-09 DIAGNOSIS — E55.9 VITAMIN D DEFICIENCY: ICD-10-CM

## 2022-09-09 LAB
25(OH)D3+25(OH)D2 SERPL-MCNC: 54 NG/ML (ref 30–96)
ALBUMIN SERPL BCP-MCNC: 3.7 G/DL (ref 3.5–5.2)
ALP SERPL-CCNC: 65 U/L (ref 55–135)
ALT SERPL W/O P-5'-P-CCNC: 12 U/L (ref 10–44)
ANION GAP SERPL CALC-SCNC: 12 MMOL/L (ref 8–16)
AST SERPL-CCNC: 14 U/L (ref 10–40)
BASOPHILS # BLD AUTO: 0.04 K/UL (ref 0–0.2)
BASOPHILS NFR BLD: 0.7 % (ref 0–1.9)
BILIRUB SERPL-MCNC: 0.3 MG/DL (ref 0.1–1)
BUN SERPL-MCNC: 32 MG/DL (ref 8–23)
CALCIUM SERPL-MCNC: 9.8 MG/DL (ref 8.7–10.5)
CHLORIDE SERPL-SCNC: 102 MMOL/L (ref 95–110)
CHOLEST SERPL-MCNC: 176 MG/DL (ref 120–199)
CHOLEST/HDLC SERPL: 2.9 {RATIO} (ref 2–5)
CO2 SERPL-SCNC: 23 MMOL/L (ref 23–29)
CREAT SERPL-MCNC: 1 MG/DL (ref 0.5–1.4)
DIFFERENTIAL METHOD: ABNORMAL
EOSINOPHIL # BLD AUTO: 0.1 K/UL (ref 0–0.5)
EOSINOPHIL NFR BLD: 1.5 % (ref 0–8)
ERYTHROCYTE [DISTWIDTH] IN BLOOD BY AUTOMATED COUNT: 13.6 % (ref 11.5–14.5)
EST. GFR  (NO RACE VARIABLE): 58 ML/MIN/1.73 M^2
ESTIMATED AVG GLUCOSE: 143 MG/DL (ref 68–131)
FERRITIN SERPL-MCNC: 42 NG/ML (ref 20–300)
GLUCOSE SERPL-MCNC: 180 MG/DL (ref 70–110)
HBA1C MFR BLD: 6.6 % (ref 4–5.6)
HCT VFR BLD AUTO: 35.7 % (ref 37–48.5)
HDLC SERPL-MCNC: 61 MG/DL (ref 40–75)
HDLC SERPL: 34.7 % (ref 20–50)
HGB BLD-MCNC: 11.7 G/DL (ref 12–16)
IMM GRANULOCYTES # BLD AUTO: 0.04 K/UL (ref 0–0.04)
IMM GRANULOCYTES NFR BLD AUTO: 0.7 % (ref 0–0.5)
IRON SERPL-MCNC: 49 UG/DL (ref 30–160)
LDLC SERPL CALC-MCNC: 91.2 MG/DL (ref 63–159)
LYMPHOCYTES # BLD AUTO: 1.5 K/UL (ref 1–4.8)
LYMPHOCYTES NFR BLD: 25.5 % (ref 18–48)
MCH RBC QN AUTO: 29.2 PG (ref 27–31)
MCHC RBC AUTO-ENTMCNC: 32.8 G/DL (ref 32–36)
MCV RBC AUTO: 89 FL (ref 82–98)
MONOCYTES # BLD AUTO: 0.3 K/UL (ref 0.3–1)
MONOCYTES NFR BLD: 4.7 % (ref 4–15)
NEUTROPHILS # BLD AUTO: 4 K/UL (ref 1.8–7.7)
NEUTROPHILS NFR BLD: 66.9 % (ref 38–73)
NONHDLC SERPL-MCNC: 115 MG/DL
NRBC BLD-RTO: 0 /100 WBC
PLATELET # BLD AUTO: 350 K/UL (ref 150–450)
PMV BLD AUTO: 9.6 FL (ref 9.2–12.9)
POTASSIUM SERPL-SCNC: 4.6 MMOL/L (ref 3.5–5.1)
PROT SERPL-MCNC: 6 G/DL (ref 6–8.4)
RBC # BLD AUTO: 4.01 M/UL (ref 4–5.4)
SATURATED IRON: 13 % (ref 20–50)
SODIUM SERPL-SCNC: 137 MMOL/L (ref 136–145)
TOTAL IRON BINDING CAPACITY: 383 UG/DL (ref 250–450)
TRANSFERRIN SERPL-MCNC: 259 MG/DL (ref 200–375)
TRIGL SERPL-MCNC: 119 MG/DL (ref 30–150)
WBC # BLD AUTO: 5.91 K/UL (ref 3.9–12.7)

## 2022-09-09 PROCEDURE — 84466 ASSAY OF TRANSFERRIN: CPT | Performed by: INTERNAL MEDICINE

## 2022-09-09 PROCEDURE — 85025 COMPLETE CBC W/AUTO DIFF WBC: CPT | Performed by: INTERNAL MEDICINE

## 2022-09-09 PROCEDURE — 82728 ASSAY OF FERRITIN: CPT | Performed by: INTERNAL MEDICINE

## 2022-09-09 PROCEDURE — 36415 COLL VENOUS BLD VENIPUNCTURE: CPT | Mod: PO | Performed by: INTERNAL MEDICINE

## 2022-09-09 PROCEDURE — 80053 COMPREHEN METABOLIC PANEL: CPT | Performed by: INTERNAL MEDICINE

## 2022-09-09 PROCEDURE — 80061 LIPID PANEL: CPT | Performed by: INTERNAL MEDICINE

## 2022-09-09 PROCEDURE — 83036 HEMOGLOBIN GLYCOSYLATED A1C: CPT | Performed by: INTERNAL MEDICINE

## 2022-09-09 PROCEDURE — 82306 VITAMIN D 25 HYDROXY: CPT | Performed by: INTERNAL MEDICINE

## 2022-09-13 ENCOUNTER — OFFICE VISIT (OUTPATIENT)
Dept: INTERNAL MEDICINE | Facility: CLINIC | Age: 77
End: 2022-09-13
Payer: MEDICARE

## 2022-09-13 VITALS
HEART RATE: 88 BPM | SYSTOLIC BLOOD PRESSURE: 132 MMHG | OXYGEN SATURATION: 98 % | DIASTOLIC BLOOD PRESSURE: 74 MMHG | BODY MASS INDEX: 21.02 KG/M2 | HEIGHT: 59 IN | WEIGHT: 104.25 LBS

## 2022-09-13 DIAGNOSIS — E11.51 TYPE 2 DIABETES MELLITUS WITH DIABETIC PERIPHERAL ANGIOPATHY WITHOUT GANGRENE, WITHOUT LONG-TERM CURRENT USE OF INSULIN: ICD-10-CM

## 2022-09-13 DIAGNOSIS — D35.2 NON-FUNCTIONING PITUITARY ADENOMA: ICD-10-CM

## 2022-09-13 DIAGNOSIS — M46.96 UNSPECIFIED INFLAMMATORY SPONDYLOPATHY, LUMBAR REGION: ICD-10-CM

## 2022-09-13 DIAGNOSIS — N18.31 STAGE 3A CHRONIC KIDNEY DISEASE: ICD-10-CM

## 2022-09-13 DIAGNOSIS — E78.5 HYPERLIPIDEMIA, UNSPECIFIED HYPERLIPIDEMIA TYPE: ICD-10-CM

## 2022-09-13 DIAGNOSIS — F03.90 DEMENTIA WITHOUT BEHAVIORAL DISTURBANCE, UNSPECIFIED DEMENTIA TYPE: ICD-10-CM

## 2022-09-13 DIAGNOSIS — Z00.00 ANNUAL PHYSICAL EXAM: Primary | ICD-10-CM

## 2022-09-13 DIAGNOSIS — D64.9 ANEMIA, UNSPECIFIED TYPE: ICD-10-CM

## 2022-09-13 DIAGNOSIS — E55.9 VITAMIN D DEFICIENCY: ICD-10-CM

## 2022-09-13 DIAGNOSIS — E11.42 DIABETIC POLYNEUROPATHY ASSOCIATED WITH TYPE 2 DIABETES MELLITUS: ICD-10-CM

## 2022-09-13 PROCEDURE — G0008 FLU VACCINE - QUADRIVALENT - ADJUVANTED: ICD-10-PCS | Mod: S$GLB,,, | Performed by: INTERNAL MEDICINE

## 2022-09-13 PROCEDURE — 3288F PR FALLS RISK ASSESSMENT DOCUMENTED: ICD-10-PCS | Mod: CPTII,S$GLB,, | Performed by: INTERNAL MEDICINE

## 2022-09-13 PROCEDURE — 99397 PR PREVENTIVE VISIT,EST,65 & OVER: ICD-10-PCS | Mod: 25,S$GLB,, | Performed by: INTERNAL MEDICINE

## 2022-09-13 PROCEDURE — 1126F AMNT PAIN NOTED NONE PRSNT: CPT | Mod: CPTII,S$GLB,, | Performed by: INTERNAL MEDICINE

## 2022-09-13 PROCEDURE — 1160F PR REVIEW ALL MEDS BY PRESCRIBER/CLIN PHARMACIST DOCUMENTED: ICD-10-PCS | Mod: CPTII,S$GLB,, | Performed by: INTERNAL MEDICINE

## 2022-09-13 PROCEDURE — 3078F PR MOST RECENT DIASTOLIC BLOOD PRESSURE < 80 MM HG: ICD-10-PCS | Mod: CPTII,S$GLB,, | Performed by: INTERNAL MEDICINE

## 2022-09-13 PROCEDURE — 1157F PR ADVANCE CARE PLAN OR EQUIV PRESENT IN MEDICAL RECORD: ICD-10-PCS | Mod: CPTII,S$GLB,, | Performed by: INTERNAL MEDICINE

## 2022-09-13 PROCEDURE — 99499 UNLISTED E&M SERVICE: CPT | Mod: S$GLB,,, | Performed by: INTERNAL MEDICINE

## 2022-09-13 PROCEDURE — 1160F RVW MEDS BY RX/DR IN RCRD: CPT | Mod: CPTII,S$GLB,, | Performed by: INTERNAL MEDICINE

## 2022-09-13 PROCEDURE — 3072F PR LOW RISK FOR RETINOPATHY: ICD-10-PCS | Mod: CPTII,S$GLB,, | Performed by: INTERNAL MEDICINE

## 2022-09-13 PROCEDURE — 99397 PER PM REEVAL EST PAT 65+ YR: CPT | Mod: 25,S$GLB,, | Performed by: INTERNAL MEDICINE

## 2022-09-13 PROCEDURE — 1157F ADVNC CARE PLAN IN RCRD: CPT | Mod: CPTII,S$GLB,, | Performed by: INTERNAL MEDICINE

## 2022-09-13 PROCEDURE — 3075F SYST BP GE 130 - 139MM HG: CPT | Mod: CPTII,S$GLB,, | Performed by: INTERNAL MEDICINE

## 2022-09-13 PROCEDURE — 1101F PR PT FALLS ASSESS DOC 0-1 FALLS W/OUT INJ PAST YR: ICD-10-PCS | Mod: CPTII,S$GLB,, | Performed by: INTERNAL MEDICINE

## 2022-09-13 PROCEDURE — 99999 PR PBB SHADOW E&M-EST. PATIENT-LVL V: ICD-10-PCS | Mod: PBBFAC,,, | Performed by: INTERNAL MEDICINE

## 2022-09-13 PROCEDURE — 3078F DIAST BP <80 MM HG: CPT | Mod: CPTII,S$GLB,, | Performed by: INTERNAL MEDICINE

## 2022-09-13 PROCEDURE — 1126F PR PAIN SEVERITY QUANTIFIED, NO PAIN PRESENT: ICD-10-PCS | Mod: CPTII,S$GLB,, | Performed by: INTERNAL MEDICINE

## 2022-09-13 PROCEDURE — G0008 ADMIN INFLUENZA VIRUS VAC: HCPCS | Mod: S$GLB,,, | Performed by: INTERNAL MEDICINE

## 2022-09-13 PROCEDURE — 99999 PR PBB SHADOW E&M-EST. PATIENT-LVL V: CPT | Mod: PBBFAC,,, | Performed by: INTERNAL MEDICINE

## 2022-09-13 PROCEDURE — 3288F FALL RISK ASSESSMENT DOCD: CPT | Mod: CPTII,S$GLB,, | Performed by: INTERNAL MEDICINE

## 2022-09-13 PROCEDURE — 1159F PR MEDICATION LIST DOCUMENTED IN MEDICAL RECORD: ICD-10-PCS | Mod: CPTII,S$GLB,, | Performed by: INTERNAL MEDICINE

## 2022-09-13 PROCEDURE — 90694 VACC AIIV4 NO PRSRV 0.5ML IM: CPT | Mod: S$GLB,,, | Performed by: INTERNAL MEDICINE

## 2022-09-13 PROCEDURE — 3072F LOW RISK FOR RETINOPATHY: CPT | Mod: CPTII,S$GLB,, | Performed by: INTERNAL MEDICINE

## 2022-09-13 PROCEDURE — 90694 FLU VACCINE - QUADRIVALENT - ADJUVANTED: ICD-10-PCS | Mod: S$GLB,,, | Performed by: INTERNAL MEDICINE

## 2022-09-13 PROCEDURE — 1101F PT FALLS ASSESS-DOCD LE1/YR: CPT | Mod: CPTII,S$GLB,, | Performed by: INTERNAL MEDICINE

## 2022-09-13 PROCEDURE — 3075F PR MOST RECENT SYSTOLIC BLOOD PRESS GE 130-139MM HG: ICD-10-PCS | Mod: CPTII,S$GLB,, | Performed by: INTERNAL MEDICINE

## 2022-09-13 PROCEDURE — 1159F MED LIST DOCD IN RCRD: CPT | Mod: CPTII,S$GLB,, | Performed by: INTERNAL MEDICINE

## 2022-09-13 NOTE — PROGRESS NOTES
Subjective:       Patient ID: June B Yris is a 77 y.o. female.    Chief Complaint: Annual Exam    HPI  Accompanied by daughter.  Living with  and daughter, Hui.  Progressive dementia.    Hui halfed the glipizide due to occas low sugars.    Long standing htn.  Recently elevated to systolic of 200's.  She was constipated at the time.  Hui have her an extra 50 mg of losartan.    1 mo ago, for 3 days, could not eat or walk.      Now back to baseline.     She has been using walking last 6-8 months.       Incont x 2.    Needs help dressing, bathing, toileting.      Has to be reminded to brush teeth.       Short term memory as worsened.  Often does not recognize family members.         Sleeps through night with melatonin.    BMI down to 21.  Diet supplemented with protein shakes.    Eating 2 meals a day.    Review of Systems   Constitutional:  Negative for fever and unexpected weight change.   HENT:  Negative for nasal congestion and postnasal drip.    Respiratory:  Negative for chest tightness, shortness of breath and wheezing.    Cardiovascular:  Negative for chest pain and leg swelling.   Gastrointestinal:  Negative for abdominal pain, anal bleeding, constipation, diarrhea, nausea and vomiting.   Genitourinary:  Negative for dysuria and urgency.   Musculoskeletal:  Positive for back pain (with change in position.).   Integumentary:  Negative for rash.   Neurological:  Negative for headaches.   Psychiatric/Behavioral:  Negative for dysphoric mood and sleep disturbance. The patient is not nervous/anxious.        Objective:      Physical Exam  Constitutional:       General: She is not in acute distress.     Appearance: She is well-developed.   HENT:      Head: Normocephalic and atraumatic.      Right Ear: External ear normal.      Left Ear: External ear normal.      Nose: Nose normal.   Eyes:      General: No scleral icterus.        Right eye: No discharge.         Left eye: No discharge.       Conjunctiva/sclera: Conjunctivae normal.      Pupils: Pupils are equal, round, and reactive to light.   Neck:      Thyroid: No thyromegaly.      Vascular: No JVD.   Cardiovascular:      Rate and Rhythm: Normal rate and regular rhythm.      Heart sounds: Normal heart sounds. No murmur heard.    No gallop.   Pulmonary:      Effort: Pulmonary effort is normal. No respiratory distress.      Breath sounds: Normal breath sounds. No wheezing or rales.   Abdominal:      General: Bowel sounds are normal. There is no distension.      Palpations: Abdomen is soft. There is no mass.      Tenderness: There is no abdominal tenderness. There is no guarding or rebound.   Musculoskeletal:         General: No tenderness. Normal range of motion.      Cervical back: Normal range of motion and neck supple.   Lymphadenopathy:      Cervical: No cervical adenopathy.   Skin:     General: Skin is warm and dry.      Findings: No rash.   Neurological:      Mental Status: She is alert and oriented to person, place, and time.      Cranial Nerves: No cranial nerve deficit.      Coordination: Coordination normal.   Psychiatric:         Behavior: Behavior normal.         Thought Content: Thought content normal.         Judgment: Judgment normal.     Feet- no ulceratoins  Results for orders placed or performed in visit on 09/09/22   COMPREHENSIVE METABOLIC PANEL   Result Value Ref Range    Sodium 137 136 - 145 mmol/L    Potassium 4.6 3.5 - 5.1 mmol/L    Chloride 102 95 - 110 mmol/L    CO2 23 23 - 29 mmol/L    Glucose 180 (H) 70 - 110 mg/dL    BUN 32 (H) 8 - 23 mg/dL    Creatinine 1.0 0.5 - 1.4 mg/dL    Calcium 9.8 8.7 - 10.5 mg/dL    Total Protein 6.0 6.0 - 8.4 g/dL    Albumin 3.7 3.5 - 5.2 g/dL    Total Bilirubin 0.3 0.1 - 1.0 mg/dL    Alkaline Phosphatase 65 55 - 135 U/L    AST 14 10 - 40 U/L    ALT 12 10 - 44 U/L    Anion Gap 12 8 - 16 mmol/L    eGFR 58.0 (A) >60 mL/min/1.73 m^2   CBC Auto Differential   Result Value Ref Range    WBC 5.91 3.90 -  12.70 K/uL    RBC 4.01 4.00 - 5.40 M/uL    Hemoglobin 11.7 (L) 12.0 - 16.0 g/dL    Hematocrit 35.7 (L) 37.0 - 48.5 %    MCV 89 82 - 98 fL    MCH 29.2 27.0 - 31.0 pg    MCHC 32.8 32.0 - 36.0 g/dL    RDW 13.6 11.5 - 14.5 %    Platelets 350 150 - 450 K/uL    MPV 9.6 9.2 - 12.9 fL    Immature Granulocytes 0.7 (H) 0.0 - 0.5 %    Gran # (ANC) 4.0 1.8 - 7.7 K/uL    Immature Grans (Abs) 0.04 0.00 - 0.04 K/uL    Lymph # 1.5 1.0 - 4.8 K/uL    Mono # 0.3 0.3 - 1.0 K/uL    Eos # 0.1 0.0 - 0.5 K/uL    Baso # 0.04 0.00 - 0.20 K/uL    nRBC 0 0 /100 WBC    Gran % 66.9 38.0 - 73.0 %    Lymph % 25.5 18.0 - 48.0 %    Mono % 4.7 4.0 - 15.0 %    Eosinophil % 1.5 0.0 - 8.0 %    Basophil % 0.7 0.0 - 1.9 %    Differential Method Automated    Lipid Panel   Result Value Ref Range    Cholesterol 176 120 - 199 mg/dL    Triglycerides 119 30 - 150 mg/dL    HDL 61 40 - 75 mg/dL    LDL Cholesterol 91.2 63.0 - 159.0 mg/dL    HDL/Cholesterol Ratio 34.7 20.0 - 50.0 %    Total Cholesterol/HDL Ratio 2.9 2.0 - 5.0    Non-HDL Cholesterol 115 mg/dL   Hemoglobin A1C   Result Value Ref Range    Hemoglobin A1C 6.6 (H) 4.0 - 5.6 %    Estimated Avg Glucose 143 (H) 68 - 131 mg/dL   Ferritin   Result Value Ref Range    Ferritin 42 20.0 - 300.0 ng/mL   Iron and TIBC   Result Value Ref Range    Iron 49 30 - 160 ug/dL    Transferrin 259 200 - 375 mg/dL    TIBC 383 250 - 450 ug/dL    Saturated Iron 13 (L) 20 - 50 %   Vitamin D   Result Value Ref Range    Vit D, 25-Hydroxy 54 30 - 96 ng/mL      Assessment:       Problem List Items Addressed This Visit       Unspecified inflammatory spondylopathy, lumbar region    Type 2 diabetes mellitus with circulatory disorder, without long-term current use of insulin    Stage 3a chronic kidney disease - Primary     As per today's labs         Non-functioning pituitary adenoma     Reviewed.  Chronic, asymptomatic         Diabetic polyneuropathy associated with type 2 diabetes mellitus     As previously documented.  Unable to  comment today on symtpoms          Other Visit Diagnoses       Dementia without behavioral disturbance, unspecified dementia type                Plan:       June was seen today for annual exam.    Diagnoses and all orders for this visit:    Stage 3a chronic kidney disease    Non-functioning pituitary adenoma    Diabetic polyneuropathy associated with type 2 diabetes mellitus    Unspecified inflammatory spondylopathy, lumbar region    Type 2 diabetes mellitus with diabetic peripheral angiopathy without gangrene, without long-term current use of insulin    Dementia without behavioral disturbance, unspecified dementia type         Stop glipizide to avoid hypolycemia.  COnsider januvia in future if needed.

## 2022-10-27 ENCOUNTER — HOSPITAL ENCOUNTER (INPATIENT)
Facility: HOSPITAL | Age: 77
LOS: 5 days | Discharge: HOSPICE | DRG: 871 | End: 2022-11-02
Attending: STUDENT IN AN ORGANIZED HEALTH CARE EDUCATION/TRAINING PROGRAM | Admitting: STUDENT IN AN ORGANIZED HEALTH CARE EDUCATION/TRAINING PROGRAM
Payer: MEDICARE

## 2022-10-27 DIAGNOSIS — N17.9 AKI (ACUTE KIDNEY INJURY): ICD-10-CM

## 2022-10-27 DIAGNOSIS — E11.10 DIABETIC KETOACIDOSIS WITHOUT COMA ASSOCIATED WITH TYPE 2 DIABETES MELLITUS: ICD-10-CM

## 2022-10-27 DIAGNOSIS — I25.10 CAD (CORONARY ARTERY DISEASE): ICD-10-CM

## 2022-10-27 DIAGNOSIS — R57.8 HEMORRHAGIC SHOCK: ICD-10-CM

## 2022-10-27 DIAGNOSIS — R57.9: ICD-10-CM

## 2022-10-27 DIAGNOSIS — N32.89 BLADDER HEMORRHAGE: ICD-10-CM

## 2022-10-27 DIAGNOSIS — R31.9 HEMATURIA, UNSPECIFIED TYPE: ICD-10-CM

## 2022-10-27 DIAGNOSIS — R41.82 ALTERED MENTAL STATUS, UNSPECIFIED ALTERED MENTAL STATUS TYPE: ICD-10-CM

## 2022-10-27 DIAGNOSIS — E08.11 DIABETIC KETOACIDOSIS WITH COMA ASSOCIATED WITH DIABETES MELLITUS DUE TO UNDERLYING CONDITION: ICD-10-CM

## 2022-10-27 DIAGNOSIS — N30.01 ACUTE CYSTITIS WITH HEMATURIA: ICD-10-CM

## 2022-10-27 DIAGNOSIS — I21.A1: ICD-10-CM

## 2022-10-27 DIAGNOSIS — R79.89 ELEVATED TROPONIN: ICD-10-CM

## 2022-10-27 DIAGNOSIS — R57.0 CARDIOGENIC SHOCK: ICD-10-CM

## 2022-10-27 DIAGNOSIS — E11.10 DKA (DIABETIC KETOACIDOSIS): Primary | ICD-10-CM

## 2022-10-27 DIAGNOSIS — R73.9 HYPERGLYCEMIA: ICD-10-CM

## 2022-10-27 PROBLEM — R93.89 ABNORMAL CT SCAN: Status: ACTIVE | Noted: 2022-10-27

## 2022-10-27 PROBLEM — D72.829 LEUKOCYTOSIS: Status: ACTIVE | Noted: 2022-10-27

## 2022-10-27 PROBLEM — G93.41 ENCEPHALOPATHY, METABOLIC: Status: ACTIVE | Noted: 2022-10-27

## 2022-10-27 LAB
ALBUMIN SERPL BCP-MCNC: 3.7 G/DL (ref 3.5–5.2)
ALP SERPL-CCNC: 91 U/L (ref 55–135)
ALT SERPL W/O P-5'-P-CCNC: 31 U/L (ref 10–44)
AMYLASE SERPL-CCNC: 187 U/L (ref 20–110)
ANION GAP SERPL CALC-SCNC: 21 MMOL/L (ref 8–16)
ANION GAP SERPL CALC-SCNC: 22 MMOL/L (ref 8–16)
AST SERPL-CCNC: 34 U/L (ref 10–40)
B-OH-BUTYR BLD STRIP-SCNC: 4 MMOL/L (ref 0–0.5)
BACTERIA #/AREA URNS HPF: ABNORMAL /HPF
BASOPHILS # BLD AUTO: 0.03 K/UL (ref 0–0.2)
BASOPHILS NFR BLD: 0.1 % (ref 0–1.9)
BILIRUB SERPL-MCNC: 0.4 MG/DL (ref 0.1–1)
BILIRUB UR QL STRIP: NEGATIVE
BNP SERPL-MCNC: 454 PG/ML (ref 0–99)
BUN SERPL-MCNC: 74 MG/DL (ref 8–23)
BUN SERPL-MCNC: 76 MG/DL (ref 8–23)
CALCIUM SERPL-MCNC: 10.4 MG/DL (ref 8.7–10.5)
CALCIUM SERPL-MCNC: 9.8 MG/DL (ref 8.7–10.5)
CHLORIDE SERPL-SCNC: 104 MMOL/L (ref 95–110)
CHLORIDE SERPL-SCNC: 99 MMOL/L (ref 95–110)
CLARITY UR: ABNORMAL
CO2 SERPL-SCNC: 13 MMOL/L (ref 23–29)
CO2 SERPL-SCNC: 16 MMOL/L (ref 23–29)
COLOR UR: ABNORMAL
CREAT SERPL-MCNC: 2.2 MG/DL (ref 0.5–1.4)
CREAT SERPL-MCNC: 2.4 MG/DL (ref 0.5–1.4)
CREAT UR-MCNC: 12.8 MG/DL (ref 15–325)
CTP QC/QA: YES
DIFFERENTIAL METHOD: ABNORMAL
EOSINOPHIL # BLD AUTO: 0 K/UL (ref 0–0.5)
EOSINOPHIL NFR BLD: 0 % (ref 0–8)
EOSINOPHIL URNS QL WRIGHT STN: ABNORMAL
ERYTHROCYTE [DISTWIDTH] IN BLOOD BY AUTOMATED COUNT: 13.2 % (ref 11.5–14.5)
EST. GFR  (NO RACE VARIABLE): 20 ML/MIN/1.73 M^2
EST. GFR  (NO RACE VARIABLE): 23 ML/MIN/1.73 M^2
ESTIMATED AVG GLUCOSE: 209 MG/DL (ref 68–131)
GLUCOSE SERPL-MCNC: 1039 MG/DL (ref 70–110)
GLUCOSE SERPL-MCNC: 937 MG/DL (ref 70–110)
GLUCOSE UR QL STRIP: ABNORMAL
HBA1C MFR BLD: 8.9 % (ref 4–5.6)
HCO3 UR-SCNC: 22.1 MMOL/L (ref 24–28)
HCT VFR BLD AUTO: 44.5 % (ref 37–48.5)
HGB BLD-MCNC: 14.6 G/DL (ref 12–16)
HGB UR QL STRIP: ABNORMAL
HYALINE CASTS #/AREA URNS LPF: 0 /LPF
IMM GRANULOCYTES # BLD AUTO: 0.13 K/UL (ref 0–0.04)
IMM GRANULOCYTES NFR BLD AUTO: 0.6 % (ref 0–0.5)
INR PPP: 1.1 (ref 0.8–1.2)
INR PPP: 1.1 (ref 0.8–1.2)
KETONES UR QL STRIP: NEGATIVE
LACTATE SERPL-SCNC: 2.5 MMOL/L (ref 0.5–2.2)
LEUKOCYTE ESTERASE UR QL STRIP: ABNORMAL
LIPASE SERPL-CCNC: 313 U/L (ref 4–60)
LYMPHOCYTES # BLD AUTO: 0.9 K/UL (ref 1–4.8)
LYMPHOCYTES NFR BLD: 4.2 % (ref 18–48)
MAGNESIUM SERPL-MCNC: 1.8 MG/DL (ref 1.6–2.6)
MCH RBC QN AUTO: 29.4 PG (ref 27–31)
MCHC RBC AUTO-ENTMCNC: 32.8 G/DL (ref 32–36)
MCV RBC AUTO: 90 FL (ref 82–98)
MICROSCOPIC COMMENT: ABNORMAL
MONOCYTES # BLD AUTO: 0.8 K/UL (ref 0.3–1)
MONOCYTES NFR BLD: 4 % (ref 4–15)
NEUTROPHILS # BLD AUTO: 18.4 K/UL (ref 1.8–7.7)
NEUTROPHILS NFR BLD: 91.1 % (ref 38–73)
NITRITE UR QL STRIP: NEGATIVE
NRBC BLD-RTO: 0 /100 WBC
PCO2 BLDA: 43.6 MMHG (ref 35–45)
PH SMN: 7.31 [PH] (ref 7.35–7.45)
PH UR STRIP: 8 [PH] (ref 5–8)
PHOSPHATE SERPL-MCNC: 3.5 MG/DL (ref 2.7–4.5)
PHOSPHATE SERPL-MCNC: 3.5 MG/DL (ref 2.7–4.5)
PLATELET # BLD AUTO: 370 K/UL (ref 150–450)
PMV BLD AUTO: 10.4 FL (ref 9.2–12.9)
PO2 BLDA: 44 MMHG (ref 40–60)
POC BE: -4 MMOL/L
POC MOLECULAR INFLUENZA A AGN: NEGATIVE
POC MOLECULAR INFLUENZA B AGN: NEGATIVE
POC SATURATED O2: 75 % (ref 95–100)
POC TCO2: 23 MMOL/L (ref 24–29)
POCT GLUCOSE: >500 MG/DL (ref 70–110)
POCT GLUCOSE: >500 MG/DL (ref 70–110)
POTASSIUM SERPL-SCNC: 4.5 MMOL/L (ref 3.5–5.1)
POTASSIUM SERPL-SCNC: 5.7 MMOL/L (ref 3.5–5.1)
PROT SERPL-MCNC: 7.5 G/DL (ref 6–8.4)
PROT UR QL STRIP: ABNORMAL
PROT UR-MCNC: 1335 MG/DL (ref 0–15)
PROT/CREAT UR: 104.3 MG/G{CREAT} (ref 0–0.2)
PROTHROMBIN TIME: 11.1 SEC (ref 9–12.5)
PROTHROMBIN TIME: 11.2 SEC (ref 9–12.5)
RBC # BLD AUTO: 4.96 M/UL (ref 4–5.4)
RBC #/AREA URNS HPF: >100 /HPF (ref 0–4)
SAMPLE: ABNORMAL
SODIUM SERPL-SCNC: 137 MMOL/L (ref 136–145)
SODIUM SERPL-SCNC: 138 MMOL/L (ref 136–145)
SODIUM UR-SCNC: 103 MMOL/L (ref 20–250)
SP GR UR STRIP: 1.01 (ref 1–1.03)
TROPONIN I SERPL DL<=0.01 NG/ML-MCNC: 0.4 NG/ML (ref 0–0.03)
TROPONIN I SERPL DL<=0.01 NG/ML-MCNC: 1.23 NG/ML (ref 0–0.03)
TROPONIN I SERPL DL<=0.01 NG/ML-MCNC: 1.28 NG/ML (ref 0–0.03)
URATE SERPL-MCNC: 6 MG/DL (ref 2.4–5.7)
URN SPEC COLLECT METH UR: ABNORMAL
UROBILINOGEN UR STRIP-ACNC: NEGATIVE EU/DL
WBC # BLD AUTO: 20.17 K/UL (ref 3.9–12.7)
WBC #/AREA URNS HPF: 10 /HPF (ref 0–5)
YEAST URNS QL MICRO: ABNORMAL

## 2022-10-27 PROCEDURE — 84484 ASSAY OF TROPONIN QUANT: CPT | Mod: 91 | Performed by: INTERNAL MEDICINE

## 2022-10-27 PROCEDURE — 84550 ASSAY OF BLOOD/URIC ACID: CPT | Performed by: INTERNAL MEDICINE

## 2022-10-27 PROCEDURE — 80048 BASIC METABOLIC PNL TOTAL CA: CPT | Mod: 91,XB | Performed by: INTERNAL MEDICINE

## 2022-10-27 PROCEDURE — 84443 ASSAY THYROID STIM HORMONE: CPT | Performed by: INTERNAL MEDICINE

## 2022-10-27 PROCEDURE — 83605 ASSAY OF LACTIC ACID: CPT | Performed by: STUDENT IN AN ORGANIZED HEALTH CARE EDUCATION/TRAINING PROGRAM

## 2022-10-27 PROCEDURE — 82010 KETONE BODYS QUAN: CPT | Performed by: STUDENT IN AN ORGANIZED HEALTH CARE EDUCATION/TRAINING PROGRAM

## 2022-10-27 PROCEDURE — 80048 BASIC METABOLIC PNL TOTAL CA: CPT | Mod: XB | Performed by: STUDENT IN AN ORGANIZED HEALTH CARE EDUCATION/TRAINING PROGRAM

## 2022-10-27 PROCEDURE — G0378 HOSPITAL OBSERVATION PER HR: HCPCS

## 2022-10-27 PROCEDURE — 87040 BLOOD CULTURE FOR BACTERIA: CPT | Mod: 59 | Performed by: INTERNAL MEDICINE

## 2022-10-27 PROCEDURE — 93005 ELECTROCARDIOGRAM TRACING: CPT

## 2022-10-27 PROCEDURE — 87086 URINE CULTURE/COLONY COUNT: CPT | Performed by: INTERNAL MEDICINE

## 2022-10-27 PROCEDURE — 85610 PROTHROMBIN TIME: CPT | Performed by: STUDENT IN AN ORGANIZED HEALTH CARE EDUCATION/TRAINING PROGRAM

## 2022-10-27 PROCEDURE — 86920 COMPATIBILITY TEST SPIN: CPT | Performed by: STUDENT IN AN ORGANIZED HEALTH CARE EDUCATION/TRAINING PROGRAM

## 2022-10-27 PROCEDURE — 87205 SMEAR GRAM STAIN: CPT | Performed by: INTERNAL MEDICINE

## 2022-10-27 PROCEDURE — 99285 EMERGENCY DEPT VISIT HI MDM: CPT | Mod: 25

## 2022-10-27 PROCEDURE — 87040 BLOOD CULTURE FOR BACTERIA: CPT | Performed by: STUDENT IN AN ORGANIZED HEALTH CARE EDUCATION/TRAINING PROGRAM

## 2022-10-27 PROCEDURE — 84100 ASSAY OF PHOSPHORUS: CPT | Performed by: STUDENT IN AN ORGANIZED HEALTH CARE EDUCATION/TRAINING PROGRAM

## 2022-10-27 PROCEDURE — 80053 COMPREHEN METABOLIC PANEL: CPT | Performed by: STUDENT IN AN ORGANIZED HEALTH CARE EDUCATION/TRAINING PROGRAM

## 2022-10-27 PROCEDURE — 82150 ASSAY OF AMYLASE: CPT | Performed by: INTERNAL MEDICINE

## 2022-10-27 PROCEDURE — 87088 URINE BACTERIA CULTURE: CPT | Performed by: INTERNAL MEDICINE

## 2022-10-27 PROCEDURE — 93010 ELECTROCARDIOGRAM REPORT: CPT | Mod: ,,, | Performed by: INTERNAL MEDICINE

## 2022-10-27 PROCEDURE — 96361 HYDRATE IV INFUSION ADD-ON: CPT

## 2022-10-27 PROCEDURE — 85610 PROTHROMBIN TIME: CPT | Mod: 91 | Performed by: INTERNAL MEDICINE

## 2022-10-27 PROCEDURE — 93010 EKG 12-LEAD: ICD-10-PCS | Mod: ,,, | Performed by: INTERNAL MEDICINE

## 2022-10-27 PROCEDURE — 84145 PROCALCITONIN (PCT): CPT | Performed by: INTERNAL MEDICINE

## 2022-10-27 PROCEDURE — 63600175 PHARM REV CODE 636 W HCPCS: Performed by: INTERNAL MEDICINE

## 2022-10-27 PROCEDURE — 96360 HYDRATION IV INFUSION INIT: CPT

## 2022-10-27 PROCEDURE — 85025 COMPLETE CBC W/AUTO DIFF WBC: CPT | Performed by: STUDENT IN AN ORGANIZED HEALTH CARE EDUCATION/TRAINING PROGRAM

## 2022-10-27 PROCEDURE — 81000 URINALYSIS NONAUTO W/SCOPE: CPT | Performed by: STUDENT IN AN ORGANIZED HEALTH CARE EDUCATION/TRAINING PROGRAM

## 2022-10-27 PROCEDURE — 83735 ASSAY OF MAGNESIUM: CPT | Performed by: STUDENT IN AN ORGANIZED HEALTH CARE EDUCATION/TRAINING PROGRAM

## 2022-10-27 PROCEDURE — 83036 HEMOGLOBIN GLYCOSYLATED A1C: CPT | Performed by: INTERNAL MEDICINE

## 2022-10-27 PROCEDURE — 83690 ASSAY OF LIPASE: CPT | Performed by: INTERNAL MEDICINE

## 2022-10-27 PROCEDURE — 82962 GLUCOSE BLOOD TEST: CPT | Mod: 59

## 2022-10-27 PROCEDURE — 86850 RBC ANTIBODY SCREEN: CPT | Performed by: STUDENT IN AN ORGANIZED HEALTH CARE EDUCATION/TRAINING PROGRAM

## 2022-10-27 PROCEDURE — 83880 ASSAY OF NATRIURETIC PEPTIDE: CPT | Performed by: STUDENT IN AN ORGANIZED HEALTH CARE EDUCATION/TRAINING PROGRAM

## 2022-10-27 PROCEDURE — 84156 ASSAY OF PROTEIN URINE: CPT | Performed by: INTERNAL MEDICINE

## 2022-10-27 PROCEDURE — 25000003 PHARM REV CODE 250: Performed by: STUDENT IN AN ORGANIZED HEALTH CARE EDUCATION/TRAINING PROGRAM

## 2022-10-27 PROCEDURE — 87077 CULTURE AEROBIC IDENTIFY: CPT | Performed by: INTERNAL MEDICINE

## 2022-10-27 PROCEDURE — 25000003 PHARM REV CODE 250: Performed by: INTERNAL MEDICINE

## 2022-10-27 PROCEDURE — 87186 SC STD MICRODIL/AGAR DIL: CPT | Performed by: INTERNAL MEDICINE

## 2022-10-27 PROCEDURE — 84484 ASSAY OF TROPONIN QUANT: CPT | Mod: 91 | Performed by: STUDENT IN AN ORGANIZED HEALTH CARE EDUCATION/TRAINING PROGRAM

## 2022-10-27 PROCEDURE — 84300 ASSAY OF URINE SODIUM: CPT | Performed by: INTERNAL MEDICINE

## 2022-10-27 PROCEDURE — 25500020 PHARM REV CODE 255: Performed by: STUDENT IN AN ORGANIZED HEALTH CARE EDUCATION/TRAINING PROGRAM

## 2022-10-27 RX ORDER — DONEPEZIL HYDROCHLORIDE 5 MG/1
10 TABLET, FILM COATED ORAL NIGHTLY
Status: DISCONTINUED | OUTPATIENT
Start: 2022-10-27 | End: 2022-11-02 | Stop reason: HOSPADM

## 2022-10-27 RX ORDER — ONDANSETRON 2 MG/ML
4 INJECTION INTRAMUSCULAR; INTRAVENOUS EVERY 6 HOURS PRN
Status: DISCONTINUED | OUTPATIENT
Start: 2022-10-27 | End: 2022-11-02 | Stop reason: HOSPADM

## 2022-10-27 RX ORDER — MORPHINE SULFATE 2 MG/ML
1 INJECTION, SOLUTION INTRAMUSCULAR; INTRAVENOUS EVERY 4 HOURS PRN
Status: DISCONTINUED | OUTPATIENT
Start: 2022-10-27 | End: 2022-11-02 | Stop reason: HOSPADM

## 2022-10-27 RX ORDER — ACETAMINOPHEN 325 MG/1
650 TABLET ORAL EVERY 4 HOURS PRN
Status: DISCONTINUED | OUTPATIENT
Start: 2022-10-27 | End: 2022-11-02 | Stop reason: HOSPADM

## 2022-10-27 RX ORDER — MAGNESIUM SULFATE HEPTAHYDRATE 40 MG/ML
2 INJECTION, SOLUTION INTRAVENOUS ONCE
Status: COMPLETED | OUTPATIENT
Start: 2022-10-27 | End: 2022-10-28

## 2022-10-27 RX ORDER — LABETALOL 100 MG/1
400 TABLET, FILM COATED ORAL 2 TIMES DAILY
Status: DISCONTINUED | OUTPATIENT
Start: 2022-10-27 | End: 2022-10-28

## 2022-10-27 RX ORDER — BISACODYL 10 MG
10 SUPPOSITORY, RECTAL RECTAL DAILY PRN
Status: DISCONTINUED | OUTPATIENT
Start: 2022-10-27 | End: 2022-11-02 | Stop reason: HOSPADM

## 2022-10-27 RX ORDER — POTASSIUM CHLORIDE 7.45 MG/ML
60 INJECTION INTRAVENOUS
Status: DISCONTINUED | OUTPATIENT
Start: 2022-10-27 | End: 2022-11-02 | Stop reason: HOSPADM

## 2022-10-27 RX ORDER — HYDROCODONE BITARTRATE AND ACETAMINOPHEN 5; 325 MG/1; MG/1
1 TABLET ORAL EVERY 8 HOURS PRN
Status: DISCONTINUED | OUTPATIENT
Start: 2022-10-27 | End: 2022-11-02 | Stop reason: HOSPADM

## 2022-10-27 RX ORDER — DEXTROSE MONOHYDRATE AND SODIUM CHLORIDE 5; .45 G/100ML; G/100ML
125 INJECTION, SOLUTION INTRAVENOUS CONTINUOUS PRN
Status: DISCONTINUED | OUTPATIENT
Start: 2022-10-27 | End: 2022-11-02 | Stop reason: HOSPADM

## 2022-10-27 RX ORDER — POTASSIUM CHLORIDE 7.45 MG/ML
80 INJECTION INTRAVENOUS
Status: DISCONTINUED | OUTPATIENT
Start: 2022-10-27 | End: 2022-11-02 | Stop reason: HOSPADM

## 2022-10-27 RX ORDER — SODIUM CHLORIDE 9 MG/ML
125 INJECTION, SOLUTION INTRAVENOUS CONTINUOUS
Status: DISCONTINUED | OUTPATIENT
Start: 2022-10-27 | End: 2022-10-28

## 2022-10-27 RX ORDER — PROCHLORPERAZINE EDISYLATE 5 MG/ML
5 INJECTION INTRAMUSCULAR; INTRAVENOUS EVERY 6 HOURS PRN
Status: DISCONTINUED | OUTPATIENT
Start: 2022-10-27 | End: 2022-11-02 | Stop reason: HOSPADM

## 2022-10-27 RX ORDER — TALC
6 POWDER (GRAM) TOPICAL NIGHTLY PRN
Status: DISCONTINUED | OUTPATIENT
Start: 2022-10-27 | End: 2022-11-02 | Stop reason: HOSPADM

## 2022-10-27 RX ORDER — FAMOTIDINE 10 MG/ML
20 INJECTION INTRAVENOUS EVERY 12 HOURS
Status: DISCONTINUED | OUTPATIENT
Start: 2022-10-27 | End: 2022-10-27

## 2022-10-27 RX ORDER — NITROGLYCERIN 0.4 MG/1
0.4 TABLET SUBLINGUAL EVERY 5 MIN PRN
Status: DISCONTINUED | OUTPATIENT
Start: 2022-10-27 | End: 2022-11-02 | Stop reason: HOSPADM

## 2022-10-27 RX ORDER — POLYETHYLENE GLYCOL 3350 17 G/17G
17 POWDER, FOR SOLUTION ORAL 2 TIMES DAILY PRN
Status: DISCONTINUED | OUTPATIENT
Start: 2022-10-27 | End: 2022-11-02 | Stop reason: HOSPADM

## 2022-10-27 RX ORDER — LABETALOL HYDROCHLORIDE 5 MG/ML
10 INJECTION, SOLUTION INTRAVENOUS EVERY 4 HOURS PRN
Status: DISCONTINUED | OUTPATIENT
Start: 2022-10-27 | End: 2022-10-28

## 2022-10-27 RX ORDER — HYDRALAZINE HYDROCHLORIDE 20 MG/ML
10 INJECTION INTRAMUSCULAR; INTRAVENOUS EVERY 4 HOURS PRN
Status: DISCONTINUED | OUTPATIENT
Start: 2022-10-27 | End: 2022-10-27

## 2022-10-27 RX ORDER — POTASSIUM CHLORIDE 7.45 MG/ML
40 INJECTION INTRAVENOUS
Status: DISCONTINUED | OUTPATIENT
Start: 2022-10-27 | End: 2022-11-02 | Stop reason: HOSPADM

## 2022-10-27 RX ORDER — NAPROXEN SODIUM 220 MG/1
81 TABLET, FILM COATED ORAL DAILY
Status: DISCONTINUED | OUTPATIENT
Start: 2022-10-28 | End: 2022-10-28

## 2022-10-27 RX ORDER — MEMANTINE HYDROCHLORIDE 5 MG/1
10 TABLET ORAL 2 TIMES DAILY
Status: DISCONTINUED | OUTPATIENT
Start: 2022-10-27 | End: 2022-11-02 | Stop reason: HOSPADM

## 2022-10-27 RX ORDER — FAMOTIDINE 10 MG/ML
20 INJECTION INTRAVENOUS DAILY
Status: DISCONTINUED | OUTPATIENT
Start: 2022-10-28 | End: 2022-10-31

## 2022-10-27 RX ORDER — SODIUM CHLORIDE 0.9 % (FLUSH) 0.9 %
10 SYRINGE (ML) INJECTION
Status: DISCONTINUED | OUTPATIENT
Start: 2022-10-27 | End: 2022-11-02 | Stop reason: HOSPADM

## 2022-10-27 RX ADMIN — MORPHINE SULFATE 1 MG: 2 INJECTION, SOLUTION INTRAMUSCULAR; INTRAVENOUS at 10:10

## 2022-10-27 RX ADMIN — SODIUM CHLORIDE 5 UNITS/HR: 9 INJECTION, SOLUTION INTRAVENOUS at 10:10

## 2022-10-27 RX ADMIN — SODIUM CHLORIDE 125 ML/HR: 0.9 INJECTION, SOLUTION INTRAVENOUS at 10:10

## 2022-10-27 RX ADMIN — SODIUM CHLORIDE 1000 ML: 9 INJECTION, SOLUTION INTRAVENOUS at 07:10

## 2022-10-27 RX ADMIN — IOHEXOL 75 ML: 350 INJECTION, SOLUTION INTRAVENOUS at 07:10

## 2022-10-27 RX ADMIN — MAGNESIUM SULFATE HEPTAHYDRATE 2 G: 2 INJECTION, SOLUTION INTRAVENOUS at 11:10

## 2022-10-27 RX ADMIN — PIPERACILLIN AND TAZOBACTAM 4.5 G: 4; .5 INJECTION, POWDER, LYOPHILIZED, FOR SOLUTION INTRAVENOUS; PARENTERAL at 11:10

## 2022-10-27 RX ADMIN — SODIUM CHLORIDE 1000 ML: 0.9 INJECTION, SOLUTION INTRAVENOUS at 08:10

## 2022-10-27 NOTE — ED PROVIDER NOTES
"Encounter Date: 10/27/2022       History     Chief Complaint   Patient presents with    Hyperglycemia     Pt presents to ED c/o hyperglycemia x4 hrs with CBG and glucometer reading: "High." Hx of MD type 2. Daughter has given insulin and checking cbg q hr and called EMS d/t no improvement. Pt is currently lethargic, responsive to pain, generalized weakness noted, and Nonverbal. Hx of Dementia.     77-year-old female with history of type 2 diabetes, CAD, hypertension, hyperlipidemia and prior strokes alert and responsive at baseline who presents with increased lethargy and altered mental status in the setting of hyperglycemia.     Per patient's daughter patient's blood glucose has been fluctuating this past month and adjustments were attempted given recurrent hypoglycemic episodes. Earlier this morning daughter noted patient was mildly diaphoretic which usually occurs when she is hypoglycemic so she gave her mother ice cream which is the only thing she had to eat today.  Usually patient feeds herself a for the past 2 days has been weaker than usual. Later in the day she checked her blood glucose at home and was greater than 600. She attempted to give her a total of 10u of novolog and hyperglycemia persisted so she called EMS.  On EMS arrival patient was only responsive to painful stimuli and nonverbal.  Point of care glucose with EMS was greater than 500.  IV was started and patient received approximately 500 cc of fluid EN route.   History limited secondary to patient's mental status and acuity.      Review of patient's allergies indicates:   Allergen Reactions    No known drug allergies      Past Medical History:   Diagnosis Date    Aortic stenosis     Arthritis     Back pain     Carotid artery occlusion     Cataract     Coronary artery disease     Diabetes mellitus type II     Heart murmur     Hyperlipidemia     Hypertension associated with diabetes 7/24/2012    Non-functioning pituitary adenoma 7/24/2012    " Pituitary microadenoma 2015    Polyneuropathy     PVD (peripheral vascular disease)     PVD (peripheral vascular disease)     S/P AVR (aortic valve replacement) 2013    21 mm Medtronic Mosaic ultra porcine valve     Stenosis     Stented coronary artery 4/10/2013    2013 RCA QASIM     Stroke     Multiple mini strokes; decreased left peripheal vision    Type 2 myocardial infarction due to shock 10/28/2022    Type II or unspecified type diabetes mellitus with neurological manifestations, not stated as uncontrolled(250.60)     Type II or unspecified type diabetes mellitus with peripheral circulatory disorders, not stated as uncontrolled(250.70)      Past Surgical History:   Procedure Laterality Date    AORTIC VALVE REPLACEMENT      BILATERAL SALPINGOOPHORECTOMY      BREAST BIOPSY Left     CARDIAC VALVE REPLACEMENT      aortic 2013    CAROTID ENDARTERECTOMY Right     CATARACT EXTRACTION W/  INTRAOCULAR LENS IMPLANT Bilateral     Cataract Surgery      Both eyes    CORONARY ANGIOPLASTY      DENTAL SURGERY      EYE SURGERY      HYSTERECTOMY      TUBAL LIGATION      YAG Bilateral      Family History   Problem Relation Age of Onset    Diabetes Mother     Coronary artery disease Mother     Heart disease Mother 42         42 of MI    Heart attack Mother     Heart failure Mother     Hyperlipidemia Mother     Hypertension Mother     Heart disease Father     Heart attack Father     Heart failure Father     Hyperlipidemia Father     Hypertension Father     Stroke Father     Coronary artery disease Brother     Diabetes Brother     Heart disease Brother     Hyperlipidemia Brother     Hypertension Brother     Heart disease Sister 41    Heart attack Sister     Hyperlipidemia Sister     Hypertension Sister     Heart disease Sister     Heart attack Sister     Hyperlipidemia Sister     Hypertension Sister     Diabetes Daughter     No Known Problems Son     Amblyopia Neg Hx     Blindness Neg Hx     Cataracts Neg Hx      Glaucoma Neg Hx     Macular degeneration Neg Hx     Retinal detachment Neg Hx     Strabismus Neg Hx      Social History     Tobacco Use    Smoking status: Former     Packs/day: 1.00     Years: 20.00     Pack years: 20.00     Types: Cigarettes     Quit date: 1987     Years since quittin.7    Smokeless tobacco: Never   Substance Use Topics    Alcohol use: No     Alcohol/week: 0.0 standard drinks    Drug use: No     Review of Systems   Unable to perform ROS: Dementia     Physical Exam     Initial Vitals   BP Pulse Resp Temp SpO2   10/27/22 1828 10/27/22 1828 10/27/22 1828 10/27/22 2307 10/27/22 182   100/78 94 16 98.1 °F (36.7 °C) 100 %      MAP       --                Physical Exam    Constitutional: She is not diaphoretic. She appears ill.   HENT:   Head: Normocephalic and atraumatic.   Eyes: EOM are normal. Pupils are equal, round, and reactive to light.   Neck: No thyromegaly present. No JVD present.   Cardiovascular:  Normal heart sounds.   Tachycardia present.         Pulses:       Radial pulses are 2+ on the right side and 2+ on the left side.        Dorsalis pedis pulses are 2+ on the right side and 2+ on the left side.        Posterior tibial pulses are 2+ on the right side and 2+ on the left side.   Pulmonary/Chest: No accessory muscle usage. No respiratory distress. She has decreased breath sounds.   Abdominal: She exhibits distension and mass. Bowel sounds are decreased. There is generalized abdominal tenderness.   Musculoskeletal:         General: No edema.     Neurological: She is alert.   Patient is sleeping but arousable to verbal stimuli.  Patient withdraws to pain in all 4 extremities.  Patient currently nonverbal.   Skin: Skin is dry. Capillary refill takes 2 to 3 seconds.       ED Course   Procedures  Labs Reviewed   CBC W/ AUTO DIFFERENTIAL - Abnormal; Notable for the following components:       Result Value    WBC 20.17 (*)     Immature Granulocytes 0.6 (*)     Gran # (ANC) 18.4 (*)      Immature Grans (Abs) 0.13 (*)     Lymph # 0.9 (*)     Gran % 91.1 (*)     Lymph % 4.2 (*)     All other components within normal limits   COMPREHENSIVE METABOLIC PANEL - Abnormal; Notable for the following components:    Potassium 5.7 (*)     CO2 16 (*)     Glucose 1,039 (*)     BUN 76 (*)     Creatinine 2.4 (*)     Anion Gap 22 (*)     eGFR 20 (*)     All other components within normal limits    Narrative:     GLU critical result(s) called and verbal readback obtained from Nancy Rangel RN by [facility] 10/27/2022 20:25   BETA - HYDROXYBUTYRATE, SERUM - Abnormal; Notable for the following components:    Beta-Hydroxybutyrate 4.0 (*)     All other components within normal limits   URINALYSIS, REFLEX TO URINE CULTURE - Abnormal; Notable for the following components:    Color, UA Red (*)     Appearance, UA Cloudy (*)     Protein, UA 3+ (*)     Glucose, UA 4+ (*)     Occult Blood UA 3+ (*)     Leukocytes, UA Trace (*)     All other components within normal limits    Narrative:     Specimen Source->Urine   TROPONIN I - Abnormal; Notable for the following components:    Troponin I 0.402 (*)     All other components within normal limits   B-TYPE NATRIURETIC PEPTIDE - Abnormal; Notable for the following components:     (*)     All other components within normal limits   LACTIC ACID, PLASMA - Abnormal; Notable for the following components:    Lactate (Lactic Acid) 2.5 (*)     All other components within normal limits    Narrative:     Collection has been rescheduled by AVI at 10/27/2022 19:48 Reason:   Patient unavailable   BASIC METABOLIC PANEL - Abnormal; Notable for the following components:    CO2 13 (*)     Glucose 937 (*)     BUN 74 (*)     Creatinine 2.2 (*)     Anion Gap 21 (*)     eGFR 23 (*)     All other components within normal limits    Narrative:     With next lab draw  GLU  critical result(s) called and verbal readback obtained from   Nancy Rangel RN by [facility] 10/27/2022 21:31   URINALYSIS MICROSCOPIC -  Abnormal; Notable for the following components:    RBC, UA >100 (*)     WBC, UA 10 (*)     Bacteria Many (*)     All other components within normal limits    Narrative:     Specimen Source->Urine   TROPONIN I - Abnormal; Notable for the following components:    Troponin I 1.227 (*)     All other components within normal limits    Narrative:     Collection has been rescheduled by FORTUNATO at 10/27/2022 20:43 Reason:   Unable to collect   BASIC METABOLIC PANEL - Abnormal; Notable for the following components:    CO2 12 (*)     Glucose 787 (*)     BUN 70 (*)     Creatinine 1.9 (*)     Anion Gap 22 (*)     eGFR 27 (*)     All other components within normal limits    Narrative:     If not done in ED.  GLU  critical result(s) called and verbal readback obtained from   Torrie Gonsalves RN by [facility] 10/27/2022 23:57   HEMOGLOBIN A1C - Abnormal; Notable for the following components:    Hemoglobin A1C 8.9 (*)     Estimated Avg Glucose 209 (*)     All other components within normal limits    Narrative:     If not done in ED.   AMYLASE - Abnormal; Notable for the following components:    Amylase 187 (*)     All other components within normal limits    Narrative:     If not done in ED.   LIPASE - Abnormal; Notable for the following components:    Lipase 313 (*)     All other components within normal limits    Narrative:     If not done in ED.   PROTEIN / CREATININE RATIO, URINE - Abnormal; Notable for the following components:    Protein, Urine Random 1335 (*)     Creatinine, Urine 12.8 (*)     Prot/Creat Ratio, Urine 104.30 (*)     All other components within normal limits    Narrative:     Specimen Source->Urine   URIC ACID - Abnormal; Notable for the following components:    Uric Acid 6.0 (*)     All other components within normal limits    Narrative:     THIS IS NOT AN ADD ON   REYNOSO'S STAIN, URINE RANDOM - Abnormal; Notable for the following components:    Reynoso's Stain, Ur Occ eosinophils seen (*)     All other components  within normal limits    Narrative:     Specimen Source->Urine   TROPONIN I - Abnormal; Notable for the following components:    Troponin I 1.279 (*)     All other components within normal limits    Narrative:     THIS IS NOT AN ADD ON   TROPONIN I - Abnormal; Notable for the following components:    Troponin I 0.776 (*)     All other components within normal limits   POCT GLUCOSE - Abnormal; Notable for the following components:    POCT Glucose >500 (*)     All other components within normal limits   ISTAT PROCEDURE - Abnormal; Notable for the following components:    POC PH 7.312 (*)     POC HCO3 22.1 (*)     POC SATURATED O2 75 (*)     POC TCO2 23 (*)     All other components within normal limits   POCT GLUCOSE - Abnormal; Notable for the following components:    POCT Glucose >500 (*)     All other components within normal limits   CULTURE, URINE   PROTIME-INR    Narrative:     With next lab draw  Collection has been rescheduled by SJPepito at 10/27/2022 19:48 Reason:   Patient unavailable   PHOSPHORUS    Narrative:     With next lab draw   PHOSPHORUS    Narrative:     With next lab draw   MAGNESIUM    Narrative:     With next lab draw   PROTIME-INR    Narrative:     If not done in ED.   PROCALCITONIN   SODIUM, URINE, RANDOM    Narrative:     Specimen Source->Urine   PROCALCITONIN   POCT INFLUENZA A/B MOLECULAR   TYPE & SCREEN   POCT GLUCOSE MONITORING CONTINUOUS          Imaging Results               CT Chest Abdomen Pelvis With Contrast (xpd) (Final result)  Result time 10/27/22 20:40:34      Final result by Maurizio Bae MD (10/27/22 20:40:34)                   Impression:      Irregular density filling the posterior left lateral aspect of the urinary bladder from the trigone to the midportion.  Correlate for bladder neoplasm or blood clot.  Urology consultation urged.    Constipation with large rectal fecal impaction and suggested early stercoral colitis.    Severe atherosclerotic vascular disease throughout  the aorta without evidence of aneurysm or dissection.    Suspected occlusion of the mid and lower abdominal aorta with possible central vascular stent.    No evidence of iliac occlusion with severe iliac and femoral atherosclerotic calcifications.    No evidence of mesenteric occlusion.    This report was flagged in Epic as abnormal.    Findings communicated to Josiane Rankin MD in the ED via [facility] secure chat with confirmation of receipt at the time of this dictation.      Electronically signed by: Maurizio Bae  Date:    10/27/2022  Time:    20:40               Narrative:    EXAMINATION:  CT CHEST ABDOMEN PELVIS WITH CONTRAST (XPD)    CLINICAL HISTORY:  Sepsis;    TECHNIQUE:  Arterial phase axial CT images were obtained through the chest, abdomen and pelvis following IV administration of 75 mL of Omnipaque 350 contrast. Coronal, sagittal and 3D reconstructions were submitted and interpreted. Dose reduction techniques including automatic exposure control (AEC) were utilized.    COMPARISON:  None    FINDINGS:  Thoracic aorta: Aortic valve replacement.  No aneurysm or dissection.  Atherosclerotic calcifications throughout.  No evidence of shaggy aorta.    Abdominal aorta: Normal in caliber with severe atherosclerotic disease and apparent near occlusion in the mid section extending inferiorly to the iliac bifurcation.  A central stent may be present but minimal if any flow is question.    Abdominal arteries: Atherosclerotic disease throughout the branches of the aorta with no discrete occlusion or aneurysm.    Base of Neck: No significant abnormality.    Thoracic soft tissues: Unremarkable.    Heart: Normal size. No effusion.  Three vessel coronary artery disease with aortic valve replacement.    Pulmonary vasculature: Pulmonary arteries distribute normally.  There are four pulmonary veins.    Mariely/Mediastinum: No pathologic libertad enlargement.    Esophagus: Unremarkable.    Airways: Patent.    Lungs/Pleura: Clear  lungs. No pleural effusion or thickening.    Liver: Normal in size with fatty attenuation., with no focal hepatic lesions.    Gallbladder: No calcified gallstones.    Bile Ducts: No evidence of dilated ducts.    Pancreas: No mass or peripancreatic fat stranding.    Spleen: Unremarkable.    Adrenals: Unremarkable.    Kidneys/ Ureters: No radiodense stones or hydronephrosis.  Hydronephrosis and hydroureter.    Bladder: Markedly distended with air and Hill catheter balloon.  Swirl of high density is seen in the posterior left lateral wall raising the question of bladder neoplasm or focal blood clot.    Reproductive organs: Hysterectomy    GI Tract/Mesentery: Marked constipation in large rectal fecal impaction with early stercoral colitis question.    Peritoneal Space: No ascites. No free air.    Retroperitoneum: No significant adenopathy.    Abdominal wall: Unremarkable.    Bones: No acute fracture or aggressive-appearing lytic or blastic lesion.                                       X-Ray Chest AP Portable (Final result)  Result time 10/27/22 19:29:58      Final result by Sobia Bae MD (10/27/22 19:29:58)                   Impression:      No acute abnormality.      Electronically signed by: Sobia Bae  Date:    10/27/2022  Time:    19:29               Narrative:    EXAMINATION:  XR CHEST AP PORTABLE    CLINICAL HISTORY:  hyperglycemia;    TECHNIQUE:  Single frontal view of the chest was performed.    COMPARISON:  09/03/2013 chest x-ray    FINDINGS:  The cardiac silhouette is stable and nonenlarged.  Postoperative changes of sternotomy are noted.  Lungs appear clear.  There is no pleural effusion or pneumothorax.  Osseous structures appear stable with degenerative changes of the spine.                                       Medications   sodium chloride 0.9% flush 10 mL (has no administration in time range)   dextrose 5 % and 0.45 % NaCl infusion ( Intravenous Verify Only 10/28/22 1400)   ondansetron  injection 4 mg (has no administration in time range)   acetaminophen tablet 650 mg (has no administration in time range)   potassium chloride 10 mEq in 100 mL IVPB (has no administration in time range)     And   potassium chloride 10 mEq in 100 mL IVPB (has no administration in time range)     And   potassium chloride 10 mEq in 100 mL IVPB (0 mEq Intravenous Stopped 10/28/22 0723)   dextrose 10% bolus 125 mL (has no administration in time range)   dextrose 10% bolus 250 mL (has no administration in time range)   melatonin tablet 6 mg (has no administration in time range)   prochlorperazine injection Soln 5 mg (has no administration in time range)   polyethylene glycol packet 17 g (has no administration in time range)   bisacodyL suppository 10 mg (has no administration in time range)   HYDROcodone-acetaminophen 5-325 mg per tablet 1 tablet (has no administration in time range)   donepeziL tablet 10 mg (10 mg Oral Not Given 10/27/22 2230)   memantine tablet 10 mg (10 mg Oral Given 10/28/22 0935)   nitroGLYCERIN SL tablet 0.4 mg (has no administration in time range)   Lactobacillus acidoph-L.bulgar 1 million cell tablet 4 tablet (0 tablets Oral Hold 10/28/22 1200)   famotidine (PF) injection 20 mg (20 mg Intravenous Given 10/28/22 0935)   piperacillin-tazobactam 4.5 g in dextrose 5 % 100 mL IVPB (ready to mix system) (0 g Intravenous Stopped 10/28/22 1519)   morphine injection 1 mg (1 mg Intravenous Given 10/27/22 2247)   insulin regular 1 Units/mL in sodium chloride 0.9% 100 mL infusion (1.1 Units/hr Intravenous Verify Only 10/28/22 1400)   insulin detemir U-100 pen 14 Units (14 Units Subcutaneous Given 10/28/22 0935)   NORepinephrine 4 mg in dextrose 5% 250 mL infusion (premix) (titrating) (0.16 mcg/kg/min × 42.5 kg (Dosing Weight) Intravenous Verify Only 10/28/22 1400)   0.9%  NaCl infusion (for blood administration) (has no administration in time range)   mupirocin 2 % ointment (0 g Nasal Hold 10/28/22 1245)    sodium chloride 0.9% bolus 1,000 mL (0 mLs Intravenous Stopped 10/27/22 2026)   iohexoL (OMNIPAQUE 350) injection 75 mL (75 mLs Intravenous Given 10/27/22 1953)   insulin regular bolus from bag/infusion 4.7 Units (4.7 Units Intravenous Bolus from Bag 10/27/22 2202)   sodium chloride 0.9% bolus 1,000 mL (0 mLs Intravenous Stopped 10/27/22 2159)   magnesium sulfate 2g in water 50mL IVPB (premix) (0 g Intravenous Stopped 10/28/22 0100)   potassium phosphate 20 mmol in dextrose 5 % 500 mL infusion (0 mmol Intravenous Stopped 10/28/22 0656)   calcium gluconate 1 g in NS IVPB (premixed) (0 g Intravenous Stopped 10/28/22 0606)   insulin regular injection 5 Units 0.05 mL (5 Units Intravenous Given 10/28/22 0355)   potassium bicarbonate disintegrating tablet 25 mEq (25 mEq Oral Given 10/28/22 0453)   sodium chloride 0.9% bolus 250 mL ( Intravenous Stopped 10/28/22 0804)   lactated ringers infusion ( Intravenous Canceled Entry 10/28/22 1245)   perflutren protein-A microsphr 0.22 mg/mL IV susp (0.11 mg Intravenous Given 10/28/22 1300)   tranexamic acid injection Soln 1,000 mg (1,000 mg Intravenous Given 10/28/22 1404)     Medical Decision Making:   Initial Assessment:   77-year-old female with history of type 2 diabetes, CAD, hypertension, hyperlipidemia and prior strokes alert and responsive at baseline who presents with increased lethargy and altered mental status in the setting of hyperglycemia.  On arrival patient nonverbal sleeping and arousable to verbal stimuli.  Patient withdrawing all 4 extremities.  Abdomen diffusely tender with mass palpated in the suprapubic region.  Bedside ultrasound revealed distended bladder.  Given patient's presentation concern for possible DKA as the cause of her worsening mental status.  Fluids given and labs obtained per DKA protocol.  Upon insertion of Hill for distended bladder skylar blood was expressed.  Concern for possible malignancy versus cystitis as the cause of patient's  hematuria.  Will obtain CT chest abdomen and pelvis to assess for any intrathoracic or intra-abdominal pathology.  Clinical Tests:   Lab Tests: Reviewed and Ordered  Radiological Study: Reviewed and Ordered  Medical Tests: Reviewed and Ordered           ED Course as of 10/28/22 1651   Thu Oct 27, 2022   1914 CBC with leukocytosis to 20.17 will obtain lactic acid and blood cultures. [AS]   1929 EKG obtained at 7:28 p.m. sinus tachycardia with a ventricular rate of 120 no ST elevations or depressions to suggest ischemia.  Nonspecific T-wave abnormalities.   [AS]   1936 Prelim CMP potassium of 5.7 with a slightly hemolyzed specimen.  HELADIO with creatinine of 2.4.  Anion gap of 22. [AS]   1937 Troponin of 0.402 EKG without  ischemia.  No reported history of chest pain, suspect elevated troponin is secondary to demand better than acute ischemia. [AS]   1944 When patient was straight cathed for urine skylar blood was expressed so Hill was placed. [AS]   Fri Oct 28, 2022   1648 CT chest abdomen and pelvis Irregular density filling the posterior left lateral aspect of the urinary bladder from the trigone to the midportion.  Hill in place but given amount of fluid in the bladder likely clotted.  Will admit patient to medical ICU for further management and care. [AS]      ED Course User Index  [AS] Josiane Rankin MD          Please put in 60 minutes of critical care due to patient having a high risk of cardiovascular failure failure.   Separate from teaching and exclusive of procedure and ekg time  Includes:  Time at bedside  Time reviewing test results  Time discussing case with staff  Time documenting the medical record  Time spent with family members  Time spent with consults  Management         Clinical Impression:   Final diagnoses:  [R73.9] Hyperglycemia  [E11.10] DKA (diabetic ketoacidosis) (Primary)  [R31.9] Hematuria, unspecified type  [R41.82] Altered mental status, unspecified altered mental status type  [N17.9]  HELADIO (acute kidney injury)  [R77.8] Elevated troponin      ED Disposition Condition    Observation                 Josiane Rankin MD  10/28/22 9407

## 2022-10-28 ENCOUNTER — ANESTHESIA (OUTPATIENT)
Dept: INTENSIVE CARE | Facility: HOSPITAL | Age: 77
DRG: 871 | End: 2022-10-28
Payer: MEDICARE

## 2022-10-28 ENCOUNTER — ANESTHESIA EVENT (OUTPATIENT)
Dept: INTENSIVE CARE | Facility: HOSPITAL | Age: 77
DRG: 871 | End: 2022-10-28
Payer: MEDICARE

## 2022-10-28 PROBLEM — N32.89 BLADDER HEMORRHAGE: Status: ACTIVE | Noted: 2022-10-28

## 2022-10-28 PROBLEM — R57.8 HEMORRHAGIC SHOCK: Status: ACTIVE | Noted: 2022-10-28

## 2022-10-28 PROBLEM — R57.0 CARDIOGENIC SHOCK: Status: ACTIVE | Noted: 2022-10-28

## 2022-10-28 PROBLEM — R57.0 CARDIOGENIC SHOCK: Status: RESOLVED | Noted: 2022-10-28 | Resolved: 2022-10-28

## 2022-10-28 PROBLEM — I21.A1 TYPE 2 MYOCARDIAL INFARCTION DUE TO SHOCK: Status: ACTIVE | Noted: 2022-10-28

## 2022-10-28 PROBLEM — Z51.5 PALLIATIVE CARE ENCOUNTER: Status: ACTIVE | Noted: 2022-10-28

## 2022-10-28 PROBLEM — R31.0 GROSS HEMATURIA: Status: ACTIVE | Noted: 2022-10-28

## 2022-10-28 PROBLEM — R57.9 TYPE 2 MYOCARDIAL INFARCTION DUE TO SHOCK: Status: ACTIVE | Noted: 2022-10-28

## 2022-10-28 LAB
ABO + RH BLD: NORMAL
ALLENS TEST: ABNORMAL
ANION GAP SERPL CALC-SCNC: 12 MMOL/L (ref 8–16)
ANION GAP SERPL CALC-SCNC: 13 MMOL/L (ref 8–16)
ANION GAP SERPL CALC-SCNC: 16 MMOL/L (ref 8–16)
ANION GAP SERPL CALC-SCNC: 22 MMOL/L (ref 8–16)
ANION GAP SERPL CALC-SCNC: 35 MMOL/L (ref 8–16)
ANION GAP SERPL CALC-SCNC: 8 MMOL/L (ref 8–16)
ANION GAP SERPL CALC-SCNC: 9 MMOL/L (ref 8–16)
AV INDEX (PROSTH): 0.23
AV MEAN GRADIENT: 10 MMHG
AV PEAK GRADIENT: 15 MMHG
AV VALVE AREA: 0.64 CM2
AV VELOCITY RATIO: 0.26
BASOPHILS # BLD AUTO: 0.01 K/UL (ref 0–0.2)
BASOPHILS # BLD AUTO: 0.02 K/UL (ref 0–0.2)
BASOPHILS # BLD AUTO: 0.04 K/UL (ref 0–0.2)
BASOPHILS NFR BLD: 0.1 % (ref 0–1.9)
BASOPHILS NFR BLD: 0.1 % (ref 0–1.9)
BASOPHILS NFR BLD: 0.3 % (ref 0–1.9)
BLD GP AB SCN CELLS X3 SERPL QL: NORMAL
BLD PROD TYP BPU: NORMAL
BLOOD UNIT EXPIRATION DATE: NORMAL
BLOOD UNIT TYPE CODE: 7300
BLOOD UNIT TYPE: NORMAL
BSA FOR ECHO PROCEDURE: 1.33 M2
BUN SERPL-MCNC: 56 MG/DL (ref 8–23)
BUN SERPL-MCNC: 61 MG/DL (ref 8–23)
BUN SERPL-MCNC: 62 MG/DL (ref 8–23)
BUN SERPL-MCNC: 63 MG/DL (ref 8–23)
BUN SERPL-MCNC: 64 MG/DL (ref 8–23)
BUN SERPL-MCNC: 65 MG/DL (ref 8–23)
BUN SERPL-MCNC: 70 MG/DL (ref 8–23)
CALCIUM SERPL-MCNC: 6.7 MG/DL (ref 8.7–10.5)
CALCIUM SERPL-MCNC: 8.8 MG/DL (ref 8.7–10.5)
CALCIUM SERPL-MCNC: 8.9 MG/DL (ref 8.7–10.5)
CALCIUM SERPL-MCNC: 9.1 MG/DL (ref 8.7–10.5)
CALCIUM SERPL-MCNC: 9.4 MG/DL (ref 8.7–10.5)
CALCIUM SERPL-MCNC: 9.5 MG/DL (ref 8.7–10.5)
CALCIUM SERPL-MCNC: 9.5 MG/DL (ref 8.7–10.5)
CALCIUM SERPL-MCNC: 9.6 MG/DL (ref 8.7–10.5)
CALCIUM SERPL-MCNC: 9.8 MG/DL (ref 8.7–10.5)
CHLORIDE SERPL-SCNC: 109 MMOL/L (ref 95–110)
CHLORIDE SERPL-SCNC: 112 MMOL/L (ref 95–110)
CHLORIDE SERPL-SCNC: 113 MMOL/L (ref 95–110)
CHLORIDE SERPL-SCNC: 113 MMOL/L (ref 95–110)
CHLORIDE SERPL-SCNC: 114 MMOL/L (ref 95–110)
CHLORIDE SERPL-SCNC: 115 MMOL/L (ref 95–110)
CHLORIDE SERPL-SCNC: 115 MMOL/L (ref 95–110)
CHLORIDE SERPL-SCNC: 81 MMOL/L (ref 95–110)
CHOLEST SERPL-MCNC: 139 MG/DL (ref 120–199)
CHOLEST/HDLC SERPL: 2.8 {RATIO} (ref 2–5)
CO2 SERPL-SCNC: 11 MMOL/L (ref 23–29)
CO2 SERPL-SCNC: 12 MMOL/L (ref 23–29)
CO2 SERPL-SCNC: 15 MMOL/L (ref 23–29)
CO2 SERPL-SCNC: 16 MMOL/L (ref 23–29)
CO2 SERPL-SCNC: 16 MMOL/L (ref 23–29)
CO2 SERPL-SCNC: 19 MMOL/L (ref 23–29)
CO2 SERPL-SCNC: 19 MMOL/L (ref 23–29)
CO2 SERPL-SCNC: 21 MMOL/L (ref 23–29)
CO2 SERPL-SCNC: 22 MMOL/L (ref 23–29)
CODING SYSTEM: NORMAL
CREAT SERPL-MCNC: 1.6 MG/DL (ref 0.5–1.4)
CREAT SERPL-MCNC: 1.7 MG/DL (ref 0.5–1.4)
CREAT SERPL-MCNC: 1.8 MG/DL (ref 0.5–1.4)
CREAT SERPL-MCNC: 1.9 MG/DL (ref 0.5–1.4)
CV ECHO LV RWT: 0.51 CM
DELSYS: ABNORMAL
DELSYS: ABNORMAL
DIFFERENTIAL METHOD: ABNORMAL
DISPENSE STATUS: NORMAL
DOP CALC AO PEAK VEL: 1.95 M/S
DOP CALC AO VTI: 47.4 CM
DOP CALC LVOT AREA: 2.8 CM2
DOP CALC LVOT DIAMETER: 1.9 CM
DOP CALC LVOT PEAK VEL: 0.51 M/S
DOP CALC LVOT STROKE VOLUME: 30.32 CM3
DOP CALC MV VTI: 22.6 CM
DOP CALCLVOT PEAK VEL VTI: 10.7 CM
E WAVE DECELERATION TIME: 213.16 MSEC
E/A RATIO: 1.45
E/E' RATIO: 11.2 M/S
ECHO LV POSTERIOR WALL: 1.03 CM (ref 0.6–1.1)
EJECTION FRACTION: 50 %
EOSINOPHIL # BLD AUTO: 0 K/UL (ref 0–0.5)
EOSINOPHIL NFR BLD: 0 % (ref 0–8)
ERYTHROCYTE [DISTWIDTH] IN BLOOD BY AUTOMATED COUNT: 13.2 % (ref 11.5–14.5)
ERYTHROCYTE [DISTWIDTH] IN BLOOD BY AUTOMATED COUNT: 13.3 % (ref 11.5–14.5)
ERYTHROCYTE [DISTWIDTH] IN BLOOD BY AUTOMATED COUNT: 13.3 % (ref 11.5–14.5)
EST. GFR  (NO RACE VARIABLE): 27 ML/MIN/1.73 M^2
EST. GFR  (NO RACE VARIABLE): 29 ML/MIN/1.73 M^2
EST. GFR  (NO RACE VARIABLE): 31 ML/MIN/1.73 M^2
EST. GFR  (NO RACE VARIABLE): 33 ML/MIN/1.73 M^2
FRACTIONAL SHORTENING: 3 % (ref 28–44)
GLUCOSE SERPL-MCNC: 1603 MG/DL (ref 70–110)
GLUCOSE SERPL-MCNC: 1603 MG/DL (ref 70–110)
GLUCOSE SERPL-MCNC: 196 MG/DL (ref 70–110)
GLUCOSE SERPL-MCNC: 202 MG/DL (ref 70–110)
GLUCOSE SERPL-MCNC: 223 MG/DL (ref 70–110)
GLUCOSE SERPL-MCNC: 268 MG/DL (ref 70–110)
GLUCOSE SERPL-MCNC: 276 MG/DL (ref 70–110)
GLUCOSE SERPL-MCNC: 276 MG/DL (ref 70–110)
GLUCOSE SERPL-MCNC: 294 MG/DL (ref 70–110)
GLUCOSE SERPL-MCNC: 316 MG/DL (ref 70–110)
GLUCOSE SERPL-MCNC: 458 MG/DL (ref 70–110)
GLUCOSE SERPL-MCNC: 787 MG/DL (ref 70–110)
HCO3 UR-SCNC: 17.2 MMOL/L (ref 24–28)
HCO3 UR-SCNC: 19.6 MMOL/L (ref 24–28)
HCO3 UR-SCNC: 20.9 MMOL/L (ref 24–28)
HCT VFR BLD AUTO: 29.4 % (ref 37–48.5)
HCT VFR BLD AUTO: 35.3 % (ref 37–48.5)
HCT VFR BLD AUTO: 41.6 % (ref 37–48.5)
HCT VFR BLD CALC: 25 %PCV (ref 36–54)
HDLC SERPL-MCNC: 50 MG/DL (ref 40–75)
HDLC SERPL: 36 % (ref 20–50)
HGB BLD-MCNC: 11.6 G/DL (ref 12–16)
HGB BLD-MCNC: 13.4 G/DL (ref 12–16)
HGB BLD-MCNC: 9 G/DL
HGB BLD-MCNC: 9.8 G/DL (ref 12–16)
IMM GRANULOCYTES # BLD AUTO: 0.04 K/UL (ref 0–0.04)
IMM GRANULOCYTES # BLD AUTO: 0.05 K/UL (ref 0–0.04)
IMM GRANULOCYTES # BLD AUTO: 0.06 K/UL (ref 0–0.04)
IMM GRANULOCYTES NFR BLD AUTO: 0.2 % (ref 0–0.5)
IMM GRANULOCYTES NFR BLD AUTO: 0.3 % (ref 0–0.5)
IMM GRANULOCYTES NFR BLD AUTO: 0.5 % (ref 0–0.5)
INTERVENTRICULAR SEPTUM: 1.29 CM (ref 0.6–1.1)
IVC DIAMETER: 1.11 CM
LA MAJOR: 3.24 CM
LA MINOR: 2.52 CM
LA WIDTH: 2.8 CM
LACTATE SERPL-SCNC: 3.1 MMOL/L (ref 0.5–2.2)
LDLC SERPL CALC-MCNC: 68 MG/DL (ref 63–159)
LEFT ATRIUM SIZE: 2.75 CM
LEFT ATRIUM VOLUME INDEX MOD: 9.8 ML/M2
LEFT ATRIUM VOLUME INDEX: 14 ML/M2
LEFT ATRIUM VOLUME MOD: 13.07 CM3
LEFT ATRIUM VOLUME: 18.56 CM3
LEFT INTERNAL DIMENSION IN SYSTOLE: 3.92 CM (ref 2.1–4)
LEFT VENTRICLE DIASTOLIC VOLUME INDEX: 53.58 ML/M2
LEFT VENTRICLE DIASTOLIC VOLUME: 71.26 ML
LEFT VENTRICLE MASS INDEX: 120 G/M2
LEFT VENTRICLE SYSTOLIC VOLUME INDEX: 29.4 ML/M2
LEFT VENTRICLE SYSTOLIC VOLUME: 39.16 ML
LEFT VENTRICULAR INTERNAL DIMENSION IN DIASTOLE: 4.03 CM (ref 3.5–6)
LEFT VENTRICULAR MASS: 159.18 G
LV LATERAL E/E' RATIO: 9.33 M/S
LV SEPTAL E/E' RATIO: 14 M/S
LVOT MG: 0.58 MMHG
LVOT MV: 0.37 CM/S
LYMPHOCYTES # BLD AUTO: 1 K/UL (ref 1–4.8)
LYMPHOCYTES # BLD AUTO: 1.2 K/UL (ref 1–4.8)
LYMPHOCYTES # BLD AUTO: 1.3 K/UL (ref 1–4.8)
LYMPHOCYTES NFR BLD: 7.1 % (ref 18–48)
LYMPHOCYTES NFR BLD: 8.5 % (ref 18–48)
LYMPHOCYTES NFR BLD: 8.7 % (ref 18–48)
MAGNESIUM SERPL-MCNC: 2.2 MG/DL (ref 1.6–2.6)
MAGNESIUM SERPL-MCNC: 2.3 MG/DL (ref 1.6–2.6)
MAGNESIUM SERPL-MCNC: 2.4 MG/DL (ref 1.6–2.6)
MCH RBC QN AUTO: 29.2 PG (ref 27–31)
MCH RBC QN AUTO: 29.4 PG (ref 27–31)
MCH RBC QN AUTO: 29.5 PG (ref 27–31)
MCHC RBC AUTO-ENTMCNC: 32.2 G/DL (ref 32–36)
MCHC RBC AUTO-ENTMCNC: 32.9 G/DL (ref 32–36)
MCHC RBC AUTO-ENTMCNC: 33.3 G/DL (ref 32–36)
MCV RBC AUTO: 88 FL (ref 82–98)
MCV RBC AUTO: 89 FL (ref 82–98)
MCV RBC AUTO: 91 FL (ref 82–98)
MONOCYTES # BLD AUTO: 0.3 K/UL (ref 0.3–1)
MONOCYTES # BLD AUTO: 0.3 K/UL (ref 0.3–1)
MONOCYTES # BLD AUTO: 0.6 K/UL (ref 0.3–1)
MONOCYTES NFR BLD: 1.8 % (ref 4–15)
MONOCYTES NFR BLD: 2.2 % (ref 4–15)
MONOCYTES NFR BLD: 4.8 % (ref 4–15)
MV A" WAVE DURATION": 121.79 MSEC
MV MEAN GRADIENT: 1 MMHG
MV PEAK A VEL: 0.58 M/S
MV PEAK E VEL: 0.84 M/S
MV PEAK GRADIENT: 3 MMHG
MV STENOSIS PRESSURE HALF TIME: 61.82 MS
MV VALVE AREA BY CONTINUITY EQUATION: 1.34 CM2
MV VALVE AREA P 1/2 METHOD: 3.56 CM2
NEUTROPHILS # BLD AUTO: 10.2 K/UL (ref 1.8–7.7)
NEUTROPHILS # BLD AUTO: 12.7 K/UL (ref 1.8–7.7)
NEUTROPHILS # BLD AUTO: 14.9 K/UL (ref 1.8–7.7)
NEUTROPHILS NFR BLD: 86.1 % (ref 38–73)
NEUTROPHILS NFR BLD: 88.5 % (ref 38–73)
NEUTROPHILS NFR BLD: 90.8 % (ref 38–73)
NONHDLC SERPL-MCNC: 89 MG/DL
NRBC BLD-RTO: 0 /100 WBC
NUM UNITS TRANS PACKED RBC: NORMAL
PCO2 BLDA: 26.9 MMHG (ref 35–45)
PCO2 BLDA: 38.7 MMHG (ref 35–45)
PCO2 BLDA: 48.7 MMHG (ref 35–45)
PH SMN: 7.24 [PH] (ref 7.35–7.45)
PH SMN: 7.31 [PH] (ref 7.35–7.45)
PH SMN: 7.41 [PH] (ref 7.35–7.45)
PHOSPHATE SERPL-MCNC: 1.7 MG/DL (ref 2.7–4.5)
PHOSPHATE SERPL-MCNC: 3.4 MG/DL (ref 2.7–4.5)
PHOSPHATE SERPL-MCNC: 4.2 MG/DL (ref 2.7–4.5)
PHOSPHATE SERPL-MCNC: 4.4 MG/DL (ref 2.7–4.5)
PHOSPHATE SERPL-MCNC: 4.5 MG/DL (ref 2.7–4.5)
PHOSPHATE SERPL-MCNC: 4.6 MG/DL (ref 2.7–4.5)
PHOSPHATE SERPL-MCNC: 4.6 MG/DL (ref 2.7–4.5)
PLATELET # BLD AUTO: 222 K/UL (ref 150–450)
PLATELET # BLD AUTO: 300 K/UL (ref 150–450)
PLATELET # BLD AUTO: 306 K/UL (ref 150–450)
PLATELET BLD QL SMEAR: ABNORMAL
PMV BLD AUTO: 9.8 FL (ref 9.2–12.9)
PMV BLD AUTO: 9.9 FL (ref 9.2–12.9)
PMV BLD AUTO: 9.9 FL (ref 9.2–12.9)
PO2 BLDA: 32 MMHG (ref 40–60)
PO2 BLDA: 57 MMHG (ref 80–100)
PO2 BLDA: 66 MMHG (ref 80–100)
POC BE: -6 MMOL/L
POC BE: -7 MMOL/L
POC BE: -7 MMOL/L
POC SATURATED O2: 50 % (ref 95–100)
POC SATURATED O2: 90 % (ref 95–100)
POC SATURATED O2: 91 % (ref 95–100)
POC TCO2: 18 MMOL/L (ref 23–27)
POC TCO2: 21 MMOL/L (ref 23–27)
POC TCO2: 22 MMOL/L (ref 24–29)
POCT GLUCOSE: 185 MG/DL (ref 70–110)
POCT GLUCOSE: 204 MG/DL (ref 70–110)
POCT GLUCOSE: 232 MG/DL (ref 70–110)
POCT GLUCOSE: 243 MG/DL (ref 70–110)
POCT GLUCOSE: 256 MG/DL (ref 70–110)
POCT GLUCOSE: 272 MG/DL (ref 70–110)
POCT GLUCOSE: 284 MG/DL (ref 70–110)
POCT GLUCOSE: 285 MG/DL (ref 70–110)
POCT GLUCOSE: 324 MG/DL (ref 70–110)
POCT GLUCOSE: 451 MG/DL (ref 70–110)
POCT GLUCOSE: 467 MG/DL (ref 70–110)
POCT GLUCOSE: 480 MG/DL (ref 70–110)
POCT GLUCOSE: >500 MG/DL (ref 70–110)
POTASSIUM BLD-SCNC: 3.9 MMOL/L (ref 3.5–5.1)
POTASSIUM SERPL-SCNC: 2.6 MMOL/L (ref 3.5–5.1)
POTASSIUM SERPL-SCNC: 3.7 MMOL/L (ref 3.5–5.1)
POTASSIUM SERPL-SCNC: 4.2 MMOL/L (ref 3.5–5.1)
POTASSIUM SERPL-SCNC: 4.2 MMOL/L (ref 3.5–5.1)
POTASSIUM SERPL-SCNC: 4.4 MMOL/L (ref 3.5–5.1)
POTASSIUM SERPL-SCNC: 4.6 MMOL/L (ref 3.5–5.1)
POTASSIUM SERPL-SCNC: 4.7 MMOL/L (ref 3.5–5.1)
POTASSIUM SERPL-SCNC: 4.8 MMOL/L (ref 3.5–5.1)
POTASSIUM SERPL-SCNC: 4.9 MMOL/L (ref 3.5–5.1)
POTASSIUM SERPL-SCNC: 4.9 MMOL/L (ref 3.5–5.1)
POTASSIUM SERPL-SCNC: 5 MMOL/L (ref 3.5–5.1)
PROCALCITONIN SERPL IA-MCNC: 7.22 NG/ML
PROCALCITONIN SERPL IA-MCNC: 9.16 NG/ML
PV MV: 0.6 M/S
PV PEAK VELOCITY: 0.88 CM/S
RA MAJOR: 3.48 CM
RA PRESSURE: 3 MMHG
RA WIDTH: 3.81 CM
RBC # BLD AUTO: 3.33 M/UL (ref 4–5.4)
RBC # BLD AUTO: 3.97 M/UL (ref 4–5.4)
RBC # BLD AUTO: 4.55 M/UL (ref 4–5.4)
SAMPLE: ABNORMAL
SARS-COV-2 RDRP RESP QL NAA+PROBE: NEGATIVE
SITE: ABNORMAL
SITE: ABNORMAL
SODIUM BLD-SCNC: 141 MMOL/L (ref 136–145)
SODIUM SERPL-SCNC: 127 MMOL/L (ref 136–145)
SODIUM SERPL-SCNC: 141 MMOL/L (ref 136–145)
SODIUM SERPL-SCNC: 142 MMOL/L (ref 136–145)
SODIUM SERPL-SCNC: 143 MMOL/L (ref 136–145)
SODIUM SERPL-SCNC: 144 MMOL/L (ref 136–145)
SODIUM SERPL-SCNC: 145 MMOL/L (ref 136–145)
SODIUM SERPL-SCNC: 146 MMOL/L (ref 136–145)
TDI LATERAL: 0.09 M/S
TDI SEPTAL: 0.06 M/S
TDI: 0.08 M/S
TRIGL SERPL-MCNC: 105 MG/DL (ref 30–150)
TROPONIN I SERPL DL<=0.01 NG/ML-MCNC: 0.78 NG/ML (ref 0–0.03)
TROPONIN I SERPL DL<=0.01 NG/ML-MCNC: 2.45 NG/ML (ref 0–0.03)
TSH SERPL DL<=0.005 MIU/L-ACNC: 1.7 UIU/ML (ref 0.4–4)
WBC # BLD AUTO: 11.82 K/UL (ref 3.9–12.7)
WBC # BLD AUTO: 14.35 K/UL (ref 3.9–12.7)
WBC # BLD AUTO: 16.4 K/UL (ref 3.9–12.7)

## 2022-10-28 PROCEDURE — 25000003 PHARM REV CODE 250: Performed by: HOSPITALIST

## 2022-10-28 PROCEDURE — 99291 CRITICAL CARE FIRST HOUR: CPT | Mod: 25,,, | Performed by: INTERNAL MEDICINE

## 2022-10-28 PROCEDURE — 84484 ASSAY OF TROPONIN QUANT: CPT | Mod: 91 | Performed by: INTERNAL MEDICINE

## 2022-10-28 PROCEDURE — 63600175 PHARM REV CODE 636 W HCPCS: Performed by: STUDENT IN AN ORGANIZED HEALTH CARE EDUCATION/TRAINING PROGRAM

## 2022-10-28 PROCEDURE — U0002 COVID-19 LAB TEST NON-CDC: HCPCS | Performed by: STUDENT IN AN ORGANIZED HEALTH CARE EDUCATION/TRAINING PROGRAM

## 2022-10-28 PROCEDURE — 63600175 PHARM REV CODE 636 W HCPCS: Performed by: INTERNAL MEDICINE

## 2022-10-28 PROCEDURE — 85018 HEMOGLOBIN: CPT | Performed by: STUDENT IN AN ORGANIZED HEALTH CARE EDUCATION/TRAINING PROGRAM

## 2022-10-28 PROCEDURE — 25000003 PHARM REV CODE 250: Performed by: INTERNAL MEDICINE

## 2022-10-28 PROCEDURE — P9016 RBC LEUKOCYTES REDUCED: HCPCS | Performed by: STUDENT IN AN ORGANIZED HEALTH CARE EDUCATION/TRAINING PROGRAM

## 2022-10-28 PROCEDURE — 87186 SC STD MICRODIL/AGAR DIL: CPT | Mod: 59 | Performed by: INTERNAL MEDICINE

## 2022-10-28 PROCEDURE — 36415 COLL VENOUS BLD VENIPUNCTURE: CPT | Performed by: NURSE PRACTITIONER

## 2022-10-28 PROCEDURE — 99223 1ST HOSP IP/OBS HIGH 75: CPT | Mod: ,,,

## 2022-10-28 PROCEDURE — 85025 COMPLETE CBC W/AUTO DIFF WBC: CPT | Mod: 91 | Performed by: INTERNAL MEDICINE

## 2022-10-28 PROCEDURE — 87040 BLOOD CULTURE FOR BACTERIA: CPT | Mod: 59 | Performed by: INTERNAL MEDICINE

## 2022-10-28 PROCEDURE — 83605 ASSAY OF LACTIC ACID: CPT | Performed by: INTERNAL MEDICINE

## 2022-10-28 PROCEDURE — 99900035 HC TECH TIME PER 15 MIN (STAT)

## 2022-10-28 PROCEDURE — 85025 COMPLETE CBC W/AUTO DIFF WBC: CPT | Mod: 91 | Performed by: STUDENT IN AN ORGANIZED HEALTH CARE EDUCATION/TRAINING PROGRAM

## 2022-10-28 PROCEDURE — 83735 ASSAY OF MAGNESIUM: CPT | Mod: 91 | Performed by: INTERNAL MEDICINE

## 2022-10-28 PROCEDURE — 87088 URINE BACTERIA CULTURE: CPT | Performed by: INTERNAL MEDICINE

## 2022-10-28 PROCEDURE — 25500020 PHARM REV CODE 255: Performed by: INTERNAL MEDICINE

## 2022-10-28 PROCEDURE — 80048 BASIC METABOLIC PNL TOTAL CA: CPT | Mod: 91 | Performed by: INTERNAL MEDICINE

## 2022-10-28 PROCEDURE — 99497 ADVNCD CARE PLAN 30 MIN: CPT | Mod: 25,,,

## 2022-10-28 PROCEDURE — 25000003 PHARM REV CODE 250: Performed by: STUDENT IN AN ORGANIZED HEALTH CARE EDUCATION/TRAINING PROGRAM

## 2022-10-28 PROCEDURE — 80048 BASIC METABOLIC PNL TOTAL CA: CPT | Performed by: STUDENT IN AN ORGANIZED HEALTH CARE EDUCATION/TRAINING PROGRAM

## 2022-10-28 PROCEDURE — 87077 CULTURE AEROBIC IDENTIFY: CPT | Mod: 59 | Performed by: INTERNAL MEDICINE

## 2022-10-28 PROCEDURE — 87077 CULTURE AEROBIC IDENTIFY: CPT | Performed by: INTERNAL MEDICINE

## 2022-10-28 PROCEDURE — 87040 BLOOD CULTURE FOR BACTERIA: CPT | Performed by: STUDENT IN AN ORGANIZED HEALTH CARE EDUCATION/TRAINING PROGRAM

## 2022-10-28 PROCEDURE — 80061 LIPID PANEL: CPT | Performed by: INTERNAL MEDICINE

## 2022-10-28 PROCEDURE — S5010 5% DEXTROSE AND 0.45% SALINE: HCPCS | Performed by: INTERNAL MEDICINE

## 2022-10-28 PROCEDURE — 87186 SC STD MICRODIL/AGAR DIL: CPT | Performed by: INTERNAL MEDICINE

## 2022-10-28 PROCEDURE — 99497 PR ADVNCD CARE PLAN 30 MIN: ICD-10-PCS | Mod: 25,,,

## 2022-10-28 PROCEDURE — 93010 EKG 12-LEAD: ICD-10-PCS | Mod: ,,, | Performed by: INTERNAL MEDICINE

## 2022-10-28 PROCEDURE — 99291 PR CRITICAL CARE, E/M 30-74 MINUTES: ICD-10-PCS | Mod: 25,,, | Performed by: INTERNAL MEDICINE

## 2022-10-28 PROCEDURE — 93010 ELECTROCARDIOGRAM REPORT: CPT | Mod: ,,, | Performed by: INTERNAL MEDICINE

## 2022-10-28 PROCEDURE — 85025 COMPLETE CBC W/AUTO DIFF WBC: CPT | Performed by: NURSE PRACTITIONER

## 2022-10-28 PROCEDURE — 87086 URINE CULTURE/COLONY COUNT: CPT | Performed by: INTERNAL MEDICINE

## 2022-10-28 PROCEDURE — 93005 ELECTROCARDIOGRAM TRACING: CPT

## 2022-10-28 PROCEDURE — 36556 INSERT NON-TUNNEL CV CATH: CPT

## 2022-10-28 PROCEDURE — 84484 ASSAY OF TROPONIN QUANT: CPT | Performed by: INTERNAL MEDICINE

## 2022-10-28 PROCEDURE — 84100 ASSAY OF PHOSPHORUS: CPT | Mod: 91 | Performed by: INTERNAL MEDICINE

## 2022-10-28 PROCEDURE — 99222 PR INITIAL HOSPITAL CARE,LEVL II: ICD-10-PCS | Mod: ,,, | Performed by: UROLOGY

## 2022-10-28 PROCEDURE — 99222 1ST HOSP IP/OBS MODERATE 55: CPT | Mod: ,,, | Performed by: UROLOGY

## 2022-10-28 PROCEDURE — 51702 INSERT TEMP BLADDER CATH: CPT

## 2022-10-28 PROCEDURE — 83735 ASSAY OF MAGNESIUM: CPT | Performed by: INTERNAL MEDICINE

## 2022-10-28 PROCEDURE — 25000003 PHARM REV CODE 250

## 2022-10-28 PROCEDURE — 84100 ASSAY OF PHOSPHORUS: CPT | Performed by: STUDENT IN AN ORGANIZED HEALTH CARE EDUCATION/TRAINING PROGRAM

## 2022-10-28 PROCEDURE — C1751 CATH, INF, PER/CENT/MIDLINE: HCPCS

## 2022-10-28 PROCEDURE — 36430 TRANSFUSION BLD/BLD COMPNT: CPT

## 2022-10-28 PROCEDURE — 36600 WITHDRAWAL OF ARTERIAL BLOOD: CPT

## 2022-10-28 PROCEDURE — 99223 PR INITIAL HOSPITAL CARE,LEVL III: ICD-10-PCS | Mod: ,,,

## 2022-10-28 PROCEDURE — 82803 BLOOD GASES ANY COMBINATION: CPT

## 2022-10-28 PROCEDURE — C1751 CATH, INF, PER/CENT/MIDLINE: HCPCS | Performed by: STUDENT IN AN ORGANIZED HEALTH CARE EDUCATION/TRAINING PROGRAM

## 2022-10-28 PROCEDURE — 94761 N-INVAS EAR/PLS OXIMETRY MLT: CPT

## 2022-10-28 PROCEDURE — 80048 BASIC METABOLIC PNL TOTAL CA: CPT | Mod: 91 | Performed by: CLINIC/CENTER

## 2022-10-28 PROCEDURE — 84100 ASSAY OF PHOSPHORUS: CPT | Mod: 91 | Performed by: STUDENT IN AN ORGANIZED HEALTH CARE EDUCATION/TRAINING PROGRAM

## 2022-10-28 PROCEDURE — 84145 PROCALCITONIN (PCT): CPT | Performed by: INTERNAL MEDICINE

## 2022-10-28 PROCEDURE — 96372 THER/PROPH/DIAG INJ SC/IM: CPT | Performed by: INTERNAL MEDICINE

## 2022-10-28 PROCEDURE — 20000000 HC ICU ROOM

## 2022-10-28 RX ORDER — CALCIUM GLUCONATE 20 MG/ML
1 INJECTION, SOLUTION INTRAVENOUS
Status: COMPLETED | OUTPATIENT
Start: 2022-10-28 | End: 2022-10-28

## 2022-10-28 RX ORDER — MUPIROCIN 20 MG/G
OINTMENT TOPICAL 2 TIMES DAILY
Status: DISPENSED | OUTPATIENT
Start: 2022-10-28 | End: 2022-11-02

## 2022-10-28 RX ORDER — HYDROCODONE BITARTRATE AND ACETAMINOPHEN 500; 5 MG/1; MG/1
TABLET ORAL
Status: DISCONTINUED | OUTPATIENT
Start: 2022-10-28 | End: 2022-11-02 | Stop reason: HOSPADM

## 2022-10-28 RX ORDER — NOREPINEPHRINE BITARTRATE/D5W 4MG/250ML
PLASTIC BAG, INJECTION (ML) INTRAVENOUS
Status: COMPLETED
Start: 2022-10-28 | End: 2022-10-28

## 2022-10-28 RX ORDER — SODIUM CHLORIDE, SODIUM LACTATE, POTASSIUM CHLORIDE, CALCIUM CHLORIDE 600; 310; 30; 20 MG/100ML; MG/100ML; MG/100ML; MG/100ML
INJECTION, SOLUTION INTRAVENOUS ONCE
Status: COMPLETED | OUTPATIENT
Start: 2022-10-28 | End: 2022-10-28

## 2022-10-28 RX ORDER — INSULIN ASPART 100 [IU]/ML
0-5 INJECTION, SOLUTION INTRAVENOUS; SUBCUTANEOUS EVERY 6 HOURS PRN
Status: DISCONTINUED | OUTPATIENT
Start: 2022-10-28 | End: 2022-10-31

## 2022-10-28 RX ORDER — TRANEXAMIC ACID 100 MG/ML
1000 INJECTION, SOLUTION INTRAVENOUS ONCE
Status: COMPLETED | OUTPATIENT
Start: 2022-10-28 | End: 2022-10-28

## 2022-10-28 RX ORDER — GLUCAGON 1 MG
1 KIT INJECTION
Status: DISCONTINUED | OUTPATIENT
Start: 2022-10-28 | End: 2022-10-31

## 2022-10-28 RX ORDER — NOREPINEPHRINE BITARTRATE/D5W 4MG/250ML
0-3 PLASTIC BAG, INJECTION (ML) INTRAVENOUS CONTINUOUS
Status: DISCONTINUED | OUTPATIENT
Start: 2022-10-28 | End: 2022-10-29

## 2022-10-28 RX ADMIN — CALCIUM GLUCONATE 1 G: 20 INJECTION, SOLUTION INTRAVENOUS at 04:10

## 2022-10-28 RX ADMIN — SODIUM CHLORIDE 6.5 UNITS/HR: 9 INJECTION, SOLUTION INTRAVENOUS at 07:10

## 2022-10-28 RX ADMIN — LABETALOL HYDROCHLORIDE 400 MG: 100 TABLET, FILM COATED ORAL at 09:10

## 2022-10-28 RX ADMIN — DONEPEZIL HYDROCHLORIDE 10 MG: 5 TABLET, FILM COATED ORAL at 08:10

## 2022-10-28 RX ADMIN — DEXTROSE AND SODIUM CHLORIDE 125 ML/HR: 5; .45 INJECTION, SOLUTION INTRAVENOUS at 08:10

## 2022-10-28 RX ADMIN — HUMAN ALBUMIN MICROSPHERES AND PERFLUTREN 0.11 MG: 10; .22 INJECTION, SOLUTION INTRAVENOUS at 01:10

## 2022-10-28 RX ADMIN — POLYETHYLENE GLYCOL 3350 17 G: 17 POWDER, FOR SOLUTION ORAL at 08:10

## 2022-10-28 RX ADMIN — POTASSIUM CHLORIDE 10 MEQ: 7.46 INJECTION, SOLUTION INTRAVENOUS at 03:10

## 2022-10-28 RX ADMIN — MUPIROCIN: 20 OINTMENT TOPICAL at 08:10

## 2022-10-28 RX ADMIN — PIPERACILLIN AND TAZOBACTAM 4.5 G: 4; .5 INJECTION, POWDER, LYOPHILIZED, FOR SOLUTION INTRAVENOUS; PARENTERAL at 11:10

## 2022-10-28 RX ADMIN — FAMOTIDINE 20 MG: 10 INJECTION INTRAVENOUS at 09:10

## 2022-10-28 RX ADMIN — ASPIRIN 81 MG: 81 TABLET, CHEWABLE ORAL at 09:10

## 2022-10-28 RX ADMIN — LACTOBACILLUS TAB 4 TABLET: TAB at 07:10

## 2022-10-28 RX ADMIN — TRANEXAMIC ACID 1000 MG: 100 INJECTION, SOLUTION INTRAVENOUS at 02:10

## 2022-10-28 RX ADMIN — POTASSIUM PHOSPHATE, MONOBASIC AND POTASSIUM PHOSPHATE, DIBASIC 20 MMOL: 224; 236 INJECTION, SOLUTION, CONCENTRATE INTRAVENOUS at 02:10

## 2022-10-28 RX ADMIN — MEMANTINE HYDROCHLORIDE 10 MG: 5 TABLET ORAL at 08:10

## 2022-10-28 RX ADMIN — POTASSIUM BICARBONATE 25 MEQ: 977.5 TABLET, EFFERVESCENT ORAL at 04:10

## 2022-10-28 RX ADMIN — MEMANTINE HYDROCHLORIDE 10 MG: 5 TABLET ORAL at 09:10

## 2022-10-28 RX ADMIN — INSULIN HUMAN 5 UNITS: 100 INJECTION, SOLUTION PARENTERAL at 03:10

## 2022-10-28 RX ADMIN — Medication 0.02 MCG/KG/MIN: at 10:10

## 2022-10-28 RX ADMIN — CALCIUM GLUCONATE 1 G: 20 INJECTION, SOLUTION INTRAVENOUS at 05:10

## 2022-10-28 RX ADMIN — INSULIN DETEMIR 14 UNITS: 100 INJECTION, SOLUTION SUBCUTANEOUS at 09:10

## 2022-10-28 RX ADMIN — SODIUM CHLORIDE, SODIUM LACTATE, POTASSIUM CHLORIDE, AND CALCIUM CHLORIDE 500 ML: .6; .31; .03; .02 INJECTION, SOLUTION INTRAVENOUS at 11:10

## 2022-10-28 NOTE — ASSESSMENT & PLAN NOTE
"Initial BS TTE per pulm/cc: " very poor LV performance, bradycardia, and clear evidence of low flow state (cardiac "smoke")".   TTE while on 0.15 Levo - unofficial read LVEF 45%- awaiting STAT read  Feel this is likely hemorrhagic shock   Waiting for official TTE read and will make further recs/ agree with Palliative care consult   Continue supportive care  Case discussed with Dr King who will see patient shortly    "

## 2022-10-28 NOTE — PLAN OF CARE
The sw spoke to Becky Cloud(dtr)[phone] b/c the pt was asleep and she has dementia. The sw spoke to Becky via phone. The pt lives in [place] along with her spouse Moises Arndt [phone] and Becky moved in with them about 3 years ago to assist with the pt's care. 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 The pt has a very supportive family. Becky inquired about sitters and assistance with housekeeping covered by [employer] but the sw informed her it's private pay b/c [employer] doesn't cover those things. The sw gave her the name and contact info for [facility] in [place] for resources for the elderly. The sw offered to give her a list of private sitters. The pt's family assist her with her adl's as needed and she uses a rollator,shwr chair and glucometer at home. The pt's dtr is requesting a hospital bed at home b/c the pt currently sleeps on the sofa. The sw will reach out to Dr. Villalpando to notify her. To write the dme order. The sw completed the white board in the pt's room and left a pamphlet with her name and contact info at bedside for Becky. The sw encouraged her to call if she has any further questions or concerns. The sw will continue to follow the pt throughout her transitions of care and will assist with any d/c needs.        10/28/22 0911   Discharge Assessment   Assessment Type Discharge Planning Assessment   Confirmed/corrected address, phone number and insurance Yes   Confirmed Demographics Correct on Facesheet   Source of Information family   If unable to respond/provide information was family/caregiver contacted? Yes   Contact Name/Number Becky Cloud(dtr)[phone]   When was your last doctors appointment?   (last month)   Communicated HUE with patient/caregiver Date not available/Unable to determine   Reason For Admission DKA without coma associated with type 2 diabetes mellitus   Lives With spouse;child(jett), adult   Do you expect to  return to your current living situation? Yes   Do you have help at home or someone to help you manage your care at home? Yes   Who are your caregiver(s) and their phone number(s)? eBcky Cloud(dtr)[phone]   Prior to hospitilization cognitive status: Unable to Assess   Current cognitive status: Coma/Sedated/Intubated   Walking or Climbing Stairs Difficulty ambulation difficulty, assistance 1 person;transferring difficulty, assistance 1 person;stair climbing difficulty, assistance 1 person   Dressing/Bathing Difficulty bathing difficulty, assistance 1 person;dressing difficulty, assistance 1 person   Home Accessibility not wheelchair accessible;stairs to enter home   Number of Stairs, Main Entrance eight   Home Layout Able to live on 1st floor   Equipment Currently Used at Home rollator;shower chair;glucometer   Readmission within 30 days? No   Patient currently being followed by outpatient case management? No   Do you currently have service(s) that help you manage your care at home? No   Do you take prescription medications? Yes   Do you have prescription coverage? Yes   Coverage [employer] Managed Medicare   Do you have any problems affording any of your prescribed medications? No   Is the patient taking medications as prescribed? yes   Who is going to help you get home at discharge? Becky Cloud(dtr)[phone]   How do you get to doctors appointments? family or friend will provide   Are you on dialysis? No   Do you take coumadin? No   Discharge Plan A Home with family   Discharge Plan B Home Health   DME Needed Upon Discharge  hospital bed   Discharge Plan discussed with: Adult children   Discharge Barriers Identified None   Relationship/Environment   Name(s) of Who Lives With Patient Becky Cloud(dtr)[phone]

## 2022-10-28 NOTE — CONSULTS
[facility] - Intensive Care  Urology  Consult Note    Patient Name: Marlen Arndt  MRN: [id number]  Admission Date: 10/27/2022  Attending Provider: Dinora Villalpando MD   Consulting Provider: Isidro Cote MD  Principal Problem:Diabetic ketoacidosis without coma associated with type 2 diabetes mellitus    Inpatient consult to Urology  Consult performed by: Isidro Cote MD  Consult ordered by: ROYA Marques        Subjective:     HPI:   Mrs. Marlen Arndt is a 77 y.o. female who presents with high blood sugar.  Out of insulin at home, lethargic on presentation with DKA.  CT showed distended bladder despite catheter and clot to left bladder.  Cath placed last night with bloody output.  Urology consulted.  No other history able to be obtained due to patient condition.  No  visits in chart in past.     Past Medical History:   Diagnosis Date    Aortic stenosis     Arthritis     Back pain     Carotid artery occlusion     Cataract     Coronary artery disease     Diabetes mellitus type II     Heart murmur     Hyperlipidemia     Hypertension associated with diabetes 7/24/2012    Non-functioning pituitary adenoma 7/24/2012    Pituitary microadenoma 11/17/2015    Polyneuropathy     PVD (peripheral vascular disease)     PVD (peripheral vascular disease)     S/P AVR (aortic valve replacement) 8/14/2013    21 mm Medtronic Mosaic ultra porcine valve     Stenosis     Stented coronary artery 4/10/2013    2013 RCA QASIM     Stroke     Multiple mini strokes; decreased left peripheal vision    Type II or unspecified type diabetes mellitus with neurological manifestations, not stated as uncontrolled(250.60)     Type II or unspecified type diabetes mellitus with peripheral circulatory disorders, not stated as uncontrolled(250.70)        Past Surgical History:   Procedure Laterality Date    AORTIC VALVE REPLACEMENT  2013    BILATERAL SALPINGOOPHORECTOMY      BREAST BIOPSY Left     CARDIAC VALVE REPLACEMENT      aortic      CAROTID ENDARTERECTOMY Right     CATARACT EXTRACTION W/  INTRAOCULAR LENS IMPLANT Bilateral     Cataract Surgery      Both eyes    CORONARY ANGIOPLASTY      DENTAL SURGERY      EYE SURGERY      HYSTERECTOMY      TUBAL LIGATION      YAG Bilateral        Review of patient's allergies indicates:   Allergen Reactions    No known drug allergies        Family History       Problem Relation (Age of Onset)    Coronary artery disease Mother, Brother    Diabetes Mother, Brother, Daughter    Heart attack Mother, Father, Sister, Sister    Heart disease Mother (42), Father, Brother, Sister (41), Sister    Heart failure Mother, Father    Hyperlipidemia Mother, Father, Brother, Sister, Sister    Hypertension Mother, Father, Brother, Sister, Sister    No Known Problems Son    Stroke Father            Tobacco Use    Smoking status: Former     Packs/day: 1.00     Years: 20.00     Pack years: 20.00     Types: Cigarettes     Quit date: 1987     Years since quittin.7    Smokeless tobacco: Never   Substance and Sexual Activity    Alcohol use: No     Alcohol/week: 0.0 standard drinks    Drug use: No    Sexual activity: Yes     Partners: Male       Review of Systems   Unable to perform ROS: Mental status change     Objective:     Temp:  [96.2 °F (35.7 °C)-98.1 °F (36.7 °C)] 96.2 °F (35.7 °C)  Pulse:  [] 100  Resp:  [11-33] 33  SpO2:  [91 %-100 %] 97 %  BP: (100-215)/() 166/79       NAD  Not alert, lethargic  ABD S/ND/NTTP          Person red urine w/o clots, only 14F person in place             Significant Labs:  BMP:  Recent Labs   Lab 10/28/22  0014 10/28/22  0437 10/28/22  0644   * 144 144   K 2.6* 4.2 5.0   CL 81* 112* 113*   CO2 11* 19* 15*   BUN 56* 65* 64*   CREATININE 1.9* 1.8* 1.7*   CALCIUM 6.7* 9.4 9.8       CBC:  Recent Labs   Lab 10/27/22  1852 10/28/22  0234 10/28/22  0437   WBC 20.17* 14.35* 16.40*   HGB 14.6 13.4 11.6*   HCT 44.5 41.6 35.3*    306 300       Urinalysis  Recent Labs    Lab 10/27/22  1946   COLORU Red*   SPECGRAV 1.015   PHUR 8.0   PROTEINUA 3+*   BACTERIA Many*   NITRITE Negative   LEUKOCYTESUR Trace*   UROBILINOGEN Negative   HYALINECASTS 0       Results for orders placed or performed during the hospital encounter of 10/27/22 (from the past 2160 hour(s))   CT Chest Abdomen Pelvis With Contrast (xpd)    Impression    Irregular density filling the posterior left lateral aspect of the urinary bladder from the trigone to the midportion.  Correlate for bladder neoplasm or blood clot.  Urology consultation urged.    Constipation with large rectal fecal impaction and suggested early stercoral colitis.    Severe atherosclerotic vascular disease throughout the aorta without evidence of aneurysm or dissection.    Suspected occlusion of the mid and lower abdominal aorta with possible central vascular stent.    No evidence of iliac occlusion with severe iliac and femoral atherosclerotic calcifications.    No evidence of mesenteric occlusion.    This report was flagged in Epic as abnormal.    Findings communicated to Josiane Rankin MD in the ED via [employer] secure chat with confirmation of receipt at the time of this dictation.      Electronically signed by: Maurizio Bae  Date:    10/27/2022  Time:    20:40       Significant Imaging:  CT: I have reviewed all results within the past 24 hours and my personal findings are:  per hpi      Assessment:     Problem Noted   Gross Hematuria 10/28/2022    Admitted with DKA  Gross hematuria noted with person  CT with clot to left bladder, distended bladder despite person in place, no hydronephrosis    HELADIO on CKD    DKA       Plan:     Follow up urine culture  Remove existing 14F and replace with 24F three way person catheter.  Irrigate manually after to clear clots and irrigate every 4 hours.  Hold on CBI for now given mental status.    Please call on call urologist with any questions  NPO at MN in case needs operative intervention    Thank you for your  consult.     Isidro Cote MD  [facility]

## 2022-10-28 NOTE — PLAN OF CARE
Problem: Infection  Goal: Absence of Infection Signs and Symptoms  Outcome: Ongoing, Progressing     Problem: Diabetic Ketoacidosis  Goal: Fluid and Electrolyte Balance with Absence of Ketosis  Outcome: Ongoing, Progressing     Problem: Adult Inpatient Plan of Care  Goal: Plan of Care Review  Outcome: Ongoing, Progressing  Goal: Patient-Specific Goal (Individualized)  Outcome: Ongoing, Progressing  Goal: Absence of Hospital-Acquired Illness or Injury  Outcome: Ongoing, Progressing  Goal: Optimal Comfort and Wellbeing  Outcome: Ongoing, Progressing  Goal: Readiness for Transition of Care  Outcome: Ongoing, Progressing     Problem: Diabetes Comorbidity  Goal: Blood Glucose Level Within Targeted Range  Outcome: Ongoing, Progressing     Problem: Coping Ineffective  Goal: Effective Coping  Outcome: Ongoing, Progressing     Problem: Fall Injury Risk  Goal: Absence of Fall and Fall-Related Injury  Outcome: Ongoing, Progressing     Problem: Skin Injury Risk Increased  Goal: Skin Health and Integrity  Outcome: Ongoing, Progressing     Problem: Impaired Wound Healing  Goal: Optimal Wound Healing  Outcome: Ongoing, Progressing     Problem: Anemia  Goal: Anemia Symptom Improvement  Outcome: Ongoing, Progressing     Problem: Behavior Regulation Impairment (Dementia Signs/Symptoms)  Goal: Improved Behavioral Control (Dementia Signs/Symptoms)  Outcome: Ongoing, Progressing     Problem: Cognitive Impairment (Dementia Signs/Symptoms)  Goal: Optimized Cognitive Function (Dementia Signs/Symptoms)  Outcome: Ongoing, Progressing     Problem: Mood Impairment (Dementia Signs/Symptoms)  Goal: Improved Mood Symptoms (Dementia Signs/Symptoms)  Outcome: Ongoing, Progressing     Problem: Nutrition Imbalance (Dementia Signs/Symptoms)  Goal: Optimized Nutrition Intake (Dementia Signs/Symptoms)  Outcome: Ongoing, Progressing     Problem: Sleep Disturbance (Dementia Signs/Symptoms)  Goal: Improved Sleep (Dementia Signs/Symptoms)  Outcome:  Ongoing, Progressing     Problem: Social or Functional Impairment (Dementia Signs/Symptoms)  Goal: Enhanced Social or Functional Skills and Ability (Dementia Signs/Symptoms)  Outcome: Ongoing, Progressing     Problem: Constipation  Goal: Effective Bowel Elimination  Outcome: Ongoing, Progressing     Problem: Electrolyte Imbalance  Goal: Electrolyte Balance  Outcome: Ongoing, Progressing

## 2022-10-28 NOTE — PLAN OF CARE
Problem: Diabetic Ketoacidosis  Goal: Fluid and Electrolyte Balance with Absence of Ketosis  Outcome: Ongoing, Progressing  Intervention: Monitor and Manage Ketoacidosis  Flowsheets (Taken 10/28/2022 0843)  Fluid/Electrolyte Management: electrolyte supplement adjusted  Glycemic Management: blood glucose monitored     Problem: Adult Inpatient Plan of Care  Goal: Plan of Care Review  Outcome: Ongoing, Not Progressing  Flowsheets (Taken 10/28/2022 0843)  Plan of Care Reviewed With: patient     Problem: Diabetes Comorbidity  Goal: Blood Glucose Level Within Targeted Range  Outcome: Ongoing, Progressing  Intervention: Monitor and Manage Glycemia  Flowsheets (Taken 10/28/2022 0843)  Glycemic Management: blood glucose monitored     Problem: Anemia  Goal: Anemia Symptom Improvement  Outcome: Ongoing, Not Progressing  Intervention: Monitor and Manage Anemia  Flowsheets (Taken 10/28/2022 0843)  Oral Nutrition Promotion: rest periods promoted  Safety Promotion/Fall Prevention: medications reviewed     Problem: Constipation  Goal: Effective Bowel Elimination  Outcome: Ongoing, Not Progressing  Intervention: Promote Effective Bowel Elimination  Flowsheets (Taken 10/28/2022 0843)  Bowel Function Promotion: straining discouraged  Bowel Elimination Management:   digitally disimpacted   hygiene measures promoted   relaxation techniques promoted     Problem: Electrolyte Imbalance  Goal: Electrolyte Balance  Outcome: Ongoing, Progressing  Intervention: Monitor and Manage Electrolyte Imbalance  Flowsheets (Taken 10/28/2022 0843)  Fluid/Electrolyte Management: electrolyte supplement adjusted    Patient admitted to ICU from ED on Insulin drip and fluids infusing. Patient with need for electrolyte replacement and trending of labs. Mental state remains somnolent and responsive to pain as well as non-verbal, placed on nasal cannula, IV lines placed with medications and fluids infusing. Urine still remains bloody. Glucose trending down. No  family present at bedside. Report to be given and care to be relinquished to day shift nurse.

## 2022-10-28 NOTE — EICU
PICC team to be called - if unable to wait request central line if unable to maintain IV access  NG ok to use on image viewed via video at bedside    [id number] LA recheck; VBG from line

## 2022-10-28 NOTE — ASSESSMENT & PLAN NOTE
Patient's FSGs are uncontrolled due to hyperglycemia on current medication regimen.  Last A1c reviewed-   Lab Results   Component Value Date    HGBA1C 8.9 (H) 10/27/2022     Most recent fingerstick glucose reviewed-   Recent Labs   Lab 10/28/22  0742 10/28/22  0857 10/28/22  0946 10/28/22  1048   POCTGLUCOSE 232* 185* 204* 285*     Current correctional scale  High  Maintain anti-hyperglycemic dose as follows-   Antihyperglycemics (From admission, onward)    Start     Stop Route Frequency Ordered    10/28/22 0900  insulin detemir U-100 pen 14 Units         -- SubQ Daily 10/28/22 0752    10/28/22 0230  insulin regular 1 Units/mL in sodium chloride 0.9% 100 mL infusion        Question Answer Comment   Insulin Rate Adjustment (DO NOT MODIFY ANSWER) [email or web address]    Enter initial dose from Infusion Protocol Chart (Units/hr): 10        -- IV Continuous 10/28/22 0224        Transition off IV insulin to subcutaneous

## 2022-10-28 NOTE — HPI
"Per chart review, "HPI retrieved from chart, patient minimally responsive- acutely ill with no family at bedside. 77F T2DM presented to the ER with reports of a "high CBG." Apparently her daughter had been given her insulin and rechecking her glucose q1h WO improvement. Patient was very lethargic upon arrival. Work up in the ER revealed the patient was in DKA. She was given IVF and insulin. Patient now with skylar hematuria and drop in HGB from 14 to 9, currently receiving PRBC. She is on Levophed at 0.15, DNR status noted as well as palliative care consult pending. Cardiology consulted for suspected cardiogenic shock.   Per Pulm/CC:   "Case d/w patient's daughter Becky, who is an Oncology RN at [employer].  Pt is in cardiogenic shock, has significant bladder bleeding, and is progressively worsening.  Troponin >2 - likely type 2 MI.  Given patient's underlying condition, we agreed on the phone that ACLS protocols would be futile in an attempt to prolong life.  She ius bringing the pt's  in to see her.  We will continue vasopressor and blood product support in interim.  Verbal consent received for continued PRBC transfusion and placement of arterial line to guide pressor support.   Bedside echo with very poor LV performance, bradycardia, and clear evidence of low flow state (cardiac "smoke").  Do not believe percutaneous coronary intervention would be beneficial or indicated at this time."  Official TTE pending   SB-ST on tele up to 140s.   Last Troponin 2.4"  "

## 2022-10-28 NOTE — CONSULTS
"[facility] - Intensive Care  Palliative Medicine  Consult Note    Patient Name: Marlen Arndt  MRN: [id number]  Admission Date: 10/27/2022  Hospital Length of Stay: 0 days  Code Status: DNR   Attending Provider: Dinora Villalpando MD  Consulting Provider: Darlene Yates NP  Primary Care Physician: Dominique Rendon MD  Principal Problem:Diabetic ketoacidosis without coma associated with type 2 diabetes mellitus    Patient information was obtained from spouse/SO, relative(s), past medical records and primary team.      Inpatient consult to Palliative Care  Consult performed by: Darlene Yates NP  Consult ordered by: ROYA Marques  Reason for consult: Goals of Care         Assessment/Plan:     Palliative care encounter  At time of initial consult, pts , daughter Becky, cousin, and additional family member at the bedside.  Pt remained unresponsive and nonverbal throughout discussion.  Time given for all family questions to be addressed.  Abdominal imaging reviewed with daughter.     Pt lives at home with her  and daughter.  Family is supportive and assists with all ADLS.    Family endorses a rapid decline over the past few months in pts mental capacity and functioning.  Pt has history of fecal impactions that can usually be resolved at home.  Family encourages pt to eat 2 small meals a day, usually some sort of protein drink/smoothie of offered.  Pt may be receptive to this, but has decreased PO intake recently as well.   Limited mobility to a few feet with assistance.  Otherwise pt is bed/chair bound.     We discussed the seriousness of the pts prognosis.  Family able appreciate the pts clinical picture.   At one point, pts  asked, " is she dying?"-  advised that the pt is critically ill and prognosis is guarded.   Family understands that multiple issues make treatment options limited.   Pts daughter Becky verbalized understanding of limited options due to multiple underlying serious " "condtions- MI, cardiogenic shock, possible bladder tumor w/ bleed, fecal impaction, DKA    Family wishes for all aggressive measures up to the point of compressions be utilized on this pt.    Aggressive supportive measures with reasonable care restrictions for the pt at this time.     Recommend 1 mg tranexamic acid x1 to control bleeding    Pt made a DNR.         Thank you for your consult. I will follow-up with patient. Please contact us if you have any additional questions.    Subjective:     HPI:   Per chart review, "HPI retrieved from chart, patient minimally responsive- acutely ill with no family at bedside. 77F T2DM presented to the ER with reports of a "high CBG." Apparently her daughter had been given her insulin and rechecking her glucose q1h WO improvement. Patient was very lethargic upon arrival. Work up in the ER revealed the patient was in DKA. She was given IVF and insulin. Patient now with skylar hematuria and drop in HGB from 14 to 9, currently receiving PRBC. She is on Levophed at 0.15, DNR status noted as well as palliative care consult pending. Cardiology consulted for suspected cardiogenic shock.   Per Pulm/CC:   "Case d/w patient's daughter Becky, who is an Oncology RN at [employer].  Pt is in cardiogenic shock, has significant bladder bleeding, and is progressively worsening.  Troponin >2 - likely type 2 MI.  Given patient's underlying condition, we agreed on the phone that ACLS protocols would be futile in an attempt to prolong life.  She ius bringing the pt's  in to see her.  We will continue vasopressor and blood product support in interim.  Verbal consent received for continued PRBC transfusion and placement of arterial line to guide pressor support.   Bedside echo with very poor LV performance, bradycardia, and clear evidence of low flow state (cardiac "smoke").  Do not believe percutaneous coronary intervention would be beneficial or indicated at this time."  Official TTE pending " "  SB-ST on tele up to 140s.   Last Troponin 2.4"      Hospital Course:  No notes on file    Interval History: glucose 196, down from 1603.  Troponin elevated 2.45 from 1.27 on admit. HCT 29.4, 41.6 on admit. Hemoglobin 9.8.  PRBC transfusing. [place] with blood.      Past Medical History:   Diagnosis Date    Aortic stenosis     Arthritis     Back pain     Carotid artery occlusion     Cataract     Coronary artery disease     Diabetes mellitus type II     Heart murmur     Hyperlipidemia     Hypertension associated with diabetes 7/24/2012    Non-functioning pituitary adenoma 7/24/2012    Pituitary microadenoma 11/17/2015    Polyneuropathy     PVD (peripheral vascular disease)     PVD (peripheral vascular disease)     S/P AVR (aortic valve replacement) 8/14/2013    21 mm Medtronic Mosaic ultra porcine valve     Stenosis     Stented coronary artery 4/10/2013    2013 RCA QASIM     Stroke     Multiple mini strokes; decreased left peripheal vision    Type 2 myocardial infarction due to shock 10/28/2022    Type II or unspecified type diabetes mellitus with neurological manifestations, not stated as uncontrolled(250.60)     Type II or unspecified type diabetes mellitus with peripheral circulatory disorders, not stated as uncontrolled(250.70)        Past Surgical History:   Procedure Laterality Date    AORTIC VALVE REPLACEMENT  2013    BILATERAL SALPINGOOPHORECTOMY      BREAST BIOPSY Left     CARDIAC VALVE REPLACEMENT      aortic 2013    CAROTID ENDARTERECTOMY Right     CATARACT EXTRACTION W/  INTRAOCULAR LENS IMPLANT Bilateral     Cataract Surgery      Both eyes    CORONARY ANGIOPLASTY      DENTAL SURGERY      EYE SURGERY      HYSTERECTOMY      TUBAL LIGATION      YAG Bilateral        Review of patient's allergies indicates:   Allergen Reactions    No known drug allergies        Medications:  Continuous Infusions:   dextrose 5 % and 0.45 % NaCl 125 mL/hr (10/28/22 1400)    insulin regular 1 " units/mL infusion orderable (DKA) 1.1 Units/hr (10/28/22 1400)    NORepinephrine bitartrate-D5W 0.16 mcg/kg/min (10/28/22 1400)     Scheduled Meds:   donepeziL  10 mg Oral QHS    famotidine (PF)  20 mg Intravenous Daily    insulin detemir U-100  14 Units Subcutaneous Daily    Lactobacillus acidoph-L.bulgar  4 tablet Oral TID WM    memantine  10 mg Oral BID    mupirocin   Nasal BID    piperacillin-tazobactam (ZOSYN) IVPB  4.5 g Intravenous Q12H     PRN Meds:sodium chloride, acetaminophen, bisacodyL, dextrose 10%, dextrose 10%, dextrose 5 % and 0.45 % NaCl, HYDROcodone-acetaminophen, melatonin, morphine, nitroGLYCERIN, ondansetron, polyethylene glycol, potassium chloride **AND** potassium chloride **AND** potassium chloride, prochlorperazine, sodium chloride 0.9%    Family History       Problem Relation (Age of Onset)    Coronary artery disease Mother, Brother    Diabetes Mother, Brother, Daughter    Heart attack Mother, Father, Sister, Sister    Heart disease Mother (42), Father, Brother, Sister (41), Sister    Heart failure Mother, Father    Hyperlipidemia Mother, Father, Brother, Sister, Sister    Hypertension Mother, Father, Brother, Sister, Sister    No Known Problems Son    Stroke Father          Tobacco Use    Smoking status: Former     Packs/day: 1.00     Years: 20.00     Pack years: 20.00     Types: Cigarettes     Quit date: 1987     Years since quittin.7    Smokeless tobacco: Never   Substance and Sexual Activity    Alcohol use: No     Alcohol/week: 0.0 standard drinks    Drug use: No    Sexual activity: Yes     Partners: Male       Review of Systems   Unable to perform ROS: Mental status change   Objective:     Vital Signs (Most Recent):  Temp: 97.1 °F (36.2 °C) (10/28/22 141)  Pulse: 72 (10/28/22 141)  Resp: (!) 22 (10/28/22 1415)  BP: (!) 158/68 (10/28/22 1415)  SpO2: 98 % (10/28/22 1415)   Vital Signs (24h Range):  Temp:  [96.2 °F (35.7 °C)-98.1 °F (36.7 °C)] 97.1 °F (36.2  °C)  Pulse:  [] 72  Resp:  [11-46] 22  SpO2:  [91 %-100 %] 98 %  BP: ()/() 158/68     Weight: 42.2 kg (93 lb)  Body mass index is 18.78 kg/m².    Physical Exam  Vitals and nursing note reviewed. Exam conducted with a chaperone present.   Constitutional:       Appearance: She is underweight. She is ill-appearing and toxic-appearing.   HENT:      Head: Normocephalic.      Nose: Nose normal.      Comments: NG tube     Mouth/Throat:      Mouth: Mucous membranes are dry.   Eyes:      Comments: Pinpoint pupils bilaterally    Cardiovascular:      Rate and Rhythm: Normal rate.      Pulses: Normal pulses.   Pulmonary:      Effort: Pulmonary effort is normal.      Breath sounds: Normal breath sounds.   Abdominal:      General: Abdomen is flat. Bowel sounds are decreased.      Palpations: Abdomen is soft.   Skin:     Capillary Refill: Capillary refill takes 2 to 3 seconds.      Coloration: Skin is ashen.   Neurological:      Mental Status: She is unresponsive.       Review of Symptoms      Symptom Assessment (ESAS 0-10 Scale)  Unable to complete assessment due to Mental status change         Pain Assessment in Advanced Demential Scale (PAINAD)   Breathing - Independent of vocalization:  0  Negative vocalization:  0  Facial expression:  0  Body language:  0  Consolability:  0  Total:  0    Performance Status:  20    Living Arrangements:  Lives in home, Lives with family and Lives with spouse    Psychosocial/Cultural: Pt lives at home with her  and daughter.      Spiritual:  F - [place] and Belief:  Unable to assess, nonverbal   I - Importance:  Unknown at this time   C - Community:  Family support   A - Address in Care:  Pastoral visits prn      Time-Based Charting:  Yes  Chart Review: 18 minutes  Face to Face: 21 minutes  Symptom Assessment: 6 minutes  Coordination of Care: 18 minutes  Discharge Planning: 10 minutes  Advance Care Plannin minutes  Goals of Care: 8 minutes    Total Time Spent: 89  minutes      Advance Care Planning   Advance Directives:   Living Will: Yes        Copy on chart: Yes    [place]POST: No    Do Not Resuscitate Status: Yes    Agent's Name:  Moises Arndt   Agent's Contact Number:  [phone]    Decision Making:  Family answered questions and Patient unable to communicate due to disease severity/cognitive impairment  Goals of Care: The family and healthcare power of   endorses that what is most important right now is to focus on improvement in condition but with limits to invasive therapies    Accordingly, we have decided that the best plan to meet the patient's goals includes continuing with treatment         Significant Labs: CBC:   Recent Labs   Lab 10/28/22  0234 10/28/22  0437 10/28/22  1049 10/28/22  1100   WBC 14.35* 16.40* 11.82  --    HGB 13.4 11.6* 9.8*  --    HCT 41.6 35.3* 29.4* 25*    300 222  --      CMP:   Recent Labs   Lab 10/27/22  1852 10/27/22  2042 10/28/22  0946 10/28/22  1050 10/28/22  1406      < > 144 146* 142   K 5.7*   < > 4.2 4.4 4.8   CL 99   < > 115* 115* 113*   CO2 16*   < > 21* 22* 21*   GLU 1,039*   < > 202* 196* 268*   BUN 76*   < > 63* 64* 61*   CREATININE 2.4*   < > 1.6* 1.6* 1.6*   CALCIUM 10.4   < > 9.6 9.1 8.8   PROT 7.5  --   --   --   --    ALBUMIN 3.7  --   --   --   --    BILITOT 0.4  --   --   --   --    ALKPHOS 91  --   --   --   --    AST 34  --   --   --   --    ALT 31  --   --   --   --    ANIONGAP 22*   < > 8 9 8    < > = values in this interval not displayed.     All pertinent labs within the past 24 hours have been reviewed.  CBC:   Recent Labs   Lab 10/28/22  1049 10/28/22  1100   WBC 11.82  --    HGB 9.8*  --    HCT 29.4* 25*   MCV 88  --      --      BMP:  Recent Labs   Lab 10/28/22  0644 10/28/22  0743 10/28/22  1406   *   < > 268*      < > 142   K 5.0   < > 4.8   *   < > 113*   CO2 15*   < > 21*   BUN 64*   < > 61*   CREATININE 1.7*   < > 1.6*   CALCIUM 9.8   < > 8.8   MG 2.2  --   --      < > = values in this interval not displayed.     LFT:  Lab Results   Component Value Date    AST 34 10/27/2022    ALKPHOS 91 10/27/2022    BILITOT 0.4 10/27/2022     Albumin:   Albumin   Date Value Ref Range Status   10/27/2022 3.7 3.5 - 5.2 g/dL Final     Protein:   Total Protein   Date Value Ref Range Status   10/27/2022 7.5 6.0 - 8.4 g/dL Final     Comment:     Specimen moderately hemolyzed     Lactic acid:   Lab Results   Component Value Date    LACTATE 3.1 (H) 10/28/2022    LACTATE 2.5 (H) 10/27/2022       Significant Imaging: I have reviewed all pertinent imaging results/findings within the past 24 hours.      Darlene Yates NP  Palliative Medicine  [facility] - Intensive Care  Advance Care Planning     Date: 10/28/2022    Code Status  In light of the patients advanced and life limiting illness,I engaged the the family and healthcare power of   in a conversation about the patient's preferences for care  at the very end of life. The patient wishes to have a natural, peaceful death.  Along those lines, the patient does not wish to have CPR or other invasive treatments performed when her heart and/or breathing stops. I communicated to the family and healthcare power of   that a DNR order would be placed in her medical record to reflect this preference.  I spent a total of 16 minutes engaging the patient in this advance care planning discussion.       [facility]  I engaged the family and healthcare power of   in a conversation about advance care planning and we specifically addressed what the goals of care would be moving forward, in light of the patient's change in clinical status, specifically DKA.  We did specifically address the patient's likely prognosis, which is guarded.  We explored the patient's values and preferences for future care.  The family and healthcare power of   endorses that what is most important right now is to focus on improvement in condition but with limits to  invasive therapies.    Accordingly, we have decided that the best plan to meet the patient's goals includes continuing with treatment    I did not explain the role for hospice care at this stage of the patient's illness, including its ability to help the patient live with the best quality of life possible.  We will not be making a hospice referral.    I spent a total of 16 minutes engaging the patient in this advance care planning discussion.

## 2022-10-28 NOTE — ASSESSMENT & PLAN NOTE
"At time of initial consult, pts , daughter Becky, cousin, and additional family member at the bedside.  Pt remained unresponsive and nonverbal throughout discussion.  Time given for all family questions to be addressed.  Abdominal imaging reviewed with daughter.     Pt lives at home with her  and daughter.  Family is supportive and assists with all ADLS.    Family endorses a rapid decline over the past few months in pts mental capacity and functioning.  Pt has history of fecal impactions that can usually be resolved at home.  Family encourages pt to eat 2 small meals a day, usually some sort of protein drink/smoothie of offered.  Pt may be receptive to this, but has decreased PO intake recently as well.   Limited mobility to a few feet with assistance.  Otherwise pt is bed/chair bound.     We discussed the seriousness of the pts prognosis.  Family able appreciate the pts clinical picture.   At one point, pts  asked, " is she dying?"-  advised that the pt is critically ill and prognosis is guarded.   Family understands that multiple issues make treatment options limited.   Pts daughter Becky verbalized understanding of limited options due to multiple underlying serious condtions- MI, cardiogenic shock, possible bladder tumor w/ bleed, fecal impaction    Family wishes for all aggressive measures up to the point of compressions be utilized on this pt.    Aggressive supportive measures with reasonable care restrictions for the pt at this time.     Recommend 1 mg tranexamic acid x1 to control bleeding    Pt made a DNR.   "

## 2022-10-28 NOTE — PROGRESS NOTES
Pharmacist Renal Dose Adjustment Note    June B Yris is a 77 y.o. female being treated with the medication famotidine    Patient Data:    Vital Signs (Most Recent):  Pulse: (!) 123 (10/27/22 2102)  Resp: (!) 25 (10/27/22 2102)  BP: (!) 171/89 (10/27/22 2102)  SpO2: 96 % (10/27/22 2100)   Vital Signs (72h Range):  Pulse:  []   Resp:  [16-25]   BP: (100-171)/(78-94)   SpO2:  [96 %-100 %]      Recent Labs   Lab 10/27/22  1852 10/27/22  2042   CREATININE 2.4* 2.2*     Serum creatinine: 2.2 mg/dL (H) 10/27/22 2042  Estimated creatinine clearance: 15.9 mL/min (A)    Medication:famotidine dose: 20mg frequency bid will be changed to medication:famotidine dose:20mg frequency:daily    Pharmacist's Name: Idalia Fatima  Pharmacist's Extension: 0725

## 2022-10-28 NOTE — ASSESSMENT & PLAN NOTE
Urine, blood cultures pending   Infectious versus stress related. ?  Stercoral colitis  Continue Zosyn empirically

## 2022-10-28 NOTE — ASSESSMENT & PLAN NOTE
Irregular density filling the posterior left lateral aspect of the urinary bladder from the trigone to the midportion.  Correlate for bladder neoplasm or blood clot.  Urology consultation urged. Can consult Uro when she's out of DKA     Constipation with large rectal fecal impaction and suggested early stercoral colitis. Started Zosyn. Prescribed laxatives, stool softeners      Severe atherosclerotic vascular disease throughout the aorta without evidence of aneurysm or dissection. Suspected occlusion of the mid and lower abdominal aorta with possible central vascular stent---> Appears chronic     No evidence of iliac occlusion with severe iliac and femoral atherosclerotic calcifications.     No evidence of mesenteric occlusion.

## 2022-10-28 NOTE — ANESTHESIA PROCEDURE NOTES
Central Line    Diagnosis: Vascular access  Patient location during procedure: ICU  Timeout: 10/28/2022 4:15 AM  Procedure end time: 10/28/2022 4:35 AM    Staffing  Authorizing Provider: Triston Guzman MD  Performing Provider: Syl Charles MD    Staffing  Performed: anesthesiologist and resident/CRNA   Anesthesiologist: Triston Guzman MD  Other anesthesia staff: Syl Charles MD  Anesthesiologist was present at the time of the procedure.  Preanesthetic Checklist  Completed: patient identified, IV checked, site marked, risks and benefits discussed, surgical consent, monitors and equipment checked, pre-op evaluation, timeout performed and anesthesia consent given  Indication   Indication: med administration, vascular access     Anesthesia     Central Line   Skin Prep: skin prepped with ChloraPrep, skin prep agent completely dried prior to procedure  Sterile Barriers Followed: Yes    All five maximal barriers used- gloves, gown, cap, mask, and large sterile sheet    hand hygiene performed prior to central venous catheter insertion  Location: right internal jugular.   Catheter type: triple lumen  Catheter Size: 7 Fr  Ultrasound: vascular probe with ultrasound   Vessel Caliber: patent  Needle advanced into vessel with real time Ultrasound guidance.  Guidewire confirmed in vessel.  sterile gel and probe cover used in ultrasound-guided central venous catheter insertion   Manometry: Venous cannualation confirmed by visual estimation of blood vessel pressure using manometry.  Insertion Attempts: 1   Securement:line sutured, blood return through all ports, sterile dressing applied and chlorhexidine patch    Post-Procedure   X-Ray: successful placement   Adverse Events:none      Guidewire Guidewire removed intact.

## 2022-10-28 NOTE — EICU
"eICU Brief Note    Issue: 77 y old woman with DM with severe high glucose on insulin but not responding to treatment ad admitted for IV therapy. G 35, low K 2.6, low phos. Prior dementia on medications. Imahing with stercoral colitis, vascular stent? , drowsy per RN    /68 (BP Location: Left arm, Patient Position: Lying)   Pulse 102   Temp 96.8 °F (36 °C) (Axillary)   Resp (!) 23   Ht 4' 11" (1.499 m)   Wt 42.5 kg (93 lb 11.1 oz)   SpO2 (!) 91%   BMI 18.92 kg/m²   Eyes close don video review  Intake/Output - Last 3 Shifts         10/26 0700  10/27 0659 10/27 0700  10/28 0659    I.V. (mL/kg)  477 (11.2)    IV Piggyback  2052.9    Total Intake(mL/kg)  2530 (59.5)    Urine (mL/kg/hr)  1200    Total Output  1200    Net  +1330                    Plan :  Insulin infusion- increase rate with close monitoring of K   Replete K, Phos and check Mg  Insulin one time additional dose given   On empiric antibiotics for colitis  Urinary bladder abnormal imaging mass? For workup later  HELADIO monitor with hydration   SCD-? Bladder blood clot- if not there could consider using LMWH      D/w RN    "

## 2022-10-28 NOTE — SUBJECTIVE & OBJECTIVE
Interval History: glucose 196, down from 1603.  Troponin elevated 2.45 from 1.27 on admit. HCT 29.4, 41.6 on admit. Hemoglobin 9.8.  PRBC transfusing. [facility] with blood.      Past Medical History:   Diagnosis Date    Aortic stenosis     Arthritis     Back pain     Carotid artery occlusion     Cataract     Coronary artery disease     Diabetes mellitus type II     Heart murmur     Hyperlipidemia     Hypertension associated with diabetes 7/24/2012    Non-functioning pituitary adenoma 7/24/2012    Pituitary microadenoma 11/17/2015    Polyneuropathy     PVD (peripheral vascular disease)     PVD (peripheral vascular disease)     S/P AVR (aortic valve replacement) 8/14/2013    21 mm Medtronic Mosaic ultra porcine valve     Stenosis     Stented coronary artery 4/10/2013    2013 RCA QASIM     Stroke     Multiple mini strokes; decreased left peripheal vision    Type 2 myocardial infarction due to shock 10/28/2022    Type II or unspecified type diabetes mellitus with neurological manifestations, not stated as uncontrolled(250.60)     Type II or unspecified type diabetes mellitus with peripheral circulatory disorders, not stated as uncontrolled(250.70)        Past Surgical History:   Procedure Laterality Date    AORTIC VALVE REPLACEMENT  2013    BILATERAL SALPINGOOPHORECTOMY      BREAST BIOPSY Left     CARDIAC VALVE REPLACEMENT      aortic 2013    CAROTID ENDARTERECTOMY Right     CATARACT EXTRACTION W/  INTRAOCULAR LENS IMPLANT Bilateral     Cataract Surgery      Both eyes    CORONARY ANGIOPLASTY      DENTAL SURGERY      EYE SURGERY      HYSTERECTOMY      TUBAL LIGATION      YAG Bilateral        Review of patient's allergies indicates:   Allergen Reactions    No known drug allergies        Medications:  Continuous Infusions:   dextrose 5 % and 0.45 % NaCl 125 mL/hr (10/28/22 1400)    insulin regular 1 units/mL infusion orderable (DKA) 1.1 Units/hr (10/28/22 1400)    NORepinephrine bitartrate-D5W 0.16 mcg/kg/min (10/28/22 1400)      Scheduled Meds:   donepeziL  10 mg Oral QHS    famotidine (PF)  20 mg Intravenous Daily    insulin detemir U-100  14 Units Subcutaneous Daily    Lactobacillus acidoph-L.bulgar  4 tablet Oral TID WM    memantine  10 mg Oral BID    mupirocin   Nasal BID    piperacillin-tazobactam (ZOSYN) IVPB  4.5 g Intravenous Q12H     PRN Meds:sodium chloride, acetaminophen, bisacodyL, dextrose 10%, dextrose 10%, dextrose 5 % and 0.45 % NaCl, HYDROcodone-acetaminophen, melatonin, morphine, nitroGLYCERIN, ondansetron, polyethylene glycol, potassium chloride **AND** potassium chloride **AND** potassium chloride, prochlorperazine, sodium chloride 0.9%    Family History       Problem Relation (Age of Onset)    Coronary artery disease Mother, Brother    Diabetes Mother, Brother, Daughter    Heart attack Mother, Father, Sister, Sister    Heart disease Mother (42), Father, Brother, Sister (41), Sister    Heart failure Mother, Father    Hyperlipidemia Mother, Father, Brother, Sister, Sister    Hypertension Mother, Father, Brother, Sister, Sister    No Known Problems Son    Stroke Father          Tobacco Use    Smoking status: Former     Packs/day: 1.00     Years: 20.00     Pack years: 20.00     Types: Cigarettes     Quit date: 1987     Years since quittin.7    Smokeless tobacco: Never   Substance and Sexual Activity    Alcohol use: No     Alcohol/week: 0.0 standard drinks    Drug use: No    Sexual activity: Yes     Partners: Male       Review of Systems   Unable to perform ROS: Mental status change   Objective:     Vital Signs (Most Recent):  Temp: 97.1 °F (36.2 °C) (10/28/22 1415)  Pulse: 72 (10/28/22 1415)  Resp: (!) 22 (10/28/22 1415)  BP: (!) 158/68 (10/28/22 1415)  SpO2: 98 % (10/28/22 1415)   Vital Signs (24h Range):  Temp:  [96.2 °F (35.7 °C)-98.1 °F (36.7 °C)] 97.1 °F (36.2 °C)  Pulse:  [] 72  Resp:  [11-46] 22  SpO2:  [91 %-100 %] 98 %  BP: ()/() 158/68     Weight: 42.2 kg (93 lb)  Body mass index is  18.78 kg/m².    Physical Exam  Vitals and nursing note reviewed. Exam conducted with a chaperone present.   Constitutional:       Appearance: She is underweight. She is ill-appearing and toxic-appearing.   HENT:      Head: Normocephalic.      Nose: Nose normal.      Comments: NG tube     Mouth/Throat:      Mouth: Mucous membranes are dry.   Eyes:      Comments: Pinpoint pupils bilaterally    Cardiovascular:      Rate and Rhythm: Normal rate.      Pulses: Normal pulses.   Pulmonary:      Effort: Pulmonary effort is normal.      Breath sounds: Normal breath sounds.   Abdominal:      General: Abdomen is flat. Bowel sounds are decreased.      Palpations: Abdomen is soft.   Skin:     Capillary Refill: Capillary refill takes 2 to 3 seconds.      Coloration: Skin is ashen.   Neurological:      Mental Status: She is unresponsive.       Review of Symptoms      Symptom Assessment (ESAS 0-10 Scale)  Unable to complete assessment due to Mental status change         Pain Assessment in Advanced Demential Scale (PAINAD)   Breathing - Independent of vocalization:  0  Negative vocalization:  0  Facial expression:  0  Body language:  0  Consolability:  0  Total:  0    Performance Status:  20    Living Arrangements:  Lives in home, Lives with family and Lives with spouse    Psychosocial/Cultural: Pt lives at home with her  and daughter.      Spiritual:  F - [place] and Belief:  Unable to assess, nonverbal   I - Importance:  Unknown at this time   C - Community:  Family support   A - Address in Care:  Pastoral visits prn      Time-Based Charting:  Yes  Chart Review: 18 minutes  Face to Face: 21 minutes  Symptom Assessment: 6 minutes  Coordination of Care: 18 minutes  Discharge Planning: 10 minutes  Advance Care Plannin minutes  Goals of Care: 8 minutes    Total Time Spent: 89 minutes      Advance Care Planning   Advance Directives:   Living Will: Yes        Copy on chart: Yes    LaPOST: No    Do Not Resuscitate Status: Yes     Agent's Name:  Moises Arndt   Agent's Contact Number:  [phone]    Decision Making:  Family answered questions and Patient unable to communicate due to disease severity/cognitive impairment  Goals of Care: The family and healthcare power of   endorses that what is most important right now is to focus on improvement in condition but with limits to invasive therapies    Accordingly, we have decided that the best plan to meet the patient's goals includes continuing with treatment         Significant Labs: CBC:   Recent Labs   Lab 10/28/22  0234 10/28/22  0437 10/28/22  1049 10/28/22  1100   WBC 14.35* 16.40* 11.82  --    HGB 13.4 11.6* 9.8*  --    HCT 41.6 35.3* 29.4* 25*    300 222  --      CMP:   Recent Labs   Lab 10/27/22  1852 10/27/22  2042 10/28/22  0946 10/28/22  1050 10/28/22  1406      < > 144 146* 142   K 5.7*   < > 4.2 4.4 4.8   CL 99   < > 115* 115* 113*   CO2 16*   < > 21* 22* 21*   GLU 1,039*   < > 202* 196* 268*   BUN 76*   < > 63* 64* 61*   CREATININE 2.4*   < > 1.6* 1.6* 1.6*   CALCIUM 10.4   < > 9.6 9.1 8.8   PROT 7.5  --   --   --   --    ALBUMIN 3.7  --   --   --   --    BILITOT 0.4  --   --   --   --    ALKPHOS 91  --   --   --   --    AST 34  --   --   --   --    ALT 31  --   --   --   --    ANIONGAP 22*   < > 8 9 8    < > = values in this interval not displayed.     All pertinent labs within the past 24 hours have been reviewed.  CBC:   Recent Labs   Lab 10/28/22  1049 10/28/22  1100   WBC 11.82  --    HGB 9.8*  --    HCT 29.4* 25*   MCV 88  --      --      BMP:  Recent Labs   Lab 10/28/22  0644 10/28/22  0743 10/28/22  1406   *   < > 268*      < > 142   K 5.0   < > 4.8   *   < > 113*   CO2 15*   < > 21*   BUN 64*   < > 61*   CREATININE 1.7*   < > 1.6*   CALCIUM 9.8   < > 8.8   MG 2.2  --   --     < > = values in this interval not displayed.     LFT:  Lab Results   Component Value Date    AST 34 10/27/2022    ALKPHOS 91 10/27/2022    BILITOT  0.4 10/27/2022     Albumin:   Albumin   Date Value Ref Range Status   10/27/2022 3.7 3.5 - 5.2 g/dL Final     Protein:   Total Protein   Date Value Ref Range Status   10/27/2022 7.5 6.0 - 8.4 g/dL Final     Comment:     Specimen moderately hemolyzed     Lactic acid:   Lab Results   Component Value Date    LACTATE 3.1 (H) 10/28/2022    LACTATE 2.5 (H) 10/27/2022       Significant Imaging: I have reviewed all pertinent imaging results/findings within the past 24 hours.

## 2022-10-28 NOTE — SUBJECTIVE & OBJECTIVE
Interval History:  Admitted overnight for shock, DKA.  Lethargic and unable to follow any commands.  Multiple family members including daughter Becky,  at bedside.  They would like to continue current plan of care but changed her code status to DNR.    Review of Systems   Reason unable to perform ROS: Unable to obtain ROS due to mental status.   Constitutional:  Positive for activity change, appetite change and fatigue.   Eyes:  Negative for visual disturbance.   Cardiovascular:  Negative for chest pain and leg swelling.   Gastrointestinal:  Negative for abdominal pain, diarrhea, nausea and vomiting.   Genitourinary:  Negative for dysuria, frequency and urgency.   Musculoskeletal:  Negative for back pain.   Neurological:  Positive for syncope. Negative for light-headedness and headaches.   Psychiatric/Behavioral:  Negative for confusion.    Objective:     Vital Signs (Most Recent):  Temp: 97.1 °F (36.2 °C) (10/28/22 1415)  Pulse: 72 (10/28/22 1415)  Resp: (!) 22 (10/28/22 1415)  BP: (!) 158/68 (10/28/22 1415)  SpO2: 98 % (10/28/22 1415)   Vital Signs (24h Range):  Temp:  [96.2 °F (35.7 °C)-98.1 °F (36.7 °C)] 97.1 °F (36.2 °C)  Pulse:  [] 72  Resp:  [11-46] 22  SpO2:  [91 %-100 %] 98 %  BP: ()/() 158/68     Weight: 42.2 kg (93 lb)  Body mass index is 18.78 kg/m².    Intake/Output Summary (Last 24 hours) at 10/28/2022 1700  Last data filed at 10/28/2022 1415  Gross per 24 hour   Intake 6246.73 ml   Output 1635 ml   Net 4611.73 ml      Physical Exam  Vitals and nursing note reviewed.   Constitutional:       Appearance: She is ill-appearing.      Comments: Elderly frail appearing female, lethargic   HENT:      Head: Normocephalic and atraumatic.      Mouth/Throat:      Mouth: Mucous membranes are dry.   Eyes:      General: No scleral icterus.     Comments: Bilateral pupils pinpoint, sluggishly reactive to light   Cardiovascular:      Rate and Rhythm: Normal rate and regular rhythm.      Pulses:  Normal pulses.      Heart sounds: Normal heart sounds. No murmur heard.  Pulmonary:      Effort: Pulmonary effort is normal. No respiratory distress.      Breath sounds: Normal breath sounds. No wheezing.   Abdominal:      Palpations: Abdomen is soft.      Comments: Gross hematuria noted in [facility] bag   Musculoskeletal:      Cervical back: Neck supple.      Right lower leg: No edema.      Left lower leg: No edema.   Skin:     General: Skin is warm and dry.      Coloration: Skin is pale.      Findings: No bruising.   Neurological:      Comments: Lethargic, does not respond to verbal commands or sternal rub       Significant Labs: All pertinent labs within the past 24 hours have been reviewed.    Significant Imaging: I have reviewed all pertinent imaging results/findings within the past 24 hours.

## 2022-10-28 NOTE — HPI
"HPI retrieved from chart, patient minimally responsive- acutely ill with no family at bedside. 77F T2DM presented to the ER with reports of a "high CBG." Apparently her daughter had been given her insulin and rechecking her glucose q1h WO improvement. Patient was very lethargic upon arrival. Work up in the ER revealed the patient was in DKA. She was given IVF and insulin. Patient now with skylar hematuria and drop in HGB from 14 to 9, currently receiving PRBC. She is on Levophed at 0.15, DNR status noted as well as palliative care consult pending. Cardiology consulted for suspected cardiogenic shock.   Per Pulm/CC:   "Case d/w patient's daughter Becky, who is an Oncology RN at [employer].  Pt is in cardiogenic shock, has significant bladder bleeding, and is progressively worsening.  Troponin >2 - likely type 2 MI.  Given patient's underlying condition, we agreed on the phone that ACLS protocols would be futile in an attempt to prolong life.  She ius bringing the pt's  in to see her.  We will continue vasopressor and blood product support in interim.  Verbal consent received for continued PRBC transfusion and placement of arterial line to guide pressor support.   Bedside echo with very poor LV performance, bradycardia, and clear evidence of low flow state (cardiac "smoke").  Do not believe percutaneous coronary intervention would be beneficial or indicated at this time."  Official TTE pending   SB-ST on tele up to 140s.   Last Troponin 2.4  "

## 2022-10-28 NOTE — PROVIDER PROGRESS NOTES - EMERGENCY DEPT.
Encounter Date: 10/27/2022    Although patient has diminished kidney function (eGFR of 20) the benefits of obtaining a contrasted CT study outweigh the risk of kidney injury.    Josiane Rankin MD

## 2022-10-28 NOTE — ASSESSMENT & PLAN NOTE
HELADIO likely prerenal/ATN in setting shock, DKA   Improving on IV fluids   Continue low-dose maintenance fluids  Renally dose medications, avoid nephrotoxic drugs

## 2022-10-28 NOTE — ASSESSMENT & PLAN NOTE
Patient's FSGs are uncontrolled due to hyperglycemia on current medication regimen.  Last A1c reviewed-   Lab Results   Component Value Date    HGBA1C 6.6 (H) 09/09/2022     Most recent fingerstick glucose reviewed-   Recent Labs   Lab 10/27/22  1852   POCTGLUCOSE >500*     Current correctional scale  High  Maintain anti-hyperglycemic dose as follows-   Antihyperglycemics (From admission, onward)    Start     Stop Route Frequency Ordered    10/27/22 2215  insulin regular 1 Units/mL in sodium chloride 0.9% 100 mL infusion        Question Answer Comment   Insulin Rate Adjustment (DO NOT MODIFY ANSWER) [email or web address]    Enter initial dose from Infusion Protocol Chart (Units/hr): 5        -- IV Continuous 10/27/22 2115        Hold Oral hypoglycemics while patient is in the hospital.  KIT protocol  dM educator

## 2022-10-28 NOTE — SUBJECTIVE & OBJECTIVE
Past Medical History:   Diagnosis Date    Aortic stenosis     Arthritis     Back pain     Carotid artery occlusion     Cataract     Coronary artery disease     Diabetes mellitus type II     Heart murmur     Hyperlipidemia     Hypertension associated with diabetes 7/24/2012    Non-functioning pituitary adenoma 7/24/2012    Pituitary microadenoma 11/17/2015    Polyneuropathy     PVD (peripheral vascular disease)     PVD (peripheral vascular disease)     S/P AVR (aortic valve replacement) 8/14/2013    21 mm Medtronic Mosaic ultra porcine valve     Stenosis     Stented coronary artery 4/10/2013    2013 RCA QASIM     Stroke     Multiple mini strokes; decreased left peripheal vision    Type 2 myocardial infarction due to shock 10/28/2022    Type II or unspecified type diabetes mellitus with neurological manifestations, not stated as uncontrolled(250.60)     Type II or unspecified type diabetes mellitus with peripheral circulatory disorders, not stated as uncontrolled(250.70)        Past Surgical History:   Procedure Laterality Date    AORTIC VALVE REPLACEMENT  2013    BILATERAL SALPINGOOPHORECTOMY      BREAST BIOPSY Left     CARDIAC VALVE REPLACEMENT      aortic 2013    CAROTID ENDARTERECTOMY Right     CATARACT EXTRACTION W/  INTRAOCULAR LENS IMPLANT Bilateral     Cataract Surgery      Both eyes    CORONARY ANGIOPLASTY      DENTAL SURGERY      EYE SURGERY      HYSTERECTOMY      TUBAL LIGATION      YAG Bilateral        Review of patient's allergies indicates:   Allergen Reactions    No known drug allergies        No current facility-administered medications on file prior to encounter.     Current Outpatient Medications on File Prior to Encounter   Medication Sig    acetaminophen (TYLENOL) 325 MG tablet Take 325 mg by mouth daily as needed. Half Tablet Oral Every day as needed    ascorbic acid, vitamin C, (VITAMIN C) 1000 MG tablet     aspirin 81 MG Chew Take 81 mg by mouth once daily.     blood-glucose meter Misc 2 each by Misc.(Non-Drug; Combo Route) route 2 (two) times daily.    coenzyme Q10 100 mg capsule Take 100 mg by mouth Daily. 1 Capsule Oral Every day    Daily Multivitamin tablet Take 1 tablet by mouth Daily. 1 Tablet Oral Every day    donepeziL (ARICEPT) 10 MG tablet TAKE 1 TABLET BY MOUTH ONCE DAILY IN THE EVENING    ergocalciferol (ERGOCALCIFEROL) 50,000 unit Cap Take 1 capsule by mouth once a week    ferrous sulfate (FEOSOL) 325 mg (65 mg iron) Tab tablet Take 1 tablet (325 mg total) by mouth daily with breakfast.    furosemide (LASIX) 20 MG tablet 1 tab po q day prn swelling    glipiZIDE (GLUCOTROL) 10 MG TR24 Take 1 tablet (10 mg total) by mouth daily with breakfast.    lancets 30 gauge Misc 1 lancet by Misc.(Non-Drug; Combo Route) route 4 (four) times daily.    losartan (COZAAR) 50 MG tablet Take 1 tablet by mouth once daily    magnesium 200 mg Tab Take 200 mg by mouth once daily.    memantine (NAMENDA) 10 MG Tab Take 1 tablet by mouth twice daily    nitroGLYCERIN (NITROSTAT) 0.4 MG SL tablet Place 1 tablet (0.4 mg total) under the tongue every 5 (five) minutes as needed for Chest pain.    omega-3 fatty acids-vitamin E 1,000 mg Cap     rosuvastatin (CRESTOR) 10 MG tablet TAKE 1 TABLET BY MOUTH ON MONDAY, WEDNESDAY AND FRIDAY    TRUE METRIX GLUCOSE TEST STRIP Strp 1 strip by Misc.(Non-Drug; Combo Route) route 4 (four) times daily.    [DISCONTINUED] labetaloL (NORMODYNE) 200 MG tablet Take 2 tablets by mouth twice daily    [DISCONTINUED] metFORMIN (GLUCOPHAGE) 1000 MG tablet TAKE 1 TABLET BY MOUTH TWICE DAILY WITH MEALS     Family History       Problem Relation (Age of Onset)    Coronary artery disease Mother, Brother    Diabetes Mother, Brother, Daughter    Heart attack Mother, Father, Sister, Sister    Heart disease Mother (42), Father, Brother, Sister (41), Sister    Heart failure Mother, Father    Hyperlipidemia Mother, Father, Brother, Sister, Sister     Hypertension Mother, Father, Brother, Sister, Sister    No Known Problems Son    Stroke Father          Tobacco Use    Smoking status: Former     Packs/day: 1.00     Years: 20.00     Pack years: 20.00     Types: Cigarettes     Quit date: 1987     Years since quittin.7    Smokeless tobacco: Never   Substance and Sexual Activity    Alcohol use: No     Alcohol/week: 0.0 standard drinks    Drug use: No    Sexual activity: Yes     Partners: Male     Review of Systems   Unable to perform ROS: Acuity of condition   Objective:     Vital Signs (Most Recent):  Temp: 96.2 °F (35.7 °C) (10/28/22 0415)  Pulse: 67 (10/28/22 1230)  Resp: (!) 27 (10/28/22 1230)  BP: (!) 107/53 (10/28/22 1230)  SpO2: 95 % (10/28/22 1230)   Vital Signs (24h Range):  Temp:  [96.2 °F (35.7 °C)-98.1 °F (36.7 °C)] 96.2 °F (35.7 °C)  Pulse:  [] 67  Resp:  [11-46] 27  SpO2:  [91 %-100 %] 95 %  BP: ()/() 107/53     Weight: 42.2 kg (93 lb)  Body mass index is 18.78 kg/m².    SpO2: 95 %  O2 Device (Oxygen Therapy): room air      Intake/Output Summary (Last 24 hours) at 10/28/2022 1305  Last data filed at 10/28/2022 0900  Gross per 24 hour   Intake 4618.76 ml   Output 1485 ml   Net 3133.76 ml       Lines/Drains/Airways       Central Venous Catheter Line  Duration             Percutaneous Central Line Insertion/Assessment - Triple Lumen  10/28/22 0426 right internal jugular <1 day              Drain  Duration                  NG/OG Tube 10/28/22 0319 Mono sump 16 Fr. Left nostril <1 day         Urethral Catheter 10/27/22 1900 Double-lumen 14 Fr. <1 day              Peripheral Intravenous Line  Duration                  Peripheral IV - Single Lumen 10/27/22 20 G Anterior;Left Forearm 1 day         Peripheral IV - Single Lumen 10/28/22 0216 18 G Left Antecubital <1 day         Peripheral IV - Single Lumen 10/28/22 0217 20 G Right Antecubital <1 day                    Physical Exam  Constitutional:       Appearance: She is  ill-appearing.   HENT:      Head: Atraumatic.   Eyes:      General:         Right eye: No discharge.         Left eye: No discharge.   Cardiovascular:      Rate and Rhythm: Normal rate and regular rhythm.   Abdominal:      Palpations: Abdomen is soft.   Musculoskeletal:      Right lower leg: No edema.      Left lower leg: No edema.   Skin:     General: Skin is dry.      Capillary Refill: Capillary refill takes 2 to 3 seconds.       Significant Labs: Blood Culture:   Recent Labs   Lab 10/27/22  2041 10/27/22  2259   LABBLOO No Growth to date No Growth to date   , BMP:   Recent Labs   Lab 10/28/22  0014 10/28/22  0437 10/28/22  0644 10/28/22  0743 10/28/22  0946 10/28/22  1050   GLU 1,603*  1,603* 458* 316* 223* 202* 196*   * 144 144 145 144 146*   K 2.6* 4.2 5.0 4.6 4.2 4.4   CL 81* 112* 113* 114* 115* 115*   CO2 11* 19* 15* 19* 21* 22*   BUN 56* 65* 64* 62* 63* 64*   CREATININE 1.9* 1.8* 1.7* 1.6* 1.6* 1.6*   CALCIUM 6.7* 9.4 9.8 9.5 9.6 9.1   MG 2.4 2.3 2.2  --   --   --    , CMP   Recent Labs   Lab 10/27/22  1852 10/27/22  2042 10/28/22  0743 10/28/22  0946 10/28/22  1050      < > 145 144 146*   K 5.7*   < > 4.6 4.2 4.4   CL 99   < > 114* 115* 115*   CO2 16*   < > 19* 21* 22*   GLU 1,039*   < > 223* 202* 196*   BUN 76*   < > 62* 63* 64*   CREATININE 2.4*   < > 1.6* 1.6* 1.6*   CALCIUM 10.4   < > 9.5 9.6 9.1   PROT 7.5  --   --   --   --    ALBUMIN 3.7  --   --   --   --    BILITOT 0.4  --   --   --   --    ALKPHOS 91  --   --   --   --    AST 34  --   --   --   --    ALT 31  --   --   --   --    ANIONGAP 22*   < > 12 8 9    < > = values in this interval not displayed.   , CBC   Recent Labs   Lab 10/28/22  0234 10/28/22  0437 10/28/22  1049 10/28/22  1100   WBC 14.35* 16.40* 11.82  --    HGB 13.4 11.6* 9.8*  --    HCT 41.6 35.3* 29.4* 25*    300 222  --    , INR   Recent Labs   Lab 10/27/22  2042 10/27/22  2259   INR 1.1 1.1   , Lipid Panel   Recent Labs   Lab 10/28/22  0437   CHOL 139   HDL 50  "  LDLCALC 68.0   TRIG 105   CHOLHDL 36.0   , Troponin   Recent Labs   Lab 10/27/22  2259 10/28/22  0014 10/28/22  0437   TROPONINI 1.279*  1.227* 0.776* 2.454*   , and All pertinent lab results from the last 24 hours have been reviewed.    Significant Imaging: Echocardiogram: Transthoracic echo (TTE) complete (Cupid Only):   Results for orders placed or performed during the hospital encounter of 10/27/22   Echo   Result Value Ref Range    BSA 1.33 m2    TDI SEPTAL 0.06 m/s    LV LATERAL E/E' RATIO 9.33 m/s    LV SEPTAL E/E' RATIO 14.00 m/s    LA WIDTH 2.80 cm    IVC diameter 1.11 cm    Left Ventricular Outflow Tract Mean Velocity 0.37 cm/s    Left Ventricular Outflow Tract Mean Gradient 0.58 mmHg    Pulmonary Valve Mean Velocity 0.60 m/s    TDI LATERAL 0.09 m/s    PV PEAK VELOCITY 0.88 cm/s    LVIDd 4.03 3.5 - 6.0 cm    IVS 1.29 (A) 0.6 - 1.1 cm    Posterior Wall 1.03 0.6 - 1.1 cm    LVIDs 3.92 2.1 - 4.0 cm    FS 3 28 - 44 %    LA volume 18.56 cm3    LV mass 159.18 g    LA size 2.75 cm    RV S' 0.01 cm/s    Left Ventricle Relative Wall Thickness 0.51 cm    AV mean gradient 10 mmHg    AV valve area 0.49 cm2    AV Velocity Ratio 0.26     AV index (prosthetic) 0.23     MV mean gradient 1 mmHg    MV valve area p 1/2 method 3.56 cm2    MV valve area by continuity eq 1.02 cm2    E/A ratio 1.45     Mean e' 0.08 m/s    E wave deceleration time 213.16 msec    MV "A" wave duration [id number] msec    LVOT diameter 1.66 cm    LVOT area 2.2 cm2    LVOT peak milo 0.51 m/s    LVOT peak VTI 10.70 cm    Ao peak milo 1.95 m/s    Ao VTI 47.4 cm    LVOT stroke volume 23.15 cm3    AV peak gradient 15 mmHg    MV peak gradient 3 mmHg    E/E' ratio 11.20 m/s    MV Peak E Milo 0.84 m/s    MV VTI 22.6 cm    MV stenosis pressure 1/2 time 61.82 ms    MV Peak A Milo 0.58 m/s    LV Systolic Volume 39.16 mL    LV Systolic Volume Index 29.4 mL/m2    LV Diastolic Volume 71.26 mL    LV Diastolic Volume Index 53.58 mL/m2    LA Volume Index 14.0 " mL/m2    LV Mass Index 120 g/m2    RA Major Axis 3.48 cm    Left Atrium Minor Axis 2.52 cm    Left Atrium Major Axis 3.24 cm    LA Volume Index (Mod) 9.8 mL/m2    LA volume (mod) 13.07 cm3    RA Width 3.81 cm    TAPSE 0.95 cm

## 2022-10-28 NOTE — ASSESSMENT & PLAN NOTE
Troponin up to 2.4  No reports of chest pain PTA  TTE pending  Patient with skylar hematuria, 14 point drop in hct from admission undergoing PRBC transfusion at this time   DNR, Palliative care consult pending   Will complete TTE for completeness  Not a candidate for invasive cardiac work up and feel troponin elevation is demand in setting of hemorraghic shock/DKA. ASA discontinued given hematuria.   Agree with Palliative care consult

## 2022-10-28 NOTE — ASSESSMENT & PLAN NOTE
2/2 infxn and DKA and concomitant dementia   Will reassess as derangements are corrected  Fall & aspiration precautions

## 2022-10-28 NOTE — CONSULTS
Consult received for diabetic diet education. Pt not medically appropriate for education at this time. Will follow up.

## 2022-10-28 NOTE — ASSESSMENT & PLAN NOTE
Shock likely in setting of gross hematuria.  CT abdomen pelvis showed irregular density filling left posterior aspect of bladder which could be neoplasm a blood clot.  Urology on board.  Three way Hill catheter placed with manual irrigation.  NPO after midnight for cystoscopy tomorrow.    On Levophed.  S/p PRBC transfusion with stable hemoglobin

## 2022-10-28 NOTE — CONSULTS
"[facility]  Cardiology  Consult Note    Patient Name: Marlen B Yris  MRN: [id number]  Admission Date: 10/27/2022  Hospital Length of Stay: 0 days  Code Status: DNR   Attending Provider: Dinora Villalpando MD   Consulting Provider: Elvis Singh NP  Primary Care Physician: Dominique Rendon MD  Principal Problem:Diabetic ketoacidosis without coma associated with type 2 diabetes mellitus    Patient information was obtained from past medical records and ER records.     [facility]  Consult performed by: Elvis Singh NP  Consult ordered by: Dinora Villalpando MD        Subjective:     Chief Complaint:  DKA     HPI:   HPI retrieved from chart, patient minimally responsive- acutely ill with no family at bedside. 77F T2DM presented to the ER with reports of a "high CBG." Apparently her daughter had been given her insulin and rechecking her glucose q1h WO improvement. Patient was very lethargic upon arrival. Work up in the ER revealed the patient was in DKA. She was given IVF and insulin. Patient now with skylar hematuria and drop in HGB from 14 to 9, currently receiving PRBC. She is on Levophed at 0.15, DNR status noted as well as palliative care consult pending. Cardiology consulted for suspected cardiogenic shock.   Per Pulm/CC:   "Case d/w patient's daughter Becky, who is an Oncology RN at [employer].  Pt is in cardiogenic shock, has significant bladder bleeding, and is progressively worsening.  Troponin >2 - likely type 2 MI.  Given patient's underlying condition, we agreed on the phone that ACLS protocols would be futile in an attempt to prolong life.  She ius bringing the pt's  in to see her.  We will continue vasopressor and blood product support in interim.  Verbal consent received for continued PRBC transfusion and placement of arterial line to guide pressor support.   Bedside echo with very poor LV performance, bradycardia, and clear evidence of low flow state " "(cardiac "smoke").  Do not believe percutaneous coronary intervention would be beneficial or indicated at this time."  Official TTE pending   SB-ST on tele up to 140s.   Last Troponin 2.4      Past Medical History:   Diagnosis Date    Aortic stenosis     Arthritis     Back pain     Carotid artery occlusion     Cataract     Coronary artery disease     Diabetes mellitus type II     Heart murmur     Hyperlipidemia     Hypertension associated with diabetes 7/24/2012    Non-functioning pituitary adenoma 7/24/2012    Pituitary microadenoma 11/17/2015    Polyneuropathy     PVD (peripheral vascular disease)     PVD (peripheral vascular disease)     S/P AVR (aortic valve replacement) 8/14/2013    21 mm Medtronic Mosaic ultra porcine valve     Stenosis     Stented coronary artery 4/10/2013    2013 RCA QASIM     Stroke     Multiple mini strokes; decreased left peripheal vision    Type 2 myocardial infarction due to shock 10/28/2022    Type II or unspecified type diabetes mellitus with neurological manifestations, not stated as uncontrolled(250.60)     Type II or unspecified type diabetes mellitus with peripheral circulatory disorders, not stated as uncontrolled(250.70)        Past Surgical History:   Procedure Laterality Date    AORTIC VALVE REPLACEMENT  2013    BILATERAL SALPINGOOPHORECTOMY      BREAST BIOPSY Left     CARDIAC VALVE REPLACEMENT      aortic 2013    CAROTID ENDARTERECTOMY Right     CATARACT EXTRACTION W/  INTRAOCULAR LENS IMPLANT Bilateral     Cataract Surgery      Both eyes    CORONARY ANGIOPLASTY      DENTAL SURGERY      EYE SURGERY      HYSTERECTOMY      TUBAL LIGATION      YAG Bilateral        Review of patient's allergies indicates:   Allergen Reactions    No known drug allergies        No current facility-administered medications on file prior to encounter.     Current Outpatient Medications on File Prior to Encounter   Medication Sig    acetaminophen (TYLENOL) 325 MG " tablet Take 325 mg by mouth daily as needed. Half Tablet Oral Every day as needed    ascorbic acid, vitamin C, (VITAMIN C) 1000 MG tablet     aspirin 81 MG Chew Take 81 mg by mouth once daily.    blood-glucose meter Misc 2 each by Misc.(Non-Drug; Combo Route) route 2 (two) times daily.    coenzyme Q10 100 mg capsule Take 100 mg by mouth Daily. 1 Capsule Oral Every day    Daily Multivitamin tablet Take 1 tablet by mouth Daily. 1 Tablet Oral Every day    donepeziL (ARICEPT) 10 MG tablet TAKE 1 TABLET BY MOUTH ONCE DAILY IN THE EVENING    ergocalciferol (ERGOCALCIFEROL) 50,000 unit Cap Take 1 capsule by mouth once a week    ferrous sulfate (FEOSOL) 325 mg (65 mg iron) Tab tablet Take 1 tablet (325 mg total) by mouth daily with breakfast.    furosemide (LASIX) 20 MG tablet 1 tab po q day prn swelling    glipiZIDE (GLUCOTROL) 10 MG TR24 Take 1 tablet (10 mg total) by mouth daily with breakfast.    lancets 30 gauge Misc 1 lancet by Misc.(Non-Drug; Combo Route) route 4 (four) times daily.    losartan (COZAAR) 50 MG tablet Take 1 tablet by mouth once daily    magnesium 200 mg Tab Take 200 mg by mouth once daily.    memantine (NAMENDA) 10 MG Tab Take 1 tablet by mouth twice daily    nitroGLYCERIN (NITROSTAT) 0.4 MG SL tablet Place 1 tablet (0.4 mg total) under the tongue every 5 (five) minutes as needed for Chest pain.    omega-3 fatty acids-vitamin E 1,000 mg Cap     rosuvastatin (CRESTOR) 10 MG tablet TAKE 1 TABLET BY MOUTH ON MONDAY, WEDNESDAY AND FRIDAY    TRUE METRIX GLUCOSE TEST STRIP Strp 1 strip by Misc.(Non-Drug; Combo Route) route 4 (four) times daily.    [DISCONTINUED] labetaloL (NORMODYNE) 200 MG tablet Take 2 tablets by mouth twice daily    [DISCONTINUED] metFORMIN (GLUCOPHAGE) 1000 MG tablet TAKE 1 TABLET BY MOUTH TWICE DAILY WITH MEALS     Family History       Problem Relation (Age of Onset)    Coronary artery disease Mother, Brother    Diabetes Mother, Brother, Daughter    Heart attack  Mother, Father, Sister, Sister    Heart disease Mother (42), Father, Brother, Sister (41), Sister    Heart failure Mother, Father    Hyperlipidemia Mother, Father, Brother, Sister, Sister    Hypertension Mother, Father, Brother, Sister, Sister    No Known Problems Son    Stroke Father          Tobacco Use    Smoking status: Former     Packs/day: 1.00     Years: 20.00     Pack years: 20.00     Types: Cigarettes     Quit date: 1987     Years since quittin.7    Smokeless tobacco: Never   Substance and Sexual Activity    Alcohol use: No     Alcohol/week: 0.0 standard drinks    Drug use: No    Sexual activity: Yes     Partners: Male     Review of Systems   Unable to perform ROS: Acuity of condition   Objective:     Vital Signs (Most Recent):  Temp: 96.2 °F (35.7 °C) (10/28/22 0415)  Pulse: 67 (10/28/22 1230)  Resp: (!) 27 (10/28/22 1230)  BP: (!) 107/53 (10/28/22 1230)  SpO2: 95 % (10/28/22 1230)   Vital Signs (24h Range):  Temp:  [96.2 °F (35.7 °C)-98.1 °F (36.7 °C)] 96.2 °F (35.7 °C)  Pulse:  [] 67  Resp:  [11-46] 27  SpO2:  [91 %-100 %] 95 %  BP: ()/() 107/53     Weight: 42.2 kg (93 lb)  Body mass index is 18.78 kg/m².    SpO2: 95 %  O2 Device (Oxygen Therapy): room air      Intake/Output Summary (Last 24 hours) at 10/28/2022 1305  Last data filed at 10/28/2022 0900  Gross per 24 hour   Intake 4618.76 ml   Output 1485 ml   Net 3133.76 ml       Lines/Drains/Airways       Central Venous Catheter Line  Duration             Percutaneous Central Line Insertion/Assessment - Triple Lumen  10/28/22 0426 right internal jugular <1 day              Drain  Duration                  NG/OG Tube 10/28/22 0319 [place] sump 16 Fr. Left nostril <1 day         Urethral Catheter 10/27/22 1900 Double-lumen 14 Fr. <1 day              Peripheral Intravenous Line  Duration                  Peripheral IV - Single Lumen 10/27/22 20 G Anterior;Left Forearm 1 day         Peripheral IV - Single Lumen 10/28/22 0216 18  G Left Antecubital <1 day         Peripheral IV - Single Lumen 10/28/22 0217 20 G Right Antecubital <1 day                    Physical Exam  Constitutional:       Appearance: She is ill-appearing.   HENT:      Head: Atraumatic.   Eyes:      General:         Right eye: No discharge.         Left eye: No discharge.   Cardiovascular:      Rate and Rhythm: Normal rate and regular rhythm.   Abdominal:      Palpations: Abdomen is soft.   Musculoskeletal:      Right lower leg: No edema.      Left lower leg: No edema.   Skin:     General: Skin is dry.      Capillary Refill: Capillary refill takes 2 to 3 seconds.       Significant Labs: Blood Culture:   Recent Labs   Lab 10/27/22  2041 10/27/22  2259   LABBLOO No Growth to date No Growth to date   , BMP:   Recent Labs   Lab 10/28/22  0014 10/28/22  0437 10/28/22  0644 10/28/22  0743 10/28/22  0946 10/28/22  1050   GLU 1,603*  1,603* 458* 316* 223* 202* 196*   * 144 144 145 144 146*   K 2.6* 4.2 5.0 4.6 4.2 4.4   CL 81* 112* 113* 114* 115* 115*   CO2 11* 19* 15* 19* 21* 22*   BUN 56* 65* 64* 62* 63* 64*   CREATININE 1.9* 1.8* 1.7* 1.6* 1.6* 1.6*   CALCIUM 6.7* 9.4 9.8 9.5 9.6 9.1   MG 2.4 2.3 2.2  --   --   --    , CMP   Recent Labs   Lab 10/27/22  1852 10/27/22  2042 10/28/22  0743 10/28/22  0946 10/28/22  1050      < > 145 144 146*   K 5.7*   < > 4.6 4.2 4.4   CL 99   < > 114* 115* 115*   CO2 16*   < > 19* 21* 22*   GLU 1,039*   < > 223* 202* 196*   BUN 76*   < > 62* 63* 64*   CREATININE 2.4*   < > 1.6* 1.6* 1.6*   CALCIUM 10.4   < > 9.5 9.6 9.1   PROT 7.5  --   --   --   --    ALBUMIN 3.7  --   --   --   --    BILITOT 0.4  --   --   --   --    ALKPHOS 91  --   --   --   --    AST 34  --   --   --   --    ALT 31  --   --   --   --    ANIONGAP 22*   < > 12 8 9    < > = values in this interval not displayed.   , CBC   Recent Labs   Lab 10/28/22  0234 10/28/22  0437 10/28/22  1049 10/28/22  1100   WBC 14.35* 16.40* 11.82  --    HGB 13.4 11.6* 9.8*  --    HCT 41.6  "35.3* 29.4* 25*    300 222  --    , INR   Recent Labs   Lab 10/27/22  2042 10/27/22  2259   INR 1.1 1.1   , Lipid Panel   Recent Labs   Lab 10/28/22  0437   CHOL 139   HDL 50   LDLCALC 68.0   TRIG 105   CHOLHDL 36.0   , Troponin   Recent Labs   Lab 10/27/22  2259 10/28/22  0014 10/28/22  0437   TROPONINI 1.279*  1.227* 0.776* 2.454*   , and All pertinent lab results from the last 24 hours have been reviewed.    Significant Imaging: Echocardiogram: Transthoracic echo (TTE) complete (Cupid Only):   Results for orders placed or performed during the hospital encounter of 10/27/22   Echo   Result Value Ref Range    BSA 1.33 m2    TDI SEPTAL 0.06 m/s    LV LATERAL E/E' RATIO 9.33 m/s    LV SEPTAL E/E' RATIO 14.00 m/s    LA WIDTH 2.80 cm    IVC diameter 1.11 cm    Left Ventricular Outflow Tract Mean Velocity 0.37 cm/s    Left Ventricular Outflow Tract Mean Gradient 0.58 mmHg    Pulmonary Valve Mean Velocity 0.60 m/s    TDI LATERAL 0.09 m/s    PV PEAK VELOCITY 0.88 cm/s    LVIDd 4.03 3.5 - 6.0 cm    IVS 1.29 (A) 0.6 - 1.1 cm    Posterior Wall 1.03 0.6 - 1.1 cm    LVIDs 3.92 2.1 - 4.0 cm    FS 3 28 - 44 %    LA volume 18.56 cm3    LV mass 159.18 g    LA size 2.75 cm    RV S' 0.01 cm/s    Left Ventricle Relative Wall Thickness 0.51 cm    AV mean gradient 10 mmHg    AV valve area 0.49 cm2    AV Velocity Ratio 0.26     AV index (prosthetic) 0.23     MV mean gradient 1 mmHg    MV valve area p 1/2 method 3.56 cm2    MV valve area by continuity eq 1.02 cm2    E/A ratio 1.45     Mean e' 0.08 m/s    E wave deceleration time 213.16 msec    MV "A" wave duration [id number] msec    LVOT diameter 1.66 cm    LVOT area 2.2 cm2    LVOT peak milo 0.51 m/s    LVOT peak VTI 10.70 cm    Ao peak milo 1.95 m/s    Ao VTI 47.4 cm    LVOT stroke volume 23.15 cm3    AV peak gradient 15 mmHg    MV peak gradient 3 mmHg    E/E' ratio 11.20 m/s    MV Peak E Milo 0.84 m/s    MV VTI 22.6 cm    MV stenosis pressure 1/2 time 61.82 ms    MV Peak " "A Milo 0.58 m/s    LV Systolic Volume 39.16 mL    LV Systolic Volume Index 29.4 mL/m2    LV Diastolic Volume 71.26 mL    LV Diastolic Volume Index 53.58 mL/m2    LA Volume Index 14.0 mL/m2    LV Mass Index 120 g/m2    RA Major Axis 3.48 cm    Left Atrium Minor Axis 2.52 cm    Left Atrium Major Axis 3.24 cm    LA Volume Index (Mod) 9.8 mL/m2    LA volume (mod) 13.07 cm3    RA Width 3.81 cm    TAPSE 0.95 cm     Assessment and Plan:     Hemorrhagic shock  Management per primary     Type 2 myocardial infarction due to shock  Troponin up to 2.4  No reports of chest pain PTA  TTE pending  Patient with skylar hematuria, 14 point drop in hct from admission undergoing PRBC transfusion at this time   DNR, Palliative care consult pending   Will complete TTE for completeness  Not a candidate for invasive cardiac work up and feel troponin elevation is demand in setting of hemorraghic shock/DKA. ASA discontinued given hematuria.   Agree with Palliative care consult    Cardiogenic shock  Initial BS TTE per pulm/cc: " very poor LV performance, bradycardia, and clear evidence of low flow state (cardiac "smoke")".   TTE while on 0.15 Levo - unofficial read LVEF 45%- awaiting STAT read  Feel this is likely hemorrhagic shock   Waiting for official TTE read and will make further recs/ agree with Palliative care consult   Continue supportive care  Case discussed with Dr King who will see patient shortly      Hypertension associated with diabetes  Antihypertensives on hold 2/2 pressor use  Currently on Levo at 0.15        VTE Risk Mitigation (From admission, onward)         Ordered     IP VTE HIGH RISK PATIENT  Once         10/27/22 2115     Place CHANO hose  Until discontinued         10/27/22 2115     Place sequential compression device  Until discontinued         10/27/22 2115                Thank you for your consult. I will follow-up with patient. Please contact us if you have any additional questions.    Elvis Singh, " NP  Cardiology   [facility]

## 2022-10-28 NOTE — ASSESSMENT & PLAN NOTE
Palliative care consult given advanced dementia, multiple medical comorbidities and current critical illness.  Patient is a DNR but would like to continue aggressive care.

## 2022-10-28 NOTE — CARE UPDATE
Urology Note    Urine still quite bloody  Nursing can continue to irrigate overnight  Will plan for OR tomorrow for cystoscopy with clot evacuation    Erasto Maldonado MD  Urology  [facility]

## 2022-10-28 NOTE — PROGRESS NOTES
"[facility] Medicine  Progress Note    Patient Name: Marlen Arndt  MRN: [id number]  Patient Class: IP- Inpatient   Admission Date: 10/27/2022  Length of Stay: 0 days  Attending Physician: Dinora Villalpando MD  Primary Care Provider: Dominique Rendon MD        Subjective:     Principal Problem:Diabetic ketoacidosis without coma associated with type 2 diabetes mellitus      HPI:  77F T2DM presented to the ER with reports of a "high CBG." Apparently her daughter had been given her insulin and rechecking her glucose q1h WO improvement. Patient was very lethargic upon arrival. Work up in the ER revealed the patient was in DKA. She was given IVF and insulin.  consulted for admission. DW ER MD.       Overview/Hospital Course:  No notes on file    Interval History:  Admitted overnight for shock, DKA.  Lethargic and unable to follow any commands.  Multiple family members including daughter Becky,  at bedside.  They would like to continue current plan of care but changed her code status to DNR.    Review of Systems   Reason unable to perform ROS: Unable to obtain ROS due to mental status.   Constitutional:  Positive for activity change, appetite change and fatigue.   Eyes:  Negative for visual disturbance.   Cardiovascular:  Negative for chest pain and leg swelling.   Gastrointestinal:  Negative for abdominal pain, diarrhea, nausea and vomiting.   Genitourinary:  Negative for dysuria, frequency and urgency.   Musculoskeletal:  Negative for back pain.   Neurological:  Positive for syncope. Negative for light-headedness and headaches.   Psychiatric/Behavioral:  Negative for confusion.    Objective:     Vital Signs (Most Recent):  Temp: 97.1 °F (36.2 °C) (10/28/22 1415)  Pulse: 72 (10/28/22 1415)  Resp: (!) 22 (10/28/22 1415)  BP: (!) 158/68 (10/28/22 1415)  SpO2: 98 % (10/28/22 1415)   Vital Signs (24h Range):  Temp:  [96.2 °F (35.7 °C)-98.1 °F (36.7 °C)] 97.1 °F (36.2 °C)  Pulse:  [] 72  Resp:  " [11-46] 22  SpO2:  [91 %-100 %] 98 %  BP: ()/() 158/68     Weight: 42.2 kg (93 lb)  Body mass index is 18.78 kg/m².    Intake/Output Summary (Last 24 hours) at 10/28/2022 1700  Last data filed at 10/28/2022 1415  Gross per 24 hour   Intake 6246.73 ml   Output 1635 ml   Net 4611.73 ml      Physical Exam  Vitals and nursing note reviewed.   Constitutional:       Appearance: She is ill-appearing.      Comments: Elderly frail appearing female, lethargic   HENT:      Head: Normocephalic and atraumatic.      Mouth/Throat:      Mouth: Mucous membranes are dry.   Eyes:      General: No scleral icterus.     Comments: Bilateral pupils pinpoint, sluggishly reactive to light   Cardiovascular:      Rate and Rhythm: Normal rate and regular rhythm.      Pulses: Normal pulses.      Heart sounds: Normal heart sounds. No murmur heard.  Pulmonary:      Effort: Pulmonary effort is normal. No respiratory distress.      Breath sounds: Normal breath sounds. No wheezing.   Abdominal:      Palpations: Abdomen is soft.      Comments: Gross hematuria noted in Hill bag   Musculoskeletal:      Cervical back: Neck supple.      Right lower leg: No edema.      Left lower leg: No edema.   Skin:     General: Skin is warm and dry.      Coloration: Skin is pale.      Findings: No bruising.   Neurological:      Comments: Lethargic, does not respond to verbal commands or sternal rub       Significant Labs: All pertinent labs within the past 24 hours have been reviewed.    Significant Imaging: I have reviewed all pertinent imaging results/findings within the past 24 hours.      Assessment/Plan:      * Diabetic ketoacidosis without coma associated with type 2 diabetes mellitus  Patient's FSGs are uncontrolled due to hyperglycemia on current medication regimen.  Last A1c reviewed-   Lab Results   Component Value Date    HGBA1C 8.9 (H) 10/27/2022     Most recent fingerstick glucose reviewed-   Recent Labs   Lab 10/28/22  0742 10/28/22  0874  10/28/22  0946 10/28/22  1048   POCTGLUCOSE 232* 185* 204* 285*     Current correctional scale  High  Maintain anti-hyperglycemic dose as follows-   Antihyperglycemics (From admission, onward)    Start     Stop Route Frequency Ordered    10/28/22 0900  insulin detemir U-100 pen 14 Units         -- SubQ Daily 10/28/22 0752    10/28/22 0230  insulin regular 1 Units/mL in sodium chloride 0.9% 100 mL infusion        Question Answer Comment   Insulin Rate Adjustment (DO NOT MODIFY ANSWER) [email or web address]    Enter initial dose from Infusion Protocol Chart (Units/hr): 10        -- IV Continuous 10/28/22 0224        Transition off IV insulin to subcutaneous    Palliative care encounter  Palliative care consult given advanced dementia, multiple medical comorbidities and current critical illness.  Patient is a DNR but would like to continue aggressive care.      Hemorrhagic shock  Shock likely in setting of gross hematuria.  CT abdomen pelvis showed irregular density filling left posterior aspect of bladder which could be neoplasm a blood clot.  Urology on board.  Three way Hill catheter placed with manual irrigation.  NPO after midnight for cystoscopy tomorrow.    On Levophed.  S/p PRBC transfusion with stable hemoglobin      Type 2 myocardial infarction due to shock  Cardiology on board.  Medical management      Bladder hemorrhage        Encephalopathy, metabolic  Likely in setting of shock/DKA given advanced dementia        Acute cystitis with hematuria  See leukocytosis       Abnormal CT scan  Irregular density filling the posterior left lateral aspect of the urinary bladder from the trigone to the midportion.  Correlate for bladder neoplasm or blood clot.  Urology consultation urged. Can consult Uro when she's out of DKA     Constipation with large rectal fecal impaction and suggested early stercoral colitis. Started Zosyn. Prescribed laxatives, stool softeners       Severe atherosclerotic vascular disease throughout the aorta without evidence of aneurysm or dissection. Suspected occlusion of the mid and lower abdominal aorta with possible central vascular stent---> Appears chronic     No evidence of iliac occlusion with severe iliac and femoral atherosclerotic calcifications.     No evidence of mesenteric occlusion.      Leukocytosis  Urine, blood cultures pending   Infectious versus stress related. ?  Stercoral colitis  Continue Zosyn empirically             Elevated troponin        Stage 3a chronic kidney disease  HELADIO likely prerenal/ATN in setting shock, DKA   Improving on IV fluids   Continue low-dose maintenance fluids  Renally dose medications, avoid nephrotoxic drugs    Vascular dementia without behavioral disturbance  Resume meds  Fall and delirium precautions       Stenosis of infrarenal abdominal aorta due to atherosclerosis  Appears chronic       Hypertension associated with diabetes  Currently hypotensive.  Hold antihypertensives    VTE Risk Mitigation (From admission, onward)         Ordered     IP VTE HIGH RISK PATIENT  Once         10/27/22 2115     Place CHANO hose  Until discontinued         10/27/22 2115     Place sequential compression device  Until discontinued         10/27/22 2115                Discharge Planning   HUE:      Code Status: DNR   Is the patient medically ready for discharge?:     Reason for patient still in hospital (select all that apply): Patient trending condition and Consult recommendations  Discharge Plan A: Home with family      Overall prognosis guarded.    Critical care time spent on the evaluation and treatment of severe organ dysfunction, review of pertinent labs and imaging studies, discussions with consulting providers and discussions with patient/family: 50 minutes.      Dinora Villalpando MD  Department of Hospital Medicine   [place] - Intensive Care

## 2022-10-28 NOTE — H&P
"[facility]  History & Physical    Patient Name: Marlen Arndt  MRN: [id number]  Patient Class: OP- Observation  Admission Date: 10/27/2022  Attending Physician: Corey England, *   Primary Care Provider: Dominique Rendon MD         Patient information was obtained from past medical records and ER records.     Subjective:     Principal Problem:Diabetic ketoacidosis without coma associated with type 2 diabetes mellitus    Chief Complaint:   Chief Complaint   Patient presents with    Hyperglycemia     Pt presents to ED c/o hyperglycemia x4 hrs with CBG and glucometer reading: "High." Hx of MD type 2. Daughter has given insulin and checking cbg q hr and called EMS d/t no improvement. Pt is currently lethargic, responsive to pain, generalized weakness noted, and Nonverbal. Hx of Dementia.        HPI: 77F T2DM presented to the ER with reports of a "high CBG." Apparently her daughter had been given her insulin and rechecking her glucose q1h WO improvement. Patient was very lethargic upon arrival. Work up in the ER revealed the patient was in DKA. She was given IVF and insulin.  consulted for admission. CANDICE ER MD.         Past Medical History:   Diagnosis Date    Aortic stenosis     Arthritis     Back pain     Carotid artery occlusion     Cataract     Coronary artery disease     Diabetes mellitus type II     Heart murmur     Hyperlipidemia     Hypertension associated with diabetes 7/24/2012    Non-functioning pituitary adenoma 7/24/2012    Pituitary microadenoma 11/17/2015    Polyneuropathy     PVD (peripheral vascular disease)     PVD (peripheral vascular disease)     S/P AVR (aortic valve replacement) 8/14/2013    21 mm Medtronic Mosaic ultra porcine valve     Stenosis     Stented coronary artery 4/10/2013    2013 RCA QASIM     Stroke     Multiple mini strokes; decreased left peripheal vision    Type II or unspecified type diabetes mellitus with neurological " manifestations, not stated as uncontrolled(250.60)     Type II or unspecified type diabetes mellitus with peripheral circulatory disorders, not stated as uncontrolled(250.70)        Past Surgical History:   Procedure Laterality Date    AORTIC VALVE REPLACEMENT  2013    BILATERAL SALPINGOOPHORECTOMY      BREAST BIOPSY Left     CARDIAC VALVE REPLACEMENT      aortic 2013    CAROTID ENDARTERECTOMY Right     CATARACT EXTRACTION W/  INTRAOCULAR LENS IMPLANT Bilateral     Cataract Surgery      Both eyes    CORONARY ANGIOPLASTY      DENTAL SURGERY      EYE SURGERY      HYSTERECTOMY      TUBAL LIGATION      YAG Bilateral        Review of patient's allergies indicates:   Allergen Reactions    No known drug allergies        No current facility-administered medications on file prior to encounter.     Current Outpatient Medications on File Prior to Encounter   Medication Sig    acetaminophen (TYLENOL) 325 MG tablet Take 325 mg by mouth daily as needed. Half Tablet Oral Every day as needed    ascorbic acid, vitamin C, (VITAMIN C) 1000 MG tablet     aspirin 81 MG Chew Take 81 mg by mouth once daily.    blood-glucose meter Misc 2 each by Misc.(Non-Drug; Combo Route) route 2 (two) times daily.    coenzyme Q10 100 mg capsule Take 100 mg by mouth Daily. 1 Capsule Oral Every day    Daily Multivitamin tablet Take 1 tablet by mouth Daily. 1 Tablet Oral Every day    donepeziL (ARICEPT) 10 MG tablet TAKE 1 TABLET BY MOUTH ONCE DAILY IN THE EVENING    ergocalciferol (ERGOCALCIFEROL) 50,000 unit Cap Take 1 capsule by mouth once a week    ferrous sulfate (FEOSOL) 325 mg (65 mg iron) Tab tablet Take 1 tablet (325 mg total) by mouth daily with breakfast.    furosemide (LASIX) 20 MG tablet 1 tab po q day prn swelling    glipiZIDE (GLUCOTROL) 10 MG TR24 Take 1 tablet (10 mg total) by mouth daily with breakfast.    labetaloL (NORMODYNE) 200 MG tablet Take 2 tablets by mouth twice daily    lancets 30 gauge Misc 1 lancet  by Misc.(Non-Drug; Combo Route) route 4 (four) times daily.    losartan (COZAAR) 50 MG tablet Take 1 tablet by mouth once daily    magnesium 200 mg Tab Take 200 mg by mouth once daily.    memantine (NAMENDA) 10 MG Tab Take 1 tablet by mouth twice daily    metFORMIN (GLUCOPHAGE) 1000 MG tablet TAKE 1 TABLET BY MOUTH TWICE DAILY WITH MEALS    nitroGLYCERIN (NITROSTAT) 0.4 MG SL tablet Place 1 tablet (0.4 mg total) under the tongue every 5 (five) minutes as needed for Chest pain.    omega-3 fatty acids-vitamin E 1,000 mg Cap     rosuvastatin (CRESTOR) 10 MG tablet TAKE 1 TABLET BY MOUTH ON MONDAY, WEDNESDAY AND FRIDAY    TRUE METRIX GLUCOSE TEST STRIP Strp 1 strip by Misc.(Non-Drug; Combo Route) route 4 (four) times daily.     Family History       Problem Relation (Age of Onset)    Coronary artery disease Mother, Brother    Diabetes Mother, Brother, Daughter    Heart attack Mother, Father, Sister, Sister    Heart disease Mother (42), Father, Brother, Sister (41), Sister    Heart failure Mother, Father    Hyperlipidemia Mother, Father, Brother, Sister, Sister    Hypertension Mother, Father, Brother, Sister, Sister    No Known Problems Son    Stroke Father          Tobacco Use    Smoking status: Former     Packs/day: 1.00     Years: 20.00     Pack years: 20.00     Types: Cigarettes     Quit date: 1987     Years since quittin.7    Smokeless tobacco: Never   Substance and Sexual Activity    Alcohol use: No     Alcohol/week: 0.0 standard drinks    Drug use: No    Sexual activity: Yes     Partners: Male       Review of Systems:  Unable to assess to change in mental status      Objective:     Vital Signs (Most Recent):  Pulse: (!) 113 (10/27/22 2201)  Resp: 16 (10/27/22 2201)  BP: (!) 215/102 (10/27/22 2201)  SpO2: 96 % (10/27/22 2201)   Vital Signs (24h Range):  Pulse:  [] 113  Resp:  [16-25] 16  SpO2:  [93 %-100 %] 96 %  BP: (100-215)/() 215/102     Weight: 47 kg (103 lb 9.9 oz)  Body mass  index is 20.93 kg/m².    Physical Exam:  GEN:  Mild acute distress, in bed   HEENT:  Normocephalic, atraumatic, extra ocular movements intact  NECK:  Supple, good range of motion  RESPIRATORY:  Clear to auscultation ant, symmetrical chest rise  CARDIOVASCULAR:  Tachycardia, +s1/2  GI:  Soft, slight gen tender, bowel sounds decreased    : No flank tenderness, normal genitalia  EXT: +edema, pulses palpated  SKIN: Warm, dry, no rashes  MUSCULOSKELETAL: Fair range of passive motion, no apparent atrophy  NEURO:  lethargic will respond to tactile stimulation   PSYCH:  unable to assess     Significant Labs:    A1C: No results for input(s): HGBA1C in the last 24 hours.  Blood Culture: No results for input(s): LABBLOO in the last 24 hours.  BMP/CMP:   Recent Labs   Lab 10/27/22  1852 10/27/22  2042    138   K 5.7* 4.5   CL 99 104   CO2 16* 13*   GLU 1,039* 937*   BUN 76* 74*   CREATININE 2.4* 2.2*   CALCIUM 10.4 9.8   PROT 7.5  --    ALBUMIN 3.7  --    BILITOT 0.4  --    ALKPHOS 91  --    AST 34  --    ALT 31  --    ANIONGAP 22* 21*     CBC:   Recent Labs   Lab 10/27/22  1852   WBC 20.17*   HGB 14.6   HCT 44.5        Cardiac Markers:   Recent Labs   Lab 10/27/22  1852   *     Coagulation:   Recent Labs   Lab 10/27/22  2042   INR 1.1     Lactic Acid:   Recent Labs   Lab 10/27/22  2042   LACTATE 2.5*     Lipid Panel: No results for input(s): CHOL, HDL, LDLCALC, TRIG, CHOLHDL in the last 24 hours.  Magnesium:   Recent Labs   Lab 10/27/22  2042   MG 1.8     POCT Glucose:   Recent Labs   Lab 10/27/22  1852   POCTGLUCOSE >500*     Troponin:   Recent Labs   Lab 10/27/22  1852   TROPONINI 0.402*     TSH: No results for input(s): TSH in the last 24 hours.  Urine Culture: No results for input(s): LABURIN in the last 24 hours.  Urine Studies:   Recent Labs   Lab 10/27/22  1946   COLORU Red*   APPEARANCEUA Cloudy*   PHUR 8.0   SPECGRAV 1.015   PROTEINUA 3+*   GLUCUA 4+*   KETONESU Negative   BILIRUBINUA Negative    OCCULTUA 3+*   NITRITE Negative   UROBILINOGEN Negative   LEUKOCYTESUR Trace*   RBCUA >100*   WBCUA 10*   BACTERIA Many*   HYALINECASTS 0       Significant Imaging:     CT Chest Abdomen Pelvis With Contrast (xpd)  Narrative: EXAMINATION:  CT CHEST ABDOMEN PELVIS WITH CONTRAST (XPD)    CLINICAL HISTORY:  Sepsis;    TECHNIQUE:  Arterial phase axial CT images were obtained through the chest, abdomen and pelvis following IV administration of 75 mL of Omnipaque 350 contrast. Coronal, sagittal and 3D reconstructions were submitted and interpreted. Dose reduction techniques including automatic exposure control (AEC) were utilized.    COMPARISON:  None    FINDINGS:  Thoracic aorta: Aortic valve replacement.  No aneurysm or dissection.  Atherosclerotic calcifications throughout.  No evidence of shaggy aorta.    Abdominal aorta: Normal in caliber with severe atherosclerotic disease and apparent near occlusion in the mid section extending inferiorly to the iliac bifurcation.  A central stent may be present but minimal if any flow is question.    Abdominal arteries: Atherosclerotic disease throughout the branches of the aorta with no discrete occlusion or aneurysm.    Base of Neck: No significant abnormality.    Thoracic soft tissues: Unremarkable.    Heart: Normal size. No effusion.  Three vessel coronary artery disease with aortic valve replacement.    Pulmonary vasculature: Pulmonary arteries distribute normally.  There are four pulmonary veins.    Mariely/Mediastinum: No pathologic libertad enlargement.    Esophagus: Unremarkable.    Airways: Patent.    Lungs/Pleura: Clear lungs. No pleural effusion or thickening.    Liver: Normal in size with fatty attenuation., with no focal hepatic lesions.    Gallbladder: No calcified gallstones.    Bile Ducts: No evidence of dilated ducts.    Pancreas: No mass or peripancreatic fat stranding.    Spleen: Unremarkable.    Adrenals: Unremarkable.    Kidneys/ Ureters: No radiodense stones or  hydronephrosis.  Hydronephrosis and hydroureter.    Bladder: Markedly distended with air and Hill catheter balloon.  Swirl of high density is seen in the posterior left lateral wall raising the question of bladder neoplasm or focal blood clot.    Reproductive organs: Hysterectomy    GI Tract/Mesentery: Marked constipation in large rectal fecal impaction with early stercoral colitis question.    Peritoneal Space: No ascites. No free air.    Retroperitoneum: No significant adenopathy.    Abdominal wall: Unremarkable.    Bones: No acute fracture or aggressive-appearing lytic or blastic lesion.  Impression: Irregular density filling the posterior left lateral aspect of the urinary bladder from the trigone to the midportion.  Correlate for bladder neoplasm or blood clot.  Urology consultation urged.    Constipation with large rectal fecal impaction and suggested early stercoral colitis.    Severe atherosclerotic vascular disease throughout the aorta without evidence of aneurysm or dissection.    Suspected occlusion of the mid and lower abdominal aorta with possible central vascular stent.    No evidence of iliac occlusion with severe iliac and femoral atherosclerotic calcifications.    No evidence of mesenteric occlusion.    This report was flagged in Epic as abnormal.    Findings communicated to Josiane Rankin MD in the ED via [email or web address] secure chat with confirmation of receipt at the time of this dictation.    Electronically signed by: Maurizio Bae  Date:    10/27/2022  Time:    20:40  X-Ray Chest AP Portable  Narrative: EXAMINATION:  XR CHEST AP PORTABLE    CLINICAL HISTORY:  hyperglycemia;    TECHNIQUE:  Single frontal view of the chest was performed.    COMPARISON:  09/03/2013 chest x-ray    FINDINGS:  The cardiac silhouette is stable and nonenlarged.  Postoperative changes of sternotomy are noted.  Lungs appear clear.  There is no pleural effusion or pneumothorax.  Osseous structures appear stable with degenerative  changes of the spine.  Impression: No acute abnormality.    Electronically signed by: Sobia Bae  Date:    10/27/2022  Time:    19:29            Assessment/Plan:     * Diabetic ketoacidosis without coma associated with type 2 diabetes mellitus  Patient's FSGs are uncontrolled due to hyperglycemia on current medication regimen.  Last A1c reviewed-   Lab Results   Component Value Date    HGBA1C 6.6 (H) 09/09/2022     Most recent fingerstick glucose reviewed-   Recent Labs   Lab 10/27/22  1852   POCTGLUCOSE >500*     Current correctional scale  High  Maintain anti-hyperglycemic dose as follows-   Antihyperglycemics (From admission, onward)    Start     Stop Route Frequency Ordered    10/27/22 2215  insulin regular 1 Units/mL in sodium chloride 0.9% 100 mL infusion        Question Answer Comment   Insulin Rate Adjustment (DO NOT MODIFY ANSWER) \\[email or web address]\[email or web address]\Images\[email or web address]\InsulinInfusions\[email or web address] [id number]    Enter initial dose from Infusion Protocol Chart (Units/hr): 5        -- IV Continuous 10/27/22 2115        Hold Oral hypoglycemics while patient is in the hospital.  DIKA protocol  dM educator     Encephalopathy, metabolic  2/2 infxn and DKA and concomitant dementia   Will reassess as derangements are corrected  Fall & aspiration precautions       Acute cystitis with hematuria  See leukocytosis       Abnormal CT scan  Irregular density filling the posterior left lateral aspect of the urinary bladder from the trigone to the midportion.  Correlate for bladder neoplasm or blood clot.  Urology consultation urged. Can consult Uro when she's out of DKA     Constipation with large rectal fecal impaction and suggested early stercoral colitis. Started Zosyn. Prescribed laxatives, stool softeners      Severe atherosclerotic vascular disease throughout the aorta without evidence of aneurysm or dissection. Suspected occlusion of the mid and lower abdominal aorta with possible central vascular stent---> Appears  chronic     No evidence of iliac occlusion with severe iliac and femoral atherosclerotic calcifications.     No evidence of mesenteric occlusion.      Leukocytosis  She has concomitant reasons: colitis, DKA, UTI  Zosyn  Cx  Trend PCT        Elevated troponin  Likely type2 d/t demand  No c/o CP  Cycle CE ensure they remain flat or trend down   Tele       Stage 3a chronic kidney disease  HELADIO 2 baseline Scr around 1  2/2 IVVD/PRA  IVF  Repeat chem  Limit nephrotoxic meds  Held ARB    Vascular dementia without behavioral disturbance  Resume meds  Fall and delirium precautions       Stenosis of infrarenal abdominal aorta due to atherosclerosis  Appears chronic       Hypertension associated with diabetes  Resume home meds x ARB d/t HELADIO  Monitor and adjust as needed  Iv prn antihtn        VTE Risk Mitigation (From admission, onward)         Ordered     IP VTE HIGH RISK PATIENT  Once         10/27/22 2115     Place CHANO hose  Until discontinued         10/27/22 2115     Place sequential compression device  Until discontinued         10/27/22 2115                   Isidro Martin MD  Department of Hospital Medicine   [place] - Emergency Dept

## 2022-10-28 NOTE — CONSULTS
Consult Note  Pulmonary & Critical Care Medicine    Attending: Ari Moy  Fellow: Mala Salazar  Admit Date: 10/27/2022  Today's Date: 10/28/2022  Reason for Consult:  DKA, Cardiogenic Shock    SUBJECTIVE:     HPI: Mrs. Arndt is a 78yo CF with a PMH as stated below who presented to the ED with complaints of elevated blood glucose , weakness, and lethargy. Patient not responding to questions 2/2 encephalopathy so history obtained from chart review. Per daughter, patient's BG had been fluctuating over the past month with episodes of hypoglycemia. She noticed the patient appeared weak and checked her BG and it was 600. She gave Mrs. Arndt 10 units novolog without improvement in BG so she called 911. Patient arrived to ED with labs consistent with DKA. She was also noted to have significant hematuria. She was admitted to hospital medicine for DKA management and hematuria evaluation.     This morning, patient became hemodynamically unstable with hypotension and arrhythmia. Bedside US revealed poor cardiac performance in the setting of a troponin of 2. She was started on levophed and given 1 unit PRBC. Cardiology was consulted. Team spoke with daughter who understood pt's poor prognosis and made the patient DNR.    Review of patient's allergies indicates:   Allergen Reactions    No known drug allergies        Past Medical History:   Diagnosis Date    Aortic stenosis     Arthritis     Back pain     Carotid artery occlusion     Cataract     Coronary artery disease     Diabetes mellitus type II     Heart murmur     Hyperlipidemia     Hypertension associated with diabetes 7/24/2012    Non-functioning pituitary adenoma 7/24/2012    Pituitary microadenoma 11/17/2015    Polyneuropathy     PVD (peripheral vascular disease)     PVD (peripheral vascular disease)     S/P AVR (aortic valve replacement) 8/14/2013    21 mm Medtronic Mosaic ultra porcine valve     Stenosis     Stented coronary artery 4/10/2013    2013 RCA QASIM      Stroke     Multiple mini strokes; decreased left peripheal vision    Type 2 myocardial infarction due to shock 10/28/2022    Type II or unspecified type diabetes mellitus with neurological manifestations, not stated as uncontrolled(250.60)     Type II or unspecified type diabetes mellitus with peripheral circulatory disorders, not stated as uncontrolled(250.70)      Past Surgical History:   Procedure Laterality Date    AORTIC VALVE REPLACEMENT      BILATERAL SALPINGOOPHORECTOMY      BREAST BIOPSY Left     CARDIAC VALVE REPLACEMENT      aortic 2013    CAROTID ENDARTERECTOMY Right     CATARACT EXTRACTION W/  INTRAOCULAR LENS IMPLANT Bilateral     Cataract Surgery      Both eyes    CORONARY ANGIOPLASTY      DENTAL SURGERY      EYE SURGERY      HYSTERECTOMY      TUBAL LIGATION      YAG Bilateral      Family History   Problem Relation Age of Onset    Diabetes Mother     Coronary artery disease Mother     Heart disease Mother 42         42 of MI    Heart attack Mother     Heart failure Mother     Hyperlipidemia Mother     Hypertension Mother     Heart disease Father     Heart attack Father     Heart failure Father     Hyperlipidemia Father     Hypertension Father     Stroke Father     Coronary artery disease Brother     Diabetes Brother     Heart disease Brother     Hyperlipidemia Brother     Hypertension Brother     Heart disease Sister 41    Heart attack Sister     Hyperlipidemia Sister     Hypertension Sister     Heart disease Sister     Heart attack Sister     Hyperlipidemia Sister     Hypertension Sister     Diabetes Daughter     No Known Problems Son     Amblyopia Neg Hx     Blindness Neg Hx     Cataracts Neg Hx     Glaucoma Neg Hx     Macular degeneration Neg Hx     Retinal detachment Neg Hx     Strabismus Neg Hx      Social History     Tobacco Use    Smoking status: Former     Packs/day: 1.00     Years: 20.00     Pack years: 20.00     Types: Cigarettes     Quit date: 1987     Years since quitting:  35.7    Smokeless tobacco: Never   Substance Use Topics    Alcohol use: No     Alcohol/week: 0.0 standard drinks    Drug use: No       All medications reviewed.    Review of Systems   Unable to perform ROS: Critical illness     OBJECTIVE:     Vital Signs Trends/Hx Reviewed  Vitals:    10/28/22 1530 10/28/22 1600 10/28/22 1630 10/28/22 1700   BP: 137/63 (!) 104/54 (!) 117/58 132/62   BP Location:  Left arm     Patient Position:  Lying     Pulse: 68 67 64 67   Resp: 16 16 17 14   Temp:  96.5 °F (35.8 °C)     TempSrc:  Oral     SpO2: 97% 96% 97% 97%   Weight:       Height:           Physical Exam:  General: Encephalopathic  HEENT: AT/NC, PERRL, EOMI, oral and nasal mucosa dry.  Neck: Supple without JVD or palpable LAD.   Cardiac: bradycardia, regular rhythm, with no MRG with brisk cap refill and symmetric pulses in distal extremities.  Respiratory: Normal inspection. Symmetric chest rise. Normal palpation and percussion. Auscultation clear bilaterally. No increased work of breathing noted.   Abdomen: Soft, NT/ND. +BS. No hepatosplenomegaly.   Extremities: No edema.   Neuro: Encephalopathic      Laboratory:  Recent Labs   Lab 10/28/22  1100   PH 7.412   PCO2 26.9*   PO2 57*   HCO3 17.2*   POCSATURATED 90*   BE -7     Recent Labs   Lab 10/28/22  1049 10/28/22  1100   WBC 11.82  --    RBC 3.33*  --    HGB 9.8*  --    HCT 29.4* 25*     --    MCV 88  --    MCH 29.4  --    MCHC 33.3  --      Recent Labs   Lab 10/28/22  0644 10/28/22  0743 10/28/22  1708      < > 141  141   K 5.0   < > 4.9  4.9   *   < > 112*  112*   CO2 15*   < > 16*  16*   BUN 64*   < > 62*  62*   CREATININE 1.7*   < > 1.8*  1.8*   MG 2.2  --   --     < > = values in this interval not displayed.       Microbiology Data:   Microbiology Results (last 7 days)       Procedure Component Value Units Date/Time    Blood culture [[id number]] Collected: 10/28/22 0014    Order Status: Completed Specimen: Blood Updated: 10/28/22 9574     Blood  Culture, Routine No Growth to date    Narrative:      Site # 1, aerobic and anaerobic; Site # 2, aerobic only. If  done in ED, do not repeat.    Blood culture #2 **CANNOT BE ORDERED STAT** [[id number]] Collected: 10/28/22 0015    Order Status: Completed Specimen: Blood Updated: 10/28/22 1715     Blood Culture, Routine No Growth to date    Narrative:      Collection has been rescheduled by [email or web address] at 10/27/2022 19:48 Reason:   Patient unavailable  Collection has been rescheduled by [email or web address] at 10/27/2022 20:43 Reason:   Unable to collect  Collection has been rescheduled by [email or web address] at 10/27/2022 19:48 Reason:   Patient unavailable  Collection has been rescheduled by [email or web address] at 10/27/2022 20:43 Reason:   Unable to collect    Blood culture [[id number]] Collected: 10/27/22 2259    Order Status: Completed Specimen: Blood Updated: 10/28/22 1621     Blood Culture, Routine Gram stain peds bottle: Gram negative rods      Results called to and read back by: Sergio Sosa RN 16:21  10/28/2022    Narrative:      Site # 1, aerobic and anaerobic; Site # 2, aerobic only. If  done in ED, do not repeat.    Blood culture #1 **CANNOT BE ORDERED STAT** [[id number]] Collected: 10/27/22 2041    Order Status: Completed Specimen: Blood from Peripheral, Right Hand Updated: 10/28/22 1115     Blood Culture, Routine No Growth to date    Narrative:      Collection has been rescheduled by [email or web address] at 10/27/2022 19:48 Reason:   Patient unavailable  Collection has been rescheduled by [email or web address] at 10/27/2022 19:48 Reason:   Patient unavailable    Urine Culture High Risk [[id number]] Collected: 10/28/22 0734    Order Status: Sent Specimen: Urine, Catheterized Updated: 10/28/22 0945    Urine culture [[id number]] Collected: 10/27/22 2214    Order Status: No result Specimen: Urine Updated: 10/28/22 0331    Urine Culture High Risk [[id number]]     Order Status: Completed Specimen: Urine              Chest Imaging:   CXR- No acute cardiopulmonary findings.    Infusions:     dextrose 5  % and 0.45 % NaCl Stopped (10/28/22 1455)    insulin regular 1 units/mL infusion orderable (DKA) Stopped (10/28/22 1447)    NORepinephrine bitartrate-D5W 0.02 mcg/kg/min (10/28/22 1700)       Scheduled Medications:    donepeziL  10 mg Oral QHS    famotidine (PF)  20 mg Intravenous Daily    insulin detemir U-100  14 Units Subcutaneous Daily    Lactobacillus acidoph-L.bulgar  4 tablet Oral TID WM    memantine  10 mg Oral BID    mupirocin   Nasal BID    piperacillin-tazobactam (ZOSYN) IVPB  4.5 g Intravenous Q12H       PRN Medications:   sodium chloride, acetaminophen, bisacodyL, dextrose 10%, dextrose 10%, dextrose 5 % and 0.45 % NaCl, HYDROcodone-acetaminophen, melatonin, morphine, nitroGLYCERIN, ondansetron, polyethylene glycol, potassium chloride **AND** potassium chloride **AND** potassium chloride, prochlorperazine, sodium chloride 0.9%    Assessment & Plan:   Patient Active Problem List   Diagnosis    Hypertension associated with diabetes    PVD (peripheral vascular disease)    Non-functioning pituitary adenoma    Coronary artery disease involving native coronary artery of native heart without angina pectoris    Left homonymous hemianopsia - Both Eyes    Anemia    Stenosis of infrarenal abdominal aorta due to atherosclerosis    Type 2 diabetes mellitus with circulatory disorder, without long-term current use of insulin    Lumbar radiculopathy    Bilateral carotid artery disease    Carpal tunnel syndrome of left wrist    Atherosclerosis of aorta    Cerebrovascular disease    Aortic valve disease    S/P AVR (aortic valve replacement)    History of CVA (cerebrovascular accident)    TGA (transient global amnesia)    Vascular dementia without behavioral disturbance    Facet arthritis of lumbar region    Diabetic polyneuropathy associated with type 2 diabetes mellitus    Overweight (BMI 25.0-29.9)    Neck pain    Decreased activities of daily living (ADL)    Chronic pain    Major neurocognitive disorder    Unspecified  inflammatory spondylopathy, lumbar region    Stage 3a chronic kidney disease    Diabetic ketoacidosis without coma associated with type 2 diabetes mellitus    Elevated troponin    Leukocytosis    Abnormal CT scan    Acute cystitis with hematuria    Encephalopathy, metabolic    Gross hematuria    Bladder hemorrhage    Type 2 myocardial infarction due to shock    Hemorrhagic shock    Palliative care encounter       ASSESSMENT & RECOMMENDATIONS     #Shock  -Likely cardiogenic shock vs hemorrhagic shock  -Currently on levophed. Weaning as tolerated.   -s/p 1 unit PRBC.   -Trend H/H Q6H  -1g TXA given  -Palliative Care consult  -DNR    #Type 2 Demand Ischemia  -Likely 2/2 hematuria  -s/p 500cc bolus and 1 unit PRBCs  -Trend troponin  -Cardiology consulted    #DKA  -Resolving  -Currently off inuslin gtt  -Continune subQ insulin    #Hematuria  -Urology onboard  -S/p 1 unit PRBCs  -Plan to manually irrgate bladder overnight with plans for OR tomorrow.     Thank you for allowing us to participate in the care of this patient. Please call with questions.    Mala Salazar M.D., PGY-V  [employer] Pulmonary/Critical Care Fellow

## 2022-10-28 NOTE — TREATMENT PLAN
"Case d/w patient's daughter Becky, who is an Oncology RN at [employer].  Pt is in cardiogenic shock, has significant bladder bleeding, and is progressively worsening.  Troponin >2 - likely type 2 MI.  Given patient's underlying condition, we agreed on the phone that ACLS protocols would be futile in an attempt to prolong life.  She ius bringing the pt's  in to see her.  We will continue vasopressor and blood product support in interim.  Verbal consent received for continued PRBC transfusion and placement of arterial line to guide pressor support.    Bedside echo with very poor LV performance, bradycardia, and clear evidence of low flow state (cardiac "smoke").  Do not believe percutaneous coronary intervention would be beneficial or indicated at this time.    Over 98 minutes spent in the provision of critical care services.  Critical care was time spent personally by me on the following activities: development of treatment plan with patient or surrogate and bedside caregivers, discussions with consultants, evaluation of patient's response to treatment, examination of patient, ordering and performing treatments and interventions, ordering and review of laboratory studies, ordering and review of radiographic studies, pulse oximetry, re-evaluation of patient's condition. This critical care time did not overlap with that of any other provider or involve time for any procedures.    "

## 2022-10-28 NOTE — HPI
"77F T2DM presented to the ER with reports of a "high CBG." Apparently her daughter had been given her insulin and rechecking her glucose q1h WO improvement. Patient was very lethargic upon arrival. Work up in the ER revealed the patient was in DKA. She was given IVF and insulin.  consulted for admission. CANDICE ER MD.   "

## 2022-10-28 NOTE — SUBJECTIVE & OBJECTIVE
Past Medical History:   Diagnosis Date    Aortic stenosis     Arthritis     Back pain     Carotid artery occlusion     Cataract     Coronary artery disease     Diabetes mellitus type II     Heart murmur     Hyperlipidemia     Hypertension associated with diabetes 7/24/2012    Non-functioning pituitary adenoma 7/24/2012    Pituitary microadenoma 11/17/2015    Polyneuropathy     PVD (peripheral vascular disease)     PVD (peripheral vascular disease)     S/P AVR (aortic valve replacement) 8/14/2013    21 mm Medtronic Mosaic ultra porcine valve     Stenosis     Stented coronary artery 4/10/2013    2013 RCA QASIM     Stroke     Multiple mini strokes; decreased left peripheal vision    Type II or unspecified type diabetes mellitus with neurological manifestations, not stated as uncontrolled(250.60)     Type II or unspecified type diabetes mellitus with peripheral circulatory disorders, not stated as uncontrolled(250.70)        Past Surgical History:   Procedure Laterality Date    AORTIC VALVE REPLACEMENT  2013    BILATERAL SALPINGOOPHORECTOMY      BREAST BIOPSY Left     CARDIAC VALVE REPLACEMENT      aortic 2013    CAROTID ENDARTERECTOMY Right     CATARACT EXTRACTION W/  INTRAOCULAR LENS IMPLANT Bilateral     Cataract Surgery      Both eyes    CORONARY ANGIOPLASTY      DENTAL SURGERY      EYE SURGERY      HYSTERECTOMY      TUBAL LIGATION      YAG Bilateral        Review of patient's allergies indicates:   Allergen Reactions    No known drug allergies        No current facility-administered medications on file prior to encounter.     Current Outpatient Medications on File Prior to Encounter   Medication Sig    acetaminophen (TYLENOL) 325 MG tablet Take 325 mg by mouth daily as needed. Half Tablet Oral Every day as needed    ascorbic acid, vitamin C, (VITAMIN C) 1000 MG tablet     aspirin 81 MG Chew Take 81 mg by mouth once daily.    blood-glucose meter Misc 2 each by Misc.(Non-Drug; Combo Route) route 2 (two) times daily.     coenzyme Q10 100 mg capsule Take 100 mg by mouth Daily. 1 Capsule Oral Every day    Daily Multivitamin tablet Take 1 tablet by mouth Daily. 1 Tablet Oral Every day    donepeziL (ARICEPT) 10 MG tablet TAKE 1 TABLET BY MOUTH ONCE DAILY IN THE EVENING    ergocalciferol (ERGOCALCIFEROL) 50,000 unit Cap Take 1 capsule by mouth once a week    ferrous sulfate (FEOSOL) 325 mg (65 mg iron) Tab tablet Take 1 tablet (325 mg total) by mouth daily with breakfast.    furosemide (LASIX) 20 MG tablet 1 tab po q day prn swelling    glipiZIDE (GLUCOTROL) 10 MG TR24 Take 1 tablet (10 mg total) by mouth daily with breakfast.    labetaloL (NORMODYNE) 200 MG tablet Take 2 tablets by mouth twice daily    lancets 30 gauge Misc 1 lancet by Misc.(Non-Drug; Combo Route) route 4 (four) times daily.    losartan (COZAAR) 50 MG tablet Take 1 tablet by mouth once daily    magnesium 200 mg Tab Take 200 mg by mouth once daily.    memantine (NAMENDA) 10 MG Tab Take 1 tablet by mouth twice daily    metFORMIN (GLUCOPHAGE) 1000 MG tablet TAKE 1 TABLET BY MOUTH TWICE DAILY WITH MEALS    nitroGLYCERIN (NITROSTAT) 0.4 MG SL tablet Place 1 tablet (0.4 mg total) under the tongue every 5 (five) minutes as needed for Chest pain.    omega-3 fatty acids-vitamin E 1,000 mg Cap     rosuvastatin (CRESTOR) 10 MG tablet TAKE 1 TABLET BY MOUTH ON MONDAY, WEDNESDAY AND FRIDAY    TRUE METRIX GLUCOSE TEST STRIP Strp 1 strip by Misc.(Non-Drug; Combo Route) route 4 (four) times daily.     Family History       Problem Relation (Age of Onset)    Coronary artery disease Mother, Brother    Diabetes Mother, Brother, Daughter    Heart attack Mother, Father, Sister, Sister    Heart disease Mother (42), Father, Brother, Sister (41), Sister    Heart failure Mother, Father    Hyperlipidemia Mother, Father, Brother, Sister, Sister    Hypertension Mother, Father, Brother, Sister, Sister    No Known Problems Son    Stroke Father          Tobacco Use    Smoking status: Former      Packs/day: 1.00     Years: 20.00     Pack years: 20.00     Types: Cigarettes     Quit date: 1987     Years since quittin.7    Smokeless tobacco: Never   Substance and Sexual Activity    Alcohol use: No     Alcohol/week: 0.0 standard drinks    Drug use: No    Sexual activity: Yes     Partners: Male       Review of Systems:  Unable to assess to change in mental status      Objective:     Vital Signs (Most Recent):  Pulse: (!) 113 (10/27/22 2201)  Resp: 16 (10/27/22 2201)  BP: (!) 215/102 (10/27/22 2201)  SpO2: 96 % (10/27/22 2201)   Vital Signs (24h Range):  Pulse:  [] 113  Resp:  [16-25] 16  SpO2:  [93 %-100 %] 96 %  BP: (100-215)/() 215/102     Weight: 47 kg (103 lb 9.9 oz)  Body mass index is 20.93 kg/m².    Physical Exam:  GEN:  Mild acute distress, in bed   HEENT:  Normocephalic, atraumatic, extra ocular movements intact  NECK:  Supple, good range of motion  RESPIRATORY:  Clear to auscultation ant, symmetrical chest rise  CARDIOVASCULAR:  Tachycardia, +s1/2  GI:  Soft, slight gen tender, bowel sounds decreased    : No flank tenderness, normal genitalia  EXT: +edema, pulses palpated  SKIN: Warm, dry, no rashes  MUSCULOSKELETAL: Fair range of passive motion, no apparent atrophy  NEURO:  lethargic will respond to tactile stimulation   PSYCH:  unable to assess     Significant Labs:    A1C: No results for input(s): HGBA1C in the last 24 hours.  Blood Culture: No results for input(s): LABBLOO in the last 24 hours.  BMP/CMP:   Recent Labs   Lab 10/27/22  1852 10/27/22  2042    138   K 5.7* 4.5   CL 99 104   CO2 16* 13*   GLU 1,039* 937*   BUN 76* 74*   CREATININE 2.4* 2.2*   CALCIUM 10.4 9.8   PROT 7.5  --    ALBUMIN 3.7  --    BILITOT 0.4  --    ALKPHOS 91  --    AST 34  --    ALT 31  --    ANIONGAP 22* 21*     CBC:   Recent Labs   Lab 10/27/22  1852   WBC 20.17*   HGB 14.6   HCT 44.5        Cardiac Markers:   Recent Labs   Lab 10/27/22  1852   *     Coagulation:   Recent Labs    Lab 10/27/22  2042   INR 1.1     Lactic Acid:   Recent Labs   Lab 10/27/22  2042   LACTATE 2.5*     Lipid Panel: No results for input(s): CHOL, HDL, LDLCALC, TRIG, CHOLHDL in the last 24 hours.  Magnesium:   Recent Labs   Lab 10/27/22  2042   MG 1.8     POCT Glucose:   Recent Labs   Lab 10/27/22  1852   POCTGLUCOSE >500*     Troponin:   Recent Labs   Lab 10/27/22  1852   TROPONINI 0.402*     TSH: No results for input(s): TSH in the last 24 hours.  Urine Culture: No results for input(s): LABURIN in the last 24 hours.  Urine Studies:   Recent Labs   Lab 10/27/22  1946   COLORU Red*   APPEARANCEUA Cloudy*   PHUR 8.0   SPECGRAV 1.015   PROTEINUA 3+*   GLUCUA 4+*   KETONESU Negative   BILIRUBINUA Negative   OCCULTUA 3+*   NITRITE Negative   UROBILINOGEN Negative   LEUKOCYTESUR Trace*   RBCUA >100*   WBCUA 10*   BACTERIA Many*   HYALINECASTS 0       Significant Imaging:     CT Chest Abdomen Pelvis With Contrast (xpd)  Narrative: EXAMINATION:  CT CHEST ABDOMEN PELVIS WITH CONTRAST (XPD)    CLINICAL HISTORY:  Sepsis;    TECHNIQUE:  Arterial phase axial CT images were obtained through the chest, abdomen and pelvis following IV administration of 75 mL of Omnipaque 350 contrast. Coronal, sagittal and 3D reconstructions were submitted and interpreted. Dose reduction techniques including automatic exposure control (AEC) were utilized.    COMPARISON:  None    FINDINGS:  Thoracic aorta: Aortic valve replacement.  No aneurysm or dissection.  Atherosclerotic calcifications throughout.  No evidence of shaggy aorta.    Abdominal aorta: Normal in caliber with severe atherosclerotic disease and apparent near occlusion in the mid section extending inferiorly to the iliac bifurcation.  A central stent may be present but minimal if any flow is question.    Abdominal arteries: Atherosclerotic disease throughout the branches of the aorta with no discrete occlusion or aneurysm.    Base of Neck: No significant abnormality.    Thoracic soft  tissues: Unremarkable.    Heart: Normal size. No effusion.  Three vessel coronary artery disease with aortic valve replacement.    Pulmonary vasculature: Pulmonary arteries distribute normally.  There are four pulmonary veins.    Mariely/Mediastinum: No pathologic libertad enlargement.    Esophagus: Unremarkable.    Airways: Patent.    Lungs/Pleura: Clear lungs. No pleural effusion or thickening.    Liver: Normal in size with fatty attenuation., with no focal hepatic lesions.    Gallbladder: No calcified gallstones.    Bile Ducts: No evidence of dilated ducts.    Pancreas: No mass or peripancreatic fat stranding.    Spleen: Unremarkable.    Adrenals: Unremarkable.    Kidneys/ Ureters: No radiodense stones or hydronephrosis.  Hydronephrosis and hydroureter.    Bladder: Markedly distended with air and Hill catheter balloon.  Swirl of high density is seen in the posterior left lateral wall raising the question of bladder neoplasm or focal blood clot.    Reproductive organs: Hysterectomy    GI Tract/Mesentery: Marked constipation in large rectal fecal impaction with early stercoral colitis question.    Peritoneal Space: No ascites. No free air.    Retroperitoneum: No significant adenopathy.    Abdominal wall: Unremarkable.    Bones: No acute fracture or aggressive-appearing lytic or blastic lesion.  Impression: Irregular density filling the posterior left lateral aspect of the urinary bladder from the trigone to the midportion.  Correlate for bladder neoplasm or blood clot.  Urology consultation urged.    Constipation with large rectal fecal impaction and suggested early stercoral colitis.    Severe atherosclerotic vascular disease throughout the aorta without evidence of aneurysm or dissection.    Suspected occlusion of the mid and lower abdominal aorta with possible central vascular stent.    No evidence of iliac occlusion with severe iliac and femoral atherosclerotic calcifications.    No evidence of mesenteric  occlusion.    This report was flagged in Epic as abnormal.    Findings communicated to Josiane Rankin MD in the ED via [employer] secure chat with confirmation of receipt at the time of this dictation.    Electronically signed by: Maurizio Bae  Date:    10/27/2022  Time:    20:40  X-Ray Chest AP Portable  Narrative: EXAMINATION:  XR CHEST AP PORTABLE    CLINICAL HISTORY:  hyperglycemia;    TECHNIQUE:  Single frontal view of the chest was performed.    COMPARISON:  09/03/2013 chest x-ray    FINDINGS:  The cardiac silhouette is stable and nonenlarged.  Postoperative changes of sternotomy are noted.  Lungs appear clear.  There is no pleural effusion or pneumothorax.  Osseous structures appear stable with degenerative changes of the spine.  Impression: No acute abnormality.    Electronically signed by: Sobia Bae  Date:    10/27/2022  Time:    19:29

## 2022-10-28 NOTE — EICU
"LA 3.1  NS bolus- watch for overload  PCT high  On zosyn  Get urine culture  Check CVP  Follow labs, LA  BP (!) 178/85   Pulse (!) 118   Temp 96.2 °F (35.7 °C) (Axillary)   Resp (!) 21   Ht 4' 11" (1.499 m)   Wt 42.5 kg (93 lb 11.1 oz)   SpO2 99%   BMI 18.92 kg/m²     D/w RN   "

## 2022-10-29 PROBLEM — E08.11 DIABETIC KETOACIDOSIS WITH COMA ASSOCIATED WITH DIABETES MELLITUS DUE TO UNDERLYING CONDITION: Status: ACTIVE | Noted: 2022-10-27

## 2022-10-29 PROBLEM — R78.81 GRAM-NEGATIVE BACTEREMIA: Status: ACTIVE | Noted: 2022-10-29

## 2022-10-29 LAB
ANION GAP SERPL CALC-SCNC: 11 MMOL/L (ref 8–16)
ANION GAP SERPL CALC-SCNC: 12 MMOL/L (ref 8–16)
ANION GAP SERPL CALC-SCNC: 16 MMOL/L (ref 8–16)
ANION GAP SERPL CALC-SCNC: 8 MMOL/L (ref 8–16)
BASOPHILS # BLD AUTO: 0.01 K/UL (ref 0–0.2)
BASOPHILS NFR BLD: 0.1 % (ref 0–1.9)
BUN SERPL-MCNC: 63 MG/DL (ref 8–23)
BUN SERPL-MCNC: 64 MG/DL (ref 8–23)
BUN SERPL-MCNC: 65 MG/DL (ref 8–23)
BUN SERPL-MCNC: 66 MG/DL (ref 8–23)
CALCIUM SERPL-MCNC: 8.7 MG/DL (ref 8.7–10.5)
CALCIUM SERPL-MCNC: 9 MG/DL (ref 8.7–10.5)
CHLORIDE SERPL-SCNC: 112 MMOL/L (ref 95–110)
CHLORIDE SERPL-SCNC: 113 MMOL/L (ref 95–110)
CHLORIDE SERPL-SCNC: 115 MMOL/L (ref 95–110)
CHLORIDE SERPL-SCNC: 116 MMOL/L (ref 95–110)
CO2 SERPL-SCNC: 15 MMOL/L (ref 23–29)
CO2 SERPL-SCNC: 18 MMOL/L (ref 23–29)
CO2 SERPL-SCNC: 18 MMOL/L (ref 23–29)
CO2 SERPL-SCNC: 22 MMOL/L (ref 23–29)
CREAT SERPL-MCNC: 1.9 MG/DL (ref 0.5–1.4)
CREAT SERPL-MCNC: 2 MG/DL (ref 0.5–1.4)
DIFFERENTIAL METHOD: ABNORMAL
EOSINOPHIL # BLD AUTO: 0 K/UL (ref 0–0.5)
EOSINOPHIL NFR BLD: 0.1 % (ref 0–8)
ERYTHROCYTE [DISTWIDTH] IN BLOOD BY AUTOMATED COUNT: 13.9 % (ref 11.5–14.5)
EST. GFR  (NO RACE VARIABLE): 25 ML/MIN/1.73 M^2
EST. GFR  (NO RACE VARIABLE): 27 ML/MIN/1.73 M^2
GLUCOSE SERPL-MCNC: 115 MG/DL (ref 70–110)
GLUCOSE SERPL-MCNC: 129 MG/DL (ref 70–110)
GLUCOSE SERPL-MCNC: 169 MG/DL (ref 70–110)
GLUCOSE SERPL-MCNC: 235 MG/DL (ref 70–110)
HCT VFR BLD AUTO: 31.3 % (ref 37–48.5)
HGB BLD-MCNC: 10.2 G/DL (ref 12–16)
HGB BLD-MCNC: 10.5 G/DL (ref 12–16)
HGB BLD-MCNC: 10.5 G/DL (ref 12–16)
HGB BLD-MCNC: 10.7 G/DL (ref 12–16)
IMM GRANULOCYTES # BLD AUTO: 0.2 K/UL (ref 0–0.04)
IMM GRANULOCYTES NFR BLD AUTO: 1.4 % (ref 0–0.5)
LYMPHOCYTES # BLD AUTO: 0.9 K/UL (ref 1–4.8)
LYMPHOCYTES NFR BLD: 6.5 % (ref 18–48)
MAGNESIUM SERPL-MCNC: 1.8 MG/DL (ref 1.6–2.6)
MCH RBC QN AUTO: 29.2 PG (ref 27–31)
MCHC RBC AUTO-ENTMCNC: 33.5 G/DL (ref 32–36)
MCV RBC AUTO: 87 FL (ref 82–98)
MONOCYTES # BLD AUTO: 0.6 K/UL (ref 0.3–1)
MONOCYTES NFR BLD: 4.4 % (ref 4–15)
NEUTROPHILS # BLD AUTO: 12.4 K/UL (ref 1.8–7.7)
NEUTROPHILS NFR BLD: 87.5 % (ref 38–73)
NRBC BLD-RTO: 0 /100 WBC
PHOSPHATE SERPL-MCNC: 4.7 MG/DL (ref 2.7–4.5)
PHOSPHATE SERPL-MCNC: 4.8 MG/DL (ref 2.7–4.5)
PHOSPHATE SERPL-MCNC: 4.8 MG/DL (ref 2.7–4.5)
PHOSPHATE SERPL-MCNC: 4.9 MG/DL (ref 2.7–4.5)
PHOSPHATE SERPL-MCNC: 5 MG/DL (ref 2.7–4.5)
PLATELET # BLD AUTO: 176 K/UL (ref 150–450)
PLATELET BLD QL SMEAR: ABNORMAL
PMV BLD AUTO: 10.1 FL (ref 9.2–12.9)
POCT GLUCOSE: 131 MG/DL (ref 70–110)
POCT GLUCOSE: 132 MG/DL (ref 70–110)
POCT GLUCOSE: 172 MG/DL (ref 70–110)
POCT GLUCOSE: 176 MG/DL (ref 70–110)
POCT GLUCOSE: 235 MG/DL (ref 70–110)
POTASSIUM SERPL-SCNC: 3.8 MMOL/L (ref 3.5–5.1)
POTASSIUM SERPL-SCNC: 4 MMOL/L (ref 3.5–5.1)
POTASSIUM SERPL-SCNC: 4.4 MMOL/L (ref 3.5–5.1)
POTASSIUM SERPL-SCNC: 4.7 MMOL/L (ref 3.5–5.1)
RBC # BLD AUTO: 3.59 M/UL (ref 4–5.4)
SODIUM SERPL-SCNC: 143 MMOL/L (ref 136–145)
SODIUM SERPL-SCNC: 143 MMOL/L (ref 136–145)
SODIUM SERPL-SCNC: 144 MMOL/L (ref 136–145)
SODIUM SERPL-SCNC: 146 MMOL/L (ref 136–145)
TROPONIN I SERPL DL<=0.01 NG/ML-MCNC: 2.15 NG/ML (ref 0–0.03)
WBC # BLD AUTO: 14.1 K/UL (ref 3.9–12.7)

## 2022-10-29 PROCEDURE — 83735 ASSAY OF MAGNESIUM: CPT | Performed by: INTERNAL MEDICINE

## 2022-10-29 PROCEDURE — 20000000 HC ICU ROOM

## 2022-10-29 PROCEDURE — 63600175 PHARM REV CODE 636 W HCPCS: Performed by: CLINIC/CENTER

## 2022-10-29 PROCEDURE — 25000003 PHARM REV CODE 250: Performed by: INTERNAL MEDICINE

## 2022-10-29 PROCEDURE — 85025 COMPLETE CBC W/AUTO DIFF WBC: CPT | Performed by: INTERNAL MEDICINE

## 2022-10-29 PROCEDURE — 85018 HEMOGLOBIN: CPT | Performed by: STUDENT IN AN ORGANIZED HEALTH CARE EDUCATION/TRAINING PROGRAM

## 2022-10-29 PROCEDURE — 25000003 PHARM REV CODE 250: Performed by: HOSPITALIST

## 2022-10-29 PROCEDURE — 51702 INSERT TEMP BLADDER CATH: CPT

## 2022-10-29 PROCEDURE — 84100 ASSAY OF PHOSPHORUS: CPT | Mod: 91 | Performed by: INTERNAL MEDICINE

## 2022-10-29 PROCEDURE — 80048 BASIC METABOLIC PNL TOTAL CA: CPT | Mod: 91 | Performed by: CLINIC/CENTER

## 2022-10-29 PROCEDURE — 99499 NO LOS: ICD-10-PCS | Mod: ,,, | Performed by: UROLOGY

## 2022-10-29 PROCEDURE — 63600175 PHARM REV CODE 636 W HCPCS: Performed by: STUDENT IN AN ORGANIZED HEALTH CARE EDUCATION/TRAINING PROGRAM

## 2022-10-29 PROCEDURE — 99233 SBSQ HOSP IP/OBS HIGH 50: CPT | Mod: ,,, | Performed by: UROLOGY

## 2022-10-29 PROCEDURE — 25000003 PHARM REV CODE 250: Performed by: STUDENT IN AN ORGANIZED HEALTH CARE EDUCATION/TRAINING PROGRAM

## 2022-10-29 PROCEDURE — 84484 ASSAY OF TROPONIN QUANT: CPT | Performed by: STUDENT IN AN ORGANIZED HEALTH CARE EDUCATION/TRAINING PROGRAM

## 2022-10-29 PROCEDURE — 84100 ASSAY OF PHOSPHORUS: CPT | Performed by: STUDENT IN AN ORGANIZED HEALTH CARE EDUCATION/TRAINING PROGRAM

## 2022-10-29 PROCEDURE — 99499 UNLISTED E&M SERVICE: CPT | Mod: ,,, | Performed by: UROLOGY

## 2022-10-29 PROCEDURE — 94761 N-INVAS EAR/PLS OXIMETRY MLT: CPT

## 2022-10-29 PROCEDURE — 99233 PR SUBSEQUENT HOSPITAL CARE,LEVL III: ICD-10-PCS | Mod: ,,, | Performed by: UROLOGY

## 2022-10-29 RX ORDER — POTASSIUM CHLORIDE 29.8 MG/ML
40 INJECTION INTRAVENOUS ONCE
Status: COMPLETED | OUTPATIENT
Start: 2022-10-29 | End: 2022-10-29

## 2022-10-29 RX ORDER — LOSARTAN POTASSIUM 50 MG/1
50 TABLET ORAL DAILY
Status: DISCONTINUED | OUTPATIENT
Start: 2022-10-29 | End: 2022-10-30

## 2022-10-29 RX ADMIN — INSULIN ASPART 1 UNITS: 100 INJECTION, SOLUTION INTRAVENOUS; SUBCUTANEOUS at 12:10

## 2022-10-29 RX ADMIN — INSULIN ASPART 1 UNITS: 100 INJECTION, SOLUTION INTRAVENOUS; SUBCUTANEOUS at 11:10

## 2022-10-29 RX ADMIN — CEFTRIAXONE 1 G: 1 INJECTION, SOLUTION INTRAVENOUS at 12:10

## 2022-10-29 RX ADMIN — LACTOBACILLUS TAB 4 TABLET: TAB at 12:10

## 2022-10-29 RX ADMIN — MEMANTINE HYDROCHLORIDE 10 MG: 5 TABLET ORAL at 08:10

## 2022-10-29 RX ADMIN — MUPIROCIN: 20 OINTMENT TOPICAL at 08:10

## 2022-10-29 RX ADMIN — POTASSIUM CHLORIDE 40 MEQ: 400 INJECTION, SOLUTION INTRAVENOUS at 06:10

## 2022-10-29 RX ADMIN — MEMANTINE HYDROCHLORIDE 10 MG: 5 TABLET ORAL at 09:10

## 2022-10-29 RX ADMIN — DONEPEZIL HYDROCHLORIDE 10 MG: 5 TABLET, FILM COATED ORAL at 08:10

## 2022-10-29 RX ADMIN — FAMOTIDINE 20 MG: 10 INJECTION INTRAVENOUS at 09:10

## 2022-10-29 RX ADMIN — LACTOBACILLUS TAB 4 TABLET: TAB at 06:10

## 2022-10-29 RX ADMIN — LOSARTAN POTASSIUM 50 MG: 50 TABLET, FILM COATED ORAL at 10:10

## 2022-10-29 NOTE — PROGRESS NOTES
[facility] - Telemetry  Urology  Progress Note    Patient Name: Marlen Arndt  MRN: [id number]  Admission Date: 10/27/2022  Hospital Length of Stay: 1 days    POD: na  Following for: gross hematuria    Subjective:     Interval History:   Hematuria resolved overnight  Urine crystal clear this AM  Catheter irrigating clear    Review of Systems:  A review of 10+ systems was conducted with pertinent positive and negative findings documented in HPI with all other systems reviewed and negative.    Objective:     Vitals:  Temp:  [96.5 °F (35.8 °C)-99.2 °F (37.3 °C)] 98.3 °F (36.8 °C)  Pulse:  [59-94] 94  Resp:  [12-30] 24  SpO2:  [94 %-99 %] 96 %  BP: ()/(45-76) 158/66       I/O:  Intake/Output Summary (Last 24 hours) at 10/29/2022 1139  Last data filed at 10/29/2022 0745  Gross per 24 hour   Intake 2078.69 ml   Output 540 ml   Net 1538.69 ml        Physical Exam:  GENERAL: patient sitting comfortably  SKIN: warm, dry, well perfused  EXT: no bruising or edema  NEURO: grossly normal with no focal deficits  PSYCH: appropriate mood and affect  BACK: No CVA tenderness  ABDO: non-distended  : urine crystal clear this AM    Significant Labs:  CBC:  Recent Labs   Lab 10/28/22  0437 10/28/22  1049 10/28/22  1100 10/28/22  2355 10/29/22  0500   WBC 16.40* 11.82  --   --  14.10*   HGB 11.6* 9.8*  --  10.7* 10.5*  10.5*   HCT 35.3* 29.4* 25*  --  31.3*    222  --   --  176       BMP:  Recent Labs   Lab 10/28/22  2355 10/29/22  0500 10/29/22  0844    143 144   K 4.0 3.8 4.7   * 113* 115*   CO2 15* 22* 18*   BUN 63* 64* 66*   CREATININE 1.9* 1.9* 2.0*   CALCIUM 9.0 8.7 8.7     I personally upsized the catheter to an 18fr catheter. I irrigated it multiple times with no clots.    Urology Specific Assessment:     Gross Hematuria    Plan:     Catheter crystal clear. Had a long discussion with daughter and . Patient does need cystoscopy at some point however now that urine is clear and catheter is draining this  can be done as an outpatient.  Catheter upsized. Nursing can continue to irrigate  Call urology to troubleshoot catheter related issues.  Will cancel OR today. Plan for outpatient cystoscopy. Can change course if needed if hematuria worsens.    Call with questions.    Erasto Maldonado MD  Urology  [facility] [place]

## 2022-10-29 NOTE — ASSESSMENT & PLAN NOTE
Patient's FSGs are uncontrolled due to hyperglycemia on current medication regimen.  Last A1c reviewed-   Lab Results   Component Value Date    HGBA1C 8.9 (H) 10/27/2022     Most recent fingerstick glucose reviewed-   Recent Labs   Lab 10/28/22  2351 10/29/22  0501 10/29/22  0933 10/29/22  1232   POCTGLUCOSE 243* 176* 132* 131*     Current correctional scale  Low  Maintain anti-hyperglycemic dose as follows-   Antihyperglycemics (From admission, onward)    Start     Stop Route Frequency Ordered    10/29/22 1115  insulin detemir U-100 pen 5 Units         -- SubQ Daily 10/29/22 1107    10/28/22 2224  insulin aspart U-100 pen 0-5 Units         -- SubQ Every 6 hours PRN 10/28/22 2125        Transition off IV insulin to subcutaneous.  On oral hypoglycemics at home.  Will continue low-dose long-acting insulin while inpatient.  Monitor closely for hypoglycemia given worsening renal function.

## 2022-10-29 NOTE — PLAN OF CARE
Problem: Diabetic Ketoacidosis  Goal: Fluid and Electrolyte Balance with Absence of Ketosis  Outcome: Ongoing, Progressing  Intervention: Monitor and Manage Ketoacidosis  Flowsheets (Taken 10/29/2022 0607)  Fluid/Electrolyte Management: electrolyte supplement adjusted  Glycemic Management: blood glucose monitored     Problem: Adult Inpatient Plan of Care  Goal: Plan of Care Review  Outcome: Ongoing, Progressing  Flowsheets (Taken 10/29/2022 0607)  Plan of Care Reviewed With: patient     Problem: Diabetes Comorbidity  Goal: Blood Glucose Level Within Targeted Range  Outcome: Ongoing, Progressing     Problem: Skin Injury Risk Increased  Goal: Skin Health and Integrity  Outcome: Ongoing, Progressing     Problem: Anemia  Goal: Anemia Symptom Improvement  Outcome: Ongoing, Progressing     Problem: Sleep Disturbance (Dementia Signs/Symptoms)  Goal: Improved Sleep (Dementia Signs/Symptoms)  Outcome: Ongoing, Progressing  Flowsheets (Taken 10/29/2022 0607)  Mutually Determined Action Steps (Improved Sleep): uses relaxation techniques  Intervention: Promote Healthy Sleep Hygiene  Flowsheets (Taken 10/29/2022 0607)  Sleep Hygiene Promotion:   awakenings minimized   music provided   noise level reduced   relaxation techniques promoted   room lighting adjusted     Problem: Constipation  Goal: Effective Bowel Elimination  Outcome: Ongoing, Progressing  Intervention: Promote Effective Bowel Elimination  Flowsheets (Taken 10/29/2022 0607)  Bowel Elimination Management:   hygiene measures promoted   relaxation techniques promoted     Problem: Electrolyte Imbalance  Goal: Electrolyte Balance  Outcome: Ongoing, Progressing  Intervention: Monitor and Manage Electrolyte Imbalance  Flowsheets (Taken 10/29/2022 0607)  Fluid/Electrolyte Management: electrolyte supplement adjusted     Problem: Restraint, Nonbehavioral (Nonviolent)  Goal: Absence of Harm or Injury  Outcome: Ongoing, Progressing       VSS. NAEO. Patient more verbally  responsive throughout shift with following simple commands and yes/no answers to questions. SSI started. Remains on serial labs. Patient with multiple bowel movements after laxative given for constipation. Hill irrigated and escalated for care orders. Plan of care for patient to OR today. No family present at bedside. Bed alarm on. Bed in lowest position. Call light in reach. Room near nurses' station. Report to be given and care to be relinquished to day shift RN.

## 2022-10-29 NOTE — SUBJECTIVE & OBJECTIVE
Interval History:  No acute events overnight.  Weaned off Levophed.  Urine in Hill bag has cleared.  Daughter Becky at bedside.  Discussed overall guarded prognosis given advanced dementia, current multiple medical comorbidities and introduced the concept of hospice.  States  and family would like to continue current plan of care until the patient passes away naturally but she is amenable to discuss what hospice entails and what services home hospice would be able to provide.    Review of Systems   Reason unable to perform ROS: Unable to obtain ROS due to altered mentation.   Objective:     Vital Signs (Most Recent):  Temp: 98.8 °F (37.1 °C) (10/29/22 1200)  Pulse: 87 (10/29/22 1345)  Resp: 17 (10/29/22 1345)  BP: (!) 126/58 (10/29/22 1300)  SpO2: 95 % (10/29/22 1345)   Vital Signs (24h Range):  Temp:  [96.5 °F (35.8 °C)-99.2 °F (37.3 °C)] 98.8 °F (37.1 °C)  Pulse:  [59-96] 87  Resp:  [11-27] 17  SpO2:  [93 %-99 %] 95 %  BP: (104-179)/(49-76) 126/58     Weight: 46.5 kg (102 lb 8.2 oz)  Body mass index is 20.71 kg/m².    Intake/Output Summary (Last 24 hours) at 10/29/2022 1409  Last data filed at 10/29/2022 1404  Gross per 24 hour   Intake 1066.18 ml   Output 570 ml   Net 496.18 ml        Physical Exam  Vitals and nursing note reviewed.   Constitutional:       Appearance: She is ill-appearing.      Comments: Elderly frail appearing female, lethargic   HENT:      Head: Normocephalic and atraumatic.      Mouth/Throat:      Mouth: Mucous membranes are dry.   Eyes:      General: No scleral icterus.     Comments: Bilateral pupils pinpoint, sluggishly reactive to light   Cardiovascular:      Rate and Rhythm: Normal rate and regular rhythm.      Pulses: Normal pulses.      Heart sounds: Normal heart sounds. No murmur heard.  Pulmonary:      Effort: Pulmonary effort is normal. No respiratory distress.      Breath sounds: Normal breath sounds. No wheezing.   Abdominal:      Palpations: Abdomen is soft.      Comments:  Clear urine in Hill bag   Musculoskeletal:      Cervical back: Neck supple.      Right lower leg: No edema.      Left lower leg: No edema.   Skin:     General: Skin is warm and dry.      Coloration: Skin is pale.      Findings: No bruising.   Neurological:      Comments: Lethargic, attempts to open eyes on verbal commands but is unable to       Significant Labs: All pertinent labs within the past 24 hours have been reviewed.    Significant Imaging: I have reviewed all pertinent imaging results/findings within the past 24 hours.

## 2022-10-29 NOTE — PROGRESS NOTES
Progress Note  Pulmonary & Critical Care Medicine    Attending: Ari Moy  Fellow: Mala Salazar  Admit Date: 10/27/2022  Today's Date: 10/29/2022      SUBJECTIVE:     NAEO. More awake this AM. Urine has cleared up. Due to urine clearing, daughter wants to hold off on going to the OR with urology.     OBJECTIVE:     Vital Signs Trends/Hx Reviewed  Vitals:    10/29/22 0830 10/29/22 0838 10/29/22 0845 10/29/22 0900   BP:       BP Location:       Patient Position:       Pulse: 79 81 91 94   Resp: 13 (!) 23 14 (!) 24   Temp:       TempSrc:       SpO2: 95% 96% 97% 96%   Weight:       Height:           Ventilator settings:   No data recorded       Physical Exam:  General: Mildly encephalopathic  HEENT: AT/NC, PERRL, EOMI, oral and nasal mucosa dry.  Neck: Supple without JVD or palpable LAD.   Cardiac: bradycardia, regular rhythm, with no MRG with brisk cap refill and symmetric pulses in distal extremities.  Respiratory: Normal inspection. Symmetric chest rise. Normal palpation and percussion. Auscultation clear bilaterally. No increased work of breathing noted.   Abdomen: Soft, NT/ND. +BS. No hepatosplenomegaly.   Extremities: No edema.   Neuro: Mildly encephalopathic         Laboratory:  No results for input(s): PH, PCO2, PO2, HCO3, POCSATURATED, BE in the last 24 hours.  Recent Labs   Lab 10/29/22  0500   WBC 14.10*   RBC 3.59*   HGB 10.5*  10.5*   HCT 31.3*      MCV 87   MCH 29.2   MCHC 33.5     Recent Labs   Lab 10/29/22  0500 10/29/22  0844    144   K 3.8 4.7   * 115*   CO2 22* 18*   BUN 64* 66*   CREATININE 1.9* 2.0*   MG 1.8  --        Microbiology Data:   Microbiology Results (last 7 days)       Procedure Component Value Units Date/Time    Blood culture #2 **CANNOT BE ORDERED STAT** [[id number]] Collected: 10/28/22 0015    Order Status: Completed Specimen: Blood Updated: 10/29/22 1212     Blood Culture, Routine No Growth to date      No Growth to date    Narrative:      Collection has been  rescheduled by [email or web address] at 10/27/2022 19:48 Reason:   Patient unavailable  Collection has been rescheduled by [email or web address] at 10/27/2022 20:43 Reason:   Unable to collect  Collection has been rescheduled by [email or web address] at 10/27/2022 19:48 Reason:   Patient unavailable  Collection has been rescheduled by [email or web address] at 10/27/2022 20:43 Reason:   Unable to collect    Blood culture [[id number]]  (Abnormal) Collected: 10/27/22 2259    Order Status: Completed Specimen: Blood Updated: 10/29/22 1128     Blood Culture, Routine Gram stain peds bottle: Gram negative rods      Results called to and read back by: Sergio Sosa RN 16:21  10/28/2022      KLEBSIELLA PNEUMONIAE  For susceptibility see order #[id number]      Narrative:      Site # 1, aerobic and anaerobic; Site # 2, aerobic only. If  done in ED, do not repeat.    Blood culture [[id number]]  (Abnormal) Collected: 10/28/22 0014    Order Status: Completed Specimen: Blood Updated: 10/29/22 1128     Blood Culture, Routine Gram stain bret bottle: Gram negative rods      Positive results previously called 23:16  10/28/2022      KLEBSIELLA PNEUMONIAE  Susceptibility pending      Narrative:      Site # 1, aerobic and anaerobic; Site # 2, aerobic only. If  done in ED, do not repeat.    Urine Culture High Risk [[id number]]  (Abnormal) Collected: 10/28/22 0734    Order Status: Completed Specimen: Urine, Catheterized Updated: 10/29/22 1026     Urine Culture, Routine GRAM NEGATIVE ANNIE  50,000 - 99,999 cfu/ml  Identification and susceptibility pending      Narrative:      Indicated criteria for high risk culture:->Other  Other (specify):->pct high    Urine culture [[id number]]  (Abnormal) Collected: 10/27/22 2214    Order Status: Completed Specimen: Urine Updated: 10/29/22 1016     Urine Culture, Routine GRAM NEGATIVE ANNIE  >100,000 cfu/ml  Identification and susceptibility pending      Narrative:      Specimen Source->Urine    Blood culture #1 **CANNOT BE ORDERED STAT** [[id number]] Collected: 10/27/22 2041     Order Status: Completed Specimen: Blood from Peripheral, Right Hand Updated: 10/29/22 0617     Blood Culture, Routine No Growth to date      No Growth to date    Narrative:      Collection has been rescheduled by AVI at 10/27/2022 19:48 Reason:   Patient unavailable  Collection has been rescheduled by AVI at 10/27/2022 19:48 Reason:   Patient unavailable    Urine Culture High Risk [[id number]]     Order Status: Completed Specimen: Urine              Chest Imaging:   No results found in the last 24 hours.      Infusions:     dextrose 5 % and 0.45 % NaCl Stopped (10/28/22 1455)    insulin regular 1 units/mL infusion orderable (DKA) Stopped (10/28/22 1447)    NORepinephrine bitartrate-D5W Stopped (10/28/22 1706)       Scheduled Medications:    cefTRIAXone (ROCEPHIN) IVPB  1 g Intravenous Q24H    donepeziL  10 mg Oral QHS    famotidine (PF)  20 mg Intravenous Daily    insulin detemir U-100  5 Units Subcutaneous Daily    Lactobacillus acidoph-L.bulgar  4 tablet Oral TID WM    memantine  10 mg Oral BID    mupirocin   Nasal BID       PRN Medications:   sodium chloride, acetaminophen, bisacodyL, dextrose 10%, dextrose 10%, dextrose 10%, dextrose 5 % and 0.45 % NaCl, glucagon (human recombinant), HYDROcodone-acetaminophen, insulin aspart U-100, melatonin, morphine, nitroGLYCERIN, ondansetron, polyethylene glycol, potassium chloride **AND** potassium chloride **AND** potassium chloride, prochlorperazine, sodium chloride 0.9%    Problem List:   Patient Active Problem List   Diagnosis    Hypertension associated with diabetes    PVD (peripheral vascular disease)    Non-functioning pituitary adenoma    Coronary artery disease involving native coronary artery of native heart without angina pectoris    Left homonymous hemianopsia - Both Eyes    Anemia    Stenosis of infrarenal abdominal aorta due to atherosclerosis    Type 2 diabetes mellitus with circulatory disorder, without long-term current use of insulin    Lumbar radiculopathy     Bilateral carotid artery disease    Carpal tunnel syndrome of left wrist    Atherosclerosis of aorta    Cerebrovascular disease    Aortic valve disease    S/P AVR (aortic valve replacement)    History of CVA (cerebrovascular accident)    TGA (transient global amnesia)    Vascular dementia without behavioral disturbance    Facet arthritis of lumbar region    Diabetic polyneuropathy associated with type 2 diabetes mellitus    Overweight (BMI 25.0-29.9)    Neck pain    Decreased activities of daily living (ADL)    Chronic pain    Major neurocognitive disorder    Unspecified inflammatory spondylopathy, lumbar region    Stage 3a chronic kidney disease    Diabetic ketoacidosis without coma associated with type 2 diabetes mellitus    Elevated troponin    Leukocytosis    Abnormal CT scan    Acute cystitis with hematuria    Encephalopathy, metabolic    Gross hematuria    Bladder hemorrhage    Type 2 myocardial infarction due to shock    Hemorrhagic shock    Palliative care encounter       ASSESSMENT & RECOMMENDATIONS     #Shock  -Likely cardiogenic shock vs hemorrhagic shock  -Resolved.  -s/p 1 unit PRBC.   -1g TXA given  -Palliative Care consult  -DNR     #Type 2 Demand Ischemia  -Likely 2/2 hematuria which has resolved.  -s/p 500cc bolus and 1 unit PRBCs  -Trend troponin. Last one 2.146  -Cardiology consulted     #DKA  -Resolved  -Currently off inuslin gtt  -Continune subQ insulin     #Hematuria  -Urology onboard  -S/p 1 unit PRBCs  -Due to urine clearing up, daughter would like to hold off on OR cystoscopy at this time.     Thank you for allowing us to participate in the care of this patient.  Stable for stepdown. Please call with questions.    Mala Salazar M.D., PGY-V  [employer] Pulmonary/Critical Care Fellow

## 2022-10-29 NOTE — PROGRESS NOTES
"[facility] Medicine  Progress Note    Patient Name: Marlen Arndt  MRN: [id number]  Patient Class: IP- Inpatient   Admission Date: 10/27/2022  Length of Stay: 1 days  Attending Physician: Dinora Villalpando MD  Primary Care Provider: Dominique Rendon MD        Subjective:     Principal Problem:Diabetic ketoacidosis without coma associated with type 2 diabetes mellitus    HPI:  77F T2DM presented to the ER with reports of a "high CBG." Apparently her daughter had been given her insulin and rechecking her glucose q1h WO improvement. Patient was very lethargic upon arrival. Work up in the ER revealed the patient was in DKA. She was given IVF and insulin.  consulted for admission. DW ER MD.       Overview/Hospital Course:  No notes on file    Interval History:  No acute events overnight.  Weaned off Levophed.  Urine in Hill bag has cleared.  Daughter Becky at bedside.  Discussed overall guarded prognosis given advanced dementia, current multiple medical comorbidities and introduced the concept of hospice.  States  and family would like to continue current plan of care until the patient passes away naturally but she is amenable to discuss what hospice entails and what services home hospice would be able to provide.    Review of Systems   Reason unable to perform ROS: Unable to obtain ROS due to altered mentation.   Objective:     Vital Signs (Most Recent):  Temp: 98.8 °F (37.1 °C) (10/29/22 1200)  Pulse: 87 (10/29/22 1345)  Resp: 17 (10/29/22 1345)  BP: (!) 126/58 (10/29/22 1300)  SpO2: 95 % (10/29/22 1345)   Vital Signs (24h Range):  Temp:  [96.5 °F (35.8 °C)-99.2 °F (37.3 °C)] 98.8 °F (37.1 °C)  Pulse:  [59-96] 87  Resp:  [11-27] 17  SpO2:  [93 %-99 %] 95 %  BP: (104-179)/(49-76) 126/58     Weight: 46.5 kg (102 lb 8.2 oz)  Body mass index is 20.71 kg/m².    Intake/Output Summary (Last 24 hours) at 10/29/2022 1409  Last data filed at 10/29/2022 1404  Gross per 24 hour   Intake 1066.18 ml "   Output 570 ml   Net 496.18 ml        Physical Exam  Vitals and nursing note reviewed.   Constitutional:       Appearance: She is ill-appearing.      Comments: Elderly frail appearing female, lethargic   HENT:      Head: Normocephalic and atraumatic.      Mouth/Throat:      Mouth: Mucous membranes are dry.   Eyes:      General: No scleral icterus.     Comments: Bilateral pupils pinpoint, sluggishly reactive to light   Cardiovascular:      Rate and Rhythm: Normal rate and regular rhythm.      Pulses: Normal pulses.      Heart sounds: Normal heart sounds. No murmur heard.  Pulmonary:      Effort: Pulmonary effort is normal. No respiratory distress.      Breath sounds: Normal breath sounds. No wheezing.   Abdominal:      Palpations: Abdomen is soft.      Comments: Clear urine in Hill bag   Musculoskeletal:      Cervical back: Neck supple.      Right lower leg: No edema.      Left lower leg: No edema.   Skin:     General: Skin is warm and dry.      Coloration: Skin is pale.      Findings: No bruising.   Neurological:      Comments: Lethargic, attempts to open eyes on verbal commands but is unable to       Significant Labs: All pertinent labs within the past 24 hours have been reviewed.    Significant Imaging: I have reviewed all pertinent imaging results/findings within the past 24 hours.      Assessment/Plan:      * Diabetic ketoacidosis without coma associated with type 2 diabetes mellitus  Patient's FSGs are uncontrolled due to hyperglycemia on current medication regimen.  Last A1c reviewed-   Lab Results   Component Value Date    HGBA1C 8.9 (H) 10/27/2022     Most recent fingerstick glucose reviewed-   Recent Labs   Lab 10/28/22  2351 10/29/22  0501 10/29/22  0933 10/29/22  1232   POCTGLUCOSE 243* 176* 132* 131*     Current correctional scale  Low  Maintain anti-hyperglycemic dose as follows-   Antihyperglycemics (From admission, onward)    Start     Stop Route Frequency Ordered    10/29/22 1115  insulin detemir  U-100 pen 5 Units         -- SubQ Daily 10/29/22 1107    10/28/22 2224  insulin aspart U-100 pen 0-5 Units         -- SubQ Every 6 hours PRN 10/28/22 2125        Transition off IV insulin to subcutaneous.  On oral hypoglycemics at home.  Will continue low-dose long-acting insulin while inpatient.  Monitor closely for hypoglycemia given worsening renal function.    Hemorrhagic shock  Shock likely in setting of gross hematuria.  Suspect component of septic shock now given she has Gram-negative bacteremia secondary to UTI.    CT abdomen pelvis showed irregular density filling left posterior aspect of bladder which could be neoplasm a blood clot.  Urology on board.  Three way Hill catheter placed with manual irrigation, now with clear urine.  Initial plan of cystoscopy on hold given the urine has cleared per family request.       Weaned off Levophed.      S/p PRBC transfusion with stable hemoglobin.    Blood culture growing Klebsiella pneumoniae pending susceptibility.  Urine cultures growing Gram-negative rods pending speciation and susceptibility.  Zosyn switched to ceftriaxone by the ICU team.          Palliative care encounter  Palliative care consult given advanced dementia, multiple medical comorbidities and current critical illness.  Patient is a DNR but would like to continue aggressive care.      Type 2 myocardial infarction due to shock  Echo showed preserved LVEF.  Cardiology on board.  Medical management      Bladder hemorrhage        Encephalopathy, metabolic  Likely in setting of shock/DKA given advanced dementia        Acute cystitis with hematuria        Abnormal CT scan  Irregular density filling the posterior left lateral aspect of the urinary bladder from the trigone to the midportion.  Correlate for bladder neoplasm or blood clot.  Urology consultation urged. Can consult Uro when she's out of DKA     Constipation with large rectal fecal impaction and suggested early stercoral colitis. Started Zosyn.  Prescribed laxatives, stool softeners      Severe atherosclerotic vascular disease throughout the aorta without evidence of aneurysm or dissection. Suspected occlusion of the mid and lower abdominal aorta with possible central vascular stent---> Appears chronic     No evidence of iliac occlusion with severe iliac and femoral atherosclerotic calcifications.     No evidence of mesenteric occlusion.      Leukocytosis              Elevated troponin        Stage 3a chronic kidney disease  HELADIO likely prerenal/ATN in setting shock, DKA   Improving on IV fluids   Continue low-dose maintenance fluids  Renally dose medications, avoid nephrotoxic drugs    Vascular dementia without behavioral disturbance  Resume meds  Fall and delirium precautions       Stenosis of infrarenal abdominal aorta due to atherosclerosis  Appears chronic       Hypertension associated with diabetes  Shock resolved.  Restart antihypertensives as appropriate    VTE Risk Mitigation (From admission, onward)         Ordered     IP VTE HIGH RISK PATIENT  Once         10/27/22 2115     Place CHANO hose  Until discontinued         10/27/22 2115     Place sequential compression device  Until discontinued         10/27/22 2115                Discharge Planning   HUE:      Code Status: DNR   Is the patient medically ready for discharge?:     Reason for patient still in hospital (select all that apply): Patient trending condition, Laboratory test and Treatment  Discharge Plan A: Home with family      Patient is stable for transfer to the floor.  Transfer orders placed.    Critical care time spent on the evaluation and treatment of severe organ dysfunction, review of pertinent labs and imaging studies, discussions with consulting providers and discussions with patient/family: 45 minutes.      Dinora Villalpando MD  Department of Hospital Medicine   [facility] - Intensive Care

## 2022-10-29 NOTE — ASSESSMENT & PLAN NOTE
Shock likely in setting of gross hematuria.  Suspect component of septic shock now given she has Gram-negative bacteremia secondary to UTI.    CT abdomen pelvis showed irregular density filling left posterior aspect of bladder which could be neoplasm a blood clot.  Urology on board.  Three way Hill catheter placed with manual irrigation, now with clear urine.  Initial plan of cystoscopy on hold given the urine has cleared per family request.       Weaned off Levophed.      S/p PRBC transfusion with stable hemoglobin.    Blood culture growing Klebsiella pneumoniae pending susceptibility.  Urine cultures growing Gram-negative rods pending speciation and susceptibility.  Zosyn switched to ceftriaxone by the ICU team.

## 2022-10-30 LAB
ANION GAP SERPL CALC-SCNC: 13 MMOL/L (ref 8–16)
BACTERIA BLD CULT: ABNORMAL
BACTERIA UR CULT: ABNORMAL
BACTERIA UR CULT: ABNORMAL
BASOPHILS # BLD AUTO: 0.03 K/UL (ref 0–0.2)
BASOPHILS NFR BLD: 0.2 % (ref 0–1.9)
BUN SERPL-MCNC: 57 MG/DL (ref 8–23)
CALCIUM SERPL-MCNC: 8.7 MG/DL (ref 8.7–10.5)
CHLORIDE SERPL-SCNC: 116 MMOL/L (ref 95–110)
CO2 SERPL-SCNC: 17 MMOL/L (ref 23–29)
CREAT SERPL-MCNC: 1.7 MG/DL (ref 0.5–1.4)
DIFFERENTIAL METHOD: ABNORMAL
EOSINOPHIL # BLD AUTO: 0 K/UL (ref 0–0.5)
EOSINOPHIL NFR BLD: 0.2 % (ref 0–8)
ERYTHROCYTE [DISTWIDTH] IN BLOOD BY AUTOMATED COUNT: 14.3 % (ref 11.5–14.5)
EST. GFR  (NO RACE VARIABLE): 31 ML/MIN/1.73 M^2
GLUCOSE SERPL-MCNC: 223 MG/DL (ref 70–110)
HCT VFR BLD AUTO: 34.4 % (ref 37–48.5)
HGB BLD-MCNC: 11.5 G/DL (ref 12–16)
IMM GRANULOCYTES # BLD AUTO: 0.24 K/UL (ref 0–0.04)
IMM GRANULOCYTES NFR BLD AUTO: 1.8 % (ref 0–0.5)
LYMPHOCYTES # BLD AUTO: 0.9 K/UL (ref 1–4.8)
LYMPHOCYTES NFR BLD: 6.6 % (ref 18–48)
MAGNESIUM SERPL-MCNC: 1.9 MG/DL (ref 1.6–2.6)
MCH RBC QN AUTO: 29.4 PG (ref 27–31)
MCHC RBC AUTO-ENTMCNC: 33.4 G/DL (ref 32–36)
MCV RBC AUTO: 88 FL (ref 82–98)
MONOCYTES # BLD AUTO: 0.5 K/UL (ref 0.3–1)
MONOCYTES NFR BLD: 3.4 % (ref 4–15)
NEUTROPHILS # BLD AUTO: 11.8 K/UL (ref 1.8–7.7)
NEUTROPHILS NFR BLD: 87.8 % (ref 38–73)
NRBC BLD-RTO: 0 /100 WBC
PHOSPHATE SERPL-MCNC: 4.2 MG/DL (ref 2.7–4.5)
PLATELET # BLD AUTO: 142 K/UL (ref 150–450)
PLATELET BLD QL SMEAR: ABNORMAL
PMV BLD AUTO: 10.1 FL (ref 9.2–12.9)
POCT GLUCOSE: 245 MG/DL (ref 70–110)
POCT GLUCOSE: 309 MG/DL (ref 70–110)
POTASSIUM SERPL-SCNC: 3.8 MMOL/L (ref 3.5–5.1)
POTASSIUM SERPL-SCNC: 4.7 MMOL/L (ref 3.5–5.1)
RBC # BLD AUTO: 3.91 M/UL (ref 4–5.4)
SODIUM SERPL-SCNC: 146 MMOL/L (ref 136–145)
WBC # BLD AUTO: 13.43 K/UL (ref 3.9–12.7)

## 2022-10-30 PROCEDURE — 25000003 PHARM REV CODE 250: Performed by: INTERNAL MEDICINE

## 2022-10-30 PROCEDURE — 85025 COMPLETE CBC W/AUTO DIFF WBC: CPT | Performed by: STUDENT IN AN ORGANIZED HEALTH CARE EDUCATION/TRAINING PROGRAM

## 2022-10-30 PROCEDURE — 25000003 PHARM REV CODE 250: Performed by: STUDENT IN AN ORGANIZED HEALTH CARE EDUCATION/TRAINING PROGRAM

## 2022-10-30 PROCEDURE — 84132 ASSAY OF SERUM POTASSIUM: CPT | Performed by: STUDENT IN AN ORGANIZED HEALTH CARE EDUCATION/TRAINING PROGRAM

## 2022-10-30 PROCEDURE — 36415 COLL VENOUS BLD VENIPUNCTURE: CPT | Performed by: STUDENT IN AN ORGANIZED HEALTH CARE EDUCATION/TRAINING PROGRAM

## 2022-10-30 PROCEDURE — 25000003 PHARM REV CODE 250: Performed by: HOSPITALIST

## 2022-10-30 PROCEDURE — 11000001 HC ACUTE MED/SURG PRIVATE ROOM

## 2022-10-30 PROCEDURE — 80048 BASIC METABOLIC PNL TOTAL CA: CPT | Performed by: STUDENT IN AN ORGANIZED HEALTH CARE EDUCATION/TRAINING PROGRAM

## 2022-10-30 PROCEDURE — 99232 PR SUBSEQUENT HOSPITAL CARE,LEVL II: ICD-10-PCS | Mod: ,,, | Performed by: UROLOGY

## 2022-10-30 PROCEDURE — 84100 ASSAY OF PHOSPHORUS: CPT | Performed by: STUDENT IN AN ORGANIZED HEALTH CARE EDUCATION/TRAINING PROGRAM

## 2022-10-30 PROCEDURE — 63600175 PHARM REV CODE 636 W HCPCS: Performed by: STUDENT IN AN ORGANIZED HEALTH CARE EDUCATION/TRAINING PROGRAM

## 2022-10-30 PROCEDURE — 63600175 PHARM REV CODE 636 W HCPCS: Performed by: INTERNAL MEDICINE

## 2022-10-30 PROCEDURE — 99232 SBSQ HOSP IP/OBS MODERATE 35: CPT | Mod: ,,, | Performed by: UROLOGY

## 2022-10-30 PROCEDURE — 94761 N-INVAS EAR/PLS OXIMETRY MLT: CPT

## 2022-10-30 PROCEDURE — 83735 ASSAY OF MAGNESIUM: CPT | Performed by: STUDENT IN AN ORGANIZED HEALTH CARE EDUCATION/TRAINING PROGRAM

## 2022-10-30 RX ORDER — HYDRALAZINE HYDROCHLORIDE 20 MG/ML
10 INJECTION INTRAMUSCULAR; INTRAVENOUS EVERY 6 HOURS PRN
Status: DISCONTINUED | OUTPATIENT
Start: 2022-10-30 | End: 2022-11-02 | Stop reason: HOSPADM

## 2022-10-30 RX ORDER — AMLODIPINE BESYLATE 5 MG/1
5 TABLET ORAL ONCE
Status: COMPLETED | OUTPATIENT
Start: 2022-10-30 | End: 2022-10-30

## 2022-10-30 RX ORDER — AMLODIPINE BESYLATE 5 MG/1
10 TABLET ORAL DAILY
Status: DISCONTINUED | OUTPATIENT
Start: 2022-10-31 | End: 2022-11-02 | Stop reason: HOSPADM

## 2022-10-30 RX ORDER — AMLODIPINE BESYLATE 5 MG/1
5 TABLET ORAL DAILY
Status: DISCONTINUED | OUTPATIENT
Start: 2022-10-30 | End: 2022-10-30

## 2022-10-30 RX ADMIN — AMLODIPINE BESYLATE 5 MG: 5 TABLET ORAL at 06:10

## 2022-10-30 RX ADMIN — LACTOBACILLUS TAB 4 TABLET: TAB at 01:10

## 2022-10-30 RX ADMIN — INSULIN ASPART 2 UNITS: 100 INJECTION, SOLUTION INTRAVENOUS; SUBCUTANEOUS at 06:10

## 2022-10-30 RX ADMIN — LACTOBACILLUS TAB 4 TABLET: TAB at 06:10

## 2022-10-30 RX ADMIN — AMLODIPINE BESYLATE 5 MG: 5 TABLET ORAL at 09:10

## 2022-10-30 RX ADMIN — LACTOBACILLUS TAB 4 TABLET: TAB at 07:10

## 2022-10-30 RX ADMIN — MEMANTINE HYDROCHLORIDE 10 MG: 5 TABLET ORAL at 09:10

## 2022-10-30 RX ADMIN — DONEPEZIL HYDROCHLORIDE 10 MG: 5 TABLET, FILM COATED ORAL at 08:10

## 2022-10-30 RX ADMIN — HYDRALAZINE HYDROCHLORIDE 10 MG: 20 INJECTION, SOLUTION INTRAMUSCULAR; INTRAVENOUS at 12:10

## 2022-10-30 RX ADMIN — MEMANTINE HYDROCHLORIDE 10 MG: 5 TABLET ORAL at 08:10

## 2022-10-30 RX ADMIN — CEFTRIAXONE 1 G: 1 INJECTION, SOLUTION INTRAVENOUS at 01:10

## 2022-10-30 RX ADMIN — HYDRALAZINE HYDROCHLORIDE 10 MG: 20 INJECTION, SOLUTION INTRAMUSCULAR; INTRAVENOUS at 03:10

## 2022-10-30 RX ADMIN — MUPIROCIN: 20 OINTMENT TOPICAL at 09:10

## 2022-10-30 RX ADMIN — HYDRALAZINE HYDROCHLORIDE 10 MG: 20 INJECTION, SOLUTION INTRAMUSCULAR; INTRAVENOUS at 08:10

## 2022-10-30 RX ADMIN — INSULIN ASPART 4 UNITS: 100 INJECTION, SOLUTION INTRAVENOUS; SUBCUTANEOUS at 01:10

## 2022-10-30 RX ADMIN — ACETAMINOPHEN 650 MG: 325 TABLET ORAL at 05:10

## 2022-10-30 RX ADMIN — POTASSIUM CHLORIDE 10 MEQ: 7.46 INJECTION, SOLUTION INTRAVENOUS at 07:10

## 2022-10-30 RX ADMIN — MUPIROCIN: 20 OINTMENT TOPICAL at 08:10

## 2022-10-30 RX ADMIN — POTASSIUM CHLORIDE 10 MEQ: 7.46 INJECTION, SOLUTION INTRAVENOUS at 05:10

## 2022-10-30 RX ADMIN — HYDRALAZINE HYDROCHLORIDE 10 MG: 20 INJECTION, SOLUTION INTRAMUSCULAR; INTRAVENOUS at 05:10

## 2022-10-30 RX ADMIN — POTASSIUM CHLORIDE 10 MEQ: 7.46 INJECTION, SOLUTION INTRAVENOUS at 08:10

## 2022-10-30 RX ADMIN — FAMOTIDINE 20 MG: 10 INJECTION INTRAVENOUS at 09:10

## 2022-10-30 RX ADMIN — POTASSIUM CHLORIDE 10 MEQ: 7.46 INJECTION, SOLUTION INTRAVENOUS at 06:10

## 2022-10-30 NOTE — NURSING TRANSFER
Nursing Transfer Note      10/30/2022     Reason patient is being transferred: No longer needs ICU level of care    Transfer To: [id number]    Transfer via bed    Transfer with cardiac monitoring    Transported by Nurse and transporter    Medicines sent: Nurse and transporter    Any special needs or follow-up needed: accucheck at 1800    Chart send with patient: Yes    Notified: daughter at bedside    Patient reassessed at: 10/30/22. 1506 (date, time)    Upon arrival to floor: patient oriented to room, call bell in reach, and bed in lowest position, Kaylie in room to andrea .

## 2022-10-30 NOTE — PROGRESS NOTES
"[facility] Medicine  Progress Note    Patient Name: Marlen Arndt  MRN: [id number]  Patient Class: IP- Inpatient   Admission Date: 10/27/2022  Length of Stay: 2 days  Attending Physician: Dinora Villalpando MD  Primary Care Provider: Dominique Rendon MD        Subjective:     Principal Problem:Diabetic ketoacidosis without coma associated with type 2 diabetes mellitus    HPI:  77F T2DM presented to the ER with reports of a "high CBG." Apparently her daughter had been given her insulin and rechecking her glucose q1h WO improvement. Patient was very lethargic upon arrival. Work up in the ER revealed the patient was in DKA. She was given IVF and insulin.  consulted for admission. DW ER MD.       Overview/Hospital Course:  No notes on file    Interval History:  No acute events overnight.  Hemoglobin remains stable.  Patient remains lethargic and attempts to open eyes on verbal commands but is unable to keep them open or communicate.    Review of Systems   Reason unable to perform ROS: Unable to obtain ROS due to altered mentation.   Objective:     Vital Signs (Most Recent):  Temp: 97.6 °F (36.4 °C) (10/30/22 0730)  Pulse: 98 (10/30/22 1545)  Resp: (!) 25 (10/30/22 1545)  BP: (!) 173/65 (10/30/22 1530)  SpO2: 97 % (10/30/22 1545)   Vital Signs (24h Range):  Temp:  [97.6 °F (36.4 °C)-99.3 °F (37.4 °C)] 97.6 °F (36.4 °C)  Pulse:  [] 98  Resp:  [10-28] 25  SpO2:  [95 %-98 %] 97 %  BP: (113-213)/(50-92) 173/65     Weight: 45 kg (99 lb 3.3 oz)  Body mass index is 20.04 kg/m².    Intake/Output Summary (Last 24 hours) at 10/30/2022 1651  Last data filed at 10/30/2022 1600  Gross per 24 hour   Intake 1059.28 ml   Output 1560 ml   Net -500.72 ml        Physical Exam  Vitals and nursing note reviewed.   Constitutional:       Appearance: She is ill-appearing.      Comments: Elderly frail appearing female, lethargic   HENT:      Head: Normocephalic and atraumatic.      Mouth/Throat:      Mouth: Mucous membranes " are dry.   Eyes:      General: No scleral icterus.     Comments: Bilateral pupils pinpoint, sluggishly reactive to light   Cardiovascular:      Rate and Rhythm: Normal rate and regular rhythm.      Pulses: Normal pulses.      Heart sounds: Normal heart sounds. No murmur heard.  Pulmonary:      Effort: Pulmonary effort is normal. No respiratory distress.      Breath sounds: Normal breath sounds. No wheezing.   Abdominal:      Palpations: Abdomen is soft.      Comments: Clear urine in Hill bag   Musculoskeletal:      Cervical back: Neck supple.      Right lower leg: No edema.      Left lower leg: No edema.   Skin:     General: Skin is warm and dry.      Coloration: Skin is pale.      Findings: No bruising.   Neurological:      Comments: Lethargic, attempts to open eyes on verbal commands but is unable to       Significant Labs: All pertinent labs within the past 24 hours have been reviewed.    Significant Imaging: I have reviewed all pertinent imaging results/findings within the past 24 hours.      Assessment/Plan:      * Diabetic ketoacidosis without coma associated with type 2 diabetes mellitus  Patient's FSGs are uncontrolled due to hyperglycemia on current medication regimen.  Last A1c reviewed-   Lab Results   Component Value Date    HGBA1C 8.9 (H) 10/27/2022     Most recent fingerstick glucose reviewed-   Recent Labs   Lab 10/28/22  2351 10/29/22  0501 10/29/22  0933 10/29/22  1232   POCTGLUCOSE 243* 176* 132* 131*     Current correctional scale  Low  Maintain anti-hyperglycemic dose as follows-   Antihyperglycemics (From admission, onward)    Start     Stop Route Frequency Ordered    10/29/22 1115  insulin detemir U-100 pen 5 Units         -- SubQ Daily 10/29/22 1107    10/28/22 2224  insulin aspart U-100 pen 0-5 Units         -- SubQ Every 6 hours PRN 10/28/22 2125        Transition off IV insulin to subcutaneous.  On oral hypoglycemics at home.  Will continue low-dose long-acting insulin while inpatient.   Monitor closely for hypoglycemia given worsening renal function.    Hemorrhagic shock  Shock likely in setting of gross hematuria.  Suspect component of septic shock now given she has Gram-negative bacteremia secondary to UTI.    CT abdomen pelvis showed irregular density filling left posterior aspect of bladder which could be neoplasm a blood clot.  Urology on board.  Three way Hill catheter placed with manual irrigation, now with clear urine.  Initial plan of cystoscopy on hold given the urine has cleared per family request.       Weaned off Levophed.      S/p PRBC transfusion with stable hemoglobin.    Blood culture and urine cultures growing Klebsiella pneumoniae pan susceptible except for nitrofurantoin.  Antibiotics de-escalated to ceftriaxone, day 3 of antibiotics          Palliative care encounter  Palliative care consult given advanced dementia, multiple medical comorbidities and current critical illness.  Patient is a DNR but would like to continue aggressive care.      Type 2 myocardial infarction due to shock  Echo showed preserved LVEF.  Cardiology on board.  Medical management      Bladder hemorrhage        Encephalopathy, metabolic  Likely in setting of shock/DKA given advanced dementia        Acute cystitis with hematuria        Abnormal CT scan  Irregular density filling the posterior left lateral aspect of the urinary bladder from the trigone to the midportion.  Correlate for bladder neoplasm or blood clot.  Urology consultation urged. Can consult Uro when she's out of DKA     Constipation with large rectal fecal impaction and suggested early stercoral colitis. Started Zosyn. Prescribed laxatives, stool softeners      Severe atherosclerotic vascular disease throughout the aorta without evidence of aneurysm or dissection. Suspected occlusion of the mid and lower abdominal aorta with possible central vascular stent---> Appears chronic     No evidence of iliac occlusion with severe iliac and femoral  atherosclerotic calcifications.     No evidence of mesenteric occlusion.      Leukocytosis              Elevated troponin        Stage 3a chronic kidney disease  HELADIO likely prerenal/ATN in setting shock, DKA   Improving on IV fluids   Continue low-dose maintenance fluids  Renally dose medications, avoid nephrotoxic drugs    Vascular dementia without behavioral disturbance  Resume meds  Fall and delirium precautions       Stenosis of infrarenal abdominal aorta due to atherosclerosis  Appears chronic       Hypertension associated with diabetes  Shock resolved.  Restart antihypertensives as appropriate      VTE Risk Mitigation (From admission, onward)         Ordered     IP VTE HIGH RISK PATIENT  Once         10/27/22 2115     Place CHANO hose  Until discontinued         10/27/22 2115     Place sequential compression device  Until discontinued         10/27/22 2115                Discharge Planning   HUE:      Code Status: DNR   Is the patient medically ready for discharge?:     Reason for patient still in hospital (select all that apply): Patient trending condition  Discharge Plan A: Home with family        Stable for transfer to the floor.    Dinora Villalpando MD  Department of Hospital Medicine   [facility]

## 2022-10-30 NOTE — PLAN OF CARE
Problem: Diabetic Ketoacidosis  Goal: Fluid and Electrolyte Balance with Absence of Ketosis  Outcome: Met  Intervention: Monitor and Manage Ketoacidosis  Flowsheets (Taken 10/30/2022 0551)  Fluid/Electrolyte Management: electrolyte-binding therapy initiated  Glycemic Management: blood glucose monitored     Problem: Adult Inpatient Plan of Care  Goal: Plan of Care Review  Outcome: Ongoing, Progressing  Flowsheets (Taken 10/30/2022 0551)  Plan of Care Reviewed With: patient     Problem: Diabetes Comorbidity  Goal: Blood Glucose Level Within Targeted Range  Outcome: Ongoing, Progressing  Intervention: Monitor and Manage Glycemia  Flowsheets (Taken 10/30/2022 0551)  Glycemic Management: blood glucose monitored     Problem: Skin Injury Risk Increased  Goal: Skin Health and Integrity  Outcome: Ongoing, Progressing  Intervention: Optimize Skin Protection  Flowsheets (Taken 10/30/2022 0551)  Pressure Reduction Techniques:   heels elevated off bed   pressure points protected  Pressure Reduction Devices:   elbow protectors utilized   specialty bed utilized   pressure-redistributing mattress utilized   positioning supports utilized   heel offloading device utilized  Skin Protection:   incontinence pads utilized   silicone foam dressing in place  Head of Bed (HOB) Positioning:   HOB elevated   HOB at 30-45 degrees     Problem: Cognitive Impairment (Dementia Signs/Symptoms)  Goal: Optimized Cognitive Function (Dementia Signs/Symptoms)  Outcome: Ongoing, Progressing     Problem: Sleep Disturbance (Dementia Signs/Symptoms)  Goal: Improved Sleep (Dementia Signs/Symptoms)  Outcome: Ongoing, Progressing  Intervention: Promote Healthy Sleep Hygiene  Flowsheets (Taken 10/30/2022 0551)  Sleep Hygiene Promotion:   awakenings minimized   music provided   noise level reduced   regular sleep pattern promoted   relaxation techniques promoted   room lighting adjusted     Problem: Constipation  Goal: Effective Bowel Elimination  Outcome:  Ongoing, Progressing  Intervention: Promote Effective Bowel Elimination  Flowsheets (Taken 10/30/2022 0551)  Bowel Elimination Management: hygiene measures promoted     Problem: Electrolyte Imbalance  Goal: Electrolyte Balance  Outcome: Ongoing, Progressing  Intervention: Monitor and Manage Electrolyte Imbalance  Flowsheets (Taken 10/30/2022 0551)  Fluid/Electrolyte Management: electrolyte-binding therapy initiated     Problem: Restraint, Nonbehavioral (Nonviolent)  Goal: Absence of Harm or Injury  Outcome: Ongoing, Progressing       Patient with blood pressure issues overnight so home meds restarted as well as PRN ordered. NGT replaced. Urine output remains adequate and clear yellow. Patient with one bowel movement overnight. Patient more awake, verbal, and interactive throughout shift. Potassium replaced. No family present at bedside. Patient remains with transfer orders. Bed alarm on. Bed in lowest position. Call light in reach. Room near nurses' station. Report to be given and care to be relinquished to day shift RN.

## 2022-10-30 NOTE — SUBJECTIVE & OBJECTIVE
Interval History:  No acute events overnight.  Hemoglobin remains stable.  Patient remains lethargic and attempts to open eyes on verbal commands but is unable to keep them open or communicate.    Review of Systems   Reason unable to perform ROS: Unable to obtain ROS due to altered mentation.   Objective:     Vital Signs (Most Recent):  Temp: 97.6 °F (36.4 °C) (10/30/22 0730)  Pulse: 98 (10/30/22 1545)  Resp: (!) 25 (10/30/22 1545)  BP: (!) 173/65 (10/30/22 1530)  SpO2: 97 % (10/30/22 1545)   Vital Signs (24h Range):  Temp:  [97.6 °F (36.4 °C)-99.3 °F (37.4 °C)] 97.6 °F (36.4 °C)  Pulse:  [] 98  Resp:  [10-28] 25  SpO2:  [95 %-98 %] 97 %  BP: (113-213)/(50-92) 173/65     Weight: 45 kg (99 lb 3.3 oz)  Body mass index is 20.04 kg/m².    Intake/Output Summary (Last 24 hours) at 10/30/2022 1651  Last data filed at 10/30/2022 1600  Gross per 24 hour   Intake 1059.28 ml   Output 1560 ml   Net -500.72 ml        Physical Exam  Vitals and nursing note reviewed.   Constitutional:       Appearance: She is ill-appearing.      Comments: Elderly frail appearing female, lethargic   HENT:      Head: Normocephalic and atraumatic.      Mouth/Throat:      Mouth: Mucous membranes are dry.   Eyes:      General: No scleral icterus.     Comments: Bilateral pupils pinpoint, sluggishly reactive to light   Cardiovascular:      Rate and Rhythm: Normal rate and regular rhythm.      Pulses: Normal pulses.      Heart sounds: Normal heart sounds. No murmur heard.  Pulmonary:      Effort: Pulmonary effort is normal. No respiratory distress.      Breath sounds: Normal breath sounds. No wheezing.   Abdominal:      Palpations: Abdomen is soft.      Comments: Clear urine in Hill bag   Musculoskeletal:      Cervical back: Neck supple.      Right lower leg: No edema.      Left lower leg: No edema.   Skin:     General: Skin is warm and dry.      Coloration: Skin is pale.      Findings: No bruising.   Neurological:      Comments: Lethargic,  attempts to open eyes on verbal commands but is unable to       Significant Labs: All pertinent labs within the past 24 hours have been reviewed.    Significant Imaging: I have reviewed all pertinent imaging results/findings within the past 24 hours.

## 2022-10-30 NOTE — EICU
/85  Intake/Output - Last 3 Shifts         10/28 0700  10/29 0659 10/29 0700  10/30 0659    P.O. 0     I.V. (mL/kg) 1619.2 (34.8)     Blood 310     NG/ 370    IV Piggyback 704.2 147.1    Total Intake(mL/kg) 2913.4 (62.7) 517.1 (11.1)    Urine (mL/kg/hr) 650 (0.6) 1030 (0.9)    Stool 0 0    Total Output 650 1030    Net +2263.4 -512.9          Stool Occurrence 4 x 4 x          Not wet per RN  Started losartan  Cr 1.9  Try low dose hydralazine prn for SBP over 160 mmHg  Making urine- low body weight so seems adequate    D/w RN

## 2022-10-30 NOTE — PLAN OF CARE
Mrs. Arndt, will be turned every two hours, supported with pillows, heels floated off the bed, to prevent pressure areas to the skin. She has been started on tube feeding a 10 mL/hr,tolerating it well. . Plan to transfer to medical surgery when bed is available.

## 2022-10-30 NOTE — ASSESSMENT & PLAN NOTE
Shock likely in setting of gross hematuria.  Suspect component of septic shock now given she has Gram-negative bacteremia secondary to UTI.    CT abdomen pelvis showed irregular density filling left posterior aspect of bladder which could be neoplasm a blood clot.  Urology on board.  Three way Hill catheter placed with manual irrigation, now with clear urine.  Initial plan of cystoscopy on hold given the urine has cleared per family request.       Weaned off Levophed.      S/p PRBC transfusion with stable hemoglobin.    Blood culture and urine cultures growing Klebsiella pneumoniae pan susceptible except for nitrofurantoin.  Antibiotics de-escalated to ceftriaxone, day 3 of antibiotics

## 2022-10-31 ENCOUNTER — TELEPHONE (OUTPATIENT)
Dept: UROLOGY | Facility: CLINIC | Age: 77
End: 2022-10-31
Payer: MEDICARE

## 2022-10-31 LAB
ANION GAP SERPL CALC-SCNC: 16 MMOL/L (ref 8–16)
BASOPHILS # BLD AUTO: 0.01 K/UL (ref 0–0.2)
BASOPHILS NFR BLD: 0.1 % (ref 0–1.9)
BUN SERPL-MCNC: 44 MG/DL (ref 8–23)
CALCIUM SERPL-MCNC: 9.1 MG/DL (ref 8.7–10.5)
CHLORIDE SERPL-SCNC: 115 MMOL/L (ref 95–110)
CO2 SERPL-SCNC: 13 MMOL/L (ref 23–29)
CREAT SERPL-MCNC: 1.5 MG/DL (ref 0.5–1.4)
DIFFERENTIAL METHOD: ABNORMAL
EOSINOPHIL # BLD AUTO: 0 K/UL (ref 0–0.5)
EOSINOPHIL NFR BLD: 0.3 % (ref 0–8)
ERYTHROCYTE [DISTWIDTH] IN BLOOD BY AUTOMATED COUNT: 14.4 % (ref 11.5–14.5)
EST. GFR  (NO RACE VARIABLE): 36 ML/MIN/1.73 M^2
GLUCOSE SERPL-MCNC: 372 MG/DL (ref 70–110)
HCT VFR BLD AUTO: 37 % (ref 37–48.5)
HGB BLD-MCNC: 11.9 G/DL (ref 12–16)
IMM GRANULOCYTES # BLD AUTO: 0.07 K/UL (ref 0–0.04)
IMM GRANULOCYTES NFR BLD AUTO: 0.6 % (ref 0–0.5)
LYMPHOCYTES # BLD AUTO: 0.7 K/UL (ref 1–4.8)
LYMPHOCYTES NFR BLD: 5.4 % (ref 18–48)
MAGNESIUM SERPL-MCNC: 2 MG/DL (ref 1.6–2.6)
MCH RBC QN AUTO: 28.7 PG (ref 27–31)
MCHC RBC AUTO-ENTMCNC: 32.2 G/DL (ref 32–36)
MCV RBC AUTO: 89 FL (ref 82–98)
MONOCYTES # BLD AUTO: 0.5 K/UL (ref 0.3–1)
MONOCYTES NFR BLD: 4.5 % (ref 4–15)
NEUTROPHILS # BLD AUTO: 10.7 K/UL (ref 1.8–7.7)
NEUTROPHILS NFR BLD: 89.1 % (ref 38–73)
NRBC BLD-RTO: 0 /100 WBC
PHOSPHATE SERPL-MCNC: 2.9 MG/DL (ref 2.7–4.5)
PLATELET # BLD AUTO: 153 K/UL (ref 150–450)
PMV BLD AUTO: 10.3 FL (ref 9.2–12.9)
POCT GLUCOSE: 325 MG/DL (ref 70–110)
POCT GLUCOSE: 331 MG/DL (ref 70–110)
POCT GLUCOSE: 338 MG/DL (ref 70–110)
POCT GLUCOSE: 369 MG/DL (ref 70–110)
POCT GLUCOSE: 402 MG/DL (ref 70–110)
POCT GLUCOSE: 415 MG/DL (ref 70–110)
POCT GLUCOSE: 430 MG/DL (ref 70–110)
POCT GLUCOSE: >500 MG/DL (ref 70–110)
POTASSIUM SERPL-SCNC: 4 MMOL/L (ref 3.5–5.1)
RBC # BLD AUTO: 4.14 M/UL (ref 4–5.4)
SODIUM SERPL-SCNC: 144 MMOL/L (ref 136–145)
WBC # BLD AUTO: 11.97 K/UL (ref 3.9–12.7)

## 2022-10-31 PROCEDURE — 97535 SELF CARE MNGMENT TRAINING: CPT

## 2022-10-31 PROCEDURE — 63600175 PHARM REV CODE 636 W HCPCS: Performed by: STUDENT IN AN ORGANIZED HEALTH CARE EDUCATION/TRAINING PROGRAM

## 2022-10-31 PROCEDURE — 92610 EVALUATE SWALLOWING FUNCTION: CPT

## 2022-10-31 PROCEDURE — 99233 SBSQ HOSP IP/OBS HIGH 50: CPT | Mod: ,,, | Performed by: STUDENT IN AN ORGANIZED HEALTH CARE EDUCATION/TRAINING PROGRAM

## 2022-10-31 PROCEDURE — 25000003 PHARM REV CODE 250: Performed by: STUDENT IN AN ORGANIZED HEALTH CARE EDUCATION/TRAINING PROGRAM

## 2022-10-31 PROCEDURE — 94761 N-INVAS EAR/PLS OXIMETRY MLT: CPT

## 2022-10-31 PROCEDURE — 85025 COMPLETE CBC W/AUTO DIFF WBC: CPT | Performed by: STUDENT IN AN ORGANIZED HEALTH CARE EDUCATION/TRAINING PROGRAM

## 2022-10-31 PROCEDURE — 25000003 PHARM REV CODE 250: Performed by: NURSE PRACTITIONER

## 2022-10-31 PROCEDURE — 25000003 PHARM REV CODE 250: Performed by: HOSPITALIST

## 2022-10-31 PROCEDURE — 99233 PR SUBSEQUENT HOSPITAL CARE,LEVL III: ICD-10-PCS | Mod: ,,, | Performed by: STUDENT IN AN ORGANIZED HEALTH CARE EDUCATION/TRAINING PROGRAM

## 2022-10-31 PROCEDURE — 80048 BASIC METABOLIC PNL TOTAL CA: CPT | Performed by: STUDENT IN AN ORGANIZED HEALTH CARE EDUCATION/TRAINING PROGRAM

## 2022-10-31 PROCEDURE — 83735 ASSAY OF MAGNESIUM: CPT | Performed by: STUDENT IN AN ORGANIZED HEALTH CARE EDUCATION/TRAINING PROGRAM

## 2022-10-31 PROCEDURE — 36415 COLL VENOUS BLD VENIPUNCTURE: CPT | Performed by: STUDENT IN AN ORGANIZED HEALTH CARE EDUCATION/TRAINING PROGRAM

## 2022-10-31 PROCEDURE — 11000001 HC ACUTE MED/SURG PRIVATE ROOM

## 2022-10-31 PROCEDURE — 25000003 PHARM REV CODE 250: Performed by: INTERNAL MEDICINE

## 2022-10-31 PROCEDURE — 84100 ASSAY OF PHOSPHORUS: CPT | Performed by: STUDENT IN AN ORGANIZED HEALTH CARE EDUCATION/TRAINING PROGRAM

## 2022-10-31 RX ORDER — INSULIN ASPART 100 [IU]/ML
4 INJECTION, SOLUTION INTRAVENOUS; SUBCUTANEOUS ONCE
Status: COMPLETED | OUTPATIENT
Start: 2022-10-31 | End: 2022-10-31

## 2022-10-31 RX ORDER — SODIUM CHLORIDE 9 MG/ML
INJECTION, SOLUTION INTRAVENOUS CONTINUOUS
Status: DISCONTINUED | OUTPATIENT
Start: 2022-10-31 | End: 2022-11-02

## 2022-10-31 RX ORDER — INSULIN ASPART 100 [IU]/ML
4 INJECTION, SOLUTION INTRAVENOUS; SUBCUTANEOUS
Status: DISCONTINUED | OUTPATIENT
Start: 2022-10-31 | End: 2022-11-02 | Stop reason: HOSPADM

## 2022-10-31 RX ORDER — FAMOTIDINE 20 MG/1
20 TABLET, FILM COATED ORAL DAILY
Status: DISCONTINUED | OUTPATIENT
Start: 2022-11-01 | End: 2022-11-02 | Stop reason: HOSPADM

## 2022-10-31 RX ORDER — IBUPROFEN 200 MG
16 TABLET ORAL
Status: DISCONTINUED | OUTPATIENT
Start: 2022-10-31 | End: 2022-11-02 | Stop reason: HOSPADM

## 2022-10-31 RX ORDER — CARVEDILOL 6.25 MG/1
6.25 TABLET ORAL 2 TIMES DAILY
Status: DISCONTINUED | OUTPATIENT
Start: 2022-10-31 | End: 2022-11-02 | Stop reason: HOSPADM

## 2022-10-31 RX ORDER — IBUPROFEN 200 MG
24 TABLET ORAL
Status: DISCONTINUED | OUTPATIENT
Start: 2022-10-31 | End: 2022-11-02 | Stop reason: HOSPADM

## 2022-10-31 RX ORDER — GLUCAGON 1 MG
1 KIT INJECTION
Status: DISCONTINUED | OUTPATIENT
Start: 2022-10-31 | End: 2022-11-02 | Stop reason: HOSPADM

## 2022-10-31 RX ORDER — INSULIN ASPART 100 [IU]/ML
1-10 INJECTION, SOLUTION INTRAVENOUS; SUBCUTANEOUS
Status: DISCONTINUED | OUTPATIENT
Start: 2022-10-31 | End: 2022-11-02 | Stop reason: HOSPADM

## 2022-10-31 RX ORDER — METOPROLOL TARTRATE 1 MG/ML
5 INJECTION, SOLUTION INTRAVENOUS ONCE
Status: COMPLETED | OUTPATIENT
Start: 2022-10-31 | End: 2022-10-31

## 2022-10-31 RX ADMIN — INSULIN ASPART 4 UNITS: 100 INJECTION, SOLUTION INTRAVENOUS; SUBCUTANEOUS at 05:10

## 2022-10-31 RX ADMIN — CEFTRIAXONE 1 G: 1 INJECTION, SOLUTION INTRAVENOUS at 12:10

## 2022-10-31 RX ADMIN — MUPIROCIN: 20 OINTMENT TOPICAL at 10:10

## 2022-10-31 RX ADMIN — MEMANTINE HYDROCHLORIDE 10 MG: 5 TABLET ORAL at 08:10

## 2022-10-31 RX ADMIN — INSULIN ASPART 4 UNITS: 100 INJECTION, SOLUTION INTRAVENOUS; SUBCUTANEOUS at 10:10

## 2022-10-31 RX ADMIN — SODIUM CHLORIDE 1000 ML: 0.9 INJECTION, SOLUTION INTRAVENOUS at 04:10

## 2022-10-31 RX ADMIN — CARVEDILOL 6.25 MG: 6.25 TABLET, FILM COATED ORAL at 10:10

## 2022-10-31 RX ADMIN — METOROPROLOL TARTRATE 5 MG: 5 INJECTION, SOLUTION INTRAVENOUS at 12:10

## 2022-10-31 RX ADMIN — MUPIROCIN: 20 OINTMENT TOPICAL at 08:10

## 2022-10-31 RX ADMIN — AMLODIPINE BESYLATE 10 MG: 5 TABLET ORAL at 10:10

## 2022-10-31 RX ADMIN — FAMOTIDINE 20 MG: 10 INJECTION INTRAVENOUS at 10:10

## 2022-10-31 RX ADMIN — INSULIN ASPART 4 UNITS: 100 INJECTION, SOLUTION INTRAVENOUS; SUBCUTANEOUS at 04:10

## 2022-10-31 RX ADMIN — INSULIN ASPART 5 UNITS: 100 INJECTION, SOLUTION INTRAVENOUS; SUBCUTANEOUS at 11:10

## 2022-10-31 RX ADMIN — SODIUM CHLORIDE: 0.9 INJECTION, SOLUTION INTRAVENOUS at 06:10

## 2022-10-31 RX ADMIN — MEMANTINE HYDROCHLORIDE 10 MG: 5 TABLET ORAL at 10:10

## 2022-10-31 RX ADMIN — DONEPEZIL HYDROCHLORIDE 10 MG: 5 TABLET, FILM COATED ORAL at 08:10

## 2022-10-31 RX ADMIN — INSULIN ASPART 2 UNITS: 100 INJECTION, SOLUTION INTRAVENOUS; SUBCUTANEOUS at 04:10

## 2022-10-31 RX ADMIN — LACTOBACILLUS TAB 4 TABLET: TAB at 01:10

## 2022-10-31 RX ADMIN — CARVEDILOL 6.25 MG: 6.25 TABLET, FILM COATED ORAL at 08:10

## 2022-10-31 RX ADMIN — INSULIN ASPART 4 UNITS: 100 INJECTION, SOLUTION INTRAVENOUS; SUBCUTANEOUS at 11:10

## 2022-10-31 RX ADMIN — INSULIN ASPART 6 UNITS: 100 INJECTION, SOLUTION INTRAVENOUS; SUBCUTANEOUS at 12:10

## 2022-10-31 NOTE — NURSING
Pt's midnight /79,  after IV hydralazine at 2050. NP notified. Orders received for 5mg IV metoprolol.  Upon pt assessment, NGT found in pt's bed. TF paused. Per NP, hold NGT for now. Midnight , ordered to give 6 total units of sliding scale insulin.  Prior to pushing IV metoprolol, this RN checked tele monitor screen and noticed that pt is not being monitored. Called telemetry and reported box number. Pt now being monitored.

## 2022-10-31 NOTE — PLAN OF CARE
CM met with pt, spouse and daughter Becky Cloud  [phone] per [employer]   Daughter wants a hospital bed for home use for pt   Daughter consented to speak with [facility] nurse Ann-Marie  [phone] as she had questions about what services are available with hospice that pt might beneft from     CM will follow up with Becky later today.   CM spoke with Ann-Marie - paperwork has been done for home hospice --   Pt's daughter spoke at length with Palliative Care MD - Dr. Villalpando is addressing other medical issues prior to d/c.      Referral to [facility] sent per Claude.         10/31/22 1157   Post-Acute Status   Post-Acute Authorization Other  (tbd)

## 2022-10-31 NOTE — CONSULTS
"  [facility] - Med Surg  Adult Nutrition  Consult Note    SUMMARY     Recommendations    Recommendation:  1. Encourage intake at meals as tolerated.   2. Change Boost Plus to Boost Glucose 2/2 hx DM.   3. If TF still desired, pt would need TF of Diabetisource AC with goal rate of 45ml/hr. 4. Monitor weight/labs.   5. RD to follow to monitor nutrition status    Goals:  Pt will tolerate diet with at least 50% intake at meals by RD follow up  Nutrition Goal Status: new  Communication of RD Recs: reviewed with RN (Lisa)    Assessment and Plan  Encephalopathy, metabolic  Contributing Nutrition Diagnosis  Inadequate energy intake    Related to (etiology):   Dx/hx    Signs and Symptoms (as evidenced by):   Just started on pureed diet, possible need for TF    Interventions:  Collaboration with other providers  Commercial beverage    Nutrition Diagnosis Status:   New      Malnutrition Assessment  Weight Loss (Malnutrition): greater than 5% in 1 month (6% x 1 month)       Reason for Assessment  Reason For Assessment: consult (tube feed recs for DM)  Diagnosis:  (DKA)  Relevant Medical History: HTN, PVD, DM, CAD, arthritis, stroke, hysterectomy  General Information Comments: Pt now on dysphagia pureed consistent carb diet with Boost Plus. Pt was receiving TF but currently on hold 2/2 pt pulled out NG tube. Noted 7lb weight loss x 1 month. Tyree 12-thoracic spine wound. Was consulted last week for diabetic dit education but pt remains inappropriate for education at this time. Unable to assess NFPE at this time  Nutrition Discharge Planning: d/c needs to be determined    Nutrition Risk Screen  Nutrition Risk Screen: tube feeding or parenteral nutrition    Nutrition/Diet History  Food Preferences: no Scientologist or cultural food prefs identified  Factors Affecting Nutritional Intake: difficulty/impaired swallowing    Anthropometrics  Temp: 97.7 °F (36.5 °C)  Height Method: Estimated  Height: 4' 11" (149.9 cm)  Height (inches): 59 " in  Weight Method: Bed Scale  Weight: 44.2 kg (97 lb 7.1 oz)  Weight (lb): 97.44 lb  Ideal Body Weight (IBW), Female: 95 lb  % Ideal Body Weight, Female (lb): 102.57 %  BMI (Calculated): 19.7  BMI Grade: 18.5-24.9 - normal  Usual Body Weight (UBW), k.3 kg ()  % Usual Body Weight: 93.64  % Weight Change From Usual Weight: -6.55 %     Lab/Procedures/Meds  Pertinent Labs Reviewed: reviewed  Pertinent Labs Comments: BUN 44H, Crea 1.5H, Glu 372H  Pertinent Medications Reviewed: reviewed  Pertinent Medications Comments: rocephin, insulin, lactobacillus    Estimated/Assessed Needs  Weight Used For Calorie Calculations: 44.2 kg (97 lb 7.1 oz)  Energy Calorie Requirements (kcal): 1326 (30 kcal/kg)  Energy Need Method: Kcal/kg  Protein Requirements: 53g (1.2g/kg)  Weight Used For Protein Calculations: 44.2 kg (97 lb 7.1 oz)  Estimated Fluid Requirement Method: RDA Method  RDA Method (mL): 1326     Nutrition Prescription Ordered  Current Diet Order: dysphagia pureed consistent carb  Oral Nutrition Supplement: Boost Plus    Evaluation of Received Nutrient/Fluid Intake  I/O: 653/1645  Energy Calories Required: not meeting needs  Protein Required: not meeting needs  Fluid Required: not meeting needs  Comments: LBM 10/30  % Intake of Estimated Energy Needs: Other: diet just started  % Meal Intake: Other: diet just started    Nutrition Risk  Level of Risk/Frequency of Follow-up:  (2xweekly)     Monitor and Evaluation  Food and Nutrient Intake: food and beverage intake  Food and Nutrient Adminstration: diet order  Physical Activity and Function: nutrition-related ADLs and IADLs  Anthropometric Measurements: weight  Biochemical Data, Medical Tests and Procedures: electrolyte and renal panel  Nutrition-Focused Physical Findings: overall appearance       Nutrition Follow-Up    RD Follow-up?: Yes

## 2022-10-31 NOTE — ASSESSMENT & PLAN NOTE
Contributing Nutrition Diagnosis  Inadequate energy intake    Related to (etiology):   Dx/hx    Signs and Symptoms (as evidenced by):   Just started on pureed diet, possible need for TF    Interventions:  Collaboration with other providers  Commercial beverage    Nutrition Diagnosis Status:   New

## 2022-10-31 NOTE — PT/OT/SLP EVAL
"Speech Language Pathology Evaluation  Bedside Swallow    Patient Name:  Marlen Arndt   MRN:  [id number]  Admitting Diagnosis: Diabetic ketoacidosis without coma associated with type 2 diabetes mellitus    Recommendations:                 Diet recommendations:  Puree, Thin (boost)   Aspiration Precautions: upright for meals, should be awake and alert for all meals, slow rate, alternate sips and bites, crush oral meds   General Precautions: Standard, fall  Communication strategies:  reorient    History per MD     Principal Problem:Diabetic ketoacidosis without coma associated with type 2 diabetes mellitus     Chief Complaint:        Chief Complaint   Patient presents with    Hyperglycemia       Pt presents to ED c/o hyperglycemia x4 hrs with CBG and glucometer reading: "High." Hx of MD type 2. Daughter has given insulin and checking cbg q hr and called EMS d/t no improvement. Pt is currently lethargic, responsive to pain, generalized weakness noted, and Nonverbal. Hx of Dementia.         HPI: 77F T2DM presented to the ER with reports of a "high CBG." Apparently her daughter had been given her insulin and rechecking her glucose q1h WO improvement. Patient was very lethargic upon arrival. Work up in the ER revealed the patient was in DKA. She was given IVF and insulin.  consulted for admission. CANDICE ER MD.      Past Medical History:   Diagnosis Date    Aortic stenosis     Arthritis     Back pain     Carotid artery occlusion     Cataract     Coronary artery disease     Diabetes mellitus type II     Heart murmur     Hyperlipidemia     Hypertension associated with diabetes 7/24/2012    Non-functioning pituitary adenoma 7/24/2012    Pituitary microadenoma 11/17/2015    Polyneuropathy     PVD (peripheral vascular disease)     PVD (peripheral vascular disease)     S/P AVR (aortic valve replacement) 8/14/2013    21 mm Medtronic Mosaic ultra porcine valve     Stenosis     Stented coronary artery 4/10/2013    2013 RCA QASIM     " "Stroke     Multiple mini strokes; decreased left peripheal vision    Type 2 myocardial infarction due to shock 10/28/2022    Type II or unspecified type diabetes mellitus with neurological manifestations, not stated as uncontrolled(250.60)     Type II or unspecified type diabetes mellitus with peripheral circulatory disorders, not stated as uncontrolled(250.70)        Past Surgical History:   Procedure Laterality Date    AORTIC VALVE REPLACEMENT  2013    BILATERAL SALPINGOOPHORECTOMY      BREAST BIOPSY Left     CARDIAC VALVE REPLACEMENT      aortic 2013    CAROTID ENDARTERECTOMY Right     CATARACT EXTRACTION W/  INTRAOCULAR LENS IMPLANT Bilateral     Cataract Surgery      Both eyes    CORONARY ANGIOPLASTY      DENTAL SURGERY      EYE SURGERY      HYSTERECTOMY      TUBAL LIGATION      YAG Bilateral        Social History: Patient lives at home.    Prior Intubation HX:  n/a    Modified Barium Swallow: none per EMR     Chest X-Rays: The cardiomediastinal silhouette appear normal.  The lungs are clear.  No pneumothorax or pleural effusion.  The osseous structures appear within normal limits.     Prior diet: unknown       Subjective     ST orders received this date for swallow eval.  at bedside but he is extremely Council.   Patient goals: "I would like some water."      Pain/Comfort:  Pain Rating 1: 0/10    Respiratory Status: Room air    Objective:   Pt seen in room, pt was awake and alert. Pt able to produce single words and requested water. Pt stated she was not hungry but needed coaxing to trial PO with SLP.     Oral Musculature Evaluation  Oral Musculature: general weakness  Dentition: uses dentures to eat  Mucosal Quality: adequate  Volitional Cough: elicited, breathy cough  Volitional Swallow: timely swallow  Voice Prior to PO Intake: clear voice    Bedside Swallow Eval:   Consistencies Assessed:  Thin liquids water by cup and straw  Puree applesauce   Soft solids diced peaches       Oral Phase:   Slow oral " transit time  Slow mastication  Mild oral residue needed cues to swallow again    Pharyngeal Phase:   delayed swallow initation  no overt clinical signs/symptoms of aspiration  no overt clinical signs/symptoms of pharyngeal dysphagia  multiple spontaneous swallows  No wet voice or coughing present    Compensatory Strategies  Daily and frequent oral care  Crush oral meds  Feeding assist is needed for meals     Treatment: Attempted education with pt and spouse on reason for consult, goal of ST, diet recommendations and importance of nutrition including drinking boost in between meals. No recall noted and limited learning was noted by both parties. Secure chat sent to MD and RN of above.     Assessment:     Marlen Arndt is a 77 y.o. female admitted with Diabetic ketoacidosis without coma associated with type 2 diabetes mellitus with an SLP diagnosis of ability to advance diet to pureed/thin and boost in between bites.     Goals:   Multidisciplinary Problems       SLP Goals       Not on file                    Plan:     Patient to be seen:      Plan of Care expires:     Plan of Care reviewed with:  patient   SLP Follow-Up:  No       Discharge recommendations:   (Palliative following)   Barriers to Discharge:  None    Time Tracking:     SLP Treatment Date:   10/31/22  Speech Start Time:  1032  Speech Stop Time:  1051     Speech Total Time (min):  19 min    Billable Minutes: Eval Swallow and Oral Function 10 and Self Care/Home Management Training 9    10/31/2022

## 2022-10-31 NOTE — TELEPHONE ENCOUNTER
Spoke with patient's daughter and advised to her that I had scheduled her mother (patient) a follow up visit with Dr. Maldonado in 1 month and she ( patient) would be able to see that information on her portal and also the discharge nurse would go over her upcoming appointments once she is release. Patient's daughter voiced her understanding.

## 2022-10-31 NOTE — NURSING
Pt BS >500. Dr. Villalpando notified. Md stated she would place orders for bolus and adjust Pt. Insulin scale. WCTM.

## 2022-10-31 NOTE — PLAN OF CARE
Pt seen this date, attempted to educate pt and  on diet recs. Limited carryover noted, did speak with MD about diet initiation.

## 2022-10-31 NOTE — PROGRESS NOTES
[facility] - Telemetry  Urology  Progress Note    Patient Name: Marlen B Yris  MRN: [id number]  Admission Date: 10/27/2022  Hospital Length of Stay: 2 days    POD: na  Following for: gross hematuria    Subjective:     Interval History:   Urine remains crystal clear    Review of Systems:  A review of 10+ systems was conducted with pertinent positive and negative findings documented in HPI with all other systems reviewed and negative.    Objective:     Vitals:  Temp:  [97.6 °F (36.4 °C)-99.3 °F (37.4 °C)] 97.8 °F (36.6 °C)  Pulse:  [] 105  Resp:  [10-28] 20  SpO2:  [95 %-100 %] 96 %  BP: (113-213)/(50-92) 164/80       I/O:  Intake/Output Summary (Last 24 hours) at 10/30/2022 2053  Last data filed at 10/30/2022 1600  Gross per 24 hour   Intake 939.28 ml   Output 1260 ml   Net -320.72 ml        Physical Exam:  GENERAL: patient sitting comfortably  SKIN: warm, dry, well perfused  EXT: no bruising or edema  NEURO: grossly normal with no focal deficits  PSYCH: appropriate mood and affect  BACK: No CVA tenderness  ABDO: non-distended  : urine crystal clear    Significant Labs:  CBC:  Recent Labs   Lab 10/28/22  1049 10/28/22  1100 10/28/22  2355 10/29/22  0500 10/29/22  1229 10/30/22  0409   WBC 11.82  --   --  14.10*  --  13.43*   HGB 9.8*  --    < > 10.5*  10.5* 10.2* 11.5*   HCT 29.4* 25*  --  31.3*  --  34.4*     --   --  176  --  142*    < > = values in this interval not displayed.       BMP:  Recent Labs   Lab 10/29/22  0844 10/29/22  1229 10/30/22  0409 10/30/22  1210    146* 146*  --    K 4.7 4.4 3.8 4.7   * 116* 116*  --    CO2 18* 18* 17*  --    BUN 66* 65* 57*  --    CREATININE 2.0* 1.9* 1.7*  --    CALCIUM 8.7 8.7 8.7  --          Urology Specific Assessment:     Gross Hematuria    Plan:     Urine remains crystal clear. Hill can be removed by primary team once they deem it medically appropriate for removal  Patient will need follow up as an outpatient for cystoscopy. Appt requested  Rest  of care per primary team.    Urology team will sign off.    Erasto Maldonado MD  Urology  [facility]

## 2022-10-31 NOTE — NURSING
Dr. Villalpando made aware of current restraint order to . Md notified that Pt. Resting in bed and not combative or pulling at any equipment. Daughter and spouse at bedside and tentative to Pt. Md stated to allow restraint order to  and contact her if they are needed again. WCTM.

## 2022-10-31 NOTE — TELEPHONE ENCOUNTER
----- Message from Erasto Maldonado MD sent at 10/30/2022  8:55 PM CDT -----  Regarding: Patient follow up.  Hello,    Please schedule a follow up appointment with me in 1 month.    Thanks,    Erasto Maldonado MD  Urology  [facility]

## 2022-10-31 NOTE — SUBJECTIVE & OBJECTIVE
Interval History:  No acute events overnight.  Patient is more alert today and attempting to answer questions.  Oriented to self.  Unable to hold a meaningful conversation.    Review of Systems   Reason unable to perform ROS: Unable to obtain accurate ROS due to altered mentation.   Objective:     Vital Signs (Most Recent):  Temp: 97.7 °F (36.5 °C) (10/31/22 1117)  Pulse: 92 (10/31/22 1228)  Resp: 14 (10/31/22 1117)  BP: (!) 160/86 (10/31/22 1117)  SpO2: 97 % (10/31/22 1117)   Vital Signs (24h Range):  Temp:  [97.1 °F (36.2 °C)-98.4 °F (36.9 °C)] 97.7 °F (36.5 °C)  Pulse:  [] 92  Resp:  [14-28] 14  SpO2:  [95 %-100 %] 97 %  BP: (132-205)/(65-87) 160/86     Weight: 44.2 kg (97 lb 7.1 oz)  Body mass index is 19.68 kg/m².    Intake/Output Summary (Last 24 hours) at 10/31/2022 1516  Last data filed at 10/31/2022 0604  Gross per 24 hour   Intake 453.66 ml   Output 975 ml   Net -521.34 ml        Physical Exam  Vitals and nursing note reviewed.   Constitutional:       Appearance: She is ill-appearing.      Comments: Elderly frail appearing female, more awake today   HENT:      Head: Normocephalic and atraumatic.      Mouth/Throat:      Mouth: Mucous membranes are dry.   Eyes:      General: No scleral icterus.  Cardiovascular:      Rate and Rhythm: Normal rate and regular rhythm.      Pulses: Normal pulses.      Heart sounds: Normal heart sounds. No murmur heard.  Pulmonary:      Effort: Pulmonary effort is normal. No respiratory distress.      Breath sounds: Normal breath sounds. No wheezing.   Abdominal:      Palpations: Abdomen is soft.      Tenderness: There is no abdominal tenderness.      Comments: Clear urine in Hill bag   Musculoskeletal:      Cervical back: Neck supple.      Right lower leg: No edema.      Left lower leg: No edema.   Skin:     General: Skin is warm and dry.      Coloration: Skin is pale.      Findings: No bruising.   Neurological:      Mental Status: She is alert.      Comments: Oriented to  self, follows simple commands       Significant Labs: All pertinent labs within the past 24 hours have been reviewed.    Significant Imaging: I have reviewed all pertinent imaging results/findings within the past 24 hours.

## 2022-10-31 NOTE — PROGRESS NOTES
Palliative Care Daily Progress Note     S: Medical record reviewed, followed up with patient and spouse regarding patient's condition. Pt is markedly improved over the weekend with hematuria resolved s/p TXA as per our recs, Hb stable, stepped down from ICU and cysto is now deferred to outpatient. Pt is fatigued but conversant without dysarthria; awaiting SLP eval to clear for PO as pt removed her NGT. Had multiple large BMs over the weekend and abdomen is now soft.    Family requesting hospital bed and has agreed to meet with [facility] regarding home services.    B: Code Status: DNR   Advanced Directives:   Spouse is MPOA. Has living will.  Family/Support: Spouse as bedside and very appreciative.   Physician's Plan of Care Goal is PT/OT/SLP evals.   Labs: Hb stable   Diagnostics: noncontributory     Physical Exam  Gen: Pale, frail elderly female lying in bed in NAD, AAOx2, fatigued  HEENT: PERRL+EOMI, MMM  CV: RRR S1S2 no m/r/g, limbs wwp  Pulm: CTAB anteriorly no r/w/r, unlabored  Abdomen: Soft, nondistended, mildly tender with hypoactive bowels sounds, no palpable stool  Extremities: Normally developed and atraumatic  Psych: Slowed speech, constricted affect, fair cognition, poor short term recall    A: Patient's current condition fair.   Patient Symptom Assessment Flowsheet has been completed.   Family is appropriately involved and supportive.  Discharge Planning: Home with [facility] vs home hospice for dx of advanced dementia and biventricular both contributing to ECOG 4 status with recent life threatening bleed possibly 2/2 severe fecal impaction. Prognosis assessed as < 6 months and hospice compatible due to multiple life limiting conditions.    R: Support offered at this time.   Palliative Care Team will continue to follow patient.     Moises Mras MD  Hospice and Palliative Medicine  Palliative Care Pager: [phone]    Total time spent: 39 minutes  > 50% of 39 minute visit spent in chart review, face  to face discussion and assessment of symptoms, coordination of care with other specialties, and d/c planning.

## 2022-10-31 NOTE — PROGRESS NOTES
"[facility] Medicine  Progress Note    Patient Name: Marlen Arndt  MRN: [id number]  Patient Class: IP- Inpatient   Admission Date: 10/27/2022  Length of Stay: 3 days  Attending Physician: Dinora Villalpando MD  Primary Care Provider: Dominique Rendon MD        Subjective:     Principal Problem:Diabetic ketoacidosis without coma associated with type 2 diabetes mellitus      HPI:  77F T2DM presented to the ER with reports of a "high CBG." Apparently her daughter had been given her insulin and rechecking her glucose q1h WO improvement. Patient was very lethargic upon arrival. Work up in the ER revealed the patient was in DKA. She was given IVF and insulin.  consulted for admission. DW ER MD.       Overview/Hospital Course:  No notes on file    Interval History:  No acute events overnight.  Patient is more alert today and attempting to answer questions.  Oriented to self.  Unable to hold a meaningful conversation.    Review of Systems   Reason unable to perform ROS: Unable to obtain accurate ROS due to altered mentation.   Objective:     Vital Signs (Most Recent):  Temp: 97.7 °F (36.5 °C) (10/31/22 1117)  Pulse: 92 (10/31/22 1228)  Resp: 14 (10/31/22 1117)  BP: (!) 160/86 (10/31/22 1117)  SpO2: 97 % (10/31/22 1117)   Vital Signs (24h Range):  Temp:  [97.1 °F (36.2 °C)-98.4 °F (36.9 °C)] 97.7 °F (36.5 °C)  Pulse:  [] 92  Resp:  [14-28] 14  SpO2:  [95 %-100 %] 97 %  BP: (132-205)/(65-87) 160/86     Weight: 44.2 kg (97 lb 7.1 oz)  Body mass index is 19.68 kg/m².    Intake/Output Summary (Last 24 hours) at 10/31/2022 1516  Last data filed at 10/31/2022 0604  Gross per 24 hour   Intake 453.66 ml   Output 975 ml   Net -521.34 ml        Physical Exam  Vitals and nursing note reviewed.   Constitutional:       Appearance: She is ill-appearing.      Comments: Elderly frail appearing female, more awake today   HENT:      Head: Normocephalic and atraumatic.      Mouth/Throat:      Mouth: Mucous membranes are dry. "   Eyes:      General: No scleral icterus.  Cardiovascular:      Rate and Rhythm: Normal rate and regular rhythm.      Pulses: Normal pulses.      Heart sounds: Normal heart sounds. No murmur heard.  Pulmonary:      Effort: Pulmonary effort is normal. No respiratory distress.      Breath sounds: Normal breath sounds. No wheezing.   Abdominal:      Palpations: Abdomen is soft.      Tenderness: There is no abdominal tenderness.      Comments: Clear urine in Hill bag   Musculoskeletal:      Cervical back: Neck supple.      Right lower leg: No edema.      Left lower leg: No edema.   Skin:     General: Skin is warm and dry.      Coloration: Skin is pale.      Findings: No bruising.   Neurological:      Mental Status: She is alert.      Comments: Oriented to self, follows simple commands       Significant Labs: All pertinent labs within the past 24 hours have been reviewed.    Significant Imaging: I have reviewed all pertinent imaging results/findings within the past 24 hours.      Assessment/Plan:      * Diabetic ketoacidosis without coma associated with type 2 diabetes mellitus  Patient's FSGs are uncontrolled due to hyperglycemia on current medication regimen.  Last A1c reviewed-   Lab Results   Component Value Date    HGBA1C 8.9 (H) 10/27/2022     Most recent fingerstick glucose reviewed-   Recent Labs   Lab 10/31/22  0008 10/31/22  0526 10/31/22  1005 10/31/22  1124   POCTGLUCOSE 430* 331* 338* 402*     Current correctional scale  Low  Maintain anti-hyperglycemic dose as follows-   Antihyperglycemics (From admission, onward)    Start     Stop Route Frequency Ordered    10/31/22 2100  insulin detemir U-100 pen 8 Units         -- SubQ 2 times daily 10/31/22 1151    10/31/22 1200  insulin aspart U-100 pen 4 Units         -- SubQ 3 times daily with meals 10/31/22 1151    10/28/22 2224  insulin aspart U-100 pen 0-5 Units         -- SubQ Every 6 hours PRN 10/28/22 2125        Transition off IV insulin to subcutaneous.  On  oral hypoglycemics at home.      Continue basal/bolus regimen.  Up titrate insulin as appropriate.    Hemorrhagic shock  Shock likely in setting of gross hematuria.  Suspect component of septic shock now given she has Gram-negative bacteremia secondary to UTI.    CT abdomen pelvis showed irregular density filling left posterior aspect of bladder which could be neoplasm a blood clot.  Urology on board.  Three way Hill catheter placed with manual irrigation, now with clear urine.  Initial plan of cystoscopy on hold given the urine has cleared per family request.       Weaned off Levophed.      S/p PRBC transfusion with stable hemoglobin.    Blood culture and urine cultures growing Klebsiella pneumoniae pan susceptible except for nitrofurantoin.  Antibiotics de-escalated to ceftriaxone, day 4 of antibiotics          Gram-negative bacteremia        Palliative care encounter  Palliative care consult given advanced dementia, multiple medical comorbidities and current critical illness.  Patient is a DNR.    After discussion with palliative care team, patient's family has agreed to meet with hospice come passes regarding home services and is requesting hospital bed.      Type 2 myocardial infarction due to shock  Echo showed preserved LVEF.  Cardiology on board.  Medical management      Bladder hemorrhage        Encephalopathy, metabolic  Likely in setting of shock/DKA given advanced dementia        Acute cystitis with hematuria        Abnormal CT scan  Irregular density filling the posterior left lateral aspect of the urinary bladder from the trigone to the midportion.  Correlate for bladder neoplasm or blood clot.  Urology consultation urged. Can consult Uro when she's out of DKA     Constipation with large rectal fecal impaction and suggested early stercoral colitis. Started Zosyn. Prescribed laxatives, stool softeners      Severe atherosclerotic vascular disease throughout the aorta without evidence of aneurysm or  dissection. Suspected occlusion of the mid and lower abdominal aorta with possible central vascular stent---> Appears chronic     No evidence of iliac occlusion with severe iliac and femoral atherosclerotic calcifications.     No evidence of mesenteric occlusion.      Leukocytosis              Elevated troponin        Stage 3a chronic kidney disease  HELADIO likely prerenal/ATN in setting shock, DKA   Improving on IV fluids   Continue low-dose maintenance fluids  Renally dose medications, avoid nephrotoxic drugs    Vascular dementia without behavioral disturbance  Resume meds  Fall and delirium precautions       Stenosis of infrarenal abdominal aorta due to atherosclerosis  Appears chronic       Hypertension associated with diabetes  Shock resolved.  Blood pressure not elevated.  Restart antihypertensives as appropriate    VTE Risk Mitigation (From admission, onward)         Ordered     IP VTE HIGH RISK PATIENT  Once         10/27/22 2115     Place CHANO hose  Until discontinued         10/27/22 2115     Place sequential compression device  Until discontinued         10/27/22 2115                Discharge Planning   HUE:      Code Status: DNR   Is the patient medically ready for discharge?:     Reason for patient still in hospital (select all that apply): Patient trending condition and Pending disposition  Discharge Plan A: Home with family          Dinora Villalpando MD  Department of Hospital Medicine   [facility]

## 2022-10-31 NOTE — PROGRESS NOTES
Pharmacist Intervention IV to PO Note    Marlen MIGUEL Arndt is a 77 y.o. female being treated with IV medication famotidine    Patient Data:    Vital Signs (Most Recent):  Temp: 97.7 °F (36.5 °C) (10/31/22 1117)  Pulse: 92 (10/31/22 1228)  Resp: 14 (10/31/22 1117)  BP: (!) 160/86 (10/31/22 1117)  SpO2: 97 % (10/31/22 1117)   Vital Signs (72h Range):  Temp:  [96.5 °F (35.8 °C)-99.3 °F (37.4 °C)]   Pulse:  []   Resp:  [10-28]   BP: (104-213)/(49-92)   SpO2:  [93 %-100 %]      CBC:  Recent Labs   Lab 10/29/22  0500 10/29/22  1229 10/30/22  0409 10/31/22  0527   WBC 14.10*  --  13.43* 11.97   RBC 3.59*  --  3.91* 4.14   HGB 10.5*  10.5* 10.2* 11.5* 11.9*   HCT 31.3*  --  34.4* 37.0     --  142* 153   MCV 87  --  88 89   MCH 29.2  --  29.4 28.7   MCHC 33.5  --  33.4 32.2     CMP:     Recent Labs   Lab 10/27/22  1852 10/27/22  2042 10/29/22  1229 10/30/22  0409 10/30/22  1210 10/31/22  0527   GLU 1,039*   < > 115* 223*  --  372*   CALCIUM 10.4   < > 8.7 8.7  --  9.1   ALBUMIN 3.7  --   --   --   --   --    PROT 7.5  --   --   --   --   --       < > 146* 146*  --  144   K 5.7*   < > 4.4 3.8 4.7 4.0   CO2 16*   < > 18* 17*  --  13*   CL 99   < > 116* 116*  --  115*   BUN 76*   < > 65* 57*  --  44*   CREATININE 2.4*   < > 1.9* 1.7*  --  1.5*   ALKPHOS 91  --   --   --   --   --    ALT 31  --   --   --   --   --    AST 34  --   --   --   --   --    BILITOT 0.4  --   --   --   --   --     < > = values in this interval not displayed.       Dietary Orders:  Diet Orders            Dietary nutrition supplements [facility]; Boost Plus - Vanilla starting at 10/31 1800    Diet Dysphagia Pureed (IDDSI Level 4) [facility]; Consistent Carbohydrate; Thin: Dysphagia 1 (Pureed) starting at 10/31 1045    Tube Feedings/Formulas [facility]; Novasource Renal; 10; NG; Ready to Hang Liter: Tube Feeding (I) starting at 10/29 1111            Based on the following criteria, this patient qualifies for intravenous  to oral conversion:  [x] The patients gastrointestinal tract is functioning (tolerating medications via oral or enteral route for 24 hours and tolerating food or enteral feeds for 24 hours).  [x] The patient is hemodynamically stable for 24 hours (heart rate <100 beats per minute, systolic blood pressure >99 mm Hg, and respiratory rate <20 breaths per minute).  [x] The patient shows clinical improvement (afebrile for at least 24 hours and white blood cell count downtrending or normalized). Additionally, the patient must be non-neutropenic (absolute neutrophil count >500 cells/mm3).  [x] For antimicrobials, the patient has received IV therapy for at least 24 hours.    IV medication famotidine will be changed to oral medication famotidine    Pharmacist's Name: Fariba Prakash  Pharmacist's Extension: [phone]

## 2022-10-31 NOTE — ASSESSMENT & PLAN NOTE
Shock likely in setting of gross hematuria.  Suspect component of septic shock now given she has Gram-negative bacteremia secondary to UTI.    CT abdomen pelvis showed irregular density filling left posterior aspect of bladder which could be neoplasm a blood clot.  Urology on board.  Three way Hill catheter placed with manual irrigation, now with clear urine.  Initial plan of cystoscopy on hold given the urine has cleared per family request.       Weaned off Levophed.      S/p PRBC transfusion with stable hemoglobin.    Blood culture and urine cultures growing Klebsiella pneumoniae pan susceptible except for nitrofurantoin.  Antibiotics de-escalated to ceftriaxone, day 4 of antibiotics

## 2022-10-31 NOTE — ASSESSMENT & PLAN NOTE
Palliative care consult given advanced dementia, multiple medical comorbidities and current critical illness.  Patient is a DNR.    After discussion with palliative care team, patient's family has agreed to meet with hospice come passes regarding home services and is requesting hospital bed.

## 2022-10-31 NOTE — PLAN OF CARE
Recommendation:  1. Encourage intake at meals as tolerated.   2. Change Boost Plus to Boost Glucose 2/2 hx DM.   3. If TF still desired, pt would need TF of Diabetisource AC with goal rate of 45ml/hr. 4. Monitor weight/labs.   5. RD to follow to monitor nutrition status    Goals:  Pt will tolerate diet with at least 50% intake at meals by RD follow up  Nutrition Goal Status: new

## 2022-11-01 LAB
ANION GAP SERPL CALC-SCNC: 11 MMOL/L (ref 8–16)
BASOPHILS # BLD AUTO: 0.02 K/UL (ref 0–0.2)
BASOPHILS NFR BLD: 0.2 % (ref 0–1.9)
BUN SERPL-MCNC: 28 MG/DL (ref 8–23)
CALCIUM SERPL-MCNC: 8.8 MG/DL (ref 8.7–10.5)
CHLORIDE SERPL-SCNC: 114 MMOL/L (ref 95–110)
CO2 SERPL-SCNC: 23 MMOL/L (ref 23–29)
CREAT SERPL-MCNC: 1 MG/DL (ref 0.5–1.4)
DIFFERENTIAL METHOD: ABNORMAL
EOSINOPHIL # BLD AUTO: 0.2 K/UL (ref 0–0.5)
EOSINOPHIL NFR BLD: 2 % (ref 0–8)
ERYTHROCYTE [DISTWIDTH] IN BLOOD BY AUTOMATED COUNT: 14 % (ref 11.5–14.5)
EST. GFR  (NO RACE VARIABLE): 58 ML/MIN/1.73 M^2
GLUCOSE SERPL-MCNC: 102 MG/DL (ref 70–110)
HCT VFR BLD AUTO: 36.1 % (ref 37–48.5)
HGB BLD-MCNC: 12 G/DL (ref 12–16)
IMM GRANULOCYTES # BLD AUTO: 0.09 K/UL (ref 0–0.04)
IMM GRANULOCYTES NFR BLD AUTO: 1 % (ref 0–0.5)
LYMPHOCYTES # BLD AUTO: 0.9 K/UL (ref 1–4.8)
LYMPHOCYTES NFR BLD: 9.4 % (ref 18–48)
MAGNESIUM SERPL-MCNC: 1.6 MG/DL (ref 1.6–2.6)
MCH RBC QN AUTO: 28.8 PG (ref 27–31)
MCHC RBC AUTO-ENTMCNC: 33.2 G/DL (ref 32–36)
MCV RBC AUTO: 87 FL (ref 82–98)
MONOCYTES # BLD AUTO: 0.7 K/UL (ref 0.3–1)
MONOCYTES NFR BLD: 7 % (ref 4–15)
NEUTROPHILS # BLD AUTO: 7.6 K/UL (ref 1.8–7.7)
NEUTROPHILS NFR BLD: 80.4 % (ref 38–73)
NRBC BLD-RTO: 0 /100 WBC
PHOSPHATE SERPL-MCNC: 1.7 MG/DL (ref 2.7–4.5)
PLATELET # BLD AUTO: 149 K/UL (ref 150–450)
PMV BLD AUTO: 10.3 FL (ref 9.2–12.9)
POCT GLUCOSE: 102 MG/DL (ref 70–110)
POCT GLUCOSE: 131 MG/DL (ref 70–110)
POCT GLUCOSE: 207 MG/DL (ref 70–110)
POCT GLUCOSE: 305 MG/DL (ref 70–110)
POTASSIUM SERPL-SCNC: 2.9 MMOL/L (ref 3.5–5.1)
RBC # BLD AUTO: 4.16 M/UL (ref 4–5.4)
SODIUM SERPL-SCNC: 148 MMOL/L (ref 136–145)
WBC # BLD AUTO: 9.43 K/UL (ref 3.9–12.7)

## 2022-11-01 PROCEDURE — 63600175 PHARM REV CODE 636 W HCPCS: Performed by: STUDENT IN AN ORGANIZED HEALTH CARE EDUCATION/TRAINING PROGRAM

## 2022-11-01 PROCEDURE — 84100 ASSAY OF PHOSPHORUS: CPT | Performed by: STUDENT IN AN ORGANIZED HEALTH CARE EDUCATION/TRAINING PROGRAM

## 2022-11-01 PROCEDURE — 97535 SELF CARE MNGMENT TRAINING: CPT

## 2022-11-01 PROCEDURE — 94761 N-INVAS EAR/PLS OXIMETRY MLT: CPT

## 2022-11-01 PROCEDURE — 97530 THERAPEUTIC ACTIVITIES: CPT

## 2022-11-01 PROCEDURE — 97161 PT EVAL LOW COMPLEX 20 MIN: CPT

## 2022-11-01 PROCEDURE — 63600175 PHARM REV CODE 636 W HCPCS: Performed by: FAMILY MEDICINE

## 2022-11-01 PROCEDURE — 25000003 PHARM REV CODE 250: Performed by: STUDENT IN AN ORGANIZED HEALTH CARE EDUCATION/TRAINING PROGRAM

## 2022-11-01 PROCEDURE — 85025 COMPLETE CBC W/AUTO DIFF WBC: CPT | Performed by: STUDENT IN AN ORGANIZED HEALTH CARE EDUCATION/TRAINING PROGRAM

## 2022-11-01 PROCEDURE — 83735 ASSAY OF MAGNESIUM: CPT | Performed by: STUDENT IN AN ORGANIZED HEALTH CARE EDUCATION/TRAINING PROGRAM

## 2022-11-01 PROCEDURE — 25000003 PHARM REV CODE 250: Performed by: INTERNAL MEDICINE

## 2022-11-01 PROCEDURE — 80048 BASIC METABOLIC PNL TOTAL CA: CPT | Performed by: STUDENT IN AN ORGANIZED HEALTH CARE EDUCATION/TRAINING PROGRAM

## 2022-11-01 PROCEDURE — 97165 OT EVAL LOW COMPLEX 30 MIN: CPT

## 2022-11-01 PROCEDURE — 36415 COLL VENOUS BLD VENIPUNCTURE: CPT | Performed by: STUDENT IN AN ORGANIZED HEALTH CARE EDUCATION/TRAINING PROGRAM

## 2022-11-01 PROCEDURE — 21400001 HC TELEMETRY ROOM

## 2022-11-01 RX ORDER — POTASSIUM CHLORIDE 7.45 MG/ML
10 INJECTION INTRAVENOUS
Status: COMPLETED | OUTPATIENT
Start: 2022-11-01 | End: 2022-11-01

## 2022-11-01 RX ADMIN — POTASSIUM CHLORIDE 10 MEQ: 7.46 INJECTION, SOLUTION INTRAVENOUS at 06:11

## 2022-11-01 RX ADMIN — POTASSIUM CHLORIDE 10 MEQ: 7.46 INJECTION, SOLUTION INTRAVENOUS at 03:11

## 2022-11-01 RX ADMIN — SODIUM CHLORIDE: 0.9 INJECTION, SOLUTION INTRAVENOUS at 09:11

## 2022-11-01 RX ADMIN — INSULIN ASPART 4 UNITS: 100 INJECTION, SOLUTION INTRAVENOUS; SUBCUTANEOUS at 11:11

## 2022-11-01 RX ADMIN — INSULIN ASPART 4 UNITS: 100 INJECTION, SOLUTION INTRAVENOUS; SUBCUTANEOUS at 09:11

## 2022-11-01 RX ADMIN — AMLODIPINE BESYLATE 10 MG: 5 TABLET ORAL at 09:11

## 2022-11-01 RX ADMIN — POTASSIUM CHLORIDE 10 MEQ: 7.46 INJECTION, SOLUTION INTRAVENOUS at 05:11

## 2022-11-01 RX ADMIN — MUPIROCIN: 20 OINTMENT TOPICAL at 09:11

## 2022-11-01 RX ADMIN — CARVEDILOL 6.25 MG: 6.25 TABLET, FILM COATED ORAL at 09:11

## 2022-11-01 RX ADMIN — MEMANTINE HYDROCHLORIDE 10 MG: 5 TABLET ORAL at 09:11

## 2022-11-01 RX ADMIN — FAMOTIDINE 20 MG: 20 TABLET ORAL at 09:11

## 2022-11-01 RX ADMIN — CEFTRIAXONE 1 G: 1 INJECTION, SOLUTION INTRAVENOUS at 11:11

## 2022-11-01 RX ADMIN — LACTOBACILLUS TAB 4 TABLET: TAB at 09:11

## 2022-11-01 RX ADMIN — POTASSIUM CHLORIDE 10 MEQ: 7.46 INJECTION, SOLUTION INTRAVENOUS at 02:11

## 2022-11-01 RX ADMIN — INSULIN ASPART 4 UNITS: 100 INJECTION, SOLUTION INTRAVENOUS; SUBCUTANEOUS at 05:11

## 2022-11-01 NOTE — PT/OT/SLP EVAL
Physical Therapy Evaluation and Discharge Note    Patient Name:  Marlen Arndt   MRN:  [id number]    Recommendations:     Discharge Recommendations:  home (home w/ 24/7 care)   Discharge Equipment Recommendations: hospital bed, wheelchair, other (see comments) (honorio lift)   Barriers to discharge: None    Assessment:     Marlen Arndt is a 77 y.o. female admitted with a medical diagnosis of Diabetic ketoacidosis without coma associated with type 2 diabetes mellitus. .  At this time, patient is functioning at their prior level of function and does not require further acute PT services. Pt presents with impaired mental status, inability to follow verbal commands, impaired ADL function, and decreased functional mobility. She required max tactile cues to perform bed mobility and transfers. Recommending d/c to home w/ 24/7 care/assist.    Recent Surgery: Procedure(s) (LRB):  CYSTOSCOPY, WITH BLADDER BIOPSY, WITH FULGURATION IF INDICATED (N/A) 3 Days Post-Op    Plan:     During this hospitalization, patient does not require further acute PT services.  Please re-consult if situation changes.      Subjective     Chief Complaint: None  Patient/Family Comments/goals: Pt pleasantly confused and reluctantly agreeable to PT evaluation. Subjective information gathered from two of the pt's nieces.  Pain/Comfort:  Pain Rating 1: 0/10    Patients cultural, spiritual, Yazidism conflicts given the current situation: no    Living Environment:  Pt lives w/ her  and daughter in [facility] and a [facility]. Her daughter works night shifts.  Prior to admission, patients level of function was requiring assistance for all ADLs and ambulation at supervision level. The family has noticed cognitive decline recently that has corresponded with decreased receptiveness to mobility. She recently had to be pushed in her rollator seat. Equipment used at home: rollator, shower chair, glucometer.  DME owned (not currently used): none.  Upon discharge,  patient will have assistance from her daughter who works nights, her elderly , and a lot of other family support.    Objective:     Communicated with nurse prior to session.  Patient found HOB elevated with SCD, telemetry, person catheter ([employer]) upon PT entry to room.    General Precautions: Standard, fall   Orthopedic Precautions:N/A   Braces: N/A   Respiratory Status: Room air    Exams:  Cognitive Exam:  Patient is A&O x 0.  Postural Exam:  Patient presented with the following abnormalities:    -       Rounded shoulders  -       Forward head  -       Posterior-L lateral lean in sitting and standing.  Formal strength/ROM assessment NT 2/2 inability to follow commands.    Functional Mobility:  Bed Mobility:     Scooting: maximal assistance  Supine to Sit: maximal assistance  Sit to Supine: total assistance  Transfers:     Sit to Stand:  maximal assistance and max A for RW management with rolling walker  Gait: Side stepped 3 ft x1 and 1 ft x1 w/ RW and max A for balance and RW management.   Balance: Poor sitting and standing balance. Total A-max A to prevent posterior LOB in sitting and standing.    AM-PAC 6 CLICK MOBILITY  Total Score:11       Treatment and Education:   Pt/pt's niece educated on role of PT.  Transferred sup-sit with max tactile cues.   Formal exam not performed 2/2 pt unable to follow verbal/visual cues.  Transferred sit-stand and side-stepped to HOB. Rested for 2 min and completed second trial.  Transferred sit-sup. Scooted to [facility].    AM-PAC 6 CLICK MOBILITY  Total Score:11     Patient left HOB elevated with all lines intact, call button in reach, bed alarm on, and nurse notified.    GOALS:   Multidisciplinary Problems       Physical Therapy Goals          Problem: Physical Therapy    Goal Priority Disciplines Outcome Goal Variances Interventions   Physical Therapy Goal     PT, PT/OT Unable to Meet, Plan Revised                         History:     Past Medical History:   Diagnosis Date     Aortic stenosis     Arthritis     Back pain     Carotid artery occlusion     Cataract     Coronary artery disease     Diabetes mellitus type II     Heart murmur     Hyperlipidemia     Hypertension associated with diabetes 7/24/2012    Non-functioning pituitary adenoma 7/24/2012    Pituitary microadenoma 11/17/2015    Polyneuropathy     PVD (peripheral vascular disease)     PVD (peripheral vascular disease)     S/P AVR (aortic valve replacement) 8/14/2013    21 mm Medtronic Mosaic ultra porcine valve     Stenosis     Stented coronary artery 4/10/2013    2013 RCA QASIM     Stroke     Multiple mini strokes; decreased left peripheal vision    Type 2 myocardial infarction due to shock 10/28/2022    Type II or unspecified type diabetes mellitus with neurological manifestations, not stated as uncontrolled(250.60)     Type II or unspecified type diabetes mellitus with peripheral circulatory disorders, not stated as uncontrolled(250.70)        Past Surgical History:   Procedure Laterality Date    AORTIC VALVE REPLACEMENT  2013    BILATERAL SALPINGOOPHORECTOMY      BREAST BIOPSY Left     CARDIAC VALVE REPLACEMENT      aortic 2013    CAROTID ENDARTERECTOMY Right     CATARACT EXTRACTION W/  INTRAOCULAR LENS IMPLANT Bilateral     Cataract Surgery      Both eyes    CORONARY ANGIOPLASTY      DENTAL SURGERY      EYE SURGERY      HYSTERECTOMY      TUBAL LIGATION      YAG Bilateral        Time Tracking:     PT Received On: 11/01/22  PT Start Time: 0918     PT Stop Time: 0943  PT Total Time (min): 25 min     Billable Minutes: Evaluation 10 and Therapeutic Activity 15      11/01/2022

## 2022-11-01 NOTE — PLAN OF CARE
Pt. Resting in bed during most of shift. Arouses to voice.  at bedside. Pt. Tolerating diet. Full feed assist. Pt. Disoriented to place, time, and situation. Ivf infusing. Pt worked with therapy during shift. Urinary catheter intake, output charted. Tele monitored. BS monitored. AVASYS in room. Bed alarm on and call light in reach. Pt. And  instructed to call for concerns.   Problem: Infection  Goal: Absence of Infection Signs and Symptoms  Outcome: Ongoing, Progressing     Problem: Adult Inpatient Plan of Care  Goal: Plan of Care Review  Outcome: Ongoing, Progressing     Problem: Adult Inpatient Plan of Care  Goal: Patient-Specific Goal (Individualized)  Outcome: Ongoing, Progressing     Problem: Adult Inpatient Plan of Care  Goal: Absence of Hospital-Acquired Illness or Injury  Outcome: Ongoing, Progressing     Problem: Adult Inpatient Plan of Care  Goal: Optimal Comfort and Wellbeing  Outcome: Ongoing, Progressing

## 2022-11-01 NOTE — PT/OT/SLP EVAL
Occupational Therapy   Evaluation, Treatment, and Discharge    Name: Marlen Arndt  MRN: [id number]  Admitting Diagnosis:  Diabetic ketoacidosis without coma associated with type 2 diabetes mellitus  Recent Surgery: Procedure(s) (LRB):  CYSTOSCOPY, WITH BLADDER BIOPSY, WITH FULGURATION IF INDICATED (N/A) 3 Days Post-Op    Recommendations:     Discharge Recommendations: other (see comments) (per chart review patient is to d/c home with hospice; recommend 24/7 assist)  Discharge Equipment Recommendations:  other (see comments), hospital bed, wheelchair (honorio lift)    Assessment:     Marlen Arndt is a 77 y.o. female with a medical diagnosis of Diabetic ketoacidosis without coma associated with type 2 diabetes mellitus.  She presents with ..The primary encounter diagnosis was DKA (diabetic ketoacidosis). Diagnoses of Hyperglycemia, Acute cystitis with hematuria, Diabetic ketoacidosis without coma associated with type 2 diabetes mellitus, Cardiogenic shock, Bladder hemorrhage, Type 2 myocardial infarction due to shock, Hemorrhagic shock, CAD (coronary artery disease), Hematuria, unspecified type, Altered mental status, unspecified altered mental status type, HELADIO (acute kidney injury), Elevated troponin, and Diabetic ketoacidosis with coma associated with diabetes mellitus due to underlying condition were also pertinent to this visit.  . Performance deficits affecting function: weakness, visual deficits, impaired endurance, impaired cognition, decreased ROM, impaired sensation, impaired coordination, impaired self care skills, decreased upper extremity function, decreased lower extremity function, impaired functional mobility, gait instability, impaired skin, decreased safety awareness, impaired balance.      Patient presenting with Global Deterioration Scale 6 - moderately to severe dementia, requiring max tactile/ verbal/ visual cues for participation in limited eval/treatment. Patient requiring dependent assist for  "all ADLs with exception of feeding - requiring max to total assist. Patient is scheduled to d/c home  with extensive family support, 24/7 assist, and hospice to follow. Recommended DME to maximize patient's quality of life and to decrease caregiver burden: honorio lift, wheelchair, hospital bed.    Rehab Prognosis: Fair; patient would benefit from acute skilled OT services to address these deficits and reach maximum level of function.       Plan:     Patient to be seen  (d/c acute OT; patient is to d/c to home) to address the above listed problems via self-care/home management, therapeutic activities, therapeutic exercises  Plan of Care Reviewed with: patient, other (see comments) (niece)    Subjective     Chief Complaint: no irritation/agitation noted  Patient/Family Comments/goals: Niece (present during session) in addition to another niece (spoken to on phone) report  patient's history. Patient pleasantly confused, responsive to her name and repeats her name as well as makes other brief phrases    Occupational Profile: (history from two nieces)  Living Environment: patient lives with elderly  and daughter in a single story home with a walk in shower. Her daughter works night shift. Other family members visit/ care for patient as well.  Previous level of function: family endorses that ~ 2 - 3 weeks ago is when there was a significant physical decline and increased cognitive decline. Prior to 3 weeks ago patient required total assist for medication management, IADLs/ household management, time, and total to max assist for all ADLs but was able to feed self after setup with verbal cues/ setup cues. Patient also was able to ambulate with supervision with rollator, and family reports that she had begun "wandering"/has increased mobility at night. Since 3 weeks ago, has not been feeding self and primarily in the bed.  Roles and Routines: sits in preferred recliner chair  Equipment Used at Home:  shower chair, " rollator   Upon discharge, patient will have assistance from her daughter who works nights, her elderly , and  other family support.    Pain/Comfort:  Pain Rating 1: other (see comments) (does not present to be in discomfort)  Pain Addressed 1: Reposition  Pain Rating Post-Intervention 1: other (see comments) (no indication of discomfort)    Patients cultural, spiritual, Hinduism conflicts given the current situation:      Objective:     Communicated with: nursing prior to session.  Patient found HOB elevated with Other (comments), telemetry, persno catheter, peripheral IV (tele sitter) upon OT entry to room.    General Precautions: Standard, fall   Orthopedic Precautions:N/A   Braces: N/A  Respiratory Status: Room air    Occupational Performance:    Bed Mobility:    Patient completed Scooting/Bridging with maximal assistance  Patient completed Supine to Sit with maximal assistance  Patient completed Sit to Supine with total assistance    Functional Mobility/Transfers:  Patient completed x2 trials Sit <> Stand Transfer; trial 1 maximal assistance  with  rolling walker with secondary staff on standby. Trial 2 bilateral handheld maximal assist to stand.  Functional Mobility: sitting/ standing with posterior/ left lateral lean requiring mod to max assist at all times to prevent posterior LOB. Side stepped eob with extensive assist from PT, OT on standby for a few steps.    Activities of Daily Living:  Feeding:  holds cup in hand; grasps cup with cues but does not bring to mouth; dependence  Grooming: max assist; one step directions; wash cloth to face with tactile/ visual cueing     Cognitive/Visual Perceptual:  Cognitive/Psychosocial Skills:     -       Oriented to: A & O x 0; however does respond to name but does not report her name   -       Follows Commands/attention:requires increased time, verbal/ tactile/visual cues, hand under hand assist  -       Communication: verbalizes minimal phrases, not able to  hold meaningful activity  -       Memory: Impaired STM and Impaired LTM  -       Safety awareness/insight to disability: impaired and requiring total assist to avoid pulling on telemetry/ avoiding pulling on    -       Mood/Affect/Coping skills/emotional control: appears happy with her niece present, fatigued  Visual/Perceptual:      -unable to assess formally ; infer potential of visual deficits    Physical Exam:  Formal ROM/ MMT not assessed due to cognition; appears to have functional ROM to bring hand to mouth for self-feeding   Balance: poor to fair - : standing to sitting (posterior left lateral leaning)    AMPA 6 Click ADL:  AMPAC Total Score: 7    Treatment & Education:  Patient/patient's niece educated on role of OT in hospital setting. Guided patient for one step directions/gestural assistance to sitting eob and functional sit to standing and min side steps as above. Interview and development of occupational profile performed with patient's nieces.  Guided patient for simplified grooming task of hand to mouth with cloth x3 reps with max assist and grasp / release of cup for preparatory self feeding with max assist. Assisted patient back to supine, with placement of cloth in hand to deter grasping at telemetry (telemetry box also placed in pocket out of site)    Patient left HOB elevated with all lines intact, call button in reach, bed alarm on, and nursing notified    GOALS:   Multidisciplinary Problems       Occupational Therapy Goals          Problem: Occupational Therapy    Goal Priority Disciplines Outcome Interventions   Occupational Therapy Goal     OT, PT/OT Adequate for Care Transition    Description: Goals to be met by: 11/1/2022     Patient will increase functional independence with ADLs by performing:    Feeding with Moderate Assistance in upright supported posture with incorporation of simplified meal set up.                         History:     Past Medical History:   Diagnosis Date    Aortic  stenosis     Arthritis     Back pain     Carotid artery occlusion     Cataract     Coronary artery disease     Diabetes mellitus type II     Heart murmur     Hyperlipidemia     Hypertension associated with diabetes 7/24/2012    Non-functioning pituitary adenoma 7/24/2012    Pituitary microadenoma 11/17/2015    Polyneuropathy     PVD (peripheral vascular disease)     PVD (peripheral vascular disease)     S/P AVR (aortic valve replacement) 8/14/2013    21 mm Medtronic Mosaic ultra porcine valve     Stenosis     Stented coronary artery 4/10/2013    2013 RCA QASIM     Stroke     Multiple mini strokes; decreased left peripheal vision    Type 2 myocardial infarction due to shock 10/28/2022    Type II or unspecified type diabetes mellitus with neurological manifestations, not stated as uncontrolled(250.60)     Type II or unspecified type diabetes mellitus with peripheral circulatory disorders, not stated as uncontrolled(250.70)          Past Surgical History:   Procedure Laterality Date    AORTIC VALVE REPLACEMENT  2013    BILATERAL SALPINGOOPHORECTOMY      BREAST BIOPSY Left     CARDIAC VALVE REPLACEMENT      aortic 2013    CAROTID ENDARTERECTOMY Right     CATARACT EXTRACTION W/  INTRAOCULAR LENS IMPLANT Bilateral     Cataract Surgery      Both eyes    CORONARY ANGIOPLASTY      DENTAL SURGERY      EYE SURGERY      HYSTERECTOMY      TUBAL LIGATION      YAG Bilateral        Time Tracking:     OT Date of Treatment: 11/01/22  OT Start Time: 0918  OT Stop Time: 0944  OT Total Time (min): 26 min  Co-eval/treatment with PT secondary due to comorbidities/ vascular dementia  Billable Minutes:Evaluation 15 min  Self Care/Home Management 11 min    11/1/2022

## 2022-11-01 NOTE — SUBJECTIVE & OBJECTIVE
Interval History: awake and alert, no new complaint. Updated niece by beside, patient medically stable for discharge  Patient on ceftriaxone for Klebsiella bacteremia and UTI. Discharge on Augmentin  Updated patient daughter Becky over the phone, questions and concerns addressed. Becky wants to hold discharge until tomorrow.     Review of Systems   Unable to perform ROS: Dementia   Objective:     Vital Signs (Most Recent):  Temp: 98.7 °F (37.1 °C) (11/01/22 1119)  Pulse: 90 (11/01/22 1232)  Resp: 16 (11/01/22 1157)  BP: (!) 140/75 (11/01/22 1133)  SpO2: 96 % (11/01/22 1157)   Vital Signs (24h Range):  Temp:  [97.6 °F (36.4 °C)-98.7 °F (37.1 °C)] 98.7 °F (37.1 °C)  Pulse:  [84-97] 90  Resp:  [14-18] 16  SpO2:  [94 %-98 %] 96 %  BP: (131-209)/(65-89) 140/75     Weight: 47.6 kg (104 lb 15 oz)  Body mass index is 21.2 kg/m².    Intake/Output Summary (Last 24 hours) at 11/1/2022 1348  Last data filed at 11/1/2022 0800  Gross per 24 hour   Intake 1144.85 ml   Output 2150 ml   Net -1005.15 ml      Physical Exam  Vitals and nursing note reviewed.   Constitutional:       Appearance: She is not ill-appearing.      Comments: Elderly frail appearing female, more awake today   HENT:      Head: Normocephalic and atraumatic.      Mouth/Throat:      Mouth: Mucous membranes are dry.   Eyes:      General: No scleral icterus.  Cardiovascular:      Rate and Rhythm: Normal rate and regular rhythm.      Pulses: Normal pulses.      Heart sounds: Normal heart sounds. No murmur heard.  Pulmonary:      Effort: Pulmonary effort is normal. No respiratory distress.      Breath sounds: Normal breath sounds. No wheezing.   Abdominal:      Palpations: Abdomen is soft.      Tenderness: There is no abdominal tenderness.   Musculoskeletal:      Cervical back: Neck supple.      Right lower leg: No edema.      Left lower leg: No edema.   Skin:     General: Skin is warm and dry.      Coloration: Skin is not pale.      Findings: No bruising.    Neurological:      Mental Status: She is alert.      Comments: Oriented to self, follows simple commands       Significant Labs: A1C:   Recent Labs   Lab 09/09/22  1535 10/27/22  2259   HGBA1C 6.6* 8.9*     ABGs: No results for input(s): PH, PCO2, HCO3, POCSATURATED, BE, TOTALHB, COHB, METHB, O2HB, POCFIO2, PO2 in the last 48 hours.  Blood Culture: No results for input(s): LABBLOO in the last 48 hours.  CBC:   Recent Labs   Lab 10/31/22  0527 11/01/22  0456   WBC 11.97 9.43   HGB 11.9* 12.0   HCT 37.0 36.1*    149*     CMP:   Recent Labs   Lab 10/31/22  0527 11/01/22  0456    148*   K 4.0 2.9*   * 114*   CO2 13* 23   * 102   BUN 44* 28*   CREATININE 1.5* 1.0   CALCIUM 9.1 8.8   ANIONGAP 16 11     Lactic Acid: No results for input(s): LACTATE in the last 48 hours.  Lipase: No results for input(s): LIPASE in the last 48 hours.  Lipid Panel: No results for input(s): CHOL, HDL, LDLCALC, TRIG, CHOLHDL in the last 48 hours.  Magnesium:   Recent Labs   Lab 10/31/22  0527 11/01/22  0456   MG 2.0 1.6     Troponin: No results for input(s): TROPONINI, TROPONINIHS in the last 48 hours.  TSH:   Recent Labs   Lab 10/27/22  2259   TSH 1.699     Urine Culture: No results for input(s): LABURIN in the last 48 hours.  Urine Studies: No results for input(s): COLORU, APPEARANCEUA, PHUR, SPECGRAV, PROTEINUA, GLUCUA, KETONESU, BILIRUBINUA, OCCULTUA, NITRITE, UROBILINOGEN, LEUKOCYTESUR, RBCUA, WBCUA, BACTERIA, SQUAMEPITHEL, HYALINECASTS in the last 48 hours.    Invalid input(s): WRIGHTSUR    Significant Imaging: I have reviewed all pertinent imaging results/findings within the past 24 hours.

## 2022-11-01 NOTE — PLAN OF CARE
Pt continues to be disoriented x4;unable to state neither name, time, place or situation. Slept a solid 4 hours w/o interruption. Frequently needed to be redirected from pulling at IV - achieved distraction by handing her a short  suction tube. Blood glucose continues to be elevated. Covered hs dose with 4 units.   Irrigated urinary catheter once. Urinary output pale yellow and steady w/o clots or debris.Safety maintained throughout shift with bed in low/locked position and  call light within reach.    Problem: Diabetes Comorbidity  Goal: Blood Glucose Level Within Targeted Range  Outcome: Ongoing, Progressing     Problem: Behavior Regulation Impairment (Dementia Signs/Symptoms)  Goal: Improved Behavioral Control (Dementia Signs/Symptoms)  Outcome: Ongoing, Progressing     Problem: Sleep Disturbance (Dementia Signs/Symptoms)  Goal: Improved Sleep (Dementia Signs/Symptoms)  Outcome: Ongoing, Progressing

## 2022-11-01 NOTE — PROGRESS NOTES
"[facility] Medicine  Progress Note    Patient Name: Marlen Arndt  MRN: [id number]  Patient Class: IP- Inpatient   Admission Date: 10/27/2022  Length of Stay: 4 days  Attending Physician: Anita Portillo*  Primary Care Provider: Dominique Rendon MD        Subjective:     Principal Problem:Diabetic ketoacidosis without coma associated with type 2 diabetes mellitus        HPI:  77F T2DM presented to the ER with reports of a "high CBG." Apparently her daughter had been given her insulin and rechecking her glucose q1h WO improvement. Patient was very lethargic upon arrival. Work up in the ER revealed the patient was in DKA. She was given IVF and insulin.  consulted for admission. DW ER MD.       Overview/Hospital Course:  No notes on file    Interval History: awake and alert, no new complaint. Updated niece by beside, patient medically stable for discharge  Patient on ceftriaxone for Klebsiella bacteremia and UTI. Discharge on Augmentin  Updated patient daughter Becky over the phone, questions and concerns addressed. Becky wants to hold discharge until tomorrow.     Review of Systems   Unable to perform ROS: Dementia   Objective:     Vital Signs (Most Recent):  Temp: 98.7 °F (37.1 °C) (11/01/22 1119)  Pulse: 90 (11/01/22 1232)  Resp: 16 (11/01/22 1157)  BP: (!) 140/75 (11/01/22 1133)  SpO2: 96 % (11/01/22 1157)   Vital Signs (24h Range):  Temp:  [97.6 °F (36.4 °C)-98.7 °F (37.1 °C)] 98.7 °F (37.1 °C)  Pulse:  [84-97] 90  Resp:  [14-18] 16  SpO2:  [94 %-98 %] 96 %  BP: (131-209)/(65-89) 140/75     Weight: 47.6 kg (104 lb 15 oz)  Body mass index is 21.2 kg/m².    Intake/Output Summary (Last 24 hours) at 11/1/2022 1348  Last data filed at 11/1/2022 0800  Gross per 24 hour   Intake 1144.85 ml   Output 2150 ml   Net -1005.15 ml      Physical Exam  Vitals and nursing note reviewed.   Constitutional:       Appearance: She is not ill-appearing.      Comments: Elderly frail appearing female, more awake " today   HENT:      Head: Normocephalic and atraumatic.      Mouth/Throat:      Mouth: Mucous membranes are dry.   Eyes:      General: No scleral icterus.  Cardiovascular:      Rate and Rhythm: Normal rate and regular rhythm.      Pulses: Normal pulses.      Heart sounds: Normal heart sounds. No murmur heard.  Pulmonary:      Effort: Pulmonary effort is normal. No respiratory distress.      Breath sounds: Normal breath sounds. No wheezing.   Abdominal:      Palpations: Abdomen is soft.      Tenderness: There is no abdominal tenderness.   Musculoskeletal:      Cervical back: Neck supple.      Right lower leg: No edema.      Left lower leg: No edema.   Skin:     General: Skin is warm and dry.      Coloration: Skin is not pale.      Findings: No bruising.   Neurological:      Mental Status: She is alert.      Comments: Oriented to self, follows simple commands       Significant Labs: A1C:   Recent Labs   Lab 09/09/22  1535 10/27/22  2259   HGBA1C 6.6* 8.9*     ABGs: No results for input(s): PH, PCO2, HCO3, POCSATURATED, BE, TOTALHB, COHB, METHB, O2HB, POCFIO2, PO2 in the last 48 hours.  Blood Culture: No results for input(s): LABBLOO in the last 48 hours.  CBC:   Recent Labs   Lab 10/31/22  0527 11/01/22  0456   WBC 11.97 9.43   HGB 11.9* 12.0   HCT 37.0 36.1*    149*     CMP:   Recent Labs   Lab 10/31/22  0527 11/01/22  0456    148*   K 4.0 2.9*   * 114*   CO2 13* 23   * 102   BUN 44* 28*   CREATININE 1.5* 1.0   CALCIUM 9.1 8.8   ANIONGAP 16 11     Lactic Acid: No results for input(s): LACTATE in the last 48 hours.  Lipase: No results for input(s): LIPASE in the last 48 hours.  Lipid Panel: No results for input(s): CHOL, HDL, LDLCALC, TRIG, CHOLHDL in the last 48 hours.  Magnesium:   Recent Labs   Lab 10/31/22  0527 11/01/22  0456   MG 2.0 1.6     Troponin: No results for input(s): TROPONINI, TROPONINIHS in the last 48 hours.  TSH:   Recent Labs   Lab 10/27/22  2259   TSH 1.699     Urine  Culture: No results for input(s): LABURIN in the last 48 hours.  Urine Studies: No results for input(s): COLORU, APPEARANCEUA, PHUR, SPECGRAV, PROTEINUA, GLUCUA, KETONESU, BILIRUBINUA, OCCULTUA, NITRITE, UROBILINOGEN, LEUKOCYTESUR, RBCUA, WBCUA, BACTERIA, SQUAMEPITHEL, HYALINECASTS in the last 48 hours.    Invalid input(s): WRIGHTSUR    Significant Imaging: I have reviewed all pertinent imaging results/findings within the past 24 hours.      Assessment/Plan:      * Diabetic ketoacidosis without coma associated with type 2 diabetes mellitus  Patient's FSGs are uncontrolled due to hyperglycemia on current medication regimen.  Last A1c reviewed-   Lab Results   Component Value Date    HGBA1C 8.9 (H) 10/27/2022     Most recent fingerstick glucose reviewed-   Recent Labs   Lab 10/31/22  1922 10/31/22  2159 11/01/22  0506 11/01/22  1121   POCTGLUCOSE 369* 325* 102 305*     Current correctional scale  Low  Maintain anti-hyperglycemic dose as follows-   Antihyperglycemics (From admission, onward)    Start     Stop Route Frequency Ordered    10/31/22 1800  insulin detemir U-100 pen 10 Units         -- SubQ 2 times daily 10/31/22 1542    10/31/22 1641  insulin aspart U-100 pen 1-10 Units         -- SubQ Before meals & nightly PRN 10/31/22 1542    10/31/22 1200  insulin aspart U-100 pen 4 Units         -- SubQ 3 times daily with meals 10/31/22 1151        Transition off IV insulin to subcutaneous.  On oral hypoglycemics at home.      Continue basal/bolus regimen.  Up titrate insulin as appropriate.    Gram-negative bacteremia        Palliative care encounter  Palliative care consult given advanced dementia, multiple medical comorbidities and current critical illness.  Patient is a DNR.    After discussion with palliative care team, patient's family has agreed to meet with hospice come passes regarding home services and is requesting hospital bed.      Hemorrhagic shock  Shock likely in setting of gross hematuria.  Suspect  component of septic shock now given she has Gram-negative bacteremia secondary to UTI.    CT abdomen pelvis showed irregular density filling left posterior aspect of bladder which could be neoplasm a blood clot.  Urology on board.  Three way Hill catheter placed with manual irrigation, now with clear urine.  Initial plan of cystoscopy on hold given the urine has cleared per family request.       Weaned off Levophed.      S/p PRBC transfusion with stable hemoglobin.    Blood culture and urine cultures growing Klebsiella pneumoniae pan susceptible except for nitrofurantoin.  Antibiotics de-escalated to ceftriaxone  Blood and urine cx with klebsiella. Repeat blood cx NGTD  Discharge on PO abx          Type 2 myocardial infarction due to shock  Echo showed preserved LVEF.  Cardiology on board.  Medical management      Bladder hemorrhage        Encephalopathy, metabolic  Likely in setting of shock/DKA given advanced dementia        Acute cystitis with hematuria  Urine cx with Klebsiella  On Ceftriaxone and switch to PO on discharge      Abnormal CT scan  Irregular density filling the posterior left lateral aspect of the urinary bladder from the trigone to the midportion.  Correlate for bladder neoplasm or blood clot.  Urology consultation urged. Can consult Uro when she's out of DKA     Constipation with large rectal fecal impaction and suggested early stercoral colitis. Started Zosyn. Prescribed laxatives, stool softeners      Severe atherosclerotic vascular disease throughout the aorta without evidence of aneurysm or dissection. Suspected occlusion of the mid and lower abdominal aorta with possible central vascular stent---> Appears chronic     No evidence of iliac occlusion with severe iliac and femoral atherosclerotic calcifications.     No evidence of mesenteric occlusion.      Leukocytosis              Elevated troponin        Stage 3a chronic kidney disease  HELADIO likely prerenal/ATN in setting shock, DKA    Improving on IV fluids   Continue low-dose maintenance fluids  Renally dose medications, avoid nephrotoxic drugs    Vascular dementia without behavioral disturbance  Resume meds  Fall and delirium precautions       Stenosis of infrarenal abdominal aorta due to atherosclerosis  Appears chronic       Hypertension associated with diabetes  Shock resolved.  Blood pressure not elevated.  Restart antihypertensives as appropriate      VTE Risk Mitigation (From admission, onward)         Ordered     IP VTE HIGH RISK PATIENT  Once         10/27/22 2115     Place CHANO hose  Until discontinued         10/27/22 2115     Place sequential compression device  Until discontinued         10/27/22 2115                Discharge Planning   HUE: 11/1/2022     Code Status: DNR   Is the patient medically ready for discharge?:     Reason for patient still in hospital (select all that apply): Pending disposition  Discharge Plan A: Home with family                  Anita Portillo MD  Department of Hospital Medicine   [facility] Surg

## 2022-11-01 NOTE — ASSESSMENT & PLAN NOTE
Shock likely in setting of gross hematuria.  Suspect component of septic shock now given she has Gram-negative bacteremia secondary to UTI.    CT abdomen pelvis showed irregular density filling left posterior aspect of bladder which could be neoplasm a blood clot.  Urology on board.  Three way Hill catheter placed with manual irrigation, now with clear urine.  Initial plan of cystoscopy on hold given the urine has cleared per family request.       Weaned off Levophed.      S/p PRBC transfusion with stable hemoglobin.    Blood culture and urine cultures growing Klebsiella pneumoniae pan susceptible except for nitrofurantoin.  Antibiotics de-escalated to ceftriaxone  Blood and urine cx with klebsiella. Repeat blood cx NGTD  Discharge on PO abx

## 2022-11-01 NOTE — PLAN OF CARE
Skilled OT evaluation performed as able, see detailed note to follow. Patient presenting with Global Deterioration Scale 6 - moderately to severe dementia, requiring max tactile/ verbal/ visual cues for participation in limited eval/treatment. Patient requiring dependent assist for all ADLs with exception of feeding - requiring max to total assist. Patient is scheduled to d/c home with extensive family support, 24/7 assist, and hospice to follow. Recommended DME to maximize patient's quality of life and to decrease caregiver burden: honorio lift, wheelchair, hospital bed.    Problem: Occupational Therapy  Goal: Occupational Therapy Goal  Description: Goals to be met by: 11/1/2022     Patient will increase functional independence with ADLs by performing:    Feeding with Moderate Assistance in upright supported posture with incorporation of simplified meal set up.    Outcome: Adequate for Care Transition

## 2022-11-01 NOTE — ASSESSMENT & PLAN NOTE
Patient's FSGs are uncontrolled due to hyperglycemia on current medication regimen.  Last A1c reviewed-   Lab Results   Component Value Date    HGBA1C 8.9 (H) 10/27/2022     Most recent fingerstick glucose reviewed-   Recent Labs   Lab 10/31/22  1922 10/31/22  2159 11/01/22  0506 11/01/22  1121   POCTGLUCOSE 369* 325* 102 305*     Current correctional scale  Low  Maintain anti-hyperglycemic dose as follows-   Antihyperglycemics (From admission, onward)    Start     Stop Route Frequency Ordered    10/31/22 1800  insulin detemir U-100 pen 10 Units         -- SubQ 2 times daily 10/31/22 1542    10/31/22 1641  insulin aspart U-100 pen 1-10 Units         -- SubQ Before meals & nightly PRN 10/31/22 1542    10/31/22 1200  insulin aspart U-100 pen 4 Units         -- SubQ 3 times daily with meals 10/31/22 1151        Transition off IV insulin to subcutaneous.  On oral hypoglycemics at home.      Continue basal/bolus regimen.  Up titrate insulin as appropriate.

## 2022-11-01 NOTE — PLAN OF CARE
Discharge orders noted. Plans for patient to discharge home with [facility]. Paperwork signed yesterday per previous CM note. Dr. Tariq stated will need to verify with ID regarding antibiotics at home.  will follow.      rounded on patient in room earlier this morning, sister at bedside.     I contacted Ann-Marie with [facility]. She confirmed paperwork was signed yesterday. She spoke with daughter Becky and she is a nurse at [employer]. Becky had work last night so equipment was not delivered yet. Becky was requesting equipment delivered on Friday, however, Ann-Marie told her patient would likely discharge earlier than that. Becky wanted certain medical questions/problems addressed prior to discharge home.  will contact Becky today to touch base. Will continue to follow.    1152-- left message for daughter Becky regarding discharge to home with hospice today.    1236-- received a call from patient's daughter Becky. Becky reports she did not speak with anyone regarding patient's discharge today. She did not feel comfortable with patient leaving today. Equipment not delivered either. She said tomorrow would be a better day as well. She requested MD team call her. I sent message to Dr. Tariq.    1401-- spoke with Dr. Tariq. She said discharge will be held until tomorrow. I will update nursing staff too. I spoke with Ann-Marie and she said she spoke with patient's daughter and equipment will be delivered to house tomorrow around 9 am-11 am. Plan to discharge patient tomorrow 11/2.    1431-- received call from daughter. She wanted to update me plan is for discharge tomorrow. I told her will contact her tomorrow to ensure equipment is delivered. Daughter concerned how she would transport her mother home. I told Mrs. Pena  will see up transport to house once everything is set up. She verbalized understanding and thanked case  manager.    Patient choice form signed.    Patient Contacts    Name Relation Home Work Mobile   Becky Cloud Daughter [phone]   Moises Arndt Jr Spouse [phone]         Future Appointments   Date Time Provider Department Center   1/4/2023 11:00 AM Erasto Maldonado MD [facility] UROLOGY [facility]      [facility]  [facility] [phone] [address] [place] [address]      Next Steps: Follow up  Appointment:   Instructions: [facility]      11/01/22 1402   Discharge Reassessment   Assessment Type Discharge Planning Reassessment   Discharge Plan discussed with: Adult children   Discharge Plan A Hospice/home   DME Needed Upon Discharge  other (see comments)  (Hospice will provide DME)   Discharge Barriers Identified None   Why the patient remains in the hospital Requires continued medical care   Post-Acute Status   Post-Acute Authorization Hospice   Discharge Delays None known at this time     Ana Llanes RN    [phone]

## 2022-11-02 VITALS
HEIGHT: 59 IN | DIASTOLIC BLOOD PRESSURE: 83 MMHG | TEMPERATURE: 98 F | RESPIRATION RATE: 18 BRPM | BODY MASS INDEX: 21.29 KG/M2 | WEIGHT: 105.63 LBS | HEART RATE: 88 BPM | OXYGEN SATURATION: 97 % | SYSTOLIC BLOOD PRESSURE: 189 MMHG

## 2022-11-02 DIAGNOSIS — E13.10 DIABETIC KETOACIDOSIS WITHOUT COMA ASSOCIATED WITH OTHER SPECIFIED DIABETES MELLITUS: Primary | ICD-10-CM

## 2022-11-02 LAB
ANION GAP SERPL CALC-SCNC: 10 MMOL/L (ref 8–16)
BACTERIA BLD CULT: NORMAL
BACTERIA BLD CULT: NORMAL
BASOPHILS # BLD AUTO: 0.02 K/UL (ref 0–0.2)
BASOPHILS NFR BLD: 0.3 % (ref 0–1.9)
BUN SERPL-MCNC: 18 MG/DL (ref 8–23)
CALCIUM SERPL-MCNC: 8 MG/DL (ref 8.7–10.5)
CHLORIDE SERPL-SCNC: 114 MMOL/L (ref 95–110)
CO2 SERPL-SCNC: 22 MMOL/L (ref 23–29)
CREAT SERPL-MCNC: 0.8 MG/DL (ref 0.5–1.4)
DIFFERENTIAL METHOD: ABNORMAL
EOSINOPHIL # BLD AUTO: 0.2 K/UL (ref 0–0.5)
EOSINOPHIL NFR BLD: 3.1 % (ref 0–8)
ERYTHROCYTE [DISTWIDTH] IN BLOOD BY AUTOMATED COUNT: 13.8 % (ref 11.5–14.5)
EST. GFR  (NO RACE VARIABLE): >60 ML/MIN/1.73 M^2
GLUCOSE SERPL-MCNC: 93 MG/DL (ref 70–110)
HCT VFR BLD AUTO: 33.9 % (ref 37–48.5)
HGB BLD-MCNC: 11.1 G/DL (ref 12–16)
IMM GRANULOCYTES # BLD AUTO: 0.16 K/UL (ref 0–0.04)
IMM GRANULOCYTES NFR BLD AUTO: 2.4 % (ref 0–0.5)
LYMPHOCYTES # BLD AUTO: 1.1 K/UL (ref 1–4.8)
LYMPHOCYTES NFR BLD: 15.9 % (ref 18–48)
MAGNESIUM SERPL-MCNC: 1.4 MG/DL (ref 1.6–2.6)
MCH RBC QN AUTO: 28.9 PG (ref 27–31)
MCHC RBC AUTO-ENTMCNC: 32.7 G/DL (ref 32–36)
MCV RBC AUTO: 88 FL (ref 82–98)
MONOCYTES # BLD AUTO: 0.7 K/UL (ref 0.3–1)
MONOCYTES NFR BLD: 10.4 % (ref 4–15)
NEUTROPHILS # BLD AUTO: 4.6 K/UL (ref 1.8–7.7)
NEUTROPHILS NFR BLD: 67.9 % (ref 38–73)
NRBC BLD-RTO: 0 /100 WBC
PHOSPHATE SERPL-MCNC: 2.1 MG/DL (ref 2.7–4.5)
PLATELET # BLD AUTO: 132 K/UL (ref 150–450)
PMV BLD AUTO: 10.6 FL (ref 9.2–12.9)
POCT GLUCOSE: 171 MG/DL (ref 70–110)
POCT GLUCOSE: 86 MG/DL (ref 70–110)
POCT GLUCOSE: 98 MG/DL (ref 70–110)
POTASSIUM SERPL-SCNC: 3.2 MMOL/L (ref 3.5–5.1)
RBC # BLD AUTO: 3.84 M/UL (ref 4–5.4)
SODIUM SERPL-SCNC: 146 MMOL/L (ref 136–145)
WBC # BLD AUTO: 6.75 K/UL (ref 3.9–12.7)

## 2022-11-02 PROCEDURE — 85025 COMPLETE CBC W/AUTO DIFF WBC: CPT | Performed by: STUDENT IN AN ORGANIZED HEALTH CARE EDUCATION/TRAINING PROGRAM

## 2022-11-02 PROCEDURE — 84100 ASSAY OF PHOSPHORUS: CPT | Performed by: STUDENT IN AN ORGANIZED HEALTH CARE EDUCATION/TRAINING PROGRAM

## 2022-11-02 PROCEDURE — 83735 ASSAY OF MAGNESIUM: CPT | Performed by: STUDENT IN AN ORGANIZED HEALTH CARE EDUCATION/TRAINING PROGRAM

## 2022-11-02 PROCEDURE — 80048 BASIC METABOLIC PNL TOTAL CA: CPT | Performed by: STUDENT IN AN ORGANIZED HEALTH CARE EDUCATION/TRAINING PROGRAM

## 2022-11-02 PROCEDURE — 25000003 PHARM REV CODE 250: Performed by: INTERNAL MEDICINE

## 2022-11-02 PROCEDURE — 25000003 PHARM REV CODE 250: Performed by: STUDENT IN AN ORGANIZED HEALTH CARE EDUCATION/TRAINING PROGRAM

## 2022-11-02 PROCEDURE — 36415 COLL VENOUS BLD VENIPUNCTURE: CPT | Performed by: STUDENT IN AN ORGANIZED HEALTH CARE EDUCATION/TRAINING PROGRAM

## 2022-11-02 PROCEDURE — 94761 N-INVAS EAR/PLS OXIMETRY MLT: CPT

## 2022-11-02 PROCEDURE — 25000003 PHARM REV CODE 250: Performed by: FAMILY MEDICINE

## 2022-11-02 RX ORDER — ACETAMINOPHEN 325 MG/1
650 TABLET ORAL EVERY 4 HOURS PRN
Qty: 30 TABLET | Refills: 0 | Status: SHIPPED | OUTPATIENT
Start: 2022-11-02

## 2022-11-02 RX ORDER — LANOLIN ALCOHOL/MO/W.PET/CERES
400 CREAM (GRAM) TOPICAL DAILY
Status: DISCONTINUED | OUTPATIENT
Start: 2022-11-03 | End: 2022-11-02 | Stop reason: HOSPADM

## 2022-11-02 RX ORDER — AMLODIPINE BESYLATE 10 MG/1
10 TABLET ORAL DAILY
Qty: 30 TABLET | Refills: 11 | Status: SHIPPED | OUTPATIENT
Start: 2022-11-02 | End: 2023-11-02

## 2022-11-02 RX ORDER — MAGNESIUM SULFATE HEPTAHYDRATE 40 MG/ML
2 INJECTION, SOLUTION INTRAVENOUS ONCE
Status: DISCONTINUED | OUTPATIENT
Start: 2022-11-02 | End: 2022-11-02 | Stop reason: HOSPADM

## 2022-11-02 RX ORDER — LOSARTAN POTASSIUM 50 MG/1
50 TABLET ORAL DAILY
Status: DISCONTINUED | OUTPATIENT
Start: 2022-11-02 | End: 2022-11-02 | Stop reason: HOSPADM

## 2022-11-02 RX ORDER — AMOXICILLIN AND CLAVULANATE POTASSIUM 875; 125 MG/1; MG/1
1 TABLET, FILM COATED ORAL 2 TIMES DAILY
Qty: 28 TABLET | Refills: 0 | Status: SHIPPED | OUTPATIENT
Start: 2022-11-02 | End: 2022-11-16

## 2022-11-02 RX ORDER — FAMOTIDINE 20 MG/1
20 TABLET, FILM COATED ORAL DAILY
Qty: 30 TABLET | Refills: 11 | Status: SHIPPED | OUTPATIENT
Start: 2022-11-02 | End: 2023-11-02

## 2022-11-02 RX ORDER — CARVEDILOL 6.25 MG/1
6.25 TABLET ORAL 2 TIMES DAILY
Qty: 60 TABLET | Refills: 11 | Status: SHIPPED | OUTPATIENT
Start: 2022-11-02 | End: 2023-11-02

## 2022-11-02 RX ORDER — POLYETHYLENE GLYCOL 3350 17 G/17G
17 POWDER, FOR SOLUTION ORAL 2 TIMES DAILY PRN
Qty: 1020 G | Refills: 0 | Status: SHIPPED | OUTPATIENT
Start: 2022-11-02

## 2022-11-02 RX ADMIN — SODIUM CHLORIDE: 0.9 INJECTION, SOLUTION INTRAVENOUS at 12:11

## 2022-11-02 RX ADMIN — CARVEDILOL 6.25 MG: 6.25 TABLET, FILM COATED ORAL at 09:11

## 2022-11-02 RX ADMIN — SODIUM PHOSPHATE, MONOBASIC, MONOHYDRATE 20.01 MMOL: 276; 142 INJECTION, SOLUTION INTRAVENOUS at 09:11

## 2022-11-02 RX ADMIN — LOSARTAN POTASSIUM 50 MG: 50 TABLET, FILM COATED ORAL at 09:11

## 2022-11-02 RX ADMIN — POTASSIUM BICARBONATE 25 MEQ: 978 TABLET, EFFERVESCENT ORAL at 09:11

## 2022-11-02 RX ADMIN — FAMOTIDINE 20 MG: 20 TABLET ORAL at 09:11

## 2022-11-02 RX ADMIN — AMLODIPINE BESYLATE 10 MG: 5 TABLET ORAL at 09:11

## 2022-11-02 RX ADMIN — LACTOBACILLUS TAB 4 TABLET: TAB at 09:11

## 2022-11-02 RX ADMIN — MEMANTINE HYDROCHLORIDE 10 MG: 5 TABLET ORAL at 09:11

## 2022-11-02 NOTE — PHYSICIAN QUERY
PT Name: Marlen Arndt  MR #: [id number]    DOCUMENTATION CLARIFICATION      CDS/: SYDNEE Schultz, RN, CDS               Contact information:[email or web address]     This form is a permanent document in the medical record.      Query Date: November 2, 2022    By submitting this query, we are merely seeking further clarification of documentation. Please utilize your independent clinical judgment when addressing the question(s) below.    The Medical Record contains the following:   Indicators  Supporting Clinical Findings Location in Medical Record    Anemia documented     X H&H  Patient now with skylar hematuria and drop in HGB from 14 to 9, currently receiving PRBC     10/27 10/28 10/28 10/28 10/28 10/29 10/29 10/30   Hgb 14.6 13.4 11.6 (L) 9.8 (L) 10.7 (L) 10.5 (L)  10.5 (L) 10.2 (L) 11.5 (L)   Hct 44.5 41.6 35.3 (L) 29.4 (L)  31.3 (L)  34.4 (L)     ROSAURA Davila NP, 10/28       Lab 10/27-10/30   X BP                    HR  Temp:  [96.2 °F (35.7 °C)-98.1 °F (36.7 °C)] 97.1 °F (36.2 °C)  Pulse:  [] 72  Resp:  [11-46] 22  SpO2:  [91 %-100 %] 98 %  BP: ()/() 158/68    Dr. Kwasi ELDER, 10/28    GI bleeding documented     X Acute bleeding (Non GI site)  Hemorrhagic shock-Shock likely in setting of gross hematuria      CT abdomen pelvis showed irregular density filling left posterior aspect of bladder which could be neoplasm a blood clot.     Bladder hemorrhage    Dr. Kwasi ELDER, 10/28   X Transfusion(s)  S/p PRBC transfusion with stable hemoglobin    Dr. Kwasi ELDER, 10/28   X Acute/Chronic illness  DKA, Type 2 Myocardial infarction due to shock, bladder hemorrhage, metabolic encephalopathy, dementia, acute cystitis with hematuria, CKD3a, HELADIO/ATN    Dr. Kwasi ELDER, 10/28   X Treatments  On Levophed   Urology on board.  Three way Hill catheter placed with manual irrigation.    Dr. Kwasi ELDER, 10/28    Other       Provider, please specify diagnosis or diagnoses associated with above clinical  findings.     [ x  ] Acute blood loss anemia    [   ] Anemia, unspecified    [   ] Other Hematological Diagnosis (please specify): _________________   [   ] Clinically Undetermined              Please document in your progress notes daily for the duration of treatment, until resolved, and include in your discharge summary.    Form No. [id number]

## 2022-11-02 NOTE — ASSESSMENT & PLAN NOTE
Patient's FSGs are uncontrolled due to hyperglycemia on current medication regimen.  Last A1c reviewed-   Lab Results   Component Value Date    HGBA1C 8.9 (H) 10/27/2022     Most recent fingerstick glucose reviewed-   Recent Labs   Lab 11/01/22  1914 11/02/22  0511 11/02/22  0851 11/02/22  1116   POCTGLUCOSE 131* 98 86 171*     Current correctional scale  Low  Maintain anti-hyperglycemic dose as follows-   Antihyperglycemics (From admission, onward)    Start     Stop Route Frequency Ordered    10/31/22 1800  insulin detemir U-100 pen 10 Units         -- SubQ 2 times daily 10/31/22 1542    10/31/22 1641  insulin aspart U-100 pen 1-10 Units         -- SubQ Before meals & nightly PRN 10/31/22 1542    10/31/22 1200  insulin aspart U-100 pen 4 Units         -- SubQ 3 times daily with meals 10/31/22 1151        Transition off IV insulin to subcutaneous.  On oral hypoglycemics at home.      Continue basal/bolus regimen.  Up titrate insulin as appropriate.

## 2022-11-02 NOTE — PHYSICIAN QUERY
PT Name: Marlen Arndt  MR #: [id number]    DOCUMENTATION CLARIFICATION      CDS/: SYDNEE Schultz, RN, CDS               Contact information: [email or web address]     This form is a permanent document in the medical record.     Query Date: November 2, 2022    By submitting this query, we are merely seeking further clarification of documentation. Please utilize your independent clinical judgment when addressing the question(s) below.    The Medical Record contains the following:   Indicators   Supporting Clinical Findings Location in Medical Record   X Hypertension associated with diabetes documented  Hypertension associated with diabetes    , Dr. Portillo, 11/1   X Chronic condition(s)  history of type 2 diabetes, CAD, hypertension, hyperlipidemia and prior strokes     ED, Dr. Rankin, 10/27   X Vital signs  Temp:  [97.6 °F (36.4 °C)-98.7 °F (37.1 °C)] 98.7 °F (37.1 °C)  Pulse:  [84-97] 90  Resp:  [14-18] 16  SpO2:  [94 %-98 %] 96 %  BP: (131-209)/(65-89) 140/75    , Dr. Portillo, 11/1   X Treatment/Medication  Restart antihypertensives as appropriate    Dr. Bandar ELDER, 11/1    Other       Provider, please clarify the relationship between the Hypertension and Diabetes Mellitus    [  x ] Hypertension is a manifestation of Diabetes Mellitus (Secondary Hypertension)   [   ]  Hypertension is not a manifestation of Diabetes Mellitus (Essential Hypertension)   [   ] Other Clarification (please specify): ____________   [  ] Clinically Undetermined       Please document in your progress notes daily for the duration of treatment, until resolved, and include in your discharge summary.

## 2022-11-02 NOTE — PLAN OF CARE
Problem: Infection  Goal: Absence of Infection Signs and Symptoms  Outcome: Ongoing, Progressing     Problem: Adult Inpatient Plan of Care  Goal: Plan of Care Review  Outcome: Ongoing, Progressing  Goal: Patient-Specific Goal (Individualized)  Outcome: Ongoing, Progressing  Goal: Absence of Hospital-Acquired Illness or Injury  Outcome: Ongoing, Progressing  Goal: Optimal Comfort and Wellbeing  Outcome: Ongoing, Progressing  Goal: Readiness for Transition of Care  Outcome: Ongoing, Progressing     Problem: Diabetes Comorbidity  Goal: Blood Glucose Level Within Targeted Range  Outcome: Ongoing, Progressing     Problem: Coping Ineffective  Goal: Effective Coping  Outcome: Ongoing, Progressing     Problem: Fall Injury Risk  Goal: Absence of Fall and Fall-Related Injury  Outcome: Ongoing, Progressing     Problem: Skin Injury Risk Increased  Goal: Skin Health and Integrity  Outcome: Ongoing, Progressing     Problem: Impaired Wound Healing  Goal: Optimal Wound Healing  Outcome: Ongoing, Progressing     Problem: Anemia  Goal: Anemia Symptom Improvement  Outcome: Ongoing, Progressing     Problem: Behavior Regulation Impairment (Dementia Signs/Symptoms)  Goal: Improved Behavioral Control (Dementia Signs/Symptoms)  Outcome: Ongoing, Progressing     Problem: Cognitive Impairment (Dementia Signs/Symptoms)  Goal: Optimized Cognitive Function (Dementia Signs/Symptoms)  Outcome: Ongoing, Progressing     Problem: Mood Impairment (Dementia Signs/Symptoms)  Goal: Improved Mood Symptoms (Dementia Signs/Symptoms)  Outcome: Ongoing, Progressing     Problem: Nutrition Imbalance (Dementia Signs/Symptoms)  Goal: Optimized Nutrition Intake (Dementia Signs/Symptoms)  Outcome: Ongoing, Progressing     Problem: Sleep Disturbance (Dementia Signs/Symptoms)  Goal: Improved Sleep (Dementia Signs/Symptoms)  Outcome: Ongoing, Progressing     Problem: Social or Functional Impairment (Dementia Signs/Symptoms)  Goal: Enhanced Social or Functional  Skills and Ability (Dementia Signs/Symptoms)  Outcome: Ongoing, Progressing     Problem: Constipation  Goal: Effective Bowel Elimination  Outcome: Ongoing, Progressing     Problem: Electrolyte Imbalance  Goal: Electrolyte Balance  Outcome: Ongoing, Progressing     Problem: Restraint, Nonbehavioral (Nonviolent)  Goal: Absence of Harm or Injury  Outcome: Ongoing, Progressing

## 2022-11-02 NOTE — NURSING
Patient discharged per orders. Tele monitor removed. IV removed without complication and cath tip intact. Urethral cath removed per orders. Patient received medications from pharmacy. Patient belongings accounted for. Transportation provided by [facility].

## 2022-11-02 NOTE — DISCHARGE SUMMARY
"[facility] Medicine  Discharge Summary      Patient Name: Marlen Arndt  MRN: [id number]  FLORENCIO: [id number]  Patient Class: IP- Inpatient  Admission Date: 10/27/2022  Hospital Length of Stay: 5 days  Discharge Date and Time: 11/2/2022 12:18 PM  Attending Physician: Anita Portillo*   Discharging Provider: Anita Portillo MD  Primary Care Provider: Dominique Rendon MD    Primary Care Team: Networked reference to record PCT     HPI:   77F T2DM presented to the ER with reports of a "high CBG." Apparently her daughter had been given her insulin and rechecking her glucose q1h WO improvement. Patient was very lethargic upon arrival. Work up in the ER revealed the patient was in DKA. She was given IVF and insulin.  consulted for admission. CANDICE ER MD.       Procedure(s) (LRB):  CYSTOSCOPY, WITH BLADDER BIOPSY, WITH FULGURATION IF INDICATED (N/A)      Hospital Course:   No notes on file     Goals of Care Treatment Preferences:  Code Status: DNR    Health care agent: Moises Arndt  [facility] agent number: [phone]    Living Will: Yes     What is most important right now is to focus on improvement in condition but with limits to invasive therapies.  Accordingly, we have decided that the best plan to meet the patient's goals includes continuing with treatment.      Consults:   Consults (From admission, onward)        Status Ordering Provider     Inpatient consult to Registered Dietitian/Nutritionist  Once        Provider:  (Not yet assigned)    Completed HERBERT DAVIDSON     Inpatient consult to Cardiology-[facility]  Once        Provider:  (Not yet assigned)    Completed HERBERT DAVIDSON     Inpatient consult to Anesthesiology  Once        Provider:  (Not yet assigned)    Acknowledged SIMA LEIGH     Inpatient consult to Palliative Care  Once        Provider:  (Not yet assigned)    Completed ROMEL GONZÁLES     Inpatient consult to Urology  Once        Provider:  (Not yet assigned) "    Completed ROMEL GONZÁLES     Inpatient consult to Diabetes educator  Once        Provider:  (Not yet assigned)    Acknowledged GRAEME DEWITT     Inpatient consult to Social Work/Case Management  Once        Provider:  (Not yet assigned)    Acknowledged GRAEME DEWITT     Inpatient consult to Endocrinology  Once        Provider:  (Not yet assigned)    Acknowledged GRAEME DEWITT     Inpatient consult to Social Work/Case Management  Once        Provider:  (Not yet assigned)    Acknowledged GRAEME DEWITT     Inpatient consult to Registered Dietitian/Nutritionist  Once        Provider:  (Not yet assigned)    Completed GRAEME DEWITT          * Diabetic ketoacidosis without coma associated with type 2 diabetes mellitus  Patient's FSGs are uncontrolled due to hyperglycemia on current medication regimen.  Last A1c reviewed-   Lab Results   Component Value Date    HGBA1C 8.9 (H) 10/27/2022     Most recent fingerstick glucose reviewed-   Recent Labs   Lab 11/01/22  1914 11/02/22  0511 11/02/22  0851 11/02/22  1116   POCTGLUCOSE 131* 98 86 171*     Current correctional scale  Low  Maintain anti-hyperglycemic dose as follows-   Antihyperglycemics (From admission, onward)    Start     Stop Route Frequency Ordered    10/31/22 1800  insulin detemir U-100 pen 10 Units         -- SubQ 2 times daily 10/31/22 1542    10/31/22 1641  insulin aspart U-100 pen 1-10 Units         -- SubQ Before meals & nightly PRN 10/31/22 1542    10/31/22 1200  insulin aspart U-100 pen 4 Units         -- SubQ 3 times daily with meals 10/31/22 1151        Transition off IV insulin to subcutaneous.  On oral hypoglycemics at home.      Continue basal/bolus regimen.  Up titrate insulin as appropriate.    Palliative care encounter  Palliative care consult given advanced dementia, multiple medical comorbidities and current critical illness.  Patient is a DNR.    After discussion with palliative care team,  patient's family has agreed to meet with hospice come passes regarding home services and is requesting hospital bed.      Hemorrhagic shock  Shock likely in setting of gross hematuria.  Suspect component of septic shock now given she has Gram-negative bacteremia secondary to UTI.    CT abdomen pelvis showed irregular density filling left posterior aspect of bladder which could be neoplasm a blood clot.  Urology on board.  Three way Hill catheter placed with manual irrigation, now with clear urine.  Initial plan of cystoscopy on hold given the urine has cleared per family request.       Weaned off Levophed.      S/p PRBC transfusion with stable hemoglobin.    Blood culture and urine cultures growing Klebsiella pneumoniae pan susceptible except for nitrofurantoin.  Antibiotics de-escalated to ceftriaxone  Blood and urine cx with klebsiella. Repeat blood cx NGTD  Discharge on PO abx          Type 2 myocardial infarction due to shock  Echo showed preserved LVEF.  Cardiology on board.  Medical management      Encephalopathy, metabolic  Likely in setting of shock/DKA given advanced dementia        Acute cystitis with hematuria  Urine cx with Klebsiella  On Ceftriaxone and switch to PO on discharge      Abnormal CT scan  Irregular density filling the posterior left lateral aspect of the urinary bladder from the trigone to the midportion.  Correlate for bladder neoplasm or blood clot.  Urology consultation urged. Can consult Uro when she's out of DKA     Constipation with large rectal fecal impaction and suggested early stercoral colitis. Started Zosyn. Prescribed laxatives, stool softeners      Severe atherosclerotic vascular disease throughout the aorta without evidence of aneurysm or dissection. Suspected occlusion of the mid and lower abdominal aorta with possible central vascular stent---> Appears chronic     No evidence of iliac occlusion with severe iliac and femoral atherosclerotic calcifications.     No evidence  of mesenteric occlusion.      Stage 3a chronic kidney disease  HELADIO likely prerenal/ATN in setting shock, DKA   Improving on IV fluids   Continue low-dose maintenance fluids  Renally dose medications, avoid nephrotoxic drugs    Vascular dementia without behavioral disturbance  Resume meds  Fall and delirium precautions       Stenosis of infrarenal abdominal aorta due to atherosclerosis  Appears chronic       Hypertension associated with diabetes  Shock resolved.  Blood pressure not elevated.  Restart antihypertensives as appropriate      Final Active Diagnoses:    Diagnosis Date Noted POA    PRINCIPAL PROBLEM:  Diabetic ketoacidosis without coma associated with type 2 diabetes mellitus [E11.10] 10/27/2022 Yes    Gram-negative bacteremia [R78.81] 10/29/2022 Yes    Gross hematuria [R31.0] 10/28/2022 Yes    Bladder hemorrhage [N32.89] 10/28/2022 Yes    Type 2 myocardial infarction due to shock [R57.9, I21.A1] 10/28/2022 Yes    Hemorrhagic shock [R57.8] 10/28/2022 Yes    Palliative care encounter [Z51.5] 10/28/2022 Not Applicable    Elevated troponin [R77.8] 10/27/2022 Yes    Leukocytosis [D72.829] 10/27/2022 Yes    Abnormal CT scan [R93.89] 10/27/2022 Yes    Acute cystitis with hematuria [N30.01] 10/27/2022 Yes    Encephalopathy, metabolic [G93.41] 10/27/2022 Yes    Stage 3a chronic kidney disease [N18.31] 09/13/2022 Yes    Vascular dementia without behavioral disturbance [F01.50] 08/22/2017 Yes    Stenosis of infrarenal abdominal aorta due to atherosclerosis [I70.0] 04/15/2015 Yes    Hypertension associated with diabetes [E11.59, I15.2] 07/24/2012 Yes     Chronic      Problems Resolved During this Admission:    Diagnosis Date Noted Date Resolved POA    Cardiogenic shock [R57.0] 10/28/2022 10/28/2022 Yes       Discharged Condition: stable    Disposition: Hospice/Home    Follow Up:   Follow-up Information     [facility]    Specialty: Hospice Services  Why: Hospice  Company  Contact information:  [address]  [address]  [place] [address]  [phone]                       Patient Instructions:      Diet diabetic     Diet Cardiac     Activity as tolerated     Activity as tolerated         Pending Diagnostic Studies:     Procedure Component Value Units Date/Time    COVID-19 Routine Screening [[id number]] Collected: 10/28/22 0230    Order Status: Sent Lab Status: In process Updated: 10/28/22 0502    Specimen: Nasopharyngeal          Medications:  Reconciled Home Medications:      Medication List      START taking these medications    amLODIPine 10 MG tablet  Commonly known as: NORVASC  Take 1 tablet (10 mg total) by mouth once daily.     amoxicillin-clavulanate 875-125mg 875-125 mg per tablet  Commonly known as: AUGMENTIN  Take 1 tablet by mouth 2 (two) times daily. for 14 days     carvediloL 6.25 MG tablet  Commonly known as: COREG  1 tablet (6.25 mg total) by Per G Tube route 2 (two) times daily.     CLEARLAX 17 gram/dose powder  Generic drug: polyethylene glycol  Take 17 g by mouth 2 (two) times daily as needed.     famotidine 20 MG tablet  Commonly known as: PEPCID  Take 1 tablet (20 mg total) by mouth once daily.     KLOR-CON/EF disintegrating tablet  Generic drug: potassium bicarbonate  Take 1 tablet (25 mEq total) by mouth once daily.     LEVEMIR FLEXTOUCH U-100 INSULN 100 unit/mL (3 mL) Inpn pen  Generic drug: insulin detemir U-100  Inject 10 Units into the skin 2 (two) times daily.        CHANGE how you take these medications    acetaminophen 325 MG tablet  Commonly known as: TYLENOL  Take 2 tablets (650 mg total) by mouth every 4 (four) hours as needed.  What changed:   · how much to take  · when to take this  · additional instructions        CONTINUE taking these medications    ascorbic acid (vitamin C) 1000 MG tablet  Commonly known as: VITAMIN C     aspirin 81 MG Chew  Take 81 mg by mouth once daily.     blood-glucose meter Misc  2 each by Misc.(Non-Drug; Combo  "Route) route 2 (two) times daily.     coenzyme Q10 100 mg capsule  Take 100 mg by mouth Daily. 1 Capsule Oral Every day     DAILY MULTIVITAMIN per tablet  Generic drug: multivitamin  Take 1 tablet by mouth Daily. 1 Tablet Oral Every day     donepeziL 10 MG tablet  Commonly known as: ARICEPT  TAKE 1 TABLET BY MOUTH ONCE DAILY IN THE EVENING     ergocalciferol 50,000 unit Cap  Commonly known as: ERGOCALCIFEROL  Take 1 capsule by mouth once a week     ferrous sulfate 325 mg (65 mg iron) Tab tablet  Commonly known as: FEOSOL  Take 1 tablet (325 mg total) by mouth daily with breakfast.     furosemide 20 MG tablet  Commonly known as: LASIX  1 tab po q day prn swelling     glipiZIDE 10 MG Tr24  Commonly known as: GLUCOTROL  Take 1 tablet (10 mg total) by mouth daily with breakfast.     lancets 30 gauge Misc  1 lancet by Misc.(Non-Drug; Combo Route) route 4 (four) times daily.     losartan 50 MG tablet  Commonly known as: COZAAR  Take 1 tablet by mouth once daily     magnesium 200 mg Tab  Take 200 mg by mouth once daily.     memantine 10 MG Tab  Commonly known as: NAMENDA  Take 1 tablet by mouth twice daily     nitroGLYCERIN 0.4 MG SL tablet  Commonly known as: NITROSTAT  Place 1 tablet (0.4 mg total) under the tongue every 5 (five) minutes as needed for Chest pain.     omega-3 fatty acids-vitamin E 1,000 mg Cap     rosuvastatin 10 MG tablet  Commonly known as: CRESTOR  TAKE 1 TABLET BY MOUTH ON MONDAY, WEDNESDAY AND FRIDAY     TRUE METRIX GLUCOSE TEST STRIP Strp  Generic drug: blood sugar diagnostic  1 strip by Misc.(Non-Drug; Combo Route) route 4 (four) times daily.        ASK your doctor about these medications    BD ULTRA-FINE SHORT PEN NEEDLE 31 gauge x 5/16" Ndle  Generic drug: pen needle, diabetic  use as directed with insulin            Indwelling Lines/Drains at time of discharge:   Lines/Drains/Airways     Drain  Duration                Urethral Catheter 10/29/22 1210 Straight-tip 18 Fr. 3 days                Time " spent on the discharge of patient: 35 minutes         Anitafabian Portillo MD  Department of Hospital Medicine  [facility]

## 2022-11-02 NOTE — PROGRESS NOTES
"[facility] Medicine  Progress Note    Patient Name: Marlen Arndt  MRN: [id number]  Patient Class: IP- Inpatient   Admission Date: 10/27/2022  Length of Stay: 5 days  Attending Physician: Anita Portillo*  Primary Care Provider: Dominique Rendon MD        Subjective:     Principal Problem:Diabetic ketoacidosis without coma associated with type 2 diabetes mellitus        HPI:  77F T2DM presented to the ER with reports of a "high CBG." Apparently her daughter had been given her insulin and rechecking her glucose q1h WO improvement. Patient was very lethargic upon arrival. Work up in the ER revealed the patient was in DKA. She was given IVF and insulin.  consulted for admission. DW ER MD.       Overview/Hospital Course:  No notes on file    Interval History: sleepy, but arousable.   Patient medically stable for discharge  Patient on ceftriaxone for Klebsiella bacteremia and UTI. Discharge on Augmentin      Review of Systems   Unable to perform ROS: Dementia   Objective:     Vital Signs (Most Recent):  Temp: 98.2 °F (36.8 °C) (11/02/22 1108)  Pulse: 88 (11/02/22 1108)  Resp: 18 (11/02/22 1108)  BP: (!) 189/83 (11/02/22 1108)  SpO2: 97 % (11/02/22 1108)   Vital Signs (24h Range):  Temp:  [97.3 °F (36.3 °C)-98.2 °F (36.8 °C)] 98.2 °F (36.8 °C)  Pulse:  [81-90] 88  Resp:  [16-18] 18  SpO2:  [95 %-98 %] 97 %  BP: (127-189)/(60-97) 189/83     Weight: 47.9 kg (105 lb 9.6 oz)  Body mass index is 21.33 kg/m².    Intake/Output Summary (Last 24 hours) at 11/2/2022 1126  Last data filed at 11/2/2022 0536  Gross per 24 hour   Intake 1275.02 ml   Output 1375 ml   Net -99.98 ml        Physical Exam  Vitals and nursing note reviewed.   Constitutional:       Appearance: She is not ill-appearing.      Comments: Elderly frail appearing female, more awake today   HENT:      Head: Normocephalic and atraumatic.      Mouth/Throat:      Mouth: Mucous membranes are dry.   Eyes:      General: No scleral " icterus.  Cardiovascular:      Rate and Rhythm: Normal rate and regular rhythm.      Pulses: Normal pulses.      Heart sounds: Normal heart sounds. No murmur heard.  Pulmonary:      Effort: Pulmonary effort is normal. No respiratory distress.      Breath sounds: Normal breath sounds. No wheezing.   Abdominal:      Palpations: Abdomen is soft.      Tenderness: There is no abdominal tenderness.   Musculoskeletal:      Cervical back: Neck supple.      Right lower leg: No edema.      Left lower leg: No edema.   Skin:     General: Skin is warm and dry.      Coloration: Skin is not pale.      Findings: No bruising.   Neurological:      Mental Status: She is alert.      Comments: Oriented to self, follows simple commands       Significant Labs: A1C:   Recent Labs   Lab 09/09/22  1535 10/27/22  2259   HGBA1C 6.6* 8.9*       ABGs: No results for input(s): PH, PCO2, HCO3, POCSATURATED, BE, TOTALHB, COHB, METHB, O2HB, POCFIO2, PO2 in the last 48 hours.  Blood Culture: No results for input(s): LABBLOO in the last 48 hours.  CBC:   Recent Labs   Lab 11/01/22 0456 11/02/22  0509   WBC 9.43 6.75   HGB 12.0 11.1*   HCT 36.1* 33.9*   * 132*       CMP:   Recent Labs   Lab 11/01/22  0456 11/02/22  0509   * 146*   K 2.9* 3.2*   * 114*   CO2 23 22*    93   BUN 28* 18   CREATININE 1.0 0.8   CALCIUM 8.8 8.0*   ANIONGAP 11 10       Lactic Acid: No results for input(s): LACTATE in the last 48 hours.  Lipase: No results for input(s): LIPASE in the last 48 hours.  Lipid Panel: No results for input(s): CHOL, HDL, LDLCALC, TRIG, CHOLHDL in the last 48 hours.  Magnesium:   Recent Labs   Lab 11/01/22  0456 11/02/22  0509   MG 1.6 1.4*       Troponin: No results for input(s): TROPONINI, TROPONINIHS in the last 48 hours.  TSH:   Recent Labs   Lab 10/27/22  2259   TSH 1.699       Urine Culture: No results for input(s): LABURIN in the last 48 hours.  Urine Studies: No results for input(s): COLORU, APPEARANCEUA, PHUR,  SPECGRAV, PROTEINUA, GLUCUA, KETONESU, BILIRUBINUA, OCCULTUA, NITRITE, UROBILINOGEN, LEUKOCYTESUR, RBCUA, WBCUA, BACTERIA, SQUAMEPITHEL, HYALINECASTS in the last 48 hours.    Invalid input(s): CHRIS    Significant Imaging: I have reviewed all pertinent imaging results/findings within the past 24 hours.      Assessment/Plan:      * Diabetic ketoacidosis without coma associated with type 2 diabetes mellitus  Patient's FSGs are uncontrolled due to hyperglycemia on current medication regimen.  Last A1c reviewed-   Lab Results   Component Value Date    HGBA1C 8.9 (H) 10/27/2022     Most recent fingerstick glucose reviewed-   Recent Labs   Lab 11/01/22  1914 11/02/22  0511 11/02/22  0851 11/02/22  1116   POCTGLUCOSE 131* 98 86 171*     Current correctional scale  Low  Maintain anti-hyperglycemic dose as follows-   Antihyperglycemics (From admission, onward)    Start     Stop Route Frequency Ordered    10/31/22 1800  insulin detemir U-100 pen 10 Units         -- SubQ 2 times daily 10/31/22 1542    10/31/22 1641  insulin aspart U-100 pen 1-10 Units         -- SubQ Before meals & nightly PRN 10/31/22 1542    10/31/22 1200  insulin aspart U-100 pen 4 Units         -- SubQ 3 times daily with meals 10/31/22 1151        Transition off IV insulin to subcutaneous.  On oral hypoglycemics at home.      Continue basal/bolus regimen.  Up titrate insulin as appropriate.    Gram-negative bacteremia        Palliative care encounter  Palliative care consult given advanced dementia, multiple medical comorbidities and current critical illness.  Patient is a DNR.    After discussion with palliative care team, patient's family has agreed to meet with hospice come passes regarding home services and is requesting hospital bed.      Hemorrhagic shock  Shock likely in setting of gross hematuria.  Suspect component of septic shock now given she has Gram-negative bacteremia secondary to UTI.    CT abdomen pelvis showed irregular density filling  left posterior aspect of bladder which could be neoplasm a blood clot.  Urology on board.  Three way Hill catheter placed with manual irrigation, now with clear urine.  Initial plan of cystoscopy on hold given the urine has cleared per family request.       Weaned off Levophed.      S/p PRBC transfusion with stable hemoglobin.    Blood culture and urine cultures growing Klebsiella pneumoniae pan susceptible except for nitrofurantoin.  Antibiotics de-escalated to ceftriaxone  Blood and urine cx with klebsiella. Repeat blood cx NGTD  Discharge on PO abx          Type 2 myocardial infarction due to shock  Echo showed preserved LVEF.  Cardiology on board.  Medical management      Bladder hemorrhage        Encephalopathy, metabolic  Likely in setting of shock/DKA given advanced dementia        Acute cystitis with hematuria  Urine cx with Klebsiella  On Ceftriaxone and switch to PO on discharge      Abnormal CT scan  Irregular density filling the posterior left lateral aspect of the urinary bladder from the trigone to the midportion.  Correlate for bladder neoplasm or blood clot.  Urology consultation urged. Can consult Uro when she's out of DKA     Constipation with large rectal fecal impaction and suggested early stercoral colitis. Started Zosyn. Prescribed laxatives, stool softeners      Severe atherosclerotic vascular disease throughout the aorta without evidence of aneurysm or dissection. Suspected occlusion of the mid and lower abdominal aorta with possible central vascular stent---> Appears chronic     No evidence of iliac occlusion with severe iliac and femoral atherosclerotic calcifications.     No evidence of mesenteric occlusion.      Leukocytosis              Elevated troponin        Stage 3a chronic kidney disease  HELADIO likely prerenal/ATN in setting shock, DKA   Improving on IV fluids   Continue low-dose maintenance fluids  Renally dose medications, avoid nephrotoxic drugs    Vascular dementia without  behavioral disturbance  Resume meds  Fall and delirium precautions       Stenosis of infrarenal abdominal aorta due to atherosclerosis  Appears chronic       Hypertension associated with diabetes  Shock resolved.  Blood pressure not elevated.  Restart antihypertensives as appropriate      VTE Risk Mitigation (From admission, onward)         Ordered     IP VTE HIGH RISK PATIENT  Once         10/27/22 2115     Place CHANO hose  Until discontinued         10/27/22 2115     Place sequential compression device  Until discontinued         10/27/22 2115                Discharge Planning   HUE: 11/2/2022     Code Status: DNR   Is the patient medically ready for discharge?:     Reason for patient still in hospital (select all that apply): Pending disposition  Discharge Plan A: Hospice/home   Discharge Delays: None known at this time              Anita Portillo MD  Department of Hospital Medicine   [facility]

## 2022-11-02 NOTE — SUBJECTIVE & OBJECTIVE
Interval History: sleepy, but arousable.   Patient medically stable for discharge  Patient on ceftriaxone for Klebsiella bacteremia and UTI. Discharge on Augmentin      Review of Systems   Unable to perform ROS: Dementia   Objective:     Vital Signs (Most Recent):  Temp: 98.2 °F (36.8 °C) (11/02/22 1108)  Pulse: 88 (11/02/22 1108)  Resp: 18 (11/02/22 1108)  BP: (!) 189/83 (11/02/22 1108)  SpO2: 97 % (11/02/22 1108)   Vital Signs (24h Range):  Temp:  [97.3 °F (36.3 °C)-98.2 °F (36.8 °C)] 98.2 °F (36.8 °C)  Pulse:  [81-90] 88  Resp:  [16-18] 18  SpO2:  [95 %-98 %] 97 %  BP: (127-189)/(60-97) 189/83     Weight: 47.9 kg (105 lb 9.6 oz)  Body mass index is 21.33 kg/m².    Intake/Output Summary (Last 24 hours) at 11/2/2022 1126  Last data filed at 11/2/2022 0536  Gross per 24 hour   Intake 1275.02 ml   Output 1375 ml   Net -99.98 ml        Physical Exam  Vitals and nursing note reviewed.   Constitutional:       Appearance: She is not ill-appearing.      Comments: Elderly frail appearing female, more awake today   HENT:      Head: Normocephalic and atraumatic.      Mouth/Throat:      Mouth: Mucous membranes are dry.   Eyes:      General: No scleral icterus.  Cardiovascular:      Rate and Rhythm: Normal rate and regular rhythm.      Pulses: Normal pulses.      Heart sounds: Normal heart sounds. No murmur heard.  Pulmonary:      Effort: Pulmonary effort is normal. No respiratory distress.      Breath sounds: Normal breath sounds. No wheezing.   Abdominal:      Palpations: Abdomen is soft.      Tenderness: There is no abdominal tenderness.   Musculoskeletal:      Cervical back: Neck supple.      Right lower leg: No edema.      Left lower leg: No edema.   Skin:     General: Skin is warm and dry.      Coloration: Skin is not pale.      Findings: No bruising.   Neurological:      Mental Status: She is alert.      Comments: Oriented to self, follows simple commands       Significant Labs: A1C:   Recent Labs   Lab 09/09/22  1535  10/27/22  2259   HGBA1C 6.6* 8.9*       ABGs: No results for input(s): PH, PCO2, HCO3, POCSATURATED, BE, TOTALHB, COHB, METHB, O2HB, POCFIO2, PO2 in the last 48 hours.  Blood Culture: No results for input(s): LABBLOO in the last 48 hours.  CBC:   Recent Labs   Lab 11/01/22 0456 11/02/22 0509   WBC 9.43 6.75   HGB 12.0 11.1*   HCT 36.1* 33.9*   * 132*       CMP:   Recent Labs   Lab 11/01/22 0456 11/02/22 0509   * 146*   K 2.9* 3.2*   * 114*   CO2 23 22*    93   BUN 28* 18   CREATININE 1.0 0.8   CALCIUM 8.8 8.0*   ANIONGAP 11 10       Lactic Acid: No results for input(s): LACTATE in the last 48 hours.  Lipase: No results for input(s): LIPASE in the last 48 hours.  Lipid Panel: No results for input(s): CHOL, HDL, LDLCALC, TRIG, CHOLHDL in the last 48 hours.  Magnesium:   Recent Labs   Lab 11/01/22 0456 11/02/22 0509   MG 1.6 1.4*       Troponin: No results for input(s): TROPONINI, TROPONINIHS in the last 48 hours.  TSH:   Recent Labs   Lab 10/27/22  2259   TSH 1.699       Urine Culture: No results for input(s): LABURIN in the last 48 hours.  Urine Studies: No results for input(s): COLORU, APPEARANCEUA, PHUR, SPECGRAV, PROTEINUA, GLUCUA, KETONESU, BILIRUBINUA, OCCULTUA, NITRITE, UROBILINOGEN, LEUKOCYTESUR, RBCUA, WBCUA, BACTERIA, SQUAMEPITHEL, HYALINECASTS in the last 48 hours.    Invalid input(s): CHRIS    Significant Imaging: I have reviewed all pertinent imaging results/findings within the past 24 hours.

## 2022-11-02 NOTE — PLAN OF CARE
Pt for d/c to home with hospice today   CM called and spoke with daughter Becky   - confirmed that dme will be delivered between 9a-11a --- Stretcher transport for pt set up for 12N     CM confirmed pt's d/c today at 12N with [facility]         11/02/22 0912   Final Note   Assessment Type Final Discharge Note   Anticipated Discharge Disposition [facility]  ([facility])   What phone number can be called within the next 1-3 days to see how you are doing after discharge? [phone]   Hospital Resources/Appts/Education Provided Post-Acute resouces added to AVS   Post-Acute Status   Post-Acute Authorization Hospice   Hospice Status Set-up Complete/Auth obtained   Discharge Delays None known at this time

## 2023-01-30 PROBLEM — R57.9 TYPE 2 MYOCARDIAL INFARCTION DUE TO SHOCK: Status: RESOLVED | Noted: 2022-10-28 | Resolved: 2023-01-30

## 2023-01-30 PROBLEM — I21.A1 TYPE 2 MYOCARDIAL INFARCTION DUE TO SHOCK: Status: RESOLVED | Noted: 2022-10-28 | Resolved: 2023-01-30

## 2023-01-30 PROBLEM — E11.10 DIABETIC KETOACIDOSIS WITHOUT COMA ASSOCIATED WITH TYPE 2 DIABETES MELLITUS: Status: RESOLVED | Noted: 2022-10-27 | Resolved: 2023-01-30

## 2023-02-10 ENCOUNTER — PATIENT OUTREACH (OUTPATIENT)
Dept: ADMINISTRATIVE | Facility: HOSPITAL | Age: 78
End: 2023-02-10
Payer: MEDICARE

## 2023-02-10 NOTE — PROGRESS NOTES
Health Maintenance Due   Topic Date Due    Shingles Vaccine (2 of 3) 01/07/2016    Diabetes Urine Screening  06/21/2018    DEXA Scan  08/30/2020    Eye Exam  07/02/2022    COVID-19 Vaccine (4 - Booster for Pfizer series) 09/16/2022    Hemoglobin A1c  01/27/2023       HM updated. Triggered LINKS and Care Everywhere. Chart reviewed For [employer] report.    Vee Pritchett LPN   Clinical Care Coordinator  [facility] Care [facility] Wellness

## 2023-09-07 NOTE — PROGRESS NOTES
Subjective:       Patient ID: June B Yris is a 71 y.o. female.    Chief Complaint: Follow-up (Diabetes mellitus with peripheral vascular disease ) and Cough (x 3 weeks green mucus )    HPI   3 weeks of congested in head x 3 weeks.  Blowing clear mucus.  Coughing up green x 10 days.  No fever chills.  coughing has worsened.  No cp.  Sob at times, with coughing.      Diabetes is uncontrolled.  Readings vary.  108 this am, but ofter elevated.  Taking 12 units Levemir.  Taking 9 units novolog.          Review of Systems   Constitutional: Negative for fever and unexpected weight change.   HENT: Positive for postnasal drip, rhinorrhea and sinus pressure. Negative for congestion, dental problem, sore throat, trouble swallowing and voice change.    Eyes: Negative for pain, discharge and visual disturbance.   Respiratory: Positive for cough. Negative for chest tightness, shortness of breath and wheezing.    Cardiovascular: Negative for chest pain and leg swelling.   Gastrointestinal: Negative for abdominal pain, constipation, diarrhea and nausea.   Genitourinary: Negative for difficulty urinating, dysuria and hematuria.   Skin: Negative for rash.   Neurological: Negative for headaches.   Psychiatric/Behavioral: Negative for dysphoric mood and sleep disturbance. The patient is not nervous/anxious.        Objective:      Physical Exam   Constitutional: She is oriented to person, place, and time. She appears well-developed and well-nourished. No distress.   HENT:   Head: Normocephalic and atraumatic.   Mouth/Throat: Oropharynx is clear and moist. No oropharyngeal exudate.   Tm's clear.  Audible nasal congestion   Neck: Neck supple.   Cardiovascular: Normal rate and regular rhythm.    Pulmonary/Chest: Effort normal and breath sounds normal. No respiratory distress. She has no wheezes. She has no rales.   Lymphadenopathy:     She has no cervical adenopathy.   Neurological: She is alert and oriented to person, place, and time.    Psychiatric: She has a normal mood and affect. Her behavior is normal.       Assessment:       1. Dyslipidemia associated with type 2 diabetes mellitus    2. Diabetes mellitus with peripheral vascular disease    3. Type 2 diabetes mellitus with hyperlipidemia    4. Essential hypertension    5. Type 2 diabetes mellitus with diabetic neuropathy, with long-term current use of insulin    6. Bacterial sinusitis        Plan:       Marlen was seen today for follow-up and cough.    Diagnoses and all orders for this visit:    Dyslipidemia associated with type 2 diabetes mellitus  -     hydrochlorothiazide (HYDRODIURIL) 12.5 MG Tab; Take 1 tablet (12.5 mg total) by mouth once daily.    Diabetes mellitus with peripheral vascular disease    Type 2 diabetes mellitus with hyperlipidemia  -     Ambulatory Referral to Diabetes Education    Essential hypertension  -     hydrochlorothiazide (HYDRODIURIL) 12.5 MG Tab; Take 1 tablet (12.5 mg total) by mouth once daily.    Type 2 diabetes mellitus with diabetic neuropathy, with long-term current use of insulin  -     hydrochlorothiazide (HYDRODIURIL) 12.5 MG Tab; Take 1 tablet (12.5 mg total) by mouth once daily.    Bacterial sinusitis    Other orders  -     doxycycline (MONODOX) 100 MG capsule; Take 1 capsule (100 mg total) by mouth 2 (two) times daily.  -     fluticasone (FLONASE) 50 mcg/actuation nasal spray; 2 sprays by Each Nare route once daily.          Post-Care Instructions: I reviewed with the patient in detail post-care instructions. Patient is to wear sunprotection, and avoid picking at any of the treated lesions. Pt may apply Vaseline to crusted or scabbing areas. Detail Level: Detailed Anesthesia Volume In Cc: 0.5 Consent: The patient's consent was obtained including but not limited to risks of crusting, scabbing, blistering, scarring, darker or lighter pigmentary change, recurrence, incomplete removal and infection. Price (Use Numbers Only, No Special Characters Or $): 0.0

## 2023-09-20 DIAGNOSIS — Z78.0 MENOPAUSE: ICD-10-CM

## 2024-01-14 NOTE — TELEPHONE ENCOUNTER
Patient was discharged to [facility]. Patient's daughter was informed about the transfer by patient's nurse over the phone. Nursing report was given to nurse prior to transfer and patient had a bed available at the time of transfer.  Discharge instructions and nursing report were included with patient transfer. IV access was removed and band aid was applied to prevent bleeding; no bleeding was noted. Ambulance crew arrived at at around 4 pm and placed patient on the stretcher. Patient left the unit at around 16:30 and had no complaints at the time of transfer.                  Spoke to Pt she verbalized her appt date, time and location appt letter mailed.       ----- Message from Yina Ramirez MA sent at 12/11/2017 11:27 AM CST -----  Yes ma'am, I've copied Gera on this message she can assist with scheduling this appt.     Thanks  -Yina  ----- Message -----  From: Dominique Rendon MD  Sent: 12/11/2017  11:03 AM  To: JALIL Talbert - she never had the neuropsych test ordered in August - would y ou pls help with scheduling?  She needs f/u appt with Dr Bello after   thanks for your help.